# Patient Record
Sex: FEMALE | Race: WHITE | Employment: OTHER | URBAN - METROPOLITAN AREA
[De-identification: names, ages, dates, MRNs, and addresses within clinical notes are randomized per-mention and may not be internally consistent; named-entity substitution may affect disease eponyms.]

---

## 2017-01-07 ENCOUNTER — CARE COORDINATION (OUTPATIENT)
Dept: CASE MANAGEMENT | Age: 67
End: 2017-01-07

## 2017-01-08 ENCOUNTER — CARE COORDINATION (OUTPATIENT)
Dept: CASE MANAGEMENT | Age: 67
End: 2017-01-08

## 2017-01-09 ENCOUNTER — CARE COORDINATION (OUTPATIENT)
Dept: CASE MANAGEMENT | Age: 67
End: 2017-01-09

## 2017-01-10 ENCOUNTER — OFFICE VISIT (OUTPATIENT)
Dept: FAMILY MEDICINE CLINIC | Age: 67
End: 2017-01-10

## 2017-01-10 VITALS
WEIGHT: 200 LBS | SYSTOLIC BLOOD PRESSURE: 133 MMHG | BODY MASS INDEX: 33.28 KG/M2 | HEART RATE: 58 BPM | DIASTOLIC BLOOD PRESSURE: 82 MMHG | RESPIRATION RATE: 16 BRPM | TEMPERATURE: 97.9 F

## 2017-01-10 DIAGNOSIS — K44.9 HH (HIATUS HERNIA): ICD-10-CM

## 2017-01-10 DIAGNOSIS — I10 ESSENTIAL HYPERTENSION: Primary | ICD-10-CM

## 2017-01-10 PROCEDURE — G0444 DEPRESSION SCREEN ANNUAL: HCPCS | Performed by: FAMILY MEDICINE

## 2017-01-10 PROCEDURE — 99213 OFFICE O/P EST LOW 20 MIN: CPT | Performed by: FAMILY MEDICINE

## 2017-01-10 ASSESSMENT — PATIENT HEALTH QUESTIONNAIRE - PHQ9
9. THOUGHTS THAT YOU WOULD BE BETTER OFF DEAD, OR OF HURTING YOURSELF: 0
7. TROUBLE CONCENTRATING ON THINGS, SUCH AS READING THE NEWSPAPER OR WATCHING TELEVISION: 0
5. POOR APPETITE OR OVEREATING: 2
4. FEELING TIRED OR HAVING LITTLE ENERGY: 3
2. FEELING DOWN, DEPRESSED OR HOPELESS: 3
SUM OF ALL RESPONSES TO PHQ QUESTIONS 1-9: 14
SUM OF ALL RESPONSES TO PHQ9 QUESTIONS 1 & 2: 6
8. MOVING OR SPEAKING SO SLOWLY THAT OTHER PEOPLE COULD HAVE NOTICED. OR THE OPPOSITE, BEING SO FIGETY OR RESTLESS THAT YOU HAVE BEEN MOVING AROUND A LOT MORE THAN USUAL: 0
1. LITTLE INTEREST OR PLEASURE IN DOING THINGS: 3
10. IF YOU CHECKED OFF ANY PROBLEMS, HOW DIFFICULT HAVE THESE PROBLEMS MADE IT FOR YOU TO DO YOUR WORK, TAKE CARE OF THINGS AT HOME, OR GET ALONG WITH OTHER PEOPLE: 0
6. FEELING BAD ABOUT YOURSELF - OR THAT YOU ARE A FAILURE OR HAVE LET YOURSELF OR YOUR FAMILY DOWN: 0
3. TROUBLE FALLING OR STAYING ASLEEP: 3

## 2017-01-10 ASSESSMENT — ENCOUNTER SYMPTOMS
WHEEZING: 0
SHORTNESS OF BREATH: 0
COUGH: 0

## 2017-01-16 ENCOUNTER — CARE COORDINATION (OUTPATIENT)
Dept: CASE MANAGEMENT | Age: 67
End: 2017-01-16

## 2017-01-18 ENCOUNTER — OFFICE VISIT (OUTPATIENT)
Dept: CARDIOLOGY CLINIC | Age: 67
End: 2017-01-18

## 2017-01-18 VITALS
DIASTOLIC BLOOD PRESSURE: 80 MMHG | SYSTOLIC BLOOD PRESSURE: 134 MMHG | HEART RATE: 50 BPM | WEIGHT: 204 LBS | HEIGHT: 65 IN | BODY MASS INDEX: 33.99 KG/M2 | OXYGEN SATURATION: 97 %

## 2017-01-18 DIAGNOSIS — I25.118 CORONARY ARTERY DISEASE OF NATIVE ARTERY OF NATIVE HEART WITH STABLE ANGINA PECTORIS (HCC): ICD-10-CM

## 2017-01-18 DIAGNOSIS — I20.8 CARDIAC SYNDROME X (HCC): Primary | ICD-10-CM

## 2017-01-18 DIAGNOSIS — I10 HTN (HYPERTENSION), BENIGN: ICD-10-CM

## 2017-01-18 PROCEDURE — 99214 OFFICE O/P EST MOD 30 MIN: CPT | Performed by: INTERNAL MEDICINE

## 2017-01-19 ENCOUNTER — TELEPHONE (OUTPATIENT)
Dept: FAMILY MEDICINE CLINIC | Age: 67
End: 2017-01-19

## 2017-01-19 ENCOUNTER — CARE COORDINATION (OUTPATIENT)
Dept: CASE MANAGEMENT | Age: 67
End: 2017-01-19

## 2017-01-20 ENCOUNTER — CARE COORDINATION (OUTPATIENT)
Dept: CASE MANAGEMENT | Age: 67
End: 2017-01-20

## 2017-01-24 ENCOUNTER — CARE COORDINATION (OUTPATIENT)
Dept: FAMILY MEDICINE CLINIC | Age: 67
End: 2017-01-24

## 2017-02-01 ENCOUNTER — OFFICE VISIT (OUTPATIENT)
Dept: NEUROLOGY | Age: 67
End: 2017-02-01

## 2017-02-01 VITALS
OXYGEN SATURATION: 97 % | SYSTOLIC BLOOD PRESSURE: 139 MMHG | HEIGHT: 65 IN | BODY MASS INDEX: 33.82 KG/M2 | HEART RATE: 60 BPM | DIASTOLIC BLOOD PRESSURE: 78 MMHG | WEIGHT: 203 LBS

## 2017-02-01 DIAGNOSIS — G47.33 OSA (OBSTRUCTIVE SLEEP APNEA): ICD-10-CM

## 2017-02-01 DIAGNOSIS — D33.0 BENIGN NEOPLASM OF SUPRATENTORIAL REGION OF BRAIN (HCC): ICD-10-CM

## 2017-02-01 DIAGNOSIS — G43.109 MIGRAINE WITH AURA AND WITHOUT STATUS MIGRAINOSUS, NOT INTRACTABLE: ICD-10-CM

## 2017-02-01 DIAGNOSIS — G44.52 NEW PERSISTENT DAILY HEADACHE: Primary | ICD-10-CM

## 2017-02-01 DIAGNOSIS — I10 HTN (HYPERTENSION), BENIGN: ICD-10-CM

## 2017-02-01 PROCEDURE — 99214 OFFICE O/P EST MOD 30 MIN: CPT | Performed by: PSYCHIATRY & NEUROLOGY

## 2017-02-06 ENCOUNTER — OFFICE VISIT (OUTPATIENT)
Dept: FAMILY MEDICINE CLINIC | Age: 67
End: 2017-02-06

## 2017-02-06 VITALS
DIASTOLIC BLOOD PRESSURE: 87 MMHG | HEIGHT: 65 IN | RESPIRATION RATE: 16 BRPM | SYSTOLIC BLOOD PRESSURE: 140 MMHG | HEART RATE: 61 BPM | OXYGEN SATURATION: 99 % | BODY MASS INDEX: 33.45 KG/M2 | TEMPERATURE: 98 F | WEIGHT: 200.8 LBS

## 2017-02-06 DIAGNOSIS — J01.90 ACUTE SINUSITIS, RECURRENCE NOT SPECIFIED, UNSPECIFIED LOCATION: ICD-10-CM

## 2017-02-06 DIAGNOSIS — J06.9 UPPER RESPIRATORY TRACT INFECTION, UNSPECIFIED TYPE: Primary | ICD-10-CM

## 2017-02-06 PROCEDURE — G0444 DEPRESSION SCREEN ANNUAL: HCPCS | Performed by: NURSE PRACTITIONER

## 2017-02-06 PROCEDURE — 99213 OFFICE O/P EST LOW 20 MIN: CPT | Performed by: NURSE PRACTITIONER

## 2017-02-06 RX ORDER — AMOXICILLIN AND CLAVULANATE POTASSIUM 875; 125 MG/1; MG/1
1 TABLET, FILM COATED ORAL 2 TIMES DAILY
Qty: 20 TABLET | Refills: 0 | Status: SHIPPED | OUTPATIENT
Start: 2017-02-06 | End: 2017-03-29 | Stop reason: ALTCHOICE

## 2017-02-06 RX ORDER — DEXTROMETHORPHAN HYDROBROMIDE AND PROMETHAZINE HYDROCHLORIDE 15; 6.25 MG/5ML; MG/5ML
5 SYRUP ORAL 4 TIMES DAILY PRN
Qty: 240 ML | Refills: 0 | Status: SHIPPED | OUTPATIENT
Start: 2017-02-06 | End: 2017-03-03 | Stop reason: ALTCHOICE

## 2017-02-06 ASSESSMENT — PATIENT HEALTH QUESTIONNAIRE - PHQ9
6. FEELING BAD ABOUT YOURSELF - OR THAT YOU ARE A FAILURE OR HAVE LET YOURSELF OR YOUR FAMILY DOWN: 0
2. FEELING DOWN, DEPRESSED OR HOPELESS: 3
SUM OF ALL RESPONSES TO PHQ9 QUESTIONS 1 & 2: 3
SUM OF ALL RESPONSES TO PHQ QUESTIONS 1-9: 12
9. THOUGHTS THAT YOU WOULD BE BETTER OFF DEAD, OR OF HURTING YOURSELF: 0
4. FEELING TIRED OR HAVING LITTLE ENERGY: 3
10. IF YOU CHECKED OFF ANY PROBLEMS, HOW DIFFICULT HAVE THESE PROBLEMS MADE IT FOR YOU TO DO YOUR WORK, TAKE CARE OF THINGS AT HOME, OR GET ALONG WITH OTHER PEOPLE: 1
7. TROUBLE CONCENTRATING ON THINGS, SUCH AS READING THE NEWSPAPER OR WATCHING TELEVISION: 0
8. MOVING OR SPEAKING SO SLOWLY THAT OTHER PEOPLE COULD HAVE NOTICED. OR THE OPPOSITE, BEING SO FIGETY OR RESTLESS THAT YOU HAVE BEEN MOVING AROUND A LOT MORE THAN USUAL: 0
5. POOR APPETITE OR OVEREATING: 3
3. TROUBLE FALLING OR STAYING ASLEEP: 3

## 2017-02-06 ASSESSMENT — ENCOUNTER SYMPTOMS
SORE THROAT: 1
DIARRHEA: 0
WHEEZING: 0
SINUS PAIN: 1
PHOTOPHOBIA: 0
TROUBLE SWALLOWING: 0
BACK PAIN: 0
RHINORRHEA: 1
VOMITING: 0
ABDOMINAL PAIN: 0
SINUS PRESSURE: 1
SHORTNESS OF BREATH: 1
COUGH: 1
EYE REDNESS: 0
COLOR CHANGE: 0
EYE ITCHING: 0
EYE PAIN: 0
CHEST TIGHTNESS: 0
NAUSEA: 0

## 2017-02-08 ENCOUNTER — CARE COORDINATION (OUTPATIENT)
Dept: FAMILY MEDICINE CLINIC | Age: 67
End: 2017-02-08

## 2017-02-17 ENCOUNTER — OFFICE VISIT (OUTPATIENT)
Dept: FAMILY MEDICINE CLINIC | Age: 67
End: 2017-02-17

## 2017-02-17 VITALS
WEIGHT: 200 LBS | HEART RATE: 63 BPM | RESPIRATION RATE: 16 BRPM | TEMPERATURE: 98 F | HEIGHT: 65 IN | OXYGEN SATURATION: 98 % | DIASTOLIC BLOOD PRESSURE: 82 MMHG | BODY MASS INDEX: 33.32 KG/M2 | SYSTOLIC BLOOD PRESSURE: 140 MMHG

## 2017-02-17 DIAGNOSIS — M62.830 BACK MUSCLE SPASM: ICD-10-CM

## 2017-02-17 DIAGNOSIS — E03.9 HYPOTHYROIDISM, UNSPECIFIED TYPE: ICD-10-CM

## 2017-02-17 DIAGNOSIS — L29.9 ITCHING: ICD-10-CM

## 2017-02-17 DIAGNOSIS — M54.41 ACUTE RIGHT-SIDED LOW BACK PAIN WITH RIGHT-SIDED SCIATICA: Primary | ICD-10-CM

## 2017-02-17 LAB
A/G RATIO: 1.4 (ref 1.1–2.2)
ALBUMIN SERPL-MCNC: 4.1 G/DL (ref 3.4–5)
ALP BLD-CCNC: 132 U/L (ref 40–129)
ALT SERPL-CCNC: 9 U/L (ref 10–40)
ANION GAP SERPL CALCULATED.3IONS-SCNC: 15 MMOL/L (ref 3–16)
AST SERPL-CCNC: 12 U/L (ref 15–37)
BILIRUB SERPL-MCNC: 0.4 MG/DL (ref 0–1)
BUN BLDV-MCNC: 14 MG/DL (ref 7–20)
CALCIUM SERPL-MCNC: 9.3 MG/DL (ref 8.3–10.6)
CHLORIDE BLD-SCNC: 112 MMOL/L (ref 99–110)
CO2: 20 MMOL/L (ref 21–32)
CREAT SERPL-MCNC: 0.8 MG/DL (ref 0.6–1.2)
GFR AFRICAN AMERICAN: >60
GFR NON-AFRICAN AMERICAN: >60
GLOBULIN: 2.9 G/DL
GLUCOSE BLD-MCNC: 100 MG/DL (ref 70–99)
POTASSIUM SERPL-SCNC: 4.3 MMOL/L (ref 3.5–5.1)
SODIUM BLD-SCNC: 147 MMOL/L (ref 136–145)
T4 FREE: 2.1 NG/DL (ref 0.9–1.8)
TOTAL PROTEIN: 7 G/DL (ref 6.4–8.2)
TSH REFLEX FT4: <0.01 UIU/ML (ref 0.27–4.2)

## 2017-02-17 PROCEDURE — 99214 OFFICE O/P EST MOD 30 MIN: CPT | Performed by: NURSE PRACTITIONER

## 2017-02-17 RX ORDER — BACLOFEN 10 MG/1
10 TABLET ORAL 2 TIMES DAILY
Qty: 30 TABLET | Refills: 0 | Status: SHIPPED | OUTPATIENT
Start: 2017-02-17 | End: 2017-02-27

## 2017-02-17 RX ORDER — ETODOLAC 400 MG/1
400 TABLET, FILM COATED ORAL 2 TIMES DAILY
Qty: 30 TABLET | Refills: 0 | Status: SHIPPED | OUTPATIENT
Start: 2017-02-17 | End: 2017-03-03

## 2017-02-17 ASSESSMENT — ENCOUNTER SYMPTOMS
NAUSEA: 0
ABDOMINAL PAIN: 0
BACK PAIN: 1
CHEST TIGHTNESS: 0
COLOR CHANGE: 0
COUGH: 0
SHORTNESS OF BREATH: 0
CONSTIPATION: 0
WHEEZING: 0
BOWEL INCONTINENCE: 0
DIARRHEA: 0
EYES NEGATIVE: 1

## 2017-02-20 ENCOUNTER — TELEPHONE (OUTPATIENT)
Dept: FAMILY MEDICINE CLINIC | Age: 67
End: 2017-02-20

## 2017-02-20 DIAGNOSIS — E03.9 HYPOTHYROIDISM, UNSPECIFIED TYPE: Primary | ICD-10-CM

## 2017-02-20 RX ORDER — LEVOTHYROXINE SODIUM 0.12 MG/1
125 TABLET ORAL DAILY
Qty: 30 TABLET | Refills: 3 | Status: SHIPPED | OUTPATIENT
Start: 2017-02-20 | End: 2017-06-19 | Stop reason: DRUGHIGH

## 2017-03-03 ENCOUNTER — HOSPITAL ENCOUNTER (OUTPATIENT)
Dept: OTHER | Age: 67
Discharge: HOME | End: 2017-03-03
Attending: INTERNAL MEDICINE | Admitting: INTERNAL MEDICINE

## 2017-03-03 VITALS
TEMPERATURE: 97 F | HEART RATE: 50 BPM | DIASTOLIC BLOOD PRESSURE: 82 MMHG | OXYGEN SATURATION: 96 % | SYSTOLIC BLOOD PRESSURE: 114 MMHG | BODY MASS INDEX: 33.32 KG/M2 | WEIGHT: 200 LBS | RESPIRATION RATE: 18 BRPM | HEIGHT: 65 IN

## 2017-03-03 RX ORDER — SODIUM CHLORIDE, SODIUM LACTATE, POTASSIUM CHLORIDE, CALCIUM CHLORIDE 600; 310; 30; 20 MG/100ML; MG/100ML; MG/100ML; MG/100ML
INJECTION, SOLUTION INTRAVENOUS CONTINUOUS
Status: DISCONTINUED | OUTPATIENT
Start: 2017-03-03 | End: 2017-03-04 | Stop reason: HOSPADM

## 2017-03-03 ASSESSMENT — PAIN SCALES - GENERAL
PAINLEVEL_OUTOF10: 0

## 2017-03-03 ASSESSMENT — PAIN - FUNCTIONAL ASSESSMENT: PAIN_FUNCTIONAL_ASSESSMENT: 0-10

## 2017-03-20 RX ORDER — PANTOPRAZOLE SODIUM 40 MG/1
40 TABLET, DELAYED RELEASE ORAL 2 TIMES DAILY
Qty: 60 TABLET | Refills: 3 | Status: SHIPPED | OUTPATIENT
Start: 2017-03-20 | End: 2017-08-30 | Stop reason: SDUPTHER

## 2017-03-22 ENCOUNTER — CARE COORDINATION (OUTPATIENT)
Dept: CASE MANAGEMENT | Age: 67
End: 2017-03-22

## 2017-03-22 RX ORDER — ROSUVASTATIN CALCIUM 5 MG/1
5 TABLET, COATED ORAL DAILY
Qty: 90 TABLET | Refills: 3 | Status: SHIPPED | OUTPATIENT
Start: 2017-03-22 | End: 2018-04-11 | Stop reason: SDUPTHER

## 2017-03-27 ENCOUNTER — CARE COORDINATION (OUTPATIENT)
Dept: CASE MANAGEMENT | Age: 67
End: 2017-03-27

## 2017-03-29 ENCOUNTER — HOSPITAL ENCOUNTER (OUTPATIENT)
Dept: GENERAL RADIOLOGY | Age: 67
Discharge: HOME | End: 2017-03-29

## 2017-03-29 ENCOUNTER — OFFICE VISIT (OUTPATIENT)
Dept: FAMILY MEDICINE CLINIC | Age: 67
End: 2017-03-29

## 2017-03-29 VITALS
SYSTOLIC BLOOD PRESSURE: 152 MMHG | BODY MASS INDEX: 33.28 KG/M2 | HEART RATE: 52 BPM | RESPIRATION RATE: 16 BRPM | DIASTOLIC BLOOD PRESSURE: 92 MMHG | TEMPERATURE: 98 F | WEIGHT: 200 LBS

## 2017-03-29 DIAGNOSIS — M54.41 CHRONIC RIGHT-SIDED LOW BACK PAIN WITH RIGHT-SIDED SCIATICA: ICD-10-CM

## 2017-03-29 DIAGNOSIS — G89.29 CHRONIC RIGHT-SIDED LOW BACK PAIN WITH RIGHT-SIDED SCIATICA: ICD-10-CM

## 2017-03-29 DIAGNOSIS — M54.41 CHRONIC RIGHT-SIDED LOW BACK PAIN WITH RIGHT-SIDED SCIATICA: Primary | ICD-10-CM

## 2017-03-29 DIAGNOSIS — G89.29 CHRONIC RIGHT-SIDED LOW BACK PAIN WITH RIGHT-SIDED SCIATICA: Primary | ICD-10-CM

## 2017-03-29 PROCEDURE — G0444 DEPRESSION SCREEN ANNUAL: HCPCS | Performed by: FAMILY MEDICINE

## 2017-03-29 PROCEDURE — 99213 OFFICE O/P EST LOW 20 MIN: CPT | Performed by: FAMILY MEDICINE

## 2017-03-29 RX ORDER — CYCLOBENZAPRINE HCL 10 MG
10 TABLET ORAL 3 TIMES DAILY PRN
COMMUNITY
End: 2017-07-25

## 2017-03-29 ASSESSMENT — ENCOUNTER SYMPTOMS: BACK PAIN: 1

## 2017-03-29 ASSESSMENT — PATIENT HEALTH QUESTIONNAIRE - PHQ9
10. IF YOU CHECKED OFF ANY PROBLEMS, HOW DIFFICULT HAVE THESE PROBLEMS MADE IT FOR YOU TO DO YOUR WORK, TAKE CARE OF THINGS AT HOME, OR GET ALONG WITH OTHER PEOPLE: 0
2. FEELING DOWN, DEPRESSED OR HOPELESS: 3
SUM OF ALL RESPONSES TO PHQ QUESTIONS 1-9: 15
5. POOR APPETITE OR OVEREATING: 2
3. TROUBLE FALLING OR STAYING ASLEEP: 3
8. MOVING OR SPEAKING SO SLOWLY THAT OTHER PEOPLE COULD HAVE NOTICED. OR THE OPPOSITE, BEING SO FIGETY OR RESTLESS THAT YOU HAVE BEEN MOVING AROUND A LOT MORE THAN USUAL: 0
6. FEELING BAD ABOUT YOURSELF - OR THAT YOU ARE A FAILURE OR HAVE LET YOURSELF OR YOUR FAMILY DOWN: 0
1. LITTLE INTEREST OR PLEASURE IN DOING THINGS: 3
9. THOUGHTS THAT YOU WOULD BE BETTER OFF DEAD, OR OF HURTING YOURSELF: 0
7. TROUBLE CONCENTRATING ON THINGS, SUCH AS READING THE NEWSPAPER OR WATCHING TELEVISION: 1
SUM OF ALL RESPONSES TO PHQ9 QUESTIONS 1 & 2: 6
4. FEELING TIRED OR HAVING LITTLE ENERGY: 3

## 2017-03-30 ENCOUNTER — TELEPHONE (OUTPATIENT)
Dept: FAMILY MEDICINE CLINIC | Age: 67
End: 2017-03-30

## 2017-03-30 DIAGNOSIS — M54.16 RIGHT LUMBAR RADICULITIS: Primary | ICD-10-CM

## 2017-03-31 ENCOUNTER — CARE COORDINATION (OUTPATIENT)
Dept: CASE MANAGEMENT | Age: 67
End: 2017-03-31

## 2017-04-01 ENCOUNTER — CARE COORDINATION (OUTPATIENT)
Dept: CASE MANAGEMENT | Age: 67
End: 2017-04-01

## 2017-04-01 DIAGNOSIS — G43.109 MIGRAINE WITH AURA AND WITHOUT STATUS MIGRAINOSUS, NOT INTRACTABLE: ICD-10-CM

## 2017-04-01 DIAGNOSIS — G44.52 NEW PERSISTENT DAILY HEADACHE: ICD-10-CM

## 2017-04-03 RX ORDER — TOPIRAMATE 25 MG/1
TABLET ORAL
Qty: 120 TABLET | Refills: 3 | Status: SHIPPED | OUTPATIENT
Start: 2017-04-03 | End: 2017-07-26 | Stop reason: SDUPTHER

## 2017-04-06 ENCOUNTER — HOSPITAL ENCOUNTER (OUTPATIENT)
Dept: MRI IMAGING | Age: 67
Discharge: HOME | End: 2017-04-06
Attending: FAMILY MEDICINE | Admitting: FAMILY MEDICINE

## 2017-04-06 ENCOUNTER — HOSPITAL ENCOUNTER (OUTPATIENT)
Dept: MRI IMAGING | Age: 67
Discharge: HOME | End: 2017-04-06
Attending: NEUROLOGICAL SURGERY | Admitting: NEUROLOGICAL SURGERY

## 2017-04-06 DIAGNOSIS — D32.9 MENINGIOMA (HCC): ICD-10-CM

## 2017-04-06 DIAGNOSIS — M54.16 RIGHT LUMBAR RADICULITIS: ICD-10-CM

## 2017-04-06 DIAGNOSIS — M54.16 RADICULOPATHY OF LUMBAR REGION: ICD-10-CM

## 2017-04-06 DIAGNOSIS — D32.9 BENIGN NEOPLASM OF MENINGES (HCC): ICD-10-CM

## 2017-04-06 LAB
ALBUMIN SERPL-MCNC: 4 G/DL (ref 3.4–5)
ANION GAP SERPL CALCULATED.3IONS-SCNC: 10 MMOL/L (ref 3–16)
BUN BLDV-MCNC: 12 MG/DL (ref 7–20)
CALCIUM SERPL-MCNC: 8.9 MG/DL (ref 8.3–10.6)
CHLORIDE BLD-SCNC: 102 MMOL/L (ref 99–110)
CO2: 24 MMOL/L (ref 21–32)
CREAT SERPL-MCNC: 0.7 MG/DL (ref 0.6–1.2)
GFR AFRICAN AMERICAN: >60
GFR NON-AFRICAN AMERICAN: >60
GLUCOSE BLD-MCNC: 97 MG/DL (ref 70–99)
PHOSPHORUS: 3.5 MG/DL (ref 2.5–4.9)
POTASSIUM SERPL-SCNC: 3.5 MMOL/L (ref 3.5–5.1)
SODIUM BLD-SCNC: 136 MMOL/L (ref 136–145)

## 2017-04-10 ENCOUNTER — CARE COORDINATION (OUTPATIENT)
Dept: FAMILY MEDICINE CLINIC | Age: 67
End: 2017-04-10

## 2017-04-10 ENCOUNTER — TELEPHONE (OUTPATIENT)
Dept: FAMILY MEDICINE CLINIC | Age: 67
End: 2017-04-10

## 2017-04-10 DIAGNOSIS — M51.16 LUMBAR DISC DISEASE WITH RADICULOPATHY: Primary | ICD-10-CM

## 2017-04-11 ENCOUNTER — TELEPHONE (OUTPATIENT)
Dept: FAMILY MEDICINE CLINIC | Age: 67
End: 2017-04-11

## 2017-04-11 RX ORDER — TRAMADOL HYDROCHLORIDE 50 MG/1
50 TABLET ORAL EVERY 8 HOURS PRN
Qty: 30 TABLET | Refills: 1 | Status: SHIPPED | OUTPATIENT
Start: 2017-04-11 | End: 2018-04-11 | Stop reason: ALTCHOICE

## 2017-04-18 RX ORDER — SUCRALFATE 1 G/1
1 TABLET ORAL 4 TIMES DAILY
Qty: 120 TABLET | Refills: 3 | Status: SHIPPED | OUTPATIENT
Start: 2017-04-18 | End: 2018-01-02 | Stop reason: SDUPTHER

## 2017-04-20 ENCOUNTER — TELEPHONE (OUTPATIENT)
Dept: FAMILY MEDICINE CLINIC | Age: 67
End: 2017-04-20

## 2017-04-25 ENCOUNTER — TELEPHONE (OUTPATIENT)
Dept: FAMILY MEDICINE CLINIC | Age: 67
End: 2017-04-25

## 2017-04-25 RX ORDER — METHYLPHENIDATE HYDROCHLORIDE 10 MG/1
10 TABLET ORAL 2 TIMES DAILY
Qty: 60 TABLET | Refills: 0 | Status: SHIPPED | OUTPATIENT
Start: 2017-04-25 | End: 2017-06-05 | Stop reason: SDUPTHER

## 2017-05-01 ENCOUNTER — OFFICE VISIT (OUTPATIENT)
Dept: ORTHOPEDIC SURGERY | Age: 67
End: 2017-05-01

## 2017-05-01 VITALS
SYSTOLIC BLOOD PRESSURE: 173 MMHG | HEART RATE: 54 BPM | BODY MASS INDEX: 33.32 KG/M2 | WEIGHT: 200 LBS | HEIGHT: 65 IN | DIASTOLIC BLOOD PRESSURE: 94 MMHG

## 2017-05-01 DIAGNOSIS — M51.36 LUMBAR DEGENERATIVE DISC DISEASE: Primary | ICD-10-CM

## 2017-05-01 PROCEDURE — 99213 OFFICE O/P EST LOW 20 MIN: CPT | Performed by: ORTHOPAEDIC SURGERY

## 2017-05-03 ENCOUNTER — OFFICE VISIT (OUTPATIENT)
Dept: ORTHOPEDIC SURGERY | Age: 67
End: 2017-05-03

## 2017-05-03 ENCOUNTER — TELEPHONE (OUTPATIENT)
Dept: ORTHOPEDIC SURGERY | Age: 67
End: 2017-05-03

## 2017-05-03 VITALS
HEIGHT: 65 IN | BODY MASS INDEX: 33.31 KG/M2 | SYSTOLIC BLOOD PRESSURE: 132 MMHG | HEART RATE: 78 BPM | DIASTOLIC BLOOD PRESSURE: 88 MMHG | WEIGHT: 199.96 LBS

## 2017-05-03 DIAGNOSIS — M51.36 DDD (DEGENERATIVE DISC DISEASE), LUMBAR: Primary | ICD-10-CM

## 2017-05-03 DIAGNOSIS — M54.16 LUMBAR RADICULITIS: ICD-10-CM

## 2017-05-03 PROCEDURE — 99214 OFFICE O/P EST MOD 30 MIN: CPT | Performed by: PHYSICIAN ASSISTANT

## 2017-05-09 ENCOUNTER — HOSPITAL ENCOUNTER (OUTPATIENT)
Dept: PAIN MANAGEMENT | Age: 67
Discharge: HOME | End: 2017-05-09
Attending: PHYSICAL MEDICINE & REHABILITATION | Admitting: PHYSICAL MEDICINE & REHABILITATION

## 2017-05-09 VITALS
WEIGHT: 199 LBS | HEIGHT: 64 IN | OXYGEN SATURATION: 100 % | BODY MASS INDEX: 33.97 KG/M2 | HEART RATE: 93 BPM | SYSTOLIC BLOOD PRESSURE: 160 MMHG | RESPIRATION RATE: 16 BRPM | TEMPERATURE: 97.3 F | DIASTOLIC BLOOD PRESSURE: 89 MMHG

## 2017-05-09 ASSESSMENT — ACTIVITIES OF DAILY LIVING (ADL): EFFECT OF PAIN ON DAILY ACTIVITIES: WALKING AND STANDING

## 2017-05-09 ASSESSMENT — PAIN DESCRIPTION - DESCRIPTORS: DESCRIPTORS: ACHING

## 2017-05-09 ASSESSMENT — PAIN - FUNCTIONAL ASSESSMENT: PAIN_FUNCTIONAL_ASSESSMENT: 0-10

## 2017-05-15 RX ORDER — ISOSORBIDE MONONITRATE 30 MG/1
TABLET, EXTENDED RELEASE ORAL
Qty: 30 TABLET | Refills: 11 | Status: SHIPPED | OUTPATIENT
Start: 2017-05-15 | End: 2018-09-21 | Stop reason: SDUPTHER

## 2017-05-17 ENCOUNTER — CARE COORDINATION (OUTPATIENT)
Dept: FAMILY MEDICINE CLINIC | Age: 67
End: 2017-05-17

## 2017-05-23 ENCOUNTER — CARE COORDINATOR VISIT (OUTPATIENT)
Dept: FAMILY MEDICINE CLINIC | Age: 67
End: 2017-05-23

## 2017-05-23 ENCOUNTER — OFFICE VISIT (OUTPATIENT)
Dept: FAMILY MEDICINE CLINIC | Age: 67
End: 2017-05-23

## 2017-05-23 VITALS
HEART RATE: 55 BPM | RESPIRATION RATE: 16 BRPM | WEIGHT: 193 LBS | DIASTOLIC BLOOD PRESSURE: 94 MMHG | BODY MASS INDEX: 33.13 KG/M2 | TEMPERATURE: 98.2 F | SYSTOLIC BLOOD PRESSURE: 168 MMHG

## 2017-05-23 DIAGNOSIS — M54.16 LUMBAR RADICULITIS: ICD-10-CM

## 2017-05-23 DIAGNOSIS — I10 ESSENTIAL HYPERTENSION: Primary | ICD-10-CM

## 2017-05-23 PROCEDURE — 99213 OFFICE O/P EST LOW 20 MIN: CPT | Performed by: FAMILY MEDICINE

## 2017-05-23 RX ORDER — LISINOPRIL 10 MG/1
10 TABLET ORAL DAILY
Qty: 30 TABLET | Refills: 3 | Status: SHIPPED | OUTPATIENT
Start: 2017-05-23 | End: 2018-04-19 | Stop reason: ALTCHOICE

## 2017-05-23 ASSESSMENT — ENCOUNTER SYMPTOMS
COUGH: 0
SHORTNESS OF BREATH: 1
WHEEZING: 0

## 2017-05-28 ASSESSMENT — ENCOUNTER SYMPTOMS
BLOOD IN STOOL: 0
BACK PAIN: 1
ABDOMINAL PAIN: 0
PHOTOPHOBIA: 0

## 2017-05-30 ENCOUNTER — OFFICE VISIT (OUTPATIENT)
Dept: ORTHOPEDIC SURGERY | Age: 67
End: 2017-05-30

## 2017-05-30 VITALS
HEART RATE: 59 BPM | SYSTOLIC BLOOD PRESSURE: 133 MMHG | BODY MASS INDEX: 32.93 KG/M2 | HEIGHT: 64 IN | DIASTOLIC BLOOD PRESSURE: 72 MMHG | WEIGHT: 192.9 LBS

## 2017-05-30 DIAGNOSIS — M48.061 LUMBAR STENOSIS: Primary | ICD-10-CM

## 2017-05-30 DIAGNOSIS — M54.16 LUMBAR RADICULITIS: ICD-10-CM

## 2017-05-30 DIAGNOSIS — M51.36 DDD (DEGENERATIVE DISC DISEASE), LUMBAR: ICD-10-CM

## 2017-05-30 PROCEDURE — 99214 OFFICE O/P EST MOD 30 MIN: CPT | Performed by: PHYSICIAN ASSISTANT

## 2017-06-02 ENCOUNTER — CARE COORDINATION (OUTPATIENT)
Dept: FAMILY MEDICINE CLINIC | Age: 67
End: 2017-06-02

## 2017-06-05 ENCOUNTER — TELEPHONE (OUTPATIENT)
Dept: FAMILY MEDICINE CLINIC | Age: 67
End: 2017-06-05

## 2017-06-05 RX ORDER — METHYLPHENIDATE HYDROCHLORIDE 10 MG/1
10 TABLET ORAL 2 TIMES DAILY
Qty: 60 TABLET | Refills: 0 | Status: SHIPPED | OUTPATIENT
Start: 2017-06-05 | End: 2017-07-05 | Stop reason: SDUPTHER

## 2017-06-06 ENCOUNTER — OFFICE VISIT (OUTPATIENT)
Dept: ORTHOPEDIC SURGERY | Age: 67
End: 2017-06-06

## 2017-06-06 DIAGNOSIS — M25.551 PAIN OF RIGHT HIP JOINT: ICD-10-CM

## 2017-06-06 DIAGNOSIS — M70.61 GREATER TROCHANTERIC BURSITIS OF RIGHT HIP: Primary | ICD-10-CM

## 2017-06-06 PROCEDURE — 20610 DRAIN/INJ JOINT/BURSA W/O US: CPT | Performed by: ORTHOPAEDIC SURGERY

## 2017-06-06 PROCEDURE — 73502 X-RAY EXAM HIP UNI 2-3 VIEWS: CPT | Performed by: ORTHOPAEDIC SURGERY

## 2017-06-06 PROCEDURE — 99214 OFFICE O/P EST MOD 30 MIN: CPT | Performed by: ORTHOPAEDIC SURGERY

## 2017-06-09 ENCOUNTER — TELEPHONE (OUTPATIENT)
Dept: FAMILY MEDICINE CLINIC | Age: 67
End: 2017-06-09

## 2017-06-09 DIAGNOSIS — K92.1 BLACK TARRY STOOLS: ICD-10-CM

## 2017-06-09 DIAGNOSIS — K92.1 BLACK TARRY STOOLS: Primary | ICD-10-CM

## 2017-06-09 DIAGNOSIS — E03.9 HYPOTHYROIDISM, UNSPECIFIED TYPE: ICD-10-CM

## 2017-06-09 LAB
BASOPHILS ABSOLUTE: 0 K/UL (ref 0–0.2)
BASOPHILS RELATIVE PERCENT: 0.3 %
EOSINOPHILS ABSOLUTE: 0 K/UL (ref 0–0.6)
EOSINOPHILS RELATIVE PERCENT: 0.9 %
HCT VFR BLD CALC: 33 % (ref 36–48)
HEMOGLOBIN: 10.3 G/DL (ref 12–16)
LYMPHOCYTES ABSOLUTE: 2.1 K/UL (ref 1–5.1)
LYMPHOCYTES RELATIVE PERCENT: 39.7 %
MCH RBC QN AUTO: 26.7 PG (ref 26–34)
MCHC RBC AUTO-ENTMCNC: 31.4 G/DL (ref 31–36)
MCV RBC AUTO: 85.1 FL (ref 80–100)
MONOCYTES ABSOLUTE: 0.3 K/UL (ref 0–1.3)
MONOCYTES RELATIVE PERCENT: 5.8 %
NEUTROPHILS ABSOLUTE: 2.8 K/UL (ref 1.7–7.7)
NEUTROPHILS RELATIVE PERCENT: 53.3 %
PDW BLD-RTO: 17.8 % (ref 12.4–15.4)
PLATELET # BLD: 147 K/UL (ref 135–450)
PMV BLD AUTO: 8.3 FL (ref 5–10.5)
RBC # BLD: 3.87 M/UL (ref 4–5.2)
T4 FREE: 1.8 NG/DL (ref 0.9–1.8)
TSH REFLEX FT4: <0.01 UIU/ML (ref 0.27–4.2)
WBC # BLD: 5.2 K/UL (ref 4–11)

## 2017-06-12 ENCOUNTER — TELEPHONE (OUTPATIENT)
Dept: FAMILY MEDICINE CLINIC | Age: 67
End: 2017-06-12

## 2017-06-19 ENCOUNTER — TELEPHONE (OUTPATIENT)
Dept: FAMILY MEDICINE CLINIC | Age: 67
End: 2017-06-19

## 2017-06-19 RX ORDER — LEVOTHYROXINE SODIUM 112 UG/1
112 TABLET ORAL DAILY
Qty: 30 TABLET | Refills: 5 | Status: SHIPPED | OUTPATIENT
Start: 2017-06-19 | End: 2017-12-18 | Stop reason: SDUPTHER

## 2017-06-28 ENCOUNTER — OFFICE VISIT (OUTPATIENT)
Dept: FAMILY MEDICINE CLINIC | Age: 67
End: 2017-06-28

## 2017-06-28 VITALS
BODY MASS INDEX: 34.6 KG/M2 | RESPIRATION RATE: 16 BRPM | DIASTOLIC BLOOD PRESSURE: 88 MMHG | HEIGHT: 62 IN | WEIGHT: 188 LBS | HEART RATE: 55 BPM | SYSTOLIC BLOOD PRESSURE: 122 MMHG | TEMPERATURE: 98 F

## 2017-06-28 DIAGNOSIS — M51.36 DDD (DEGENERATIVE DISC DISEASE), LUMBAR: Primary | ICD-10-CM

## 2017-06-28 PROCEDURE — 99213 OFFICE O/P EST LOW 20 MIN: CPT | Performed by: FAMILY MEDICINE

## 2017-07-05 RX ORDER — METHYLPHENIDATE HYDROCHLORIDE 10 MG/1
10 TABLET ORAL 2 TIMES DAILY
Qty: 60 TABLET | Refills: 0 | Status: SHIPPED | OUTPATIENT
Start: 2017-07-05 | End: 2017-08-04 | Stop reason: SDUPTHER

## 2017-07-24 ENCOUNTER — OFFICE VISIT (OUTPATIENT)
Dept: CARDIOLOGY CLINIC | Age: 67
End: 2017-07-24

## 2017-07-24 VITALS
OXYGEN SATURATION: 98 % | WEIGHT: 189 LBS | BODY MASS INDEX: 34.78 KG/M2 | HEIGHT: 62 IN | SYSTOLIC BLOOD PRESSURE: 136 MMHG | DIASTOLIC BLOOD PRESSURE: 82 MMHG | HEART RATE: 52 BPM

## 2017-07-24 DIAGNOSIS — I10 ESSENTIAL HYPERTENSION: ICD-10-CM

## 2017-07-24 DIAGNOSIS — E78.2 MIXED HYPERLIPIDEMIA: ICD-10-CM

## 2017-07-24 DIAGNOSIS — I20.8 CARDIAC SYNDROME X (HCC): Primary | ICD-10-CM

## 2017-07-24 PROCEDURE — 99214 OFFICE O/P EST MOD 30 MIN: CPT | Performed by: NURSE PRACTITIONER

## 2017-07-25 ENCOUNTER — TELEPHONE (OUTPATIENT)
Dept: CARDIOLOGY CLINIC | Age: 67
End: 2017-07-25

## 2017-07-26 ENCOUNTER — CARE COORDINATION (OUTPATIENT)
Dept: FAMILY MEDICINE CLINIC | Age: 67
End: 2017-07-26

## 2017-07-26 ENCOUNTER — OFFICE VISIT (OUTPATIENT)
Dept: NEUROLOGY | Age: 67
End: 2017-07-26

## 2017-07-26 ENCOUNTER — CARE COORDINATION (OUTPATIENT)
Dept: CARE COORDINATION | Age: 67
End: 2017-07-26

## 2017-07-26 VITALS
DIASTOLIC BLOOD PRESSURE: 76 MMHG | OXYGEN SATURATION: 97 % | HEIGHT: 65 IN | BODY MASS INDEX: 31.82 KG/M2 | HEART RATE: 66 BPM | WEIGHT: 191 LBS | SYSTOLIC BLOOD PRESSURE: 136 MMHG

## 2017-07-26 DIAGNOSIS — G44.221 CHRONIC TENSION-TYPE HEADACHE, INTRACTABLE: ICD-10-CM

## 2017-07-26 DIAGNOSIS — I10 HTN (HYPERTENSION), BENIGN: ICD-10-CM

## 2017-07-26 DIAGNOSIS — D33.0 BENIGN NEOPLASM OF SUPRATENTORIAL REGION OF BRAIN (HCC): ICD-10-CM

## 2017-07-26 DIAGNOSIS — G47.33 OSA (OBSTRUCTIVE SLEEP APNEA): ICD-10-CM

## 2017-07-26 DIAGNOSIS — G43.119 INTRACTABLE MIGRAINE WITH AURA WITHOUT STATUS MIGRAINOSUS: Primary | ICD-10-CM

## 2017-07-26 DIAGNOSIS — E78.5 DYSLIPIDEMIA: ICD-10-CM

## 2017-07-26 PROCEDURE — 99214 OFFICE O/P EST MOD 30 MIN: CPT | Performed by: PSYCHIATRY & NEUROLOGY

## 2017-07-26 RX ORDER — TOPIRAMATE 25 MG/1
75 TABLET ORAL 2 TIMES DAILY
Qty: 180 TABLET | Refills: 5 | Status: SHIPPED | OUTPATIENT
Start: 2017-07-26 | End: 2017-11-01 | Stop reason: SDUPTHER

## 2017-08-03 ENCOUNTER — TELEPHONE (OUTPATIENT)
Dept: FAMILY MEDICINE CLINIC | Age: 67
End: 2017-08-03

## 2017-08-04 RX ORDER — METHYLPHENIDATE HYDROCHLORIDE 10 MG/1
10 TABLET ORAL 2 TIMES DAILY
Qty: 60 TABLET | Refills: 0 | Status: SHIPPED | OUTPATIENT
Start: 2017-08-04 | End: 2017-09-03

## 2017-08-07 ENCOUNTER — TELEPHONE (OUTPATIENT)
Dept: CARDIOLOGY CLINIC | Age: 67
End: 2017-08-07

## 2017-08-09 ENCOUNTER — TELEPHONE (OUTPATIENT)
Dept: CARDIOLOGY CLINIC | Age: 67
End: 2017-08-09

## 2017-08-15 ENCOUNTER — TELEPHONE (OUTPATIENT)
Dept: FAMILY MEDICINE CLINIC | Age: 67
End: 2017-08-15

## 2017-08-30 ENCOUNTER — CARE COORDINATION (OUTPATIENT)
Dept: FAMILY MEDICINE CLINIC | Age: 67
End: 2017-08-30

## 2017-08-30 ENCOUNTER — OFFICE VISIT (OUTPATIENT)
Dept: FAMILY MEDICINE CLINIC | Age: 67
End: 2017-08-30

## 2017-08-30 VITALS
HEART RATE: 47 BPM | RESPIRATION RATE: 16 BRPM | BODY MASS INDEX: 31.45 KG/M2 | WEIGHT: 189 LBS | SYSTOLIC BLOOD PRESSURE: 160 MMHG | DIASTOLIC BLOOD PRESSURE: 90 MMHG | TEMPERATURE: 97.7 F

## 2017-08-30 DIAGNOSIS — Z13.820 OSTEOPOROSIS SCREENING: ICD-10-CM

## 2017-08-30 DIAGNOSIS — I10 ESSENTIAL HYPERTENSION: ICD-10-CM

## 2017-08-30 DIAGNOSIS — Z12.31 ENCOUNTER FOR SCREENING MAMMOGRAM FOR BREAST CANCER: ICD-10-CM

## 2017-08-30 DIAGNOSIS — Z12.11 SCREENING FOR COLON CANCER: ICD-10-CM

## 2017-08-30 DIAGNOSIS — G47.419 CONTROLLED NARCOLEPSY: ICD-10-CM

## 2017-08-30 DIAGNOSIS — G47.33 OBSTRUCTIVE SLEEP APNEA SYNDROME: Primary | ICD-10-CM

## 2017-08-30 PROCEDURE — 99214 OFFICE O/P EST MOD 30 MIN: CPT | Performed by: FAMILY MEDICINE

## 2017-08-30 RX ORDER — LOSARTAN POTASSIUM 100 MG/1
100 TABLET ORAL DAILY
Qty: 30 TABLET | Refills: 5 | Status: SHIPPED | OUTPATIENT
Start: 2017-08-30 | End: 2018-03-06 | Stop reason: SDUPTHER

## 2017-08-30 RX ORDER — RANOLAZINE 500 MG/1
500 TABLET, EXTENDED RELEASE ORAL 2 TIMES DAILY
COMMUNITY
End: 2018-12-17 | Stop reason: SDUPTHER

## 2017-08-30 RX ORDER — PANTOPRAZOLE SODIUM 40 MG/1
40 TABLET, DELAYED RELEASE ORAL 2 TIMES DAILY
Qty: 60 TABLET | Refills: 5 | Status: SHIPPED | OUTPATIENT
Start: 2017-08-30 | End: 2018-03-12 | Stop reason: SDUPTHER

## 2017-08-30 RX ORDER — LISINOPRIL 10 MG/1
10 TABLET ORAL DAILY
Qty: 30 TABLET | Refills: 3 | Status: CANCELLED | OUTPATIENT
Start: 2017-08-30

## 2017-08-30 ASSESSMENT — ENCOUNTER SYMPTOMS
ABDOMINAL PAIN: 0
SORE THROAT: 0
RHINORRHEA: 0
TROUBLE SWALLOWING: 0
DIARRHEA: 0
SHORTNESS OF BREATH: 0
WHEEZING: 0
COUGH: 1
CONSTIPATION: 0

## 2017-09-04 ASSESSMENT — ENCOUNTER SYMPTOMS: PHOTOPHOBIA: 0

## 2017-09-06 ENCOUNTER — OFFICE VISIT (OUTPATIENT)
Dept: CARDIOLOGY CLINIC | Age: 67
End: 2017-09-06

## 2017-09-06 VITALS
WEIGHT: 190 LBS | SYSTOLIC BLOOD PRESSURE: 132 MMHG | DIASTOLIC BLOOD PRESSURE: 78 MMHG | HEART RATE: 56 BPM | BODY MASS INDEX: 31.65 KG/M2 | HEIGHT: 65 IN

## 2017-09-06 DIAGNOSIS — E78.2 MIXED HYPERLIPIDEMIA: ICD-10-CM

## 2017-09-06 DIAGNOSIS — R06.00 PAROXYSMAL DYSPNEA: ICD-10-CM

## 2017-09-06 DIAGNOSIS — I25.118 CORONARY ARTERY DISEASE OF NATIVE ARTERY OF NATIVE HEART WITH STABLE ANGINA PECTORIS (HCC): ICD-10-CM

## 2017-09-06 DIAGNOSIS — I20.8 CARDIAC SYNDROME X (HCC): ICD-10-CM

## 2017-09-06 DIAGNOSIS — I10 HTN (HYPERTENSION), BENIGN: ICD-10-CM

## 2017-09-06 DIAGNOSIS — R00.2 PALPITATIONS: ICD-10-CM

## 2017-09-06 DIAGNOSIS — I45.9 SKIPPED BEATS: Primary | ICD-10-CM

## 2017-09-06 PROCEDURE — 99214 OFFICE O/P EST MOD 30 MIN: CPT | Performed by: INTERNAL MEDICINE

## 2017-09-11 ENCOUNTER — HOSPITAL ENCOUNTER (OUTPATIENT)
Dept: PULMONOLOGY | Age: 67
Discharge: HOME | End: 2017-09-11
Attending: INTERNAL MEDICINE | Admitting: INTERNAL MEDICINE

## 2017-09-11 ENCOUNTER — HOSPITAL ENCOUNTER (OUTPATIENT)
Dept: GENERAL RADIOLOGY | Age: 67
Discharge: HOME | End: 2017-09-11
Attending: FAMILY MEDICINE | Admitting: FAMILY MEDICINE

## 2017-09-11 VITALS — OXYGEN SATURATION: 99 %

## 2017-09-11 DIAGNOSIS — Z13.820 ENCOUNTER FOR SCREENING FOR OSTEOPOROSIS: ICD-10-CM

## 2017-09-11 DIAGNOSIS — Z12.31 ENCOUNTER FOR SCREENING MAMMOGRAM FOR BREAST CANCER: ICD-10-CM

## 2017-09-11 DIAGNOSIS — Z13.820 OSTEOPOROSIS SCREENING: ICD-10-CM

## 2017-09-11 LAB — PRO-BNP: 496 PG/ML (ref 0–124)

## 2017-09-11 RX ORDER — ALBUTEROL SULFATE 2.5 MG/3ML
2.5 SOLUTION RESPIRATORY (INHALATION) ONCE
Status: COMPLETED | OUTPATIENT
Start: 2017-09-11 | End: 2017-09-11

## 2017-09-11 RX ORDER — METHYLPHENIDATE HYDROCHLORIDE 10 MG/1
10 TABLET ORAL 2 TIMES DAILY
Qty: 60 TABLET | Refills: 0 | Status: SHIPPED | OUTPATIENT
Start: 2017-09-11 | End: 2017-10-11 | Stop reason: SDUPTHER

## 2017-09-11 RX ADMIN — ALBUTEROL SULFATE 2.5 MG: 2.5 SOLUTION RESPIRATORY (INHALATION) at 10:44

## 2017-09-12 ENCOUNTER — TELEPHONE (OUTPATIENT)
Dept: CARDIOLOGY CLINIC | Age: 67
End: 2017-09-12

## 2017-09-12 DIAGNOSIS — R94.2 ABNORMAL PFT: Primary | ICD-10-CM

## 2017-09-20 ENCOUNTER — TELEPHONE (OUTPATIENT)
Dept: FAMILY MEDICINE CLINIC | Age: 67
End: 2017-09-20

## 2017-09-22 ENCOUNTER — CARE COORDINATION (OUTPATIENT)
Dept: FAMILY MEDICINE CLINIC | Age: 67
End: 2017-09-22

## 2017-10-11 ENCOUNTER — TELEPHONE (OUTPATIENT)
Dept: FAMILY MEDICINE CLINIC | Age: 67
End: 2017-10-11

## 2017-10-11 RX ORDER — METHYLPHENIDATE HYDROCHLORIDE 10 MG/1
10 TABLET ORAL 2 TIMES DAILY
Qty: 60 TABLET | Refills: 0 | Status: SHIPPED | OUTPATIENT
Start: 2017-10-11 | End: 2017-11-10

## 2017-10-11 NOTE — TELEPHONE ENCOUNTER
10/23/2017 9:20 AM    Argyle Castleman, MD          AND PULM       MMA  11/1/2017  11:00 AM   Jay Jay Cabrera MD    AND NEURO      MMA  11/6/2017  10:20 AM   DO AMRIK Weiss    [facility]  12/11/2017 9:00 AM    RAFITA Ayala   [facility]

## 2017-10-11 NOTE — TELEPHONE ENCOUNTER
Med refill:    methylphenidate (RITALIN) 10 MG tablet [[id number]]      Pharmacy:  [facility] [address] [facility] 8/30/17    Future Appointments  Date Time Provider Kamran Wangi   10/23/2017 9:20 AM Ramos Mercado MD AND PULM [facility]   11/1/2017 11:00 AM Salomon Gao MD AND NEURO [facility]   11/6/2017 10:20 AM DO RUKHSANA Melo  [address] [facility]   12/11/2017 9:00 AM Jimena Wong, RAFITA Giron [facility]

## 2017-10-24 ENCOUNTER — TELEPHONE (OUTPATIENT)
Dept: CARDIOLOGY CLINIC | Age: 67
End: 2017-10-24

## 2017-10-25 ENCOUNTER — OFFICE VISIT (OUTPATIENT)
Dept: PULMONOLOGY | Age: 67
End: 2017-10-25

## 2017-10-25 VITALS
BODY MASS INDEX: 32.09 KG/M2 | SYSTOLIC BLOOD PRESSURE: 160 MMHG | HEIGHT: 65 IN | WEIGHT: 192.6 LBS | TEMPERATURE: 98.3 F | DIASTOLIC BLOOD PRESSURE: 88 MMHG | OXYGEN SATURATION: 98 % | HEART RATE: 58 BPM | RESPIRATION RATE: 16 BRPM

## 2017-10-25 DIAGNOSIS — G47.33 OSA (OBSTRUCTIVE SLEEP APNEA): Primary | ICD-10-CM

## 2017-10-25 DIAGNOSIS — R06.02 SOB (SHORTNESS OF BREATH): ICD-10-CM

## 2017-10-25 PROCEDURE — 99203 OFFICE O/P NEW LOW 30 MIN: CPT | Performed by: INTERNAL MEDICINE

## 2017-10-25 RX ORDER — ALBUTEROL SULFATE 90 UG/1
2 AEROSOL, METERED RESPIRATORY (INHALATION) EVERY 6 HOURS PRN
Qty: 1 INHALER | Refills: 0 | COMMUNITY
Start: 2017-10-25 | End: 2018-06-18 | Stop reason: ALTCHOICE

## 2017-10-25 ASSESSMENT — SLEEP AND FATIGUE QUESTIONNAIRES
HOW LIKELY ARE YOU TO NOD OFF OR FALL ASLEEP IN A CAR, WHILE STOPPED FOR A FEW MINUTES IN TRAFFIC: 0
HOW LIKELY ARE YOU TO NOD OFF OR FALL ASLEEP WHILE SITTING AND TALKING TO SOMEONE: 0
HOW LIKELY ARE YOU TO NOD OFF OR FALL ASLEEP WHILE LYING DOWN TO REST IN THE AFTERNOON WHEN CIRCUMSTANCES PERMIT: 3
HOW LIKELY ARE YOU TO NOD OFF OR FALL ASLEEP WHILE SITTING INACTIVE IN A PUBLIC PLACE: 0
ESS TOTAL SCORE: 12
HOW LIKELY ARE YOU TO NOD OFF OR FALL ASLEEP WHILE SITTING AND READING: 3
HOW LIKELY ARE YOU TO NOD OFF OR FALL ASLEEP WHILE WATCHING TV: 3
NECK CIRCUMFERENCE (INCHES): 14
HOW LIKELY ARE YOU TO NOD OFF OR FALL ASLEEP WHEN YOU ARE A PASSENGER IN A CAR FOR AN HOUR WITHOUT A BREAK: 0
HOW LIKELY ARE YOU TO NOD OFF OR FALL ASLEEP WHILE SITTING QUIETLY AFTER LUNCH WITHOUT ALCOHOL: 3

## 2017-10-25 NOTE — PROGRESS NOTES
Chief Complaint/Referring Provider:  Patient is being seen at the request of Dr. Chiara Bates for a consultation for sleep apnea     Presenting HPI: Patient is a 29-year-old female who was referred to the office for a pulmonary consult  Patient questioning says that she has cough but is unable to expectorate but she can feel her primary in her throat, along with the coughing patient has shortness of breath but no significant wheezing as such  Patient does not give any history of significant rhinorrhea, nasal congestion or sinus congestion but does have to clear her throat quite often  Patient does not give a history of any epistaxis, gum bleeding or hemoptysis  Patient does have a history of oropharyngeal dysphagia and patient was found to have several strictures for which she has had several stretching of the esophagus and the last one was less than 1 year back  Patient does have shortness of breath with walking and patient says that along with that she also has some chest discomfort but patient has had evaluations by Dr. Chiara Bates and patient does not give any discrete history of coronary ischemia but patient says that she has had some Holter monitoring but she does not know the results  Patient does not have any significant pleuritic chest pain or palpitations  Patient does not have any fever or chills  Patient feels tired and having no energy  Patient says that she works as a  at [employer] and by the end of the day she has is some swelling of the legs  Patient had a history of snoring and also has history of sleep apnea but patient says that she has not used the CPAP machine for no more than 2 years back as the parts were not functioning  Patient does give a history of sleep fragmentation  Patient has history of hypothyroidism but patient says that it was overcorrected and for the last 2 times her PCP has decreased the dose of the Synthroid  Patient has lost 45 pounds in last 1-2 years time which is not lettrs     Social History Main Topics    Smoking status: Never Smoker    Smokeless tobacco: Never Used    Alcohol use No    Drug use: No    Sexual activity: No     Other Topics Concern    Not on file     Social History Narrative    No narrative on file       Family History   Problem Relation Age of Onset    Diabetes Mother     Heart Disease Mother     Cancer Mother      ovarian    Heart Disease Father     Stroke Father     Early Death Father      46 - heart attack    Heart Disease Sister     Stroke Sister     Cancer Sister      ovarian        Review of Systems: Complete Review of system reviewed with patient and noted on attached review of system sheet. ROS same as above     Physical Exam:  Blood pressure (!) 160/88, pulse 58, temperature 98.3 °F (36.8 °C), temperature source Oral, resp. rate 16, height 5' 5\" (1.651 m), weight 192 lb 9.6 oz (87.4 kg), SpO2 98 %, not currently breastfeeding.'  Constitutional:  No acute distress. HENT:  Oropharynx is clear and moist. No thyromegaly. Eyes:  Conjunctivae are normal. Pupils equal, round, and reactive to light. No scleral icterus. Neck: . No tracheal deviation present. No obvious thyroid mass. Neck diameter is increased   Cardiovascular: Normal rate, regular rhythm, normal heart sounds. No right ventricular heave. No lower extremity edema. Pulmonary/Chest: No wheezes. No rales. Chest wall is not dull to percussion. No accessory muscle usage or stridor. Abdominal: Soft. Bowel sounds present. No distension or hernia. No tenderness. Musculoskeletal: No cyanosis. No clubbing. No obvious joint deformity. Lymphadenopathy: No cervical or supraclavicular adenopathy. Skin: Skin is warm and dry. No rash or nodules on the exposed extremities. Psychiatric: Normal mood and affect. Behavior is normal.  No anxiety. Neurologic: Alert, awake and oriented. PERRL.   Speech fluent      Sleep Medicine Data:  Sitting and reading: High chance of inhalers but would not do so at this time  · Patient was told about salt and fluid restrictions  · Patient's primary care physician and cardiology decided with the patient will benefit from a low-dose diuretics  · Patient's Synthroid dose as per patient's primary care provider  · If patient continues to have oropharyngeal dysphagia and patient is to have GI follow-up assess if she needs to have stressing of the esophagus and not  · Patient does not believe in taking flu shots and pneumonia vaccines  · Patient told to use some saline nasal spray  · Further management as per test results

## 2017-10-30 ENCOUNTER — HOSPITAL ENCOUNTER (OUTPATIENT)
Dept: SLEEP MEDICINE | Age: 67
Discharge: HOME | End: 2017-11-01
Attending: INTERNAL MEDICINE | Admitting: INTERNAL MEDICINE

## 2017-10-30 DIAGNOSIS — G47.33 OSA (OBSTRUCTIVE SLEEP APNEA): ICD-10-CM

## 2017-11-01 ENCOUNTER — OFFICE VISIT (OUTPATIENT)
Dept: NEUROLOGY | Age: 67
End: 2017-11-01

## 2017-11-01 ENCOUNTER — CARE COORDINATION (OUTPATIENT)
Dept: CARE COORDINATION | Age: 67
End: 2017-11-01

## 2017-11-01 VITALS
OXYGEN SATURATION: 90 % | SYSTOLIC BLOOD PRESSURE: 132 MMHG | HEIGHT: 65 IN | BODY MASS INDEX: 30.99 KG/M2 | HEART RATE: 65 BPM | WEIGHT: 186 LBS | DIASTOLIC BLOOD PRESSURE: 80 MMHG

## 2017-11-01 DIAGNOSIS — E78.5 DYSLIPIDEMIA: ICD-10-CM

## 2017-11-01 DIAGNOSIS — G44.221 CHRONIC TENSION-TYPE HEADACHE, INTRACTABLE: ICD-10-CM

## 2017-11-01 DIAGNOSIS — G47.33 OSA (OBSTRUCTIVE SLEEP APNEA): ICD-10-CM

## 2017-11-01 DIAGNOSIS — I10 HTN (HYPERTENSION), BENIGN: ICD-10-CM

## 2017-11-01 DIAGNOSIS — G43.119 INTRACTABLE MIGRAINE WITH AURA WITHOUT STATUS MIGRAINOSUS: Primary | ICD-10-CM

## 2017-11-01 DIAGNOSIS — D33.0 BENIGN NEOPLASM OF SUPRATENTORIAL REGION OF BRAIN (HCC): ICD-10-CM

## 2017-11-01 PROCEDURE — 99214 OFFICE O/P EST MOD 30 MIN: CPT | Performed by: PSYCHIATRY & NEUROLOGY

## 2017-11-01 RX ORDER — TOPIRAMATE 25 MG/1
75 TABLET ORAL 2 TIMES DAILY
Qty: 180 TABLET | Refills: 5 | Status: SHIPPED | OUTPATIENT
Start: 2017-11-01 | End: 2018-09-04 | Stop reason: SDUPTHER

## 2017-11-01 ASSESSMENT — ENCOUNTER SYMPTOMS
BLURRED VISION: 0
BACK PAIN: 1
DOUBLE VISION: 0
COUGH: 0

## 2017-11-01 NOTE — CARE COORDINATION
Ambulatory Care Coordination ED Follow up Call       Reason for ED Visit:  Leg pain & Rash  Care Management Risk Score: CMRS 6    Unable to leave a message for a return call regarding ED visit 10/30, recorder states \"memory full, enter remote access code. Will attempt to reach patient again.        [address] [facility] Coordination   [phone]

## 2017-11-01 NOTE — PROGRESS NOTES
The patient came today for follow up regarding: chronic daily headaches and chronic migraines. Since the patient's last visit, she increased her Topamax to 75 mg bid. No SE from Topamax. She continues to have daily headaches. Headaches can be moderate but persistent. Pain is holocranial dull ache pain. No visual changes, weakness or nausea. No triggers or other associated symptoms. She is not taking any rescue medications for these headaches. Her migraines are once a month and can last for few hours. No changes with her migraines. She stopped taking Ultram. No recent fever or head trauma. History of HTN controlled on medications. She was seen by her sleep physician and had recent HST. She has the same EDS with snoring. History of left frontal meningoma. Last MRI brain in 4-17 showed stable lesion. She sees [facility] once a year. No recent LOC or WOOD. She is on Statin. She has a same chronic back pain. Pain is chronic and moderate. No recent falling or trauma or injury or passing out. She walks with her cane. No trouble with bladder or bladder. Other review of system was unremarkable. Past Medical History:   Diagnosis Date    Acid reflux     small stomach ulcer    Angina pectoris, variant (HCC)     Anxiety     Arthritis     Asthma     Benign esophageal stricture 12/2016    Benign tumor 03/20/2017     Benign brain tumor size of a quater    Cancer ([facility] [address]     ovarian    Depression     Frequency     Hyperlipidemia     Hypertension     Kidney stones     MI, old     Narcolepsy     Sleep apnea     Thyroid disease     Urgency of urination      Prior to Visit Medications    Medication Sig Taking?  Authorizing Provider   sulfamethoxazole-trimethoprim (BACTRIM DS) 800-160 MG per tablet Take 1 tablet by mouth 2 times daily for 10 days Yes Zelphia Copper, NP   cephALEXin (KEFLEX) 250 MG capsule Take 2 capsules by mouth 4 times daily Yes Zelphia Copper, NP   albuterol sulfate HFA (PROAIR HFA) 108 (90 Base) MCG/ACT inhaler Inhale 2 puffs into the lungs every 6 hours as needed for Wheezing Yes Adithya Reynoso MD   methylphenidate (RITALIN) 10 MG tablet Take 1 tablet by mouth 2 times daily  For narcolepsy.  Yes Shanna Reynoso DO   ranolazine (RANEXA) 500 MG extended release tablet Take 500 mg by mouth 2 times daily Yes Historical Provider, MD   pantoprazole (PROTONIX) 40 MG tablet Take 1 tablet by mouth 2 times daily Yes Shanna Reynoso DO   losartan (COZAAR) 100 MG tablet Take 1 tablet by mouth daily Yes Shanna Reynoso DO   topiramate (TOPAMAX) 25 MG tablet Take 3 tablets by mouth 2 times daily Yes Jay Jay Wall MD   levothyroxine (SYNTHROID) 112 MCG tablet Take 1 tablet by mouth Daily Yes Shanna Reynoso DO   lisinopril (PRINIVIL;ZESTRIL) 10 MG tablet Take 1 tablet by mouth daily For BP Yes Shanna Reynoso DO   isosorbide mononitrate (IMDUR) 30 MG extended release tablet TAKE ONE TABLET BY MOUTH ONCE DAILY Yes Santiago Sue MD   sucralfate (CARAFATE) 1 GM tablet Take 1 tablet by mouth 4 times daily Yes Shanna Reynoso DO   rosuvastatin (CRESTOR) 5 MG tablet Take 1 tablet by mouth daily Yes Boy Vides MD   metoprolol tartrate (LOPRESSOR) 25 MG tablet Take 25 mg by mouth 2 times daily Yes Historical Provider, MD   ondansetron (ZOFRAN) 4 MG tablet Take 1 tablet by mouth every 8 hours as needed for Nausea or Vomiting  Raymond Khan NP   traMADol (ULTRAM) 50 MG tablet Take 1 tablet by mouth every 8 hours as needed for Pain  Shanna Reynoso DO     Allergies   Allergen Reactions    Latex Rash    Vistaril [Hydroxyzine Hcl]      Social History   Substance Use Topics    Smoking status: Never Smoker    Smokeless tobacco: Never Used    Alcohol use No     Family History   Problem Relation Age of Onset    Diabetes Mother     Heart Disease Mother     Cancer Mother      ovarian    Heart Disease Father     Stroke Father     Early Death Father      46 - heart attack    Heart Disease Sister     Stroke Sister     Cancer Sister      ovarian     Past Surgical History:   Procedure Laterality Date    ARM SURGERY Right 8/19/14    exc.trapezium and flexor carpi radialis interpositional arthroplasty    BREAST SURGERY      reduction    CARPAL TUNNEL RELEASE Right     CHOLECYSTECTOMY      COLONOSCOPY      DENTAL SURGERY      ENDOSCOPY, COLON, DIAGNOSTIC  01/06/2017    Anastomotic ulcer, gastritis    ESOPHAGEAL DILATATION  12/2016    GASTRIC BYPASS SURGERY      GASTRIC BYPASS SURGERY      HYSTERECTOMY      LITHOTRIPSY Left 11/12/2013    LITHOTRIPSY Left 12/26/13    LEFT ESWL    UPPER GASTROINTESTINAL ENDOSCOPY  03/03/2017    Gastritis         Exam:   Constitutional:   Vitals:    11/01/17 1111   BP: 132/80   Pulse: 65   SpO2: 90%   Weight: 186 lb (84.4 kg)   Height: 5' 5\" (1.651 m)       General appearance: well-nourished. Eye: No icterus. No blurring of optic disc. Neck: supple  Cardiovascular: No carotid bruit. Heart: S1, S2         No lower leg edema  Mental Status: Oriented to person, place, problem, and time. Fluent speech. Good fund of knowledge. Normal attention span and concentration. Cranial Nerves:   II: Visual fields: Full to confrontation  III: Pupils: equal, round, reactive to light  III,IV,VI: Extra Ocular Movements are intact. No nystagmus  V: Facial sensation is intact to pin prick and light touch  VII: Facial strength and movements: intact and symmetric smile,cheek puffing and eyebrow elevation  VIII: Hearing: Intact to finger rub bilaterally  IX: Palate elevation is symmetric  XI: Shoulder shrug is intact  XII: Tongue movements are normal  Musculoskeletal: 5/5 in all 4 extremities. Normal tone. Reflexes: Bilateral biceps 2/4, triceps 2/4, brachial radialis 2/4, knee 1/4 and ankle 1/4. Planters: flexor bilaterally. Coordination: no pronator drift, no dysmetria. Finger nose finger testing within normal limits. Sensation: normal to all modalities.   Gait/Posture:

## 2017-11-03 NOTE — CARE COORDINATION
Ambulatory Care Coordination ED Follow up Call       Reason for ED Visit:  Leg pain & Rash  Care Management Risk Score: CMRS 6    Unable to leave a message for a return call regarding ED visit 10/30, recorder states \"memory full, enter remote access code. No further outreach will be made.     [address] [facility] Coordination   [phone]

## 2017-11-06 ENCOUNTER — OFFICE VISIT (OUTPATIENT)
Dept: FAMILY MEDICINE CLINIC | Age: 67
End: 2017-11-06

## 2017-11-06 VITALS
WEIGHT: 188 LBS | BODY MASS INDEX: 31.32 KG/M2 | HEIGHT: 65 IN | HEART RATE: 50 BPM | SYSTOLIC BLOOD PRESSURE: 146 MMHG | OXYGEN SATURATION: 97 % | DIASTOLIC BLOOD PRESSURE: 98 MMHG

## 2017-11-06 DIAGNOSIS — G47.33 OBSTRUCTIVE SLEEP APNEA SYNDROME: ICD-10-CM

## 2017-11-06 DIAGNOSIS — I10 ESSENTIAL HYPERTENSION: Primary | ICD-10-CM

## 2017-11-06 DIAGNOSIS — E03.9 HYPOTHYROIDISM, UNSPECIFIED TYPE: ICD-10-CM

## 2017-11-06 DIAGNOSIS — Z12.11 SCREENING FOR COLON CANCER: ICD-10-CM

## 2017-11-06 LAB
CONTROL: NORMAL
HEMOCCULT STL QL: NORMAL

## 2017-11-06 PROCEDURE — 99213 OFFICE O/P EST LOW 20 MIN: CPT | Performed by: FAMILY MEDICINE

## 2017-11-06 PROCEDURE — 82274 ASSAY TEST FOR BLOOD FECAL: CPT | Performed by: FAMILY MEDICINE

## 2017-11-06 NOTE — PATIENT INSTRUCTIONS
food, junk food, and fast food that you eat. Decrease animal sources of saturated fats, and completely avoid and eliminate  hydrogenated oils and trans fats. Check the Food Label on the food you eat and avoid foods that have hydrogenated vegetable oils in them. That includes bakery products. Meat and cheese generally contains saturated fat. Drink skim milk which contains no fat. Increase the amount of fresh food that you cook yourself. Eat at least five servings of fruits and vegetables a day. Increase the portion of your plate that contains the fruit and vegetables to at least 50%, and decrease the amount of starches like potatoes, rice, pasta to no more than 25% of your plate. Increase your fiber intake. Fiber is found in fruits and vegetables as well as whole grains, oatmeal,  and beans. A diet with at least 5 servings of fruits and vegetables should give you about 10-20grams of fiber daily. Switch to monounsaturated fats like olive oil. . Use these types of fats where you would have used butter. Fat from olives, avocados, coconut, or other nuts is okay. Add baked or grilled fish to you diet at least 1-2 times a week. Consider an Omega 3 fatty acid supplement which can raise the HDL good cholesterol. 2-4 grams a day is recommended. Learn more on the Internet. Check out these resources:    American Heart Association   Heart. [address]:   [employer]    Triglycerides can be lowered without medication by exercising, and loosing weight and eating a diet lower in carbohydrates especially from flour sources,  and avoiding simple sugars. Omega 3 fatty acids can help lower triglyceride levels, 2-4 grams a day. The good cholesterol is HDL. In order to increase the good cholesterol, increasing cardiovascular exercise helps. Avoid hydrogenated oils (trans fats) in processed, bakery,  and junk food.  Use monounsaturated fats such as olive oil can help raise the good cholesterol. Keep the appointment with the pulmonary doctor.     I will refer you for counseling  Same meds for now  See me 3 months

## 2017-11-06 NOTE — PROGRESS NOTES
Subjective:      Patient ID: Eliane Freeman is a 78 y.o. female. HPI   here to follow up BP and some tests that she had        Damien Franck 1762 -  1 year follow        DEXA scan - osteoporosis           PFT, mild changes         Sleep study- mild PRINCE    Review of Systems   Respiratory:        Has appointment with Pulmonary next week,   should discuss PRINCE and the PFT       Skin:        Cellulitis right lower leg. In the ER 10/30  Still on antibiotics     Neurological: Positive for headaches. Negative for seizures. Seeing Dr Ryan Sykes for headaches and a meningeoma    Psychiatric/Behavioral: Positive for dysphoric mood. Negative for self-injury and suicidal ideas. Has been feeling stressed and kind of down,  No dark thoughts / suicidal thoughts. Not being close with her daughter  And not seeing her grandson. Objective:   Physical Exam   Constitutional: She is oriented to person, place, and time. She appears well-developed and well-nourished. No distress. Cardiovascular: Normal rate and regular rhythm. Pulmonary/Chest: Effort normal. No respiratory distress. She has no wheezes. She has no rales. Abdominal: She exhibits no distension. There is no tenderness. There is no rebound. Musculoskeletal: She exhibits no edema. Right shin is near natural color,  No heat. Neurological: She is alert and oriented to person, place, and time. No cranial nerve deficit. Vitals reviewed. Assessment:      HTN, treated   PRINCE,     Narcolepsy    CAD    Cranial Meningioma             Plan:        PFT and the sleep study reviewed. Keep the appointment with pulmonary scheduled for next week. Counseling / psychology discussed. Continue the current meds    Prior to Visit Medications    Medication Sig Taking?  Authorizing Provider   topiramate (TOPAMAX) 25 MG tablet Take 3 tablets by mouth 2 times daily Yes Yannick Ha MD   cephALEXin (KEFLEX) 250 MG capsule Take 2 capsules by mouth 4 times

## 2017-11-14 ENCOUNTER — TELEPHONE (OUTPATIENT)
Dept: FAMILY MEDICINE CLINIC | Age: 67
End: 2017-11-14

## 2017-11-14 RX ORDER — METHYLPHENIDATE HYDROCHLORIDE 10 MG/1
10 TABLET ORAL 2 TIMES DAILY
Qty: 60 TABLET | Refills: 0 | Status: SHIPPED | OUTPATIENT
Start: 2017-11-14 | End: 2017-12-18 | Stop reason: SDUPTHER

## 2017-11-14 NOTE — TELEPHONE ENCOUNTER
Patient is requesting refill for her ritalin     Which has      Future Appointments  Date Time Provider Kamran Molina   11/15/2017 9:20 AM Jacob Fowler MD AND PULM [facility]   2017 9:00 AM RAFITA Gloria [facility]         Medication   methylphenidate (RITALIN) 10 MG tablet [[id number]]   Order Class:     Order Class   Normal [1]   methylphenidate (RITALIN) 10 MG tablet [[id number]] ENDED   Order Details   Dose: 10 mg Route: Oral Frequency: 2 TIMES DAILY   Dispense Quantity:  60 tablet Refills:  0 Fills remaining:  --           Sig: Take 1 tablet by mouth 2 times daily  For narcolepsy.          Written Date:  10/11/17 Expiration Date:  12/10/17     Start Date:  10/11/17 End Date:  11/10/17 after 60 doses     Ordering Provider:  -- Authorizing Provider:  Lorri Aponte DO Ordering User:  Lorri Aponte DO             Original Order:  methylphenidate (RITALIN) 10 MG tablet [[id number]]

## 2017-11-15 ENCOUNTER — OFFICE VISIT (OUTPATIENT)
Dept: PULMONOLOGY | Age: 67
End: 2017-11-15

## 2017-11-15 VITALS
WEIGHT: 188 LBS | SYSTOLIC BLOOD PRESSURE: 140 MMHG | BODY MASS INDEX: 31.32 KG/M2 | HEIGHT: 65 IN | DIASTOLIC BLOOD PRESSURE: 78 MMHG | HEART RATE: 45 BPM | RESPIRATION RATE: 16 BRPM | OXYGEN SATURATION: 98 %

## 2017-11-15 DIAGNOSIS — G47.33 OSA (OBSTRUCTIVE SLEEP APNEA): Primary | ICD-10-CM

## 2017-11-15 DIAGNOSIS — R06.02 SOB (SHORTNESS OF BREATH): ICD-10-CM

## 2017-11-15 PROCEDURE — 99214 OFFICE O/P EST MOD 30 MIN: CPT | Performed by: INTERNAL MEDICINE

## 2017-11-15 RX ORDER — FLUTICASONE FUROATE AND VILANTEROL 100; 25 UG/1; UG/1
1 POWDER RESPIRATORY (INHALATION) DAILY
COMMUNITY
End: 2018-12-12

## 2017-11-15 NOTE — PROGRESS NOTES
Presenting HPI: Patient is a 80-year-old female has come to the office for pulmonary follow up and to discuss the test results  Patient still has some cough and shortness of breath and has to use the rescue inhaler 1-2 times a day  No increased expectoration/chest pain;has occ wheezing  Continues to have sleep fragmentation  Patient is bradycardic -when pointed-states that it is chronic and does not have any orthostatic changes/dizziness  No other pertinent ROS of concern         Past Medical History:   Diagnosis Date    Acid reflux     small stomach ulcer    Angina pectoris, variant (HCC)     Anxiety     Arthritis     Asthma     Benign esophageal stricture 12/2016    Benign tumor 03/20/2017     Benign brain tumor size of a quater    Cancer ([address]     ovarian    Depression     Frequency     Hyperlipidemia     Hypertension     Kidney stones     MI, old     Narcolepsy     Sleep apnea     Thyroid disease     Urgency of urination        Past Surgical History:   Procedure Laterality Date    ARM SURGERY Right 8/19/14    exc.trapezium and flexor carpi radialis interpositional arthroplasty    BREAST SURGERY      reduction    CARPAL TUNNEL RELEASE Right     CHOLECYSTECTOMY      COLONOSCOPY      DENTAL SURGERY      ENDOSCOPY, COLON, DIAGNOSTIC  01/06/2017    Anastomotic ulcer, gastritis    ESOPHAGEAL DILATATION  12/2016    GASTRIC BYPASS SURGERY      GASTRIC BYPASS SURGERY      HYSTERECTOMY      LITHOTRIPSY Left 11/12/2013    LITHOTRIPSY Left 12/26/13    LEFT ESWL    UPPER GASTROINTESTINAL ENDOSCOPY  03/03/2017    Gastritis       Allergies   Allergen Reactions    Latex Rash    Vistaril [Hydroxyzine Hcl]        Medication list was reviewed and updated as needed in Epic    Social History     Social History    Marital status:      Spouse name: N/A    Number of children: N/A    Years of education: N/A     Occupational History     [employer]     Social History Main Topics    Smoking status: Never Smoker    Smokeless tobacco: Never Used    Alcohol use No    Drug use: No    Sexual activity: No     Other Topics Concern    Not on file     Social History Narrative    No narrative on file       Family History   Problem Relation Age of Onset    Diabetes Mother     Heart Disease Mother     Cancer Mother      ovarian    Heart Disease Father     Stroke Father     Early Death Father      46 - heart attack    Heart Disease Sister     Stroke Sister     Cancer Sister      ovarian        Review of Systems: Complete Review of system reviewed with patient and noted on attached review of system sheet. ROS same as above     Physical Exam:  Blood pressure (!) 140/78, pulse (!) 45, resp. rate 16, height 5' 5\" (1.651 m), weight 188 lb (85.3 kg), SpO2 98 %, not currently breastfeeding.'  Constitutional:  No acute distress. HENT:  Oropharynx is clear and moist. No thyromegaly. Eyes:  Conjunctivae are normal. Pupils equal, round, and reactive to light. No scleral icterus. Neck: . No tracheal deviation present. No obvious thyroid mass. Neck diameter is increased   Cardiovascular: SB, normal heart sounds. No right ventricular heave. No lower extremity edema. Pulmonary/Chest: No wheezes. No rales. Chest wall is not dull to percussion. No accessory muscle usage or stridor. Abdominal: Soft. Bowel sounds present. No distension or hernia. No tenderness. Musculoskeletal: No cyanosis. No clubbing. No obvious joint deformity. Lymphadenopathy: No cervical or supraclavicular adenopathy. Skin: Skin is warm and dry. No rash or nodules on the exposed extremities. Psychiatric: Normal mood and affect. Behavior is normal.  No anxiety. Neurologic: Alert, awake and oriented. PERRL. Speech fluent          Imaging:  I have reviewed radiology images personally.   No orders to display     PFT-No OAD/restrictive lung ds but has RAD     PSG-Mild PRINCE with AHI-13    Assessment:    1. PRINCE (obstructive sleep apnea)        2.  SOB (shortness of breath)          Plan:   · Patient was told about auscultatory findings and implications  · Patient was told about the pathophysiology of the disease process and its modifying factors  · Patient clinically has PRINCE with diastolic dysfunction which may be contributing to her symptomatology along with reactive airway disease  · Patient's sleep study results discussed along with findings and implications   · Patient also started on albuterol inhaler 2 puffs every 6 on PRN basis   · Patient was given a sample of Breo to try and told to take once a day and was shown how to take it properly and to rinse mouth with water after use to avoid hoarseness of voice/oralk thrush and if this is helping -to call for prescriptions   · Patient was told about salt and fluid restrictions  · PSG results discussed with options and after discussion-order for autoCPAP given   · Patient needs to follow in 30-90 days for compliance configuration   · Patient's primary care physician and cardiology decided with the patient will benefit from a low-dose diuretics  · Patient's Synthroid dose as per patient's primary care provider  · If patient continues to have oropharyngeal dysphagia and patient is to have GI follow-up assess if she needs to have stretchinging of the esophagus and not  · Patient has bradycardia-management as per cardiology   · Patient does not believe in taking flu shots and pneumonia vaccines  · Patient told to use some saline nasal spray  · Further management as per test results

## 2017-12-13 PROCEDURE — 93272 ECG/REVIEW INTERPRET ONLY: CPT | Performed by: INTERNAL MEDICINE

## 2017-12-14 ENCOUNTER — TELEPHONE (OUTPATIENT)
Dept: CARDIOLOGY CLINIC | Age: 67
End: 2017-12-14

## 2017-12-14 DIAGNOSIS — R00.2 PALPITATIONS: ICD-10-CM

## 2017-12-18 ENCOUNTER — TELEPHONE (OUTPATIENT)
Dept: FAMILY MEDICINE CLINIC | Age: 67
End: 2017-12-18

## 2017-12-18 RX ORDER — LEVOTHYROXINE SODIUM 112 UG/1
112 TABLET ORAL DAILY
Qty: 30 TABLET | Refills: 5 | Status: SHIPPED | OUTPATIENT
Start: 2017-12-18 | End: 2018-07-02 | Stop reason: SDUPTHER

## 2017-12-18 RX ORDER — METHYLPHENIDATE HYDROCHLORIDE 10 MG/1
10 TABLET ORAL 2 TIMES DAILY
Qty: 60 TABLET | Refills: 0 | Status: SHIPPED | OUTPATIENT
Start: 2017-12-18 | End: 2018-01-29 | Stop reason: SDUPTHER

## 2017-12-18 NOTE — TELEPHONE ENCOUNTER
Patient needs refill on     methylphenidate (RITALIN) 10 MG tablet       levothyroxine (SYNTHROID) 112 MCG tablet

## 2018-01-02 RX ORDER — SUCRALFATE 1 G/1
TABLET ORAL
Qty: 120 TABLET | Refills: 3 | Status: SHIPPED | OUTPATIENT
Start: 2018-01-02 | End: 2018-10-15 | Stop reason: SDUPTHER

## 2018-01-02 NOTE — TELEPHONE ENCOUNTER
Last OV 11/6/17  Future Appointments  Date Time Provider Kamran Molina   1/15/2018 10:20 AM Stefany Lackey MD AND PULM [facility]   1/15/2018 11:15 AM RAFITA Chiang

## 2018-01-10 ENCOUNTER — TELEPHONE (OUTPATIENT)
Dept: CARDIOLOGY CLINIC | Age: 68
End: 2018-01-10

## 2018-01-19 ENCOUNTER — CARE COORDINATION (OUTPATIENT)
Dept: CARE COORDINATION | Age: 68
End: 2018-01-19

## 2018-01-24 ENCOUNTER — TELEPHONE (OUTPATIENT)
Dept: PULMONOLOGY | Age: 68
End: 2018-01-24

## 2018-01-24 NOTE — TELEPHONE ENCOUNTER
Patient did not show for 2 mo sleep follow up  with Dr. Sigifredo Kapoor on 1/24/18    Patient was also no show on: N/A    LOV 11/15/17

## 2018-01-29 ENCOUNTER — TELEPHONE (OUTPATIENT)
Dept: FAMILY MEDICINE CLINIC | Age: 68
End: 2018-01-29

## 2018-01-29 RX ORDER — METHYLPHENIDATE HYDROCHLORIDE 10 MG/1
10 TABLET ORAL 2 TIMES DAILY
Qty: 60 TABLET | Refills: 0 | Status: SHIPPED | OUTPATIENT
Start: 2018-01-29 | End: 2018-02-26 | Stop reason: SDUPTHER

## 2018-02-02 ENCOUNTER — CARE COORDINATION (OUTPATIENT)
Dept: CARE COORDINATION | Age: 68
End: 2018-02-02

## 2018-02-19 ENCOUNTER — TELEPHONE (OUTPATIENT)
Dept: CARDIOLOGY CLINIC | Age: 68
End: 2018-02-19

## 2018-02-19 ENCOUNTER — TELEPHONE (OUTPATIENT)
Dept: FAMILY MEDICINE CLINIC | Age: 68
End: 2018-02-19

## 2018-02-19 NOTE — TELEPHONE ENCOUNTER
Med refill request    Patient needs a refill on RITALIN.      Mail order or local pharmacy: local    Pharmacy[address] [facility] [phone]        LOV 11/6/2017    Future Appointments  Date Time Provider Kamran Molina   5/9/2018 10:00 AM Alexx Moore MD AND KATE ANDINO

## 2018-02-19 NOTE — TELEPHONE ENCOUNTER
Tell the patient that this prescription refill is too early. It was refilled on January 29, for a 30 day prescription.

## 2018-02-20 ENCOUNTER — CARE COORDINATION (OUTPATIENT)
Dept: CARE COORDINATION | Age: 68
End: 2018-02-20

## 2018-02-20 NOTE — CARE COORDINATION
Attempted outreach call for CC enrollment; left a VM with RN [facility] call-back information. Will send final letter in 1 week if patient does not return call.

## 2018-02-26 ENCOUNTER — TELEPHONE (OUTPATIENT)
Dept: FAMILY MEDICINE CLINIC | Age: 68
End: 2018-02-26

## 2018-02-26 RX ORDER — METHYLPHENIDATE HYDROCHLORIDE 10 MG/1
10 TABLET ORAL 2 TIMES DAILY
Qty: 60 TABLET | Refills: 0 | Status: SHIPPED | OUTPATIENT
Start: 2018-02-26 | End: 2018-04-04 | Stop reason: SDUPTHER

## 2018-02-26 NOTE — TELEPHONE ENCOUNTER
Patient needs a refill on ritalin. They need a 30 day supply.      Mail order or local pharmacy: local    Pharmacy: Doyle [facility]itz on file    Patient  or mail to patient(If mail order):    Last OV 11/6/17    Future Appointments  Date Time Provider Kamran Molina   5/9/2018 10:00 AM Elias Alonso MD AND KATE ANDINO

## 2018-02-27 ENCOUNTER — CARE COORDINATION (OUTPATIENT)
Dept: CARE COORDINATION | Age: 68
End: 2018-02-27

## 2018-02-27 NOTE — CARE COORDINATION
Unable to reach patient via phone after 3 attempts. Mailed patient final letter to attempt enrollment into Care Coordination. She does not currently have another appointment scheduled with PCP. Will remove from list if not contacted w/in 1 week.

## 2018-03-06 RX ORDER — LOSARTAN POTASSIUM 100 MG/1
TABLET ORAL
Qty: 90 TABLET | Refills: 3 | Status: SHIPPED | OUTPATIENT
Start: 2018-03-06 | End: 2019-03-04 | Stop reason: SDUPTHER

## 2018-03-07 ENCOUNTER — CARE COORDINATION (OUTPATIENT)
Dept: CARE COORDINATION | Age: 68
End: 2018-03-07

## 2018-03-12 RX ORDER — PANTOPRAZOLE SODIUM 40 MG/1
TABLET, DELAYED RELEASE ORAL
Qty: 60 TABLET | Refills: 5 | Status: SHIPPED | OUTPATIENT
Start: 2018-03-12 | End: 2018-09-27 | Stop reason: SDUPTHER

## 2018-03-12 NOTE — TELEPHONE ENCOUNTER
Refill request   Future Appointments  Date Time Provider Kamran Molina   5/9/2018 10:00 AM Crissy Burr MD AND KATE ANDINO

## 2018-04-04 ENCOUNTER — TELEPHONE (OUTPATIENT)
Dept: FAMILY MEDICINE CLINIC | Age: 68
End: 2018-04-04

## 2018-04-04 RX ORDER — METHYLPHENIDATE HYDROCHLORIDE 10 MG/1
10 TABLET ORAL 2 TIMES DAILY
Qty: 60 TABLET | Refills: 0 | Status: SHIPPED | OUTPATIENT
Start: 2018-04-04 | End: 2018-04-30 | Stop reason: SDUPTHER

## 2018-04-11 ENCOUNTER — OFFICE VISIT (OUTPATIENT)
Dept: FAMILY MEDICINE CLINIC | Age: 68
End: 2018-04-11

## 2018-04-11 VITALS
HEART RATE: 51 BPM | WEIGHT: 199 LBS | SYSTOLIC BLOOD PRESSURE: 166 MMHG | BODY MASS INDEX: 33.12 KG/M2 | TEMPERATURE: 97.9 F | DIASTOLIC BLOOD PRESSURE: 90 MMHG | RESPIRATION RATE: 16 BRPM

## 2018-04-11 DIAGNOSIS — E03.9 HYPOTHYROIDISM, UNSPECIFIED TYPE: ICD-10-CM

## 2018-04-11 DIAGNOSIS — M51.16 LUMBAR DISC DISEASE WITH RADICULOPATHY: ICD-10-CM

## 2018-04-11 DIAGNOSIS — I10 ESSENTIAL HYPERTENSION: ICD-10-CM

## 2018-04-11 DIAGNOSIS — G47.419 CONTROLLED NARCOLEPSY: ICD-10-CM

## 2018-04-11 DIAGNOSIS — I10 ESSENTIAL HYPERTENSION: Primary | ICD-10-CM

## 2018-04-11 LAB
A/G RATIO: 1.4 (ref 1.1–2.2)
ALBUMIN SERPL-MCNC: 4.2 G/DL (ref 3.4–5)
ALP BLD-CCNC: 103 U/L (ref 40–129)
ALT SERPL-CCNC: 14 U/L (ref 10–40)
ANION GAP SERPL CALCULATED.3IONS-SCNC: 13 MMOL/L (ref 3–16)
AST SERPL-CCNC: 13 U/L (ref 15–37)
BILIRUB SERPL-MCNC: 0.4 MG/DL (ref 0–1)
BUN BLDV-MCNC: 15 MG/DL (ref 7–20)
CALCIUM SERPL-MCNC: 9.1 MG/DL (ref 8.3–10.6)
CHLORIDE BLD-SCNC: 108 MMOL/L (ref 99–110)
CO2: 22 MMOL/L (ref 21–32)
CREAT SERPL-MCNC: 0.7 MG/DL (ref 0.6–1.2)
GFR AFRICAN AMERICAN: >60
GFR NON-AFRICAN AMERICAN: >60
GLOBULIN: 2.9 G/DL
GLUCOSE BLD-MCNC: 112 MG/DL (ref 70–99)
POTASSIUM SERPL-SCNC: 5.3 MMOL/L (ref 3.5–5.1)
SODIUM BLD-SCNC: 143 MMOL/L (ref 136–145)
T4 FREE: 1.5 NG/DL (ref 0.9–1.8)
TOTAL PROTEIN: 7.1 G/DL (ref 6.4–8.2)
TSH SERPL DL<=0.05 MIU/L-ACNC: 0.13 UIU/ML (ref 0.27–4.2)

## 2018-04-11 PROCEDURE — 99213 OFFICE O/P EST LOW 20 MIN: CPT | Performed by: FAMILY MEDICINE

## 2018-04-11 RX ORDER — ROSUVASTATIN CALCIUM 5 MG/1
5 TABLET, COATED ORAL DAILY
Qty: 90 TABLET | Refills: 2 | Status: SHIPPED | OUTPATIENT
Start: 2018-04-11 | End: 2019-01-10 | Stop reason: SDUPTHER

## 2018-04-11 ASSESSMENT — ENCOUNTER SYMPTOMS
CONSTIPATION: 0
DIARRHEA: 0
BACK PAIN: 1
BLOOD IN STOOL: 0

## 2018-04-11 ASSESSMENT — PATIENT HEALTH QUESTIONNAIRE - PHQ9
SUM OF ALL RESPONSES TO PHQ QUESTIONS 1-9: 1
SUM OF ALL RESPONSES TO PHQ9 QUESTIONS 1 & 2: 1
2. FEELING DOWN, DEPRESSED OR HOPELESS: 1
1. LITTLE INTEREST OR PLEASURE IN DOING THINGS: 0

## 2018-04-16 ENCOUNTER — TELEPHONE (OUTPATIENT)
Dept: FAMILY MEDICINE CLINIC | Age: 68
End: 2018-04-16

## 2018-04-18 ENCOUNTER — TELEPHONE (OUTPATIENT)
Dept: CARDIOLOGY CLINIC | Age: 68
End: 2018-04-18

## 2018-05-01 RX ORDER — METHYLPHENIDATE HYDROCHLORIDE 10 MG/1
10 TABLET ORAL 2 TIMES DAILY
Qty: 60 TABLET | Refills: 0 | Status: SHIPPED | OUTPATIENT
Start: 2018-05-01 | End: 2018-05-31 | Stop reason: SDUPTHER

## 2018-05-14 ENCOUNTER — TELEPHONE (OUTPATIENT)
Dept: CARDIOLOGY CLINIC | Age: 68
End: 2018-05-14

## 2018-05-16 ENCOUNTER — OFFICE VISIT (OUTPATIENT)
Dept: PULMONOLOGY | Age: 68
End: 2018-05-16

## 2018-05-16 VITALS
RESPIRATION RATE: 16 BRPM | BODY MASS INDEX: 32.99 KG/M2 | WEIGHT: 198 LBS | HEIGHT: 65 IN | HEART RATE: 55 BPM | SYSTOLIC BLOOD PRESSURE: 181 MMHG | DIASTOLIC BLOOD PRESSURE: 100 MMHG | OXYGEN SATURATION: 100 % | TEMPERATURE: 98 F

## 2018-05-16 DIAGNOSIS — R06.02 SOB (SHORTNESS OF BREATH): ICD-10-CM

## 2018-05-16 DIAGNOSIS — G47.33 OSA (OBSTRUCTIVE SLEEP APNEA): Primary | ICD-10-CM

## 2018-05-16 DIAGNOSIS — J45.909 UNCOMPLICATED ASTHMA, UNSPECIFIED ASTHMA SEVERITY, UNSPECIFIED WHETHER PERSISTENT: ICD-10-CM

## 2018-05-16 DIAGNOSIS — I10 ESSENTIAL HYPERTENSION: ICD-10-CM

## 2018-05-16 PROCEDURE — 99213 OFFICE O/P EST LOW 20 MIN: CPT | Performed by: INTERNAL MEDICINE

## 2018-05-16 ASSESSMENT — SLEEP AND FATIGUE QUESTIONNAIRES
HOW LIKELY ARE YOU TO NOD OFF OR FALL ASLEEP WHILE LYING DOWN TO REST IN THE AFTERNOON WHEN CIRCUMSTANCES PERMIT: 3
HOW LIKELY ARE YOU TO NOD OFF OR FALL ASLEEP WHILE SITTING INACTIVE IN A PUBLIC PLACE: 0
HOW LIKELY ARE YOU TO NOD OFF OR FALL ASLEEP WHILE SITTING QUIETLY AFTER LUNCH WITHOUT ALCOHOL: 1
NECK CIRCUMFERENCE (INCHES): 14
HOW LIKELY ARE YOU TO NOD OFF OR FALL ASLEEP WHEN YOU ARE A PASSENGER IN A CAR FOR AN HOUR WITHOUT A BREAK: 0
HOW LIKELY ARE YOU TO NOD OFF OR FALL ASLEEP WHILE WATCHING TV: 3
HOW LIKELY ARE YOU TO NOD OFF OR FALL ASLEEP WHILE SITTING AND TALKING TO SOMEONE: 0
ESS TOTAL SCORE: 10
HOW LIKELY ARE YOU TO NOD OFF OR FALL ASLEEP WHILE SITTING AND READING: 3
HOW LIKELY ARE YOU TO NOD OFF OR FALL ASLEEP IN A CAR, WHILE STOPPED FOR A FEW MINUTES IN TRAFFIC: 0

## 2018-05-30 ENCOUNTER — TELEPHONE (OUTPATIENT)
Dept: CARDIOLOGY CLINIC | Age: 68
End: 2018-05-30

## 2018-05-31 DIAGNOSIS — G47.419 PRIMARY NARCOLEPSY WITHOUT CATAPLEXY: Primary | ICD-10-CM

## 2018-05-31 RX ORDER — METHYLPHENIDATE HYDROCHLORIDE 10 MG/1
10 TABLET ORAL 2 TIMES DAILY
Qty: 60 TABLET | Refills: 0 | Status: SHIPPED | OUTPATIENT
Start: 2018-05-31 | End: 2018-07-03 | Stop reason: SDUPTHER

## 2018-06-18 ENCOUNTER — OFFICE VISIT (OUTPATIENT)
Dept: FAMILY MEDICINE CLINIC | Age: 68
End: 2018-06-18

## 2018-06-18 ENCOUNTER — HOSPITAL ENCOUNTER (OUTPATIENT)
Dept: GENERAL RADIOLOGY | Age: 68
Discharge: HOME | End: 2018-06-18

## 2018-06-18 VITALS
OXYGEN SATURATION: 89 % | HEART RATE: 51 BPM | DIASTOLIC BLOOD PRESSURE: 86 MMHG | SYSTOLIC BLOOD PRESSURE: 150 MMHG | WEIGHT: 197 LBS | BODY MASS INDEX: 32.82 KG/M2 | HEIGHT: 65 IN

## 2018-06-18 DIAGNOSIS — M54.50 ACUTE RIGHT-SIDED LOW BACK PAIN WITHOUT SCIATICA: ICD-10-CM

## 2018-06-18 DIAGNOSIS — M54.50 ACUTE RIGHT-SIDED LOW BACK PAIN WITHOUT SCIATICA: Primary | ICD-10-CM

## 2018-06-18 PROCEDURE — 99213 OFFICE O/P EST LOW 20 MIN: CPT | Performed by: FAMILY MEDICINE

## 2018-06-18 RX ORDER — ACETAMINOPHEN AND CODEINE PHOSPHATE 300; 30 MG/1; MG/1
1 TABLET ORAL 3 TIMES DAILY PRN
Qty: 20 TABLET | Refills: 0 | Status: SHIPPED | OUTPATIENT
Start: 2018-06-18 | End: 2018-06-25

## 2018-06-18 ASSESSMENT — ENCOUNTER SYMPTOMS
ABDOMINAL PAIN: 0
BACK PAIN: 1

## 2018-06-19 ENCOUNTER — TELEPHONE (OUTPATIENT)
Dept: FAMILY MEDICINE CLINIC | Age: 68
End: 2018-06-19

## 2018-06-19 DIAGNOSIS — M54.16 LUMBAR RADICULITIS: Primary | ICD-10-CM

## 2018-06-20 ENCOUNTER — TELEPHONE (OUTPATIENT)
Dept: CARDIOLOGY CLINIC | Age: 68
End: 2018-06-20

## 2018-06-25 ENCOUNTER — TELEPHONE (OUTPATIENT)
Dept: ORTHOPEDIC SURGERY | Age: 68
End: 2018-06-25

## 2018-06-25 ENCOUNTER — OFFICE VISIT (OUTPATIENT)
Dept: ORTHOPEDIC SURGERY | Age: 68
End: 2018-06-25

## 2018-06-25 VITALS
DIASTOLIC BLOOD PRESSURE: 98 MMHG | HEIGHT: 65 IN | WEIGHT: 197.09 LBS | SYSTOLIC BLOOD PRESSURE: 170 MMHG | BODY MASS INDEX: 32.84 KG/M2 | HEART RATE: 53 BPM

## 2018-06-25 DIAGNOSIS — M54.16 LUMBAR RADICULITIS: ICD-10-CM

## 2018-06-25 DIAGNOSIS — M51.36 DDD (DEGENERATIVE DISC DISEASE), LUMBAR: Primary | ICD-10-CM

## 2018-06-25 PROCEDURE — 99214 OFFICE O/P EST MOD 30 MIN: CPT | Performed by: PHYSICIAN ASSISTANT

## 2018-06-25 PROCEDURE — L0642 LO SAG RI AN/POS PNL PRE OTS: HCPCS | Performed by: PHYSICIAN ASSISTANT

## 2018-07-02 ENCOUNTER — HOSPITAL ENCOUNTER (OUTPATIENT)
Dept: MRI IMAGING | Age: 68
Discharge: HOME | End: 2018-07-02
Attending: PHYSICIAN ASSISTANT | Admitting: PHYSICIAN ASSISTANT

## 2018-07-02 DIAGNOSIS — M51.36 OTHER INTERVERTEBRAL DISC DEGENERATION, LUMBAR REGION: ICD-10-CM

## 2018-07-02 DIAGNOSIS — M51.36 DDD (DEGENERATIVE DISC DISEASE), LUMBAR: ICD-10-CM

## 2018-07-02 DIAGNOSIS — M54.16 LUMBAR RADICULITIS: ICD-10-CM

## 2018-07-03 DIAGNOSIS — G47.419 PRIMARY NARCOLEPSY WITHOUT CATAPLEXY: ICD-10-CM

## 2018-07-03 RX ORDER — METHYLPHENIDATE HYDROCHLORIDE 10 MG/1
10 TABLET ORAL 2 TIMES DAILY
Qty: 60 TABLET | Refills: 0 | Status: SHIPPED | OUTPATIENT
Start: 2018-07-03 | End: 2018-07-31 | Stop reason: SDUPTHER

## 2018-07-03 NOTE — TELEPHONE ENCOUNTER
Med refill request: see Medication History      Patient needs a refill on RITALIN. Mail order or local pharmacy: local    Pharmacy: [address] [phone]         LOV 6/18/2018    Future Appointments  Date Time Provider Kamran Wangi   9/17/2018 12:45 PM MD Matt Galaviz   11/28/2018 1:20 PM Evaristo Velasco MD AND KATE ANDINO             MRI test results on back she had done 7.2.18 at [facility]. Pt would like to be called with those results.  Thank you

## 2018-07-11 ENCOUNTER — TELEPHONE (OUTPATIENT)
Dept: CARDIOLOGY CLINIC | Age: 68
End: 2018-07-11

## 2018-07-18 ENCOUNTER — OFFICE VISIT (OUTPATIENT)
Dept: ORTHOPEDIC SURGERY | Age: 68
End: 2018-07-18

## 2018-07-18 VITALS
HEART RATE: 59 BPM | HEIGHT: 55 IN | WEIGHT: 197 LBS | SYSTOLIC BLOOD PRESSURE: 143 MMHG | DIASTOLIC BLOOD PRESSURE: 77 MMHG | BODY MASS INDEX: 45.59 KG/M2

## 2018-07-18 DIAGNOSIS — M54.16 LUMBAR RADICULITIS: ICD-10-CM

## 2018-07-18 DIAGNOSIS — M51.36 DDD (DEGENERATIVE DISC DISEASE), LUMBAR: Primary | ICD-10-CM

## 2018-07-18 DIAGNOSIS — M48.061 LUMBAR FORAMINAL STENOSIS: ICD-10-CM

## 2018-07-18 PROCEDURE — 99214 OFFICE O/P EST MOD 30 MIN: CPT | Performed by: PHYSICIAN ASSISTANT

## 2018-07-18 NOTE — LETTER
[facility]    Please Schedule the following with: Dr. Niurka Banda    Date:  07/18/18     Patient: Dominik Brennan     YOB: 1950    Patient Home Phone: [phone] (home)    Diagnosis: L5-S1 DDD M51.36 with right foraminal stenosis M48.062, right lumbar radiculitis M54.16, mild-moderate central stenosis L4 5    []LT     [x]RT     []JERE     []Midline    Levels: L5-S1 #1 new series 02015    []Cervical SHAR    []L-MBB  []SI Joint    []C-FACET  []L-FACET    [x]Interlaminar SHAR     []HIP     []C-MBB  []Transforaminal SHAR   []Neurotomy    Attending Physician: Mynor Dodson    Injection Schedule for: At: [facility]    First Insurance: Mickeal Snow #:  Second Insurance:                 Pre-cert #:    Comments:  5/9/17: Rt L5/S1 LESI= 70% improved    [] Blood Thinner:                 []Diabetic           []Antibiotic:               []Glaucoma:    [] Current Open Wounds, Lacerations or Sores     Allergies: Allergies   Allergen Reactions    Latex Rash    Vistaril [Hydroxyzine Hcl]        Past Medical History:   Diagnosis Date    Acid reflux     small stomach ulcer    Angina pectoris, variant (HCC)     Anxiety     Arthritis     Asthma     Benign esophageal stricture 12/2016    Benign tumor 03/20/2017     Benign brain tumor size of a quater    Cancer ([facility] [address]     ovarian    Depression     Frequency     Hyperlipidemia     Hypertension     Kidney stones     MI, old     Narcolepsy     Sleep apnea     Thyroid disease     Urgency of urination           Current Outpatient Prescriptions:     levothyroxine (SYNTHROID) 112 MCG tablet, TAKE ONE TABLET BY MOUTH ONCE DAILY, Disp: 30 tablet, Rfl: 11    methylphenidate (RITALIN) 10 MG tablet, Take 1 tablet by mouth 2 times daily for 30 days. For narcolepsy. , Disp: 60 tablet, Rfl: 0    ondansetron (ZOFRAN) 4 MG tablet, Take 1 tablet by mouth every 8 hours as needed for Nausea or Vomiting, Disp: 15 tablet, Rfl: 0    rosuvastatin (CRESTOR) 5 MG tablet, Take 1 tablet by mouth daily, Disp: 90 tablet, Rfl: 2    pantoprazole (PROTONIX) 40 MG tablet, TAKE ONE TABLET BY MOUTH TWICE DAILY, Disp: 60 tablet, Rfl: 5    losartan (COZAAR) 100 MG tablet, TAKE ONE TABLET BY MOUTH ONCE DAILY, Disp: 90 tablet, Rfl: 3    sucralfate (CARAFATE) 1 GM tablet, TAKE ONE TABLET BY MOUTH 4 TIMES DAILY, Disp: 120 tablet, Rfl: 3    fluticasone-vilanterol (BREO ELLIPTA) 100-25 MCG/INH AEPB inhaler, Inhale 1 puff into the lungs daily Pt was given 1 sample in office, Disp: , Rfl:     topiramate (TOPAMAX) 25 MG tablet, Take 3 tablets by mouth 2 times daily, Disp: 180 tablet, Rfl: 5    ranolazine (RANEXA) 500 MG extended release tablet, Take 500 mg by mouth 2 times daily, Disp: , Rfl:     isosorbide mononitrate (IMDUR) 30 MG extended release tablet, TAKE ONE TABLET BY MOUTH ONCE DAILY, Disp: 30 tablet, Rfl: 11    metoprolol tartrate (LOPRESSOR) 25 MG tablet, Take 25 mg by mouth 2 times daily, Disp: , Rfl:

## 2018-07-19 ENCOUNTER — TELEPHONE (OUTPATIENT)
Dept: ORTHOPEDIC SURGERY | Age: 68
End: 2018-07-19

## 2018-07-20 ENCOUNTER — TELEPHONE (OUTPATIENT)
Dept: ORTHOPEDIC SURGERY | Age: 68
End: 2018-07-20

## 2018-07-20 NOTE — TELEPHONE ENCOUNTER
DOS   7/30/18  CPT   15885  DX   M51.36   M48.062   M54.16  OP SX AUTH NPR FOR THIS PLAN  RIGHT   LEVELS   L5  - S1   PROCEDURE   EPIDURAL INJECTION  DR. Tina Ceja [facility]  INSURANCE:   Gen Liang

## 2018-07-26 ENCOUNTER — TELEPHONE (OUTPATIENT)
Dept: CARDIOLOGY CLINIC | Age: 68
End: 2018-07-26

## 2018-07-31 ENCOUNTER — TELEPHONE (OUTPATIENT)
Dept: FAMILY MEDICINE CLINIC | Age: 68
End: 2018-07-31

## 2018-07-31 DIAGNOSIS — G47.419 PRIMARY NARCOLEPSY WITHOUT CATAPLEXY: ICD-10-CM

## 2018-07-31 RX ORDER — METHYLPHENIDATE HYDROCHLORIDE 10 MG/1
10 TABLET ORAL 2 TIMES DAILY
Qty: 60 TABLET | Refills: 0 | Status: SHIPPED | OUTPATIENT
Start: 2018-07-31 | End: 2018-08-28 | Stop reason: SDUPTHER

## 2018-08-09 ENCOUNTER — TELEPHONE (OUTPATIENT)
Dept: ORTHOPEDIC SURGERY | Age: 68
End: 2018-08-09

## 2018-08-20 ENCOUNTER — TELEPHONE (OUTPATIENT)
Dept: CARDIOLOGY CLINIC | Age: 68
End: 2018-08-20

## 2018-08-27 ENCOUNTER — HOSPITAL ENCOUNTER (OUTPATIENT)
Age: 68
Setting detail: OUTPATIENT SURGERY
Discharge: HOME | End: 2018-08-27
Attending: PHYSICAL MEDICINE & REHABILITATION | Admitting: PHYSICAL MEDICINE & REHABILITATION
Payer: COMMERCIAL

## 2018-08-27 VITALS
TEMPERATURE: 97.5 F | HEIGHT: 65 IN | OXYGEN SATURATION: 100 % | SYSTOLIC BLOOD PRESSURE: 160 MMHG | HEART RATE: 52 BPM | BODY MASS INDEX: 31.65 KG/M2 | RESPIRATION RATE: 16 BRPM | WEIGHT: 190 LBS | DIASTOLIC BLOOD PRESSURE: 93 MMHG

## 2018-08-27 PROCEDURE — 3600000002 HC SURGERY LEVEL 2 BASE: Performed by: PHYSICAL MEDICINE & REHABILITATION

## 2018-08-27 PROCEDURE — 2709999900 HC NON-CHARGEABLE SUPPLY: Performed by: PHYSICAL MEDICINE & REHABILITATION

## 2018-08-27 PROCEDURE — 7100000010 HC PHASE II RECOVERY - FIRST 15 MIN: Performed by: PHYSICAL MEDICINE & REHABILITATION

## 2018-08-27 ASSESSMENT — ACTIVITIES OF DAILY LIVING (ADL): EFFECT OF PAIN ON DAILY ACTIVITIES: WALKING, STANDING

## 2018-08-27 ASSESSMENT — PAIN DESCRIPTION - DESCRIPTORS: DESCRIPTORS: ACHING

## 2018-08-27 ASSESSMENT — PAIN - FUNCTIONAL ASSESSMENT: PAIN_FUNCTIONAL_ASSESSMENT: 0-10

## 2018-08-27 NOTE — OP NOTE
Patient:  Gabino Mendoza Record #:  [id number]   Date:  8/27/2018  Physician:  Brooklyn Lopez M.D. Facility: [facility]     Pre-op diagnosis: Lumbar spondylosis, lumbar radiculitis  Post-op diagnosis:  same  Procedure: Right L5/S1 interlaminar epidural injection with flouroscopic guidance  Anesthesia: Local  Procedure Note:    The patient was admitted through pre-op and written consent was obtained. The patient was advised of the risks and benefits of the procedure, including but not limited to the following: bleeding, pain, infection, temporary paralysis, nerve damage and spinal headache. The patient was given the opportunity to ask questions. There were no contraindications for this procedure. The appropriate area was prepped and draped in a sterile fashion. Landmarks were identified and marked. The skin and soft tissues were anesthetized with 1% lidocaine. A 20G 4.5in. Touhy needle was advanced to the right L5/S1 interlaminar space using fluoroscopic guidance with ideal needle tip placement confirmed by multiple views. Injection of contrast showed epidural flow. There were no signs of intravascular or intrathecal injection. 80 mg depomedrol and 1cc 1% lidocaine were then injected. There were no complications and the patient tolerated the procedure well. The patient was transferred to the recovery area and monitored. Discharge instructions were given. The patient is to contact me for any post-procedure concerns. The patient is to follow up as scheduled.     SHWETA: 3-4cm     F Luis Miguel Weaver MD

## 2018-08-28 ENCOUNTER — TELEPHONE (OUTPATIENT)
Dept: FAMILY MEDICINE CLINIC | Age: 68
End: 2018-08-28

## 2018-08-28 DIAGNOSIS — G47.419 PRIMARY NARCOLEPSY WITHOUT CATAPLEXY: ICD-10-CM

## 2018-08-28 RX ORDER — METHYLPHENIDATE HYDROCHLORIDE 10 MG/1
10 TABLET ORAL 2 TIMES DAILY
Qty: 60 TABLET | Refills: 0 | Status: SHIPPED | OUTPATIENT
Start: 2018-08-28 | End: 2018-09-04 | Stop reason: SDUPTHER

## 2018-08-28 NOTE — TELEPHONE ENCOUNTER
Patient needs a refill on Ritalin. They need a 30 day supply.      Mail order or local pharmacy: local    Pharmacy: [facility]

## 2018-09-04 DIAGNOSIS — G43.119 INTRACTABLE MIGRAINE WITH AURA WITHOUT STATUS MIGRAINOSUS: ICD-10-CM

## 2018-09-04 DIAGNOSIS — G47.419 PRIMARY NARCOLEPSY WITHOUT CATAPLEXY: ICD-10-CM

## 2018-09-04 RX ORDER — TOPIRAMATE 25 MG/1
TABLET ORAL
Qty: 180 TABLET | Refills: 0 | Status: SHIPPED | OUTPATIENT
Start: 2018-09-04 | End: 2018-10-08 | Stop reason: SDUPTHER

## 2018-09-04 RX ORDER — METHYLPHENIDATE HYDROCHLORIDE 10 MG/1
10 TABLET ORAL 2 TIMES DAILY
Qty: 60 TABLET | Refills: 0 | Status: SHIPPED | OUTPATIENT
Start: 2018-09-04 | End: 2018-10-04 | Stop reason: SDUPTHER

## 2018-09-04 NOTE — TELEPHONE ENCOUNTER
Last seen 11.01.17    Last office note states to RTO 6 months/05.2018    Next appointment scheduled for 09.26.18

## 2018-09-05 DIAGNOSIS — G43.119 INTRACTABLE MIGRAINE WITH AURA WITHOUT STATUS MIGRAINOSUS: ICD-10-CM

## 2018-09-05 RX ORDER — TOPIRAMATE 25 MG/1
TABLET ORAL
Qty: 180 TABLET | Refills: 5 | OUTPATIENT
Start: 2018-09-05

## 2018-09-10 ENCOUNTER — OFFICE VISIT (OUTPATIENT)
Dept: ORTHOPEDIC SURGERY | Age: 68
End: 2018-09-10

## 2018-09-10 VITALS
HEIGHT: 55 IN | SYSTOLIC BLOOD PRESSURE: 155 MMHG | HEART RATE: 77 BPM | WEIGHT: 197 LBS | DIASTOLIC BLOOD PRESSURE: 74 MMHG | BODY MASS INDEX: 45.59 KG/M2

## 2018-09-10 DIAGNOSIS — M48.061 LUMBAR FORAMINAL STENOSIS: ICD-10-CM

## 2018-09-10 DIAGNOSIS — M51.36 DDD (DEGENERATIVE DISC DISEASE), LUMBAR: Primary | ICD-10-CM

## 2018-09-10 DIAGNOSIS — M47.819 FACET ARTHROPATHY: ICD-10-CM

## 2018-09-10 PROCEDURE — 99214 OFFICE O/P EST MOD 30 MIN: CPT | Performed by: PHYSICIAN ASSISTANT

## 2018-09-17 ENCOUNTER — OFFICE VISIT (OUTPATIENT)
Dept: CARDIOLOGY CLINIC | Age: 68
End: 2018-09-17

## 2018-09-17 VITALS
WEIGHT: 202 LBS | HEIGHT: 65 IN | DIASTOLIC BLOOD PRESSURE: 78 MMHG | BODY MASS INDEX: 33.66 KG/M2 | OXYGEN SATURATION: 98 % | HEART RATE: 56 BPM | SYSTOLIC BLOOD PRESSURE: 148 MMHG

## 2018-09-17 DIAGNOSIS — E78.2 MIXED HYPERLIPIDEMIA: ICD-10-CM

## 2018-09-17 DIAGNOSIS — I20.8 CARDIAC SYNDROME X (HCC): ICD-10-CM

## 2018-09-17 DIAGNOSIS — I25.10 CORONARY ARTERY DISEASE INVOLVING NATIVE CORONARY ARTERY OF NATIVE HEART WITHOUT ANGINA PECTORIS: ICD-10-CM

## 2018-09-17 DIAGNOSIS — I10 ESSENTIAL HYPERTENSION: ICD-10-CM

## 2018-09-17 DIAGNOSIS — I20.8 MICROVASCULAR ANGINA (HCC): ICD-10-CM

## 2018-09-17 DIAGNOSIS — R06.02 SOB (SHORTNESS OF BREATH): Primary | ICD-10-CM

## 2018-09-17 PROCEDURE — 99214 OFFICE O/P EST MOD 30 MIN: CPT | Performed by: INTERNAL MEDICINE

## 2018-09-17 RX ORDER — FUROSEMIDE 20 MG/1
20 TABLET ORAL DAILY
Qty: 30 TABLET | Refills: 1 | Status: SHIPPED | OUTPATIENT
Start: 2018-09-17 | End: 2018-11-16 | Stop reason: SDUPTHER

## 2018-09-17 NOTE — LETTER
[address] Cardiology - [address] [address]  Phone: [phone]  Fax: [phone]    Maira Fam MD        September 18, 2018     Devyn Ny DO  [address] [facility] [address]  [address] [facility]    Patient: Micah Parra  MR Number: [id number]  YOB: 1950  Date of Visit: 9/17/2018    Dear Dr. Devyn Ny:    Below are the relevant portions of my assessment and plan of care. [address]   Daily Cardiovascular Progress Note          Assessment:   - Paroxysmal dyspnea of unclear etiology; pulmonary, deconditioning vs heart failure  - Microvascular angina - stable  - Mild CAD - cath June 2014   - Hypertension    - Hyperlipidemia  - s/p dilatation of esophogeal stricture Jan 2017  - Gastrojejunal anastomosis ulcer  - PRINCE    Recommendation:  - I will give her a trial of Lasix to see if that helps the dyspnea. She has been taking Breo-Ellipta without much relief. - Her chest pain is otherwise stable and she will continue her current antianginals (Ranexa, Lopressor, Imdur). - Her BP is stable on Losartan/Lopressor.  - She is off the Asa due to history of peptic ulcer. LDL from Mar 17' was at goal (<70) on low dose Crestor. She has not been able to tolerate higher doses. Will repeat her lipid panel. - I will have her see Bradley White NP in six months and see Dr. Franchesca Garcia in 12 months. If you have questions, please do not hesitate to call me. I look forward to following Micah Parra along with you. Isra Amaya MD, [address] [facility],  [facility]  [phone] [place] office  [phone] [facility]  9/17/2018 12:27 PM         If you have questions, please do not hesitate to call me. I look forward to following Graciela along with you.     Sincerely,        Maira Fam MD

## 2018-09-17 NOTE — PROGRESS NOTES
HCT 36.7 06/02/2018    MCV 91.6 06/02/2018     06/02/2018     BMP:   Lab Results   Component Value Date     06/02/2018    K 3.4 06/02/2018     06/02/2018    CO2 23 06/02/2018    PHOS 3.5 04/06/2017    BUN 21 06/02/2018    CREATININE 0.7 06/02/2018    CALCIUM 9.2 06/02/2018    GFRAA >60 06/02/2018    GFRAA >60 07/19/2012     LFTS:   Lab Results   Component Value Date    ALT 9 06/02/2018    AST 13 06/02/2018    ALKPHOS 106 06/02/2018    PROT 7.2 06/02/2018    PROT 7.2 07/19/2012    AGRATIO 1.4 06/02/2018    BILITOT 0.3 06/02/2018     LIPID PANEL:   No components found for: CHLPL  Lab Results   Component Value Date    TRIG 89 03/21/2017    TRIG 112 01/05/2017    TRIG 133 07/23/2016     Lab Results   Component Value Date    HDL 52 03/21/2017    HDL 48 01/05/2017    HDL 43 07/23/2016     Lab Results   Component Value Date    LDLCALC 46 03/21/2017    LDLCALC 35 01/05/2017    LDLCALC 91 07/23/2016     TSH:   Lab Results   Component Value Date    TSH 0.13 04/11/2018     BNP:   No results found for: BNP    Data Review:  I have reviewed the below testing personally and my interpretation is below. STRESS TEST: 6/11/14   Impression: 1. Findings are concerning for anterior wall / anteroseptal ischemia. 2. Left ventricular ejection fraction is 54%     CATH: 6/12/14   1. Mild coronary artery disease. 2. Normal left ventricular systolic function, ejection fraction 60%. 3. Mildly elevated left ventricular end-diastolic pressure 14 mmHg. CXR: 7/13/14   FINDINGS: Borderline cardiomegaly. No pleural effusions. No acute infiltrate. PFT 9/11/17  1. No evidence of obstructive defect or restrictive defect. 2.  Isolated, mildly decreased diffuse capacity that could be seen emphysema, interstitial lung disease, anemia, pulmonary vascular  disease. Clinical correlation is warranted.       Assessment:   - Paroxysmal dyspnea of unclear etiology; pulmonary, deconditioning vs heart failure  - Microvascular angina

## 2018-09-18 ENCOUNTER — TELEPHONE (OUTPATIENT)
Dept: CARDIOLOGY CLINIC | Age: 68
End: 2018-09-18

## 2018-09-18 ENCOUNTER — TELEPHONE (OUTPATIENT)
Dept: FAMILY MEDICINE CLINIC | Age: 68
End: 2018-09-18

## 2018-09-21 NOTE — TELEPHONE ENCOUNTER
Pt wants IMDUR sent to [address] [place] [address], [facility] [phone]

## 2018-09-24 RX ORDER — ISOSORBIDE MONONITRATE 30 MG/1
TABLET, EXTENDED RELEASE ORAL
Qty: 30 TABLET | Refills: 11 | Status: SHIPPED | OUTPATIENT
Start: 2018-09-24 | End: 2018-09-26

## 2018-09-24 RX ORDER — ISOSORBIDE MONONITRATE 30 MG/1
TABLET, EXTENDED RELEASE ORAL
Qty: 90 TABLET | Refills: 3 | Status: SHIPPED | OUTPATIENT
Start: 2018-09-24 | End: 2019-10-07 | Stop reason: SDUPTHER

## 2018-09-26 ENCOUNTER — OFFICE VISIT (OUTPATIENT)
Dept: NEUROLOGY | Age: 68
End: 2018-09-26
Payer: COMMERCIAL

## 2018-09-26 ENCOUNTER — OFFICE VISIT (OUTPATIENT)
Dept: FAMILY MEDICINE CLINIC | Age: 68
End: 2018-09-26
Payer: COMMERCIAL

## 2018-09-26 VITALS
BODY MASS INDEX: 33.62 KG/M2 | DIASTOLIC BLOOD PRESSURE: 84 MMHG | HEIGHT: 65 IN | OXYGEN SATURATION: 99 % | SYSTOLIC BLOOD PRESSURE: 126 MMHG | WEIGHT: 201.8 LBS | HEART RATE: 92 BPM

## 2018-09-26 VITALS
HEART RATE: 54 BPM | BODY MASS INDEX: 33.66 KG/M2 | DIASTOLIC BLOOD PRESSURE: 72 MMHG | OXYGEN SATURATION: 98 % | SYSTOLIC BLOOD PRESSURE: 114 MMHG | WEIGHT: 202 LBS | HEIGHT: 65 IN

## 2018-09-26 DIAGNOSIS — G43.119 INTRACTABLE MIGRAINE WITH AURA WITHOUT STATUS MIGRAINOSUS: Primary | ICD-10-CM

## 2018-09-26 DIAGNOSIS — G47.33 OSA (OBSTRUCTIVE SLEEP APNEA): ICD-10-CM

## 2018-09-26 DIAGNOSIS — I10 HTN (HYPERTENSION), BENIGN: ICD-10-CM

## 2018-09-26 DIAGNOSIS — E78.5 DYSLIPIDEMIA: ICD-10-CM

## 2018-09-26 DIAGNOSIS — G47.419 PRIMARY NARCOLEPSY WITHOUT CATAPLEXY: ICD-10-CM

## 2018-09-26 DIAGNOSIS — I25.10 CAD IN NATIVE ARTERY: ICD-10-CM

## 2018-09-26 DIAGNOSIS — F33.0 MILD EPISODE OF RECURRENT MAJOR DEPRESSIVE DISORDER (HCC): Primary | ICD-10-CM

## 2018-09-26 DIAGNOSIS — M54.16 LUMBAR RADICULITIS: ICD-10-CM

## 2018-09-26 PROCEDURE — 99213 OFFICE O/P EST LOW 20 MIN: CPT | Performed by: FAMILY MEDICINE

## 2018-09-26 PROCEDURE — 99214 OFFICE O/P EST MOD 30 MIN: CPT | Performed by: PSYCHIATRY & NEUROLOGY

## 2018-09-26 RX ORDER — ESCITALOPRAM OXALATE 10 MG/1
10 TABLET ORAL DAILY
Qty: 30 TABLET | Refills: 2 | Status: SHIPPED | OUTPATIENT
Start: 2018-09-26 | End: 2018-12-26 | Stop reason: SDUPTHER

## 2018-09-26 ASSESSMENT — ENCOUNTER SYMPTOMS: BACK PAIN: 1

## 2018-09-26 NOTE — PROGRESS NOTES
The patient came today for follow up regarding: chronic migraines. Since the patient's last visit, she continues to take Topamax 75 mg bid. She denies any SE from Topamax. Her migraines are less frequent. Can be once every few weeks. Duration is hours and degree is moderate. She has throbbing unilateral pain with photophobia and nausea. She does not take any rescue medications. She stopped taking OTC. No more daily headaches. No sleep issues. The same chronic depression. Hx of HTN controlled on medications. No recent CP or numbness. She does not take ASA daily. Hx of PRINCE and she using her CPAP every night. No recurrent falling, visual changes and other ROS was negative. Past Medical History:   Diagnosis Date    Acid reflux     small stomach ulcer    Angina pectoris, variant (HCC)     Anxiety     Arthritis     Asthma     Benign esophageal stricture 12/2016    Benign tumor 03/20/2017     Benign brain tumor size of a quater    Cancer ([facility] [address]     ovarian    Depression     Frequency     Hyperlipidemia     Hypertension     Kidney stones     MI, old     Narcolepsy     Sleep apnea     Thyroid disease     Urgency of urination      Prior to Visit Medications    Medication Sig Taking? Authorizing Provider   isosorbide mononitrate (IMDUR) 30 MG extended release tablet TAKE ONE TABLET BY MOUTH ONCE DAILY Yes Eliane Dockery MD   isosorbide mononitrate (IMDUR) 30 MG extended release tablet TAKE ONE TABLET BY MOUTH ONCE DAILY Yes Eliane Dockery MD   furosemide (LASIX) 20 MG tablet Take 1 tablet by mouth daily Yes Eliane Dockery MD   topiramate (TOPAMAX) 25 MG tablet TAKE THREE TABLETS BY MOUTH TWICE DAILY Yes Kody Alvarez MD   methylphenidate (RITALIN) 10 MG tablet Take 1 tablet by mouth 2 times daily for 30 days. For narcolepsy.  Yes Frank Doran, DO   levothyroxine (SYNTHROID) 112 MCG tablet TAKE ONE TABLET BY MOUTH ONCE DAILY Yes Gamaliel Katz, DO   rosuvastatin (CRESTOR) 5 MG tablet Take 1 tablet by mouth daily Yes Tod Nova, DO   pantoprazole (PROTONIX) 40 MG tablet TAKE ONE TABLET BY MOUTH TWICE DAILY Yes Tod Nova, DO   losartan (COZAAR) 100 MG tablet TAKE ONE TABLET BY MOUTH ONCE DAILY Yes Tod Nova, DO   sucralfate (CARAFATE) 1 GM tablet TAKE ONE TABLET BY MOUTH 4 TIMES DAILY Yes Tod Nova, DO   fluticasone-vilanterol (BREO ELLIPTA) 100-25 MCG/INH AEPB inhaler Inhale 1 puff into the lungs daily Pt was given 1 sample in office Yes Historical Provider, MD   ranolazine (RANEXA) 500 MG extended release tablet Take 500 mg by mouth 2 times daily Yes Historical Provider, MD   metoprolol tartrate (LOPRESSOR) 25 MG tablet Take 25 mg by mouth 2 times daily Yes Historical Provider, MD     Allergies   Allergen Reactions    Latex Rash    Vistaril [Hydroxyzine Hcl]      Social History   Substance Use Topics    Smoking status: Never Smoker    Smokeless tobacco: Never Used    Alcohol use No     Family History   Problem Relation Age of Onset    Diabetes Mother     Heart Disease Mother     Cancer Mother         ovarian    Heart Disease Father     Stroke Father     Early Death Father         46 - heart attack    Heart Disease Sister     Stroke Sister     Cancer Sister         ovarian     Past Surgical History:   Procedure Laterality Date    ARM SURGERY Right 8/19/14    exc.trapezium and flexor carpi radialis interpositional arthroplasty    BREAST SURGERY      reduction    CARPAL TUNNEL RELEASE Right     CHOLECYSTECTOMY      [facility]ONOS[facility]     [facility] [place] [facility]      ENDOSCOPY, COLON, DIAGNOSTIC  01/06/2017    Anastomotic ulcer, gastritis    ESOPHAGEAL DILATATION  12/2016    GASTRIC BYPASS SURGERY      GASTRIC BYPASS SURGERY      HYSTERECTOMY      LITHOTRIPSY Left 11/12/2013    LITHOTRIPSY Left 12/26/13    LEFT ESWL    NE NJX DX/THER SBST INTRLMNR CRV/THRC W/IMG GDN Right 8/27/2018    RIGHT LUMBAR FIVE SACRAL ONE EPIDURAL STEROID INJECTION SITE

## 2018-09-27 RX ORDER — PANTOPRAZOLE SODIUM 40 MG/1
TABLET, DELAYED RELEASE ORAL
Qty: 60 TABLET | Refills: 5 | Status: SHIPPED | OUTPATIENT
Start: 2018-09-27 | End: 2019-03-18 | Stop reason: SDUPTHER

## 2018-09-27 NOTE — TELEPHONE ENCOUNTER
LOV 09/26/2018      Future Appointments  Date Time Provider Kamran Molina   11/28/2018 1:20 PM Carlos Batres MD AND PULM [facility]   12/17/2018 2:30 PM YOCASTA Mccall - DUYEN Adela Ment[facility]   3/27/2019 9:15 AM Jay Jay Francois MD AND NEURO [facility]

## 2018-09-30 PROBLEM — F33.0 MILD EPISODE OF RECURRENT MAJOR DEPRESSIVE DISORDER (HCC): Status: ACTIVE | Noted: 2018-09-30

## 2018-10-04 DIAGNOSIS — G47.419 PRIMARY NARCOLEPSY WITHOUT CATAPLEXY: ICD-10-CM

## 2018-10-04 RX ORDER — METHYLPHENIDATE HYDROCHLORIDE 10 MG/1
10 TABLET ORAL 2 TIMES DAILY
Qty: 60 TABLET | Refills: 0 | Status: SHIPPED | OUTPATIENT
Start: 2018-10-04 | End: 2018-10-31 | Stop reason: SDUPTHER

## 2018-10-08 DIAGNOSIS — G43.119 INTRACTABLE MIGRAINE WITH AURA WITHOUT STATUS MIGRAINOSUS: ICD-10-CM

## 2018-10-08 RX ORDER — TOPIRAMATE 25 MG/1
75 TABLET ORAL 2 TIMES DAILY
Qty: 540 TABLET | Refills: 1 | Status: SHIPPED | OUTPATIENT
Start: 2018-10-08 | End: 2019-04-03 | Stop reason: SDUPTHER

## 2018-10-15 RX ORDER — SUCRALFATE 1 G/1
TABLET ORAL
Qty: 120 TABLET | Refills: 5 | Status: SHIPPED | OUTPATIENT
Start: 2018-10-15 | End: 2019-08-07 | Stop reason: SDUPTHER

## 2018-10-24 ENCOUNTER — TELEPHONE (OUTPATIENT)
Dept: CARDIOLOGY CLINIC | Age: 68
End: 2018-10-24

## 2018-10-31 DIAGNOSIS — G47.419 PRIMARY NARCOLEPSY WITHOUT CATAPLEXY: ICD-10-CM

## 2018-10-31 NOTE — TELEPHONE ENCOUNTER
Med refill request:    Patient needs a refill on RITALIN. They need a 30 day supply.      Mail order or local pharmacy: local    Pharmacy: :  [address] [place] [address] [address] [place] [phone]        LOV 9/26/2018    Future Appointments  Date Time Provider Kamran Tracy   11/5/2018 11:20 AM DO RUKHSANA Redman  [facility]   11/28/2018 1:20 PM Amol Vides MD AND PULM [facility]   12/17/2018 2:30 PM YOCASTA Vargas - CNP [facility]   3/27/2019 9:15 AM Jay Jay Tate MD AND NEURO [facility]

## 2018-11-01 RX ORDER — METHYLPHENIDATE HYDROCHLORIDE 10 MG/1
10 TABLET ORAL 2 TIMES DAILY
Qty: 60 TABLET | Refills: 0 | Status: SHIPPED | OUTPATIENT
Start: 2018-11-01 | End: 2018-12-06 | Stop reason: SDUPTHER

## 2018-11-05 ENCOUNTER — OFFICE VISIT (OUTPATIENT)
Dept: FAMILY MEDICINE CLINIC | Age: 68
End: 2018-11-05
Payer: COMMERCIAL

## 2018-11-05 VITALS
BODY MASS INDEX: 34.16 KG/M2 | OXYGEN SATURATION: 99 % | HEART RATE: 58 BPM | SYSTOLIC BLOOD PRESSURE: 154 MMHG | HEIGHT: 65 IN | DIASTOLIC BLOOD PRESSURE: 92 MMHG | WEIGHT: 205 LBS

## 2018-11-05 DIAGNOSIS — E03.9 HYPOTHYROIDISM, UNSPECIFIED TYPE: Primary | ICD-10-CM

## 2018-11-05 DIAGNOSIS — M51.36 DDD (DEGENERATIVE DISC DISEASE), LUMBAR: ICD-10-CM

## 2018-11-05 DIAGNOSIS — D33.0 BENIGN NEOPLASM OF SUPRATENTORIAL REGION OF BRAIN (HCC): ICD-10-CM

## 2018-11-05 DIAGNOSIS — G47.419 PRIMARY NARCOLEPSY WITHOUT CATAPLEXY: ICD-10-CM

## 2018-11-05 PROCEDURE — 99213 OFFICE O/P EST LOW 20 MIN: CPT | Performed by: FAMILY MEDICINE

## 2018-11-05 ASSESSMENT — ENCOUNTER SYMPTOMS: GASTROINTESTINAL NEGATIVE: 1

## 2018-11-10 ASSESSMENT — ENCOUNTER SYMPTOMS
PHOTOPHOBIA: 0
COUGH: 0
SHORTNESS OF BREATH: 0

## 2018-11-12 ENCOUNTER — HOSPITAL ENCOUNTER (OUTPATIENT)
Dept: ULTRASOUND IMAGING | Age: 68
Discharge: HOME | End: 2018-11-12
Payer: COMMERCIAL

## 2018-11-12 DIAGNOSIS — E03.9 HYPOTHYROIDISM, UNSPECIFIED TYPE: ICD-10-CM

## 2018-11-12 PROCEDURE — 76536 US EXAM OF HEAD AND NECK: CPT

## 2018-11-17 RX ORDER — FUROSEMIDE 20 MG/1
20 TABLET ORAL DAILY
Qty: 30 TABLET | Refills: 3 | Status: SHIPPED | OUTPATIENT
Start: 2018-11-17 | End: 2018-11-18 | Stop reason: SDUPTHER

## 2018-11-21 RX ORDER — FUROSEMIDE 20 MG/1
20 TABLET ORAL DAILY
Qty: 90 TABLET | Refills: 3 | Status: SHIPPED | OUTPATIENT
Start: 2018-11-21 | End: 2020-01-13

## 2018-11-26 ENCOUNTER — TELEPHONE (OUTPATIENT)
Dept: CARDIOLOGY CLINIC | Age: 68
End: 2018-11-26

## 2018-11-27 NOTE — TELEPHONE ENCOUNTER
[facility] requesting a call to the office, per HIPAA form can not leave a detailed message.

## 2018-12-05 ENCOUNTER — TELEPHONE (OUTPATIENT)
Dept: CARDIOLOGY CLINIC | Age: 68
End: 2018-12-05

## 2018-12-06 ENCOUNTER — TELEPHONE (OUTPATIENT)
Dept: FAMILY MEDICINE CLINIC | Age: 68
End: 2018-12-06

## 2018-12-06 DIAGNOSIS — G47.419 PRIMARY NARCOLEPSY WITHOUT CATAPLEXY: ICD-10-CM

## 2018-12-06 RX ORDER — METHYLPHENIDATE HYDROCHLORIDE 10 MG/1
10 TABLET ORAL 2 TIMES DAILY
Qty: 60 TABLET | Refills: 0 | Status: SHIPPED | OUTPATIENT
Start: 2018-12-06 | End: 2019-01-03 | Stop reason: SDUPTHER

## 2018-12-11 NOTE — TELEPHONE ENCOUNTER
Pt is requesting refill on her Metoprolol.  Please send to [facility] [address], [address] [phone]

## 2018-12-12 ENCOUNTER — OFFICE VISIT (OUTPATIENT)
Dept: PULMONOLOGY | Age: 68
End: 2018-12-12
Payer: COMMERCIAL

## 2018-12-12 VITALS
DIASTOLIC BLOOD PRESSURE: 80 MMHG | SYSTOLIC BLOOD PRESSURE: 132 MMHG | WEIGHT: 207 LBS | HEART RATE: 72 BPM | BODY MASS INDEX: 34.49 KG/M2 | TEMPERATURE: 97.5 F | HEIGHT: 65 IN | RESPIRATION RATE: 16 BRPM | OXYGEN SATURATION: 100 %

## 2018-12-12 DIAGNOSIS — E66.9 OBESITY (BMI 30.0-34.9): ICD-10-CM

## 2018-12-12 DIAGNOSIS — G47.33 OSA (OBSTRUCTIVE SLEEP APNEA): Primary | ICD-10-CM

## 2018-12-12 DIAGNOSIS — J45.909 UNCOMPLICATED ASTHMA, UNSPECIFIED ASTHMA SEVERITY, UNSPECIFIED WHETHER PERSISTENT: ICD-10-CM

## 2018-12-12 PROBLEM — E66.811 OBESITY (BMI 30.0-34.9): Status: ACTIVE | Noted: 2018-12-12

## 2018-12-12 PROCEDURE — 99213 OFFICE O/P EST LOW 20 MIN: CPT | Performed by: INTERNAL MEDICINE

## 2018-12-12 ASSESSMENT — SLEEP AND FATIGUE QUESTIONNAIRES
HOW LIKELY ARE YOU TO NOD OFF OR FALL ASLEEP WHILE SITTING AND READING: 0
HOW LIKELY ARE YOU TO NOD OFF OR FALL ASLEEP WHEN YOU ARE A PASSENGER IN A CAR FOR AN HOUR WITHOUT A BREAK: 0
HOW LIKELY ARE YOU TO NOD OFF OR FALL ASLEEP WHILE WATCHING TV: 1
HOW LIKELY ARE YOU TO NOD OFF OR FALL ASLEEP WHILE SITTING QUIETLY AFTER LUNCH WITHOUT ALCOHOL: 0
ESS TOTAL SCORE: 2
NECK CIRCUMFERENCE (INCHES): 14
HOW LIKELY ARE YOU TO NOD OFF OR FALL ASLEEP WHILE SITTING INACTIVE IN A PUBLIC PLACE: 0
HOW LIKELY ARE YOU TO NOD OFF OR FALL ASLEEP WHILE SITTING AND TALKING TO SOMEONE: 0
HOW LIKELY ARE YOU TO NOD OFF OR FALL ASLEEP WHILE LYING DOWN TO REST IN THE AFTERNOON WHEN CIRCUMSTANCES PERMIT: 1
HOW LIKELY ARE YOU TO NOD OFF OR FALL ASLEEP IN A CAR, WHILE STOPPED FOR A FEW MINUTES IN TRAFFIC: 0

## 2018-12-12 NOTE — PROGRESS NOTES
CC-Pulmonary follow up      Presenting HPI: Patient when seen this morning states that she has been feeling reasonably well, patient says that she does have some intermittent sensation of gasping for air which goes away but patient says that she also also notices that since that time or she has gastric bypass she has not been eating well and patient says that she has intermittent episodes of hypoglycemia and once it improves she also feels better, patient does not using any Breo ellipta at this time, patient does not have any increasing rhinorrhea or nasal congestion, patient does not have any sore throat, no difficulty in swallowing, no coughing or choking while eating, patient does not have any odynophagia or dysphagia, patient does not have any chest pain or palpitations, patient does not have any wheezing, patient says that when she goes to work depending on how much she has to walk she has to use her rescue inhaler and on average she uses about 3 times a week, patient has been using her CPAP machine on regular basis, patient sleep fragmentation has gone down, patient does have some leg swelling because she works as a  and she has to stand for long time, patient does not have any pleuritic chest pain, no fever no chills no rigors, patient does not have any abdominal discomfort and nausea vomiting, patient does not have any confusion or lethargy, overall patient says that she is making progress, no other new symptoms of concern        Past Medical History:   Diagnosis Date    Acid reflux     small stomach ulcer    Angina pectoris, variant ([facility] [address]     Anxiety     Arthritis     Asthma     Benign esophageal stricture 12/2016    Benign tumor 03/20/2017     Benign brain tumor size of a quater    Cancer ([facility] [address]     ovarian    Depression     Frequency     Hyperlipidemia     Hypertension     Kidney stones     MI, old     Narcolepsy     Sleep apnea     Thyroid disease     Urgency of urination

## 2018-12-17 ENCOUNTER — OFFICE VISIT (OUTPATIENT)
Dept: CARDIOLOGY CLINIC | Age: 68
End: 2018-12-17
Payer: COMMERCIAL

## 2018-12-17 VITALS
SYSTOLIC BLOOD PRESSURE: 90 MMHG | DIASTOLIC BLOOD PRESSURE: 60 MMHG | HEIGHT: 65 IN | HEART RATE: 54 BPM | OXYGEN SATURATION: 98 % | WEIGHT: 202.4 LBS | BODY MASS INDEX: 33.72 KG/M2

## 2018-12-17 DIAGNOSIS — E78.2 MIXED HYPERLIPIDEMIA: ICD-10-CM

## 2018-12-17 DIAGNOSIS — I20.8 MICROVASCULAR ANGINA (HCC): Primary | ICD-10-CM

## 2018-12-17 DIAGNOSIS — I10 ESSENTIAL HYPERTENSION: ICD-10-CM

## 2018-12-17 DIAGNOSIS — R06.02 SOB (SHORTNESS OF BREATH): ICD-10-CM

## 2018-12-17 PROCEDURE — 99214 OFFICE O/P EST MOD 30 MIN: CPT | Performed by: NURSE PRACTITIONER

## 2018-12-17 RX ORDER — RANOLAZINE 1000 MG/1
1000 TABLET, EXTENDED RELEASE ORAL 2 TIMES DAILY
Qty: 60 TABLET | Refills: 5 | Status: SHIPPED | OUTPATIENT
Start: 2018-12-17 | End: 2019-03-18 | Stop reason: ALTCHOICE

## 2018-12-17 NOTE — PROGRESS NOTES
ischemia '17  ~Ranexa / isosorbide / ASA / statin     2. SOB (shortness of breath)   ~states to have had improvement with lasix  ~continues to follow with pul; stated sleep apne improved with CPAP    3. Essential hypertension   ~controlled    4. Mixed hyperlipidemia   ~tolerating low dose crestor          I had the opportunity to review the clinical symptoms and presentation of [employer]. Plan:     1. Increase Ranexa to 1000 mg bid   2. F/U in four months with Dr. Jasmyne Hall    Overall the patient is stable from CV standpoint    I have addresed the patient's cardiac risk factors and adjusted pharmacologic treatment as needed. In addition, I have reinforced the need for patient directed risk factor modification. Further evaluation will be based upon the patient's clinical course and testing results. All questions and concerns were addressed to the patient/friend. Alternatives to my treatment were discussed. The patient is not currently smoking. The risks related to smoking were reviewed with the patient. Recommend maintaining a smoke-free lifestyle. Patient is on a beta-blocker  Patient is on an ARB  Patient is on a statin    Antiplatelet therapy has been recommended / prescribed for this patient. Education conducted on adverse reactions including bleeding was discussed. The patient verbalizes understanding not to stop medications without discussing with us. Discussed exercise: 30-60 minutes 7 days/week  Discussed Low saturated fat diet. Thank you for allowing to us to participate in the care of [employer].     YOCASTA Calle    Documentation of today's visit sent to PCP

## 2019-01-03 DIAGNOSIS — G47.419 PRIMARY NARCOLEPSY WITHOUT CATAPLEXY: ICD-10-CM

## 2019-01-03 RX ORDER — METHYLPHENIDATE HYDROCHLORIDE 10 MG/1
10 TABLET ORAL 2 TIMES DAILY
Qty: 60 TABLET | Refills: 0 | Status: SHIPPED | OUTPATIENT
Start: 2019-01-03 | End: 2019-02-01 | Stop reason: SDUPTHER

## 2019-01-11 RX ORDER — ROSUVASTATIN CALCIUM 5 MG/1
TABLET, COATED ORAL
Qty: 90 TABLET | Refills: 1 | Status: SHIPPED | OUTPATIENT
Start: 2019-01-11 | End: 2019-07-08 | Stop reason: SDUPTHER

## 2019-01-28 ENCOUNTER — TELEPHONE (OUTPATIENT)
Dept: CARDIOLOGY CLINIC | Age: 69
End: 2019-01-28

## 2019-02-01 DIAGNOSIS — G47.419 PRIMARY NARCOLEPSY WITHOUT CATAPLEXY: ICD-10-CM

## 2019-02-01 RX ORDER — METHYLPHENIDATE HYDROCHLORIDE 10 MG/1
10 TABLET ORAL 2 TIMES DAILY
Qty: 60 TABLET | Refills: 0 | Status: SHIPPED | OUTPATIENT
Start: 2019-02-01 | End: 2019-02-26 | Stop reason: SDUPTHER

## 2019-02-26 ENCOUNTER — TELEPHONE (OUTPATIENT)
Dept: FAMILY MEDICINE CLINIC | Age: 69
End: 2019-02-26

## 2019-02-26 DIAGNOSIS — G47.419 PRIMARY NARCOLEPSY WITHOUT CATAPLEXY: ICD-10-CM

## 2019-02-26 RX ORDER — METHYLPHENIDATE HYDROCHLORIDE 10 MG/1
10 TABLET ORAL 2 TIMES DAILY
Qty: 60 TABLET | Refills: 0 | Status: SHIPPED | OUTPATIENT
Start: 2019-02-26 | End: 2019-04-04 | Stop reason: SDUPTHER

## 2019-02-27 ENCOUNTER — TELEPHONE (OUTPATIENT)
Dept: CARDIOLOGY CLINIC | Age: 69
End: 2019-02-27

## 2019-03-04 RX ORDER — LOSARTAN POTASSIUM 100 MG/1
TABLET ORAL
Qty: 90 TABLET | Refills: 3 | Status: SHIPPED | OUTPATIENT
Start: 2019-03-04 | End: 2019-03-18 | Stop reason: CLARIF

## 2019-03-05 ENCOUNTER — TELEPHONE (OUTPATIENT)
Dept: FAMILY MEDICINE CLINIC | Age: 69
End: 2019-03-05

## 2019-03-05 RX ORDER — VALSARTAN 160 MG/1
160 TABLET ORAL DAILY
Qty: 90 TABLET | Refills: 1 | Status: SHIPPED | OUTPATIENT
Start: 2019-03-05 | End: 2019-07-18 | Stop reason: SDUPTHER

## 2019-03-18 ENCOUNTER — OFFICE VISIT (OUTPATIENT)
Dept: FAMILY MEDICINE CLINIC | Age: 69
End: 2019-03-18
Payer: COMMERCIAL

## 2019-03-18 VITALS
HEIGHT: 65 IN | OXYGEN SATURATION: 97 % | SYSTOLIC BLOOD PRESSURE: 132 MMHG | DIASTOLIC BLOOD PRESSURE: 86 MMHG | BODY MASS INDEX: 33.49 KG/M2 | HEART RATE: 68 BPM | WEIGHT: 201 LBS

## 2019-03-18 DIAGNOSIS — I10 ESSENTIAL HYPERTENSION: ICD-10-CM

## 2019-03-18 DIAGNOSIS — F33.0 MILD EPISODE OF RECURRENT MAJOR DEPRESSIVE DISORDER (HCC): ICD-10-CM

## 2019-03-18 DIAGNOSIS — D33.0 BENIGN NEOPLASM OF SUPRATENTORIAL REGION OF BRAIN (HCC): ICD-10-CM

## 2019-03-18 DIAGNOSIS — G47.419 PRIMARY NARCOLEPSY WITHOUT CATAPLEXY: Primary | ICD-10-CM

## 2019-03-18 DIAGNOSIS — I20.8 MICROVASCULAR ANGINA (HCC): ICD-10-CM

## 2019-03-18 DIAGNOSIS — I20.8 SYNDROME X, CARDIAC (HCC): ICD-10-CM

## 2019-03-18 PROCEDURE — 99214 OFFICE O/P EST MOD 30 MIN: CPT | Performed by: FAMILY MEDICINE

## 2019-03-18 RX ORDER — PANTOPRAZOLE SODIUM 40 MG/1
TABLET, DELAYED RELEASE ORAL
Qty: 60 TABLET | Refills: 5 | Status: SHIPPED | OUTPATIENT
Start: 2019-03-18 | End: 2019-07-18 | Stop reason: SDUPTHER

## 2019-03-18 ASSESSMENT — ENCOUNTER SYMPTOMS
SHORTNESS OF BREATH: 1
WHEEZING: 0
APNEA: 0
ALLERGIC/IMMUNOLOGIC NEGATIVE: 1
CHOKING: 0
GASTROINTESTINAL NEGATIVE: 1
COUGH: 1
EYE REDNESS: 0
EYE PAIN: 1
EYE ITCHING: 0

## 2019-04-03 ENCOUNTER — OFFICE VISIT (OUTPATIENT)
Dept: NEUROLOGY | Age: 69
End: 2019-04-03
Payer: COMMERCIAL

## 2019-04-03 VITALS
HEIGHT: 65 IN | OXYGEN SATURATION: 96 % | DIASTOLIC BLOOD PRESSURE: 82 MMHG | WEIGHT: 202 LBS | SYSTOLIC BLOOD PRESSURE: 128 MMHG | HEART RATE: 45 BPM | BODY MASS INDEX: 33.66 KG/M2

## 2019-04-03 DIAGNOSIS — E78.5 DYSLIPIDEMIA: ICD-10-CM

## 2019-04-03 DIAGNOSIS — I10 HTN (HYPERTENSION), BENIGN: ICD-10-CM

## 2019-04-03 DIAGNOSIS — G47.33 OSA (OBSTRUCTIVE SLEEP APNEA): ICD-10-CM

## 2019-04-03 DIAGNOSIS — G43.119 INTRACTABLE MIGRAINE WITH AURA WITHOUT STATUS MIGRAINOSUS: Primary | ICD-10-CM

## 2019-04-03 PROCEDURE — 99213 OFFICE O/P EST LOW 20 MIN: CPT | Performed by: PSYCHIATRY & NEUROLOGY

## 2019-04-03 RX ORDER — TOPIRAMATE 25 MG/1
75 TABLET ORAL 2 TIMES DAILY
Qty: 540 TABLET | Refills: 1 | Status: SHIPPED | OUTPATIENT
Start: 2019-04-03 | End: 2019-10-14 | Stop reason: SDUPTHER

## 2019-04-03 NOTE — PROGRESS NOTES
Zoe Spencer MD   sucralfate (CARAFATE) 1 GM tablet TAKE ONE TABLET BY MOUTH 4 TIMES DAILY Yes José Miguel Bland DO   isosorbide mononitrate (IMDUR) 30 MG extended release tablet TAKE ONE TABLET BY MOUTH ONCE DAILY Yes Chriss Meigs, MD   levothyroxine (SYNTHROID) 112 MCG tablet TAKE ONE TABLET BY MOUTH ONCE DAILY Yes José Miguel Bland DO     Allergies   Allergen Reactions    Latex Rash    Vistaril [Hydroxyzine Hcl]      Social History     Tobacco Use    Smoking status: Never Smoker    Smokeless tobacco: Never Used   Substance Use Topics    Alcohol use: No     Alcohol/week: 0.0 oz     Family History   Problem Relation Age of Onset    Diabetes Mother     Heart Disease Mother     Cancer Mother         ovarian    Heart Disease Father     Stroke Father     Early Death Father         46 - heart attack    Heart Disease Sister     Stroke Sister     Cancer Sister         ovarian     Past Surgical History:   Procedure Laterality Date    ARM SURGERY Right 8/19/14    exc.trapezium and flexor carpi radialis interpositional arthroplasty    BREAST SURGERY      reduction    CARPAL TUNNEL RELEASE Right     CHOLECYSTECTOMY      COLONOSCOPY      DENTAL SURGERY      ENDOSCOPY, COLON, DIAGNOSTIC  01/06/2017    Anastomotic ulcer, gastritis    ESOPHAGEAL DILATATION  12/2016    GASTRIC BYPASS SURGERY      GASTRIC BYPASS SURGERY      HYSTERECTOMY      LITHOTRIPSY Left 11/12/2013    LITHOTRIPSY Left 12/26/13    LEFT ESWL    MA NJX DX/THER SBST INTRLMNR CRV/THRC W/IMG GDN Right 8/27/2018    RIGHT LUMBAR FIVE SACRAL ONE EPIDURAL STEROID INJECTION SITE CONFIRMED BY FLUOROSCOPY performed by Ivy Uribe MD at [address]  03/03/2017    Gastritis         Exam:   Constitutional:   Vitals:    04/03/19 0956   BP: 128/82   Pulse: (!) 45   SpO2: 96%   Weight: 202 lb (91.6 kg)   Height: 5' 5\" (1.651 m)       General appearance: well-nourished. Eye: No icterus.  No blurring of optic disc. Neck: supple  Cardiovascular:          No lower leg edema with good pulsation. Mental Status: Oriented to person, place, problem, and time. Fluent speech. Good fund of knowledge. Normal attention span and concentration. Intact recent and remote memory  Cranial Nerves:   II: Visual fields: Full to confrontation  III: Pupils: equal, round, reactive to light  III,IV,VI: Extra Ocular Movements are intact. No nystagmus  V: Facial sensation is intact to pin prick and light touch  VII: Facial strength and movements: intact and symmetric  VIII: Hearing: Intact to finger rub bilaterally  IX: Palate elevation is symmetric  XI: Shoulder shrug is intact  XII: Tongue movements are normal  Musculoskeletal: 5/5 in all 4 extremities. Normal tone. Reflexes: Bilateral biceps 2/4, triceps 2/4, brachial radialis 2/4, knee 2/4 and ankle 2/4. Planters: flexor bilaterally. Coordination: no pronator drift, no dysmetria with FNF testing. No tremors. Sensation: normal to all modalities. Gait/Posture: steady    ROS : A 10-12 system review of constitutional, cardiovascular, respiratory, musculoskeletal, endocrine, hematological, skin, SHEENT, genitourinary, psychiatric and neurologic systems was obtained and updated today which is unremarkable except as mentioned in my HPI      Medical decision making:  I personally reviewed and updated social history, past medical history, medications, allergy, surgical history, and family history as documented in the patient's electronic health records. Labs and/or neuroimaging and other test results reviewed and discussed with the patient. Reviewed notes from other physicians. Provided patient education regarding risk, benefits and treatment options as well as adherence to medication regimen and side effect from these medications. Assessment:   Diagnosis Orders   1. Intractable migraine with aura without status migrainosus  topiramate (TOPAMAX) 25 MG tablet   2.  HTN (hypertension), benign     3. Dyslipidemia     4.  PRINCE (obstructive sleep apnea)             Plan:  Continue Topamax 75 mg twice daily  Refill for medication  Side effect was discussed  Hydration  Headache diary  Avoid OTC more than 2 days a week  Continue CPAP  Weight reduction  Falling precautions  Aspirin  Statin for stroke prevention  Continue current blood pressure medications and monitor blood pressure at home  SSRI  RTC 6 months

## 2019-04-04 DIAGNOSIS — G47.419 PRIMARY NARCOLEPSY WITHOUT CATAPLEXY: ICD-10-CM

## 2019-04-04 RX ORDER — METHYLPHENIDATE HYDROCHLORIDE 10 MG/1
10 TABLET ORAL 2 TIMES DAILY
Qty: 60 TABLET | Refills: 0 | Status: SHIPPED | OUTPATIENT
Start: 2019-04-04 | End: 2019-05-06 | Stop reason: SDUPTHER

## 2019-04-04 NOTE — TELEPHONE ENCOUNTER
Last OV 3/18/19  Future Appointments   Date Time Provider Kamran Wangi   4/22/2019 11:30 AM Louie Adame MD Christy Olp [facility]   7/15/2019  1:00 PM [facility]   10/2/2019  9:45 AM Jay Jay Bradford MD AND NEURO [facility]

## 2019-04-19 NOTE — PROGRESS NOTES
[address] [facility]   Cardiovascular Evaluation    PATIENT: Anselmo Treviño  DATE: 2019  MRN: [id number]  CSN: [id number]  : 1950      Primary Care Doctor: Da Qureshi DO  Reason for evaluation:   6 Month Follow-Up (Last OV NP TS 2018); New Patient (Former [facility], last visit 2018.); and Coronary Artery Disease      Subjective:   History of present illness on initial date of evaluation:   Anselmo Treviño is a 80 y.o. patient who presents who presents for follow up for microvascular angina, CAD, HTN, HLD. She follows Dr. Adithya Miller for COPD. Today she reports feeling ok. She is unable to afford Renexa. She states she has chest pain almost every day. She states it is sharp. She states the Imdur helps some, but feels the Renexa helped more. She denies SOB, palpitations, dizziness or syncope.        Patient Active Problem List   Diagnosis    Excessive sleepiness    HTN (hypertension)    Restless legs syndrome (RLS)    PRINCE (obstructive sleep apnea)    Depressive disorder, not elsewhere classified    Narcolepsy    Microvascular angina (HCC)    Chronic ischemic heart disease    CMC arthritis, thumb, degenerative    GERD (gastroesophageal reflux disease)    Anxiety    Cardiac syndrome X ([address]    CAD (coronary artery disease)    HLD (hyperlipidemia)    Essential hypertension    New persistent daily headache    Migraine with aura and without status migrainosus, not intractable    Benign neoplasm of supratentorial region of brain ([address]    HTN (hypertension), benign    Asthma    Diaphragmatic hernia    Female genuine stress incontinence    Blood in the urine    Morbid obesity ([address]    Pain of right hip joint    Greater trochanteric bursitis of right hip    Intractable migraine with aura without status migrainosus    Chronic tension-type headache, intractable    Dyslipidemia    Paroxysmal dyspnea    Skipped beats    PRINCE (obstructive sleep apnea)    SOB (shortness of breath)    Hypothyroidism    Mild episode of recurrent major depressive disorder (HCC)    Obesity (BMI 30.0-34. 9)         Past Medical History:   has a past medical history of Acid reflux, Angina pectoris, variant (HCC), Anxiety, Arthritis, Asthma, Benign esophageal stricture, Benign tumor, Cancer ([facility] [address] Depression, Frequency, Hyperlipidemia, Hypertension, Kidney stones, MI, old, Narcolepsy, Sleep apnea, Thyroid disease, and Urgency of urination. Surgical History:   has a past surgical history that includes Cholecystectomy; Hysterectomy; Gastric bypass surgery; Breast surgery; Dental surgery; Lithotripsy (Left, 11/12/2013); Lithotripsy (Left, 12/26/13); Colonoscopy; Carpal tunnel release (Right); Arm Surgery (Right, 8/19/14); Gastric bypass surgery; Endoscopy, colon, diagnostic (01/06/2017); Esophagus dilation (12/2016); Upper gastrointestinal endoscopy (03/03/2017); and pr njx dx/ther sbst intrlmnr crv/thrc w/img gdn (Right, 8/27/2018). Social History:   reports that she has never smoked. She has never used smokeless tobacco. She reports that she does not drink alcohol or use drugs. Family History:  No evidence for sudden cardiac death or premature CAD    Home Medications:  Reviewed and are listed in nursing record. and/or listed below  Current Outpatient Medications   Medication Sig Dispense Refill    methylphenidate (RITALIN) 10 MG tablet Take 1 tablet by mouth 2 times daily for 30 days.  For narcolepsy 60 tablet 0    topiramate (TOPAMAX) 25 MG tablet Take 3 tablets by mouth 2 times daily 540 tablet 1    pantoprazole (PROTONIX) 40 MG tablet TAKE 1 TABLET BY MOUTH TWICE DAILY 60 tablet 5    metoprolol tartrate (LOPRESSOR) 25 MG tablet TAKE 1 TABLET BY MOUTH TWICE DAILY 180 tablet 0    valsartan (DIOVAN) 160 MG tablet Take 1 tablet by mouth daily 90 tablet 1    rosuvastatin (CRESTOR) 5 MG tablet TAKE 1 TABLET BY MOUTH ONCE DAILY 90 tablet 1    escitalopram (LEXAPRO) 10 MG tablet TAKE 1 TABLET BY MOUTH ONCE DAILY 30 tablet 5    aspirin 81 MG tablet Take 81 mg by mouth daily      furosemide (LASIX) 20 MG tablet Take 1 tablet by mouth daily 90 tablet 3    sucralfate (CARAFATE) 1 GM tablet TAKE ONE TABLET BY MOUTH 4 TIMES DAILY 120 tablet 5    isosorbide mononitrate (IMDUR) 30 MG extended release tablet TAKE ONE TABLET BY MOUTH ONCE DAILY 90 tablet 3    levothyroxine (SYNTHROID) 112 MCG tablet TAKE ONE TABLET BY MOUTH ONCE DAILY 30 tablet 11     No current facility-administered medications for this visit. Allergies:  Latex and Vistaril [hydroxyzine hcl]     Review of Systems:   A 14 point review of symptoms completed. Pertinent positives identified in the HPI, all other review of symptoms negative as below.     Objective:   PHYSICAL EXAM:    Vitals:    04/22/19 1132   BP: 110/64   Pulse: (!) 42   SpO2: 100%    Weight: 199 lb (90.3 kg)     Wt Readings from Last 3 Encounters:   04/22/19 199 lb (90.3 kg)   04/03/19 202 lb (91.6 kg)   03/18/19 201 lb (91.2 kg)       General Appearance:  Alert, cooperative, no distress, appears stated age   Head:  Normocephalic, atraumatic   Eyes:  PERRL, conjunctiva/corneas clear   Nose: Nares normal, no drainage or sinus tenderness   Throat: Lips, mucosa, and tongue normal   Neck: Supple, symmetrical, trachea midline, NL thyroid no carotid bruit or JVD   Lungs:   CTAB, respirations unlabored   Chest Wall:  No tenderness or deformity   Heart:  Regular rhythm and rocio rate; S1, S2 are normal;   no murmur noted; no rub or gallop   Abdomen:   Soft, non-tender, +BS x 4, no masses, no organomegaly   Extremities: Extremities normal, atraumatic, no cyanosis or edema   Pulses: 2+ and symmetric   Skin: Skin color, texture, turgor normal, no rashes or lesions   Pysch: Normal mood and affect   Neurologic: Normal gross motor and sensory exam.         LABS   CBC:      Lab Results   Component Value Date    WBC 5.0 06/02/2018    RBC 4.01 06/02/2018    HGB 12.0 2018    HCT 36.7 2018    MCV 91.6 2018    RDW 15.0 2018     2018     CMP:  Lab Results   Component Value Date     2018    K 3.4 2018     2018    CO2 23 2018    BUN 21 2018    CREATININE 0.7 2018    GFRAA >60 2018    GFRAA >60 2012    AGRATIO 1.4 2018    LABGLOM >60 2018    GLUCOSE 83 2018    PROT 7.2 2018    PROT 7.2 2012    CALCIUM 9.2 2018    BILITOT 0.3 2018    ALKPHOS 106 2018    AST 13 2018    ALT 9 2018     PT/INR:   No results found for: PTINR  Liver:  No components found for: CHLPL  Lab Results   Component Value Date    ALT 9 (L) 2018    AST 13 (L) 2018    ALKPHOS 106 2018    BILITOT 0.3 2018     No results found for: LABA1C  Lipids:         Lab Results   Component Value Date    TRIG 89 2017    TRIG 112 2017    TRIG 133 2016            Lab Results   Component Value Date    HDL 52 2017    HDL 48 2017    HDL 43 2016            Lab Results   Component Value Date    LDLCALC 46 2017    LDLCALC 35 2017    LDLCALC 91 2016            Lab Results   Component Value Date    LABVLDL 18 2017    LABVLDL 22 2017    LABVLDL 27 2016         CARDIAC DATA   EK2017 Sinus rocio, low voltage, NSST changes      STRESS TEST: 3/20/17  Summary  There is no evidence of stress induced ischemia. Small, fixed defect in the  anteroseptal wall c/w breast attenuation artifact. Normal LV function with  ejection fraction of 64%. There are no regional wall motion abnormalities. CARDIAC CATH:  CATH: 14   1. Mild coronary artery disease. 2. Normal left ventricular systolic function, ejection fraction 60%. 3. Mildly elevated left ventricular end-diastolic pressure 14 mmHg. VASCULAR/OTHER IMAGING:  PFT 9/11/17  1.   No evidence of obstructive defect or restrictive defect. 2.  Isolated, mildly decreased diffuse capacity that could be seen emphysema, interstitial lung disease, anemia, pulmonary vascular  disease. Clinical correlation is warranted        Assessment and Plan   Coretta Kuhn is a 80 y.o. female who presents today for the following problems:      1. Cardiac microvascular disease: still CP daily  2. CAD: mild on 6/ 2014 cath  3. HTN: controlled  4. HLD: controlled  5. Dizziness: sounds more like possible VBO, Cerebral atrophy noted last MRI  6. Sinus bradycardia: asymptomatic      Hx of esophogeal stricture and dilation  Hx of Gastrojejunal anastomosis ulcer    PLAN  1. PT continues with daily CP, nt much help with imdur   - trial of L-arginine 500mg po BID for now  2. Wean off imdur as possible,   3. Agree with Brain MRA- eval vertebral arteries given cerebral atrophy  4. cautious ASA due to PUD  5.  Cont lopressor, crestor            MD Plan:     Patient Active Problem List   Diagnosis    Excessive sleepiness    HTN (hypertension)    Restless legs syndrome (RLS)    PRINCE (obstructive sleep apnea)    Depressive disorder, not elsewhere classified    Narcolepsy    Microvascular angina (HCC)    Chronic ischemic heart disease    CMC arthritis, thumb, degenerative    GERD (gastroesophageal reflux disease)    Anxiety    Cardiac syndrome X ([address]    CAD (coronary artery disease)    HLD (hyperlipidemia)    Essential hypertension    New persistent daily headache    Migraine with aura and without status migrainosus, not intractable    Benign neoplasm of supratentorial region of brain ([address]    HTN (hypertension), benign    Asthma    Diaphragmatic hernia    Female genuine stress incontinence    Blood in the urine    Morbid obesity ([address]    Pain of right hip joint    Greater trochanteric bursitis of right hip    Intractable migraine with aura without status migrainosus    Chronic tension-type headache, intractable    Dyslipidemia    Paroxysmal dyspnea    Skipped beats    PRINCE (obstructive sleep apnea)    SOB (shortness of breath)    Hypothyroidism    Mild episode of recurrent major depressive disorder (HCC)    Obesity (BMI 30.0-34. 9)       Patient Plan:  1. May start L-Arginine 500 mg twice a day for chest pain  2. May try weaning off the Imdur once you start the L-Arginine. 3. Continue all other medications  4. Follow up in 3-4 months       It is a pleasure to assist in the care of [employer]. Please call with any questions. This note was scribed in the presence of Fercho Anne MD by Poncho Bourgeois RN. The scribes documentation has been prepared under my direction and personally reviewed by me in its entirety. I confirm that the note above accurately reflects all work, treatment, procedures, and medical decision making performed by me. I, Dr. Dion Byrnes MD, personally performed the services described in this documentation as scribed by Dixon Ferrer in my presence, and it is both accurate and complete to the best of our ability.          Dion Byrnes MD, [address] [facility] Cardiologist  [address]  [phone] [facility]  [phone] [address] [place] [address]

## 2019-04-22 ENCOUNTER — OFFICE VISIT (OUTPATIENT)
Dept: CARDIOLOGY CLINIC | Age: 69
End: 2019-04-22
Payer: COMMERCIAL

## 2019-04-22 VITALS
DIASTOLIC BLOOD PRESSURE: 64 MMHG | HEIGHT: 65 IN | SYSTOLIC BLOOD PRESSURE: 110 MMHG | BODY MASS INDEX: 33.15 KG/M2 | OXYGEN SATURATION: 100 % | HEART RATE: 42 BPM | WEIGHT: 199 LBS

## 2019-04-22 DIAGNOSIS — I10 ESSENTIAL HYPERTENSION: ICD-10-CM

## 2019-04-22 DIAGNOSIS — R00.1 SINUS BRADYCARDIA: ICD-10-CM

## 2019-04-22 DIAGNOSIS — E78.2 MIXED HYPERLIPIDEMIA: ICD-10-CM

## 2019-04-22 DIAGNOSIS — I20.8 MICROVASCULAR ANGINA (HCC): Primary | ICD-10-CM

## 2019-04-22 DIAGNOSIS — I25.10 CORONARY ARTERY DISEASE INVOLVING NATIVE CORONARY ARTERY OF NATIVE HEART WITHOUT ANGINA PECTORIS: ICD-10-CM

## 2019-04-22 PROCEDURE — 99214 OFFICE O/P EST MOD 30 MIN: CPT | Performed by: INTERNAL MEDICINE

## 2019-04-22 NOTE — PATIENT INSTRUCTIONS
Patient Plan:  1. May start L-Arginine 500 mg twice a day for chest pain  2. May try weaning off the Imdur once you start the L-Arginine. 3. Continue all other medications  4.  Follow up in 3-4 months

## 2019-04-29 ENCOUNTER — HOSPITAL ENCOUNTER (OUTPATIENT)
Dept: MRI IMAGING | Age: 69
Discharge: HOME | End: 2019-04-29
Payer: COMMERCIAL

## 2019-04-29 ENCOUNTER — HOSPITAL ENCOUNTER (OUTPATIENT)
Age: 69
Discharge: HOME | End: 2019-04-29
Payer: COMMERCIAL

## 2019-04-29 DIAGNOSIS — D32.9 MENINGIOMA (HCC): ICD-10-CM

## 2019-04-29 LAB
BUN BLDV-MCNC: 18 MG/DL (ref 7–20)
CREAT SERPL-MCNC: 0.9 MG/DL (ref 0.6–1.2)
GFR AFRICAN AMERICAN: >60
GFR NON-AFRICAN AMERICAN: >60

## 2019-04-29 PROCEDURE — A9579 GAD-BASE MR CONTRAST NOS,1ML: HCPCS | Performed by: NEUROLOGICAL SURGERY

## 2019-04-29 PROCEDURE — 36415 COLL VENOUS BLD VENIPUNCTURE: CPT

## 2019-04-29 PROCEDURE — 82565 ASSAY OF CREATININE: CPT

## 2019-04-29 PROCEDURE — 6360000004 HC RX CONTRAST MEDICATION: Performed by: NEUROLOGICAL SURGERY

## 2019-04-29 PROCEDURE — 84520 ASSAY OF UREA NITROGEN: CPT

## 2019-04-29 PROCEDURE — 70553 MRI BRAIN STEM W/O & W/DYE: CPT

## 2019-04-29 RX ADMIN — GADOTERIDOL 17 ML: 279.3 INJECTION, SOLUTION INTRAVENOUS at 12:08

## 2019-05-06 ENCOUNTER — TELEPHONE (OUTPATIENT)
Dept: FAMILY MEDICINE CLINIC | Age: 69
End: 2019-05-06

## 2019-05-06 DIAGNOSIS — G47.419 PRIMARY NARCOLEPSY WITHOUT CATAPLEXY: ICD-10-CM

## 2019-05-06 RX ORDER — METHYLPHENIDATE HYDROCHLORIDE 10 MG/1
10 TABLET ORAL 2 TIMES DAILY
Qty: 60 TABLET | Refills: 0 | Status: SHIPPED | OUTPATIENT
Start: 2019-05-06 | End: 2019-06-05 | Stop reason: SDUPTHER

## 2019-05-06 NOTE — TELEPHONE ENCOUNTER
Patient needs a refill on methylphenidate (RITALIN) 10 MG tablet     . They need a 30 day supply.      Mail order or local pharmacy: local    [facility]arm[facility]

## 2019-05-06 NOTE — TELEPHONE ENCOUNTER
Future Appointments   Date Time Provider Kamran Tracy   7/15/2019  1:00 PM [facility]   7/31/2019 12:30 [facility]   10/2/2019  9:45 AM Chirag Fleming MD AND NEURO [facility] 3/18/2019

## 2019-05-09 ENCOUNTER — TELEPHONE (OUTPATIENT)
Dept: FAMILY MEDICINE CLINIC | Age: 69
End: 2019-05-09

## 2019-05-09 ENCOUNTER — TELEPHONE (OUTPATIENT)
Dept: RADIATION ONCOLOGY | Age: 69
End: 2019-05-09

## 2019-05-09 ENCOUNTER — PRE-PROCEDURE TELEPHONE (OUTPATIENT)
Dept: RADIATION ONCOLOGY | Age: 69
End: 2019-05-09

## 2019-05-09 NOTE — TELEPHONE ENCOUNTER
Patient returned our call regarding upcoming fractionated Gamma Knife. Discussed logistics of simulation and planning as well as the five days of treatment. Patient reports understanding that Dr. Efren Schneider office will start her on steroid and AED prior to treatment. All questions answered. Emailed patient the schedule of her treatments. Encouraged to call with any additional questions or concerns.     Trenton Montero RN

## 2019-05-09 NOTE — PROGRESS NOTES
Attempted to contact to inform patient of the dates of her scheduled SIM and 5 Fx Framless Gamma Knife procedure, however, no answer on cell phone or home phone, message left to return call.      Martinez Florez RN

## 2019-05-13 ENCOUNTER — APPOINTMENT (OUTPATIENT)
Dept: RADIATION ONCOLOGY | Age: 69
End: 2019-05-13
Payer: COMMERCIAL

## 2019-05-14 ENCOUNTER — HOSPITAL ENCOUNTER (OUTPATIENT)
Dept: MRI IMAGING | Age: 69
Discharge: HOME | End: 2019-05-14
Payer: COMMERCIAL

## 2019-05-14 ENCOUNTER — APPOINTMENT (OUTPATIENT)
Dept: CT IMAGING | Age: 69
End: 2019-05-14
Payer: COMMERCIAL

## 2019-05-14 ENCOUNTER — HOSPITAL ENCOUNTER (OUTPATIENT)
Dept: RADIATION ONCOLOGY | Age: 69
Discharge: HOME | End: 2019-05-14
Payer: COMMERCIAL

## 2019-05-14 ENCOUNTER — CLINICAL DOCUMENTATION (OUTPATIENT)
Dept: RADIATION ONCOLOGY | Age: 69
End: 2019-05-14

## 2019-05-14 PROCEDURE — 70553 MRI BRAIN STEM W/O & W/DYE: CPT

## 2019-05-14 PROCEDURE — 77290 THER RAD SIMULAJ FIELD CPLX: CPT

## 2019-05-14 PROCEDURE — 6360000004 HC RX CONTRAST MEDICATION: Performed by: NEUROLOGICAL SURGERY

## 2019-05-14 PROCEDURE — 77334 RADIATION TREATMENT AID(S): CPT

## 2019-05-14 PROCEDURE — A9579 GAD-BASE MR CONTRAST NOS,1ML: HCPCS | Performed by: NEUROLOGICAL SURGERY

## 2019-05-14 RX ADMIN — GADOTERIDOL 20 ML: 279.3 INJECTION, SOLUTION INTRAVENOUS at 12:18

## 2019-05-14 NOTE — ONCOLOGY
Fractionated SRS Simulation on the Gamma Knife ICON unit at [address] [place] [address]  05/14/19         Patient confirmation and identification was confirmed using 2 means and a timeout procedure was also done prior to the procedure. She was taken to the gamma knife machine, and her mask was fabricated. Cone beam CT scan was performed. She then was transferred to the MRI unit where an MRI w wo was completed. Both CT and MRI images were then accepted by myself, Dr. Luan Castanon and our physicist.     Plan is for 25 Gy/5 fractions.  Initial treatment is scheduled for 5/28/19.         Junie Ballesteros MD

## 2019-05-14 NOTE — ONCOLOGY
Gamma Knife Radiosurgery Procedure Note      Patient: Martha Guevara  Date: 5/14/2019    Diagnosis: R frontal falcine meningioma    Procedure: Gamma Knife Based Stereotactic Radiosurgery [facility])    Description of procedure:  Cooper Jim was met at the [facility] at 3:26 PM on 5/14/2019 by Dr. Real Marie and myself. [Weston Sandoval]    Conscious sedation and frame placement were completed by Dr. Real Marie. A stereotactic MRI brain with double-dose contrast was then completed. Dosimetry of SRS is summarized as follows:   R frontal falcine meningioma: 14 Gy to isodose line enveloping lesion. See Media Tab for complete dosimetry. Dosimetry was reviewed by \"Dr. Jose Simpson MS (special physics consult requested) and myself. Treatment was then given successfully. The frame was removed without complication. The patient was discharged home in stable condition. She was placed on a steroid taper She will follow with Dr. Real Marie. She will followup with me on prn basis.       María Tavarez MD

## 2019-05-28 ENCOUNTER — HOSPITAL ENCOUNTER (OUTPATIENT)
Dept: RADIATION ONCOLOGY | Age: 69
Discharge: HOME | End: 2019-05-28
Payer: COMMERCIAL

## 2019-05-28 PROCEDURE — 77295 3-D RADIOTHERAPY PLAN: CPT

## 2019-05-28 PROCEDURE — 77300 RADIATION THERAPY DOSE PLAN: CPT

## 2019-05-28 PROCEDURE — 77373 STRTCTC BDY RAD THER TX DLVR: CPT

## 2019-05-28 PROCEDURE — 77334 RADIATION TREATMENT AID(S): CPT

## 2019-05-29 ENCOUNTER — PROCEDURE VISIT (OUTPATIENT)
Dept: RADIATION ONCOLOGY | Age: 69
End: 2019-05-29

## 2019-05-29 ENCOUNTER — HOSPITAL ENCOUNTER (OUTPATIENT)
Dept: RADIATION ONCOLOGY | Age: 69
Discharge: HOME | End: 2019-05-29
Payer: COMMERCIAL

## 2019-05-29 DIAGNOSIS — D32.0 MENINGIOMA, CEREBRAL (HCC): Primary | ICD-10-CM

## 2019-05-29 PROCEDURE — 77373 STRTCTC BDY RAD THER TX DLVR: CPT

## 2019-05-29 NOTE — OP NOTE
1 [facility]  PATIENT NAME:   Jose Bauer   MR #:  [id number]  YOB: 1950   ACCOUNT #:  [de-identified]  SURGEON:  Chico Diggs M.D. ADMIT DATE:  5/29/2019  SERVICE:  Neurosurgery  DICTATED BY: Chico Diggs M.D. SURGERY DATE:  5/29/2019           OPERATIVE REPORT       PREOPERATIVE DIAGNOSIS:     1. Right frontal falcine meningioma     POSTOPERATIVE DIAGNOSIS:     1. Same    PROCEDURE(S) PERFORMED:     1. Frameless Gamma Knife radiosurgery for right frontal falcine meningioma    SURGEON:  Chico Diggs MD   RADIATION ONCOLOGIST: Oriana Calderon MD and Overton Peabody, MD    ANESTHESIA:  None    COMPLICATIONS:  None    INDICATIONS:   This is a 80year-old woman with migratory headaches and an enlarging right frontal falcine meningioma. We discussed various treatment options but ultimately recommended five-fraction frameless Gamma Knife radiosurgery. The patient was aware of the potential benefits in terms of tumor control and the possible risks including (but not limited to): brain swelling, seizures, new neurological symptoms, radiation injury (necrosis) of the brain, and/or secondary tumor formation. PERFORMANCE STATUS:  90%    SYSTEMIC DISEASE:  Not applicable    DETAILS OF PROCEDURE:  The patient underwent a radiosurgery fusion MRI scan prior to the procedure. A thermoplastic mask was formed and the patient underwent a reference cone-beam CT scan in the Gamma Knife ICON. These images were sent over the network to the [address] [place] [address] and fused with the MRI scan. An optimal treatment plan was generated and approved by the neurosurgeon, radiation oncologist, and medical physicist.      On the days of treatment, the patient was positioned in the mask and a set-up cone-beam CT scan was performed. The bony landmarks were compared with the reference cone-beam CT to confirm set-up accuracy.   The patient then underwent stereotactic radiosurgery treatment delivery. Intra-fraction movement was monitored by infrared throughout the procedure. Target Size (cm) Shape Shots Parameters Dose (Gy) Isodose (%)   Rt frontal falcine 4.02 Oval 18 5 Gy x 5 25 50     The Rt frontal falcine target is complex based on maximal diameter > 3.5 cm. The patient tolerated the procedure without difficulty. The patient was instructed on medications and the need for follow-up in two weeks. TOTAL TIME SPENT WITH PATIENT:  In conformance with CMS regulations, I affirm that I was present for the entire neurosurgical portion of the procedure including target identification and contouring, development of the treatment plan, patient positioning and verification, and treatment delivery.      Lillian Desir MD

## 2019-06-03 ENCOUNTER — PROCEDURE VISIT (OUTPATIENT)
Dept: RADIATION ONCOLOGY | Age: 69
End: 2019-06-03

## 2019-06-03 DIAGNOSIS — D32.9 MENINGIOMA (HCC): Primary | ICD-10-CM

## 2019-06-03 NOTE — PROGRESS NOTES
Fractionated SRS treatment note on the gamma knife machine. Ricky Monica  06/03/19       Patient presents for her 5 of 5 treatments on the gamma knife machine for her memingiomaq    She was taken to the gamma knife machine, and her mask was fitted to the machine. Patient confirmation and identification was confirmed using 2 means and a timeout procedure was also done prior to the procedure.     Cone beam CT scan was performed. These images were reviewed in detailed and fused upon her stereotactic reference images. Isodose lines were also reviewed. These were then accepted by myself and our physicist.     Fraction Number: 5  Dose delivered: 5 Gy  Total dose delivered: 25Gy  Planned total dose:  25Gy     The patient acutely tolerated the procedure well. Mask was removed. She will follow-up when the next appointment has been scheduled. I was present throughout the procedure.     Alexia Boyle MD

## 2019-06-05 ENCOUNTER — TELEPHONE (OUTPATIENT)
Dept: FAMILY MEDICINE CLINIC | Age: 69
End: 2019-06-05

## 2019-06-05 DIAGNOSIS — G47.419 PRIMARY NARCOLEPSY WITHOUT CATAPLEXY: Primary | ICD-10-CM

## 2019-06-05 RX ORDER — METHYLPHENIDATE HYDROCHLORIDE 10 MG/1
10 TABLET ORAL 2 TIMES DAILY
Qty: 60 TABLET | Refills: 0 | Status: SHIPPED | OUTPATIENT
Start: 2019-06-05 | End: 2019-07-08 | Stop reason: SDUPTHER

## 2019-06-17 ENCOUNTER — TELEPHONE (OUTPATIENT)
Dept: RADIATION ONCOLOGY | Age: 69
End: 2019-06-17

## 2019-06-19 ENCOUNTER — POST-OP TELEPHONE (OUTPATIENT)
Dept: RADIATION ONCOLOGY | Age: 69
End: 2019-06-19

## 2019-06-19 NOTE — FLOWSHEET NOTE
Post GK call pt feeling fine but is tired. Did complain of a headache shortly after stopping the steroids but it was mild and went away.

## 2019-06-26 RX ORDER — LEVOTHYROXINE SODIUM 112 UG/1
TABLET ORAL
Qty: 30 TABLET | Refills: 11 | Status: SHIPPED | OUTPATIENT
Start: 2019-06-26 | End: 2019-07-17 | Stop reason: DRUGHIGH

## 2019-06-26 RX ORDER — ESCITALOPRAM OXALATE 10 MG/1
TABLET ORAL
Qty: 30 TABLET | Refills: 5 | Status: SHIPPED | OUTPATIENT
Start: 2019-06-26 | End: 2019-12-12 | Stop reason: SDUPTHER

## 2019-06-26 NOTE — TELEPHONE ENCOUNTER
Future Appointments   Date Time Provider Kamran Tracy   7/15/2019  1:00 PM DO RUKHSANA Castellanos  [facility]   7/31/2019 12:30 PM MD Edie Obregon [facility]   10/2/2019  9:45 AM Pavithra Lee MD AND NEURO [facility] 3/18/2019

## 2019-07-08 DIAGNOSIS — G47.419 PRIMARY NARCOLEPSY WITHOUT CATAPLEXY: ICD-10-CM

## 2019-07-08 RX ORDER — METHYLPHENIDATE HYDROCHLORIDE 10 MG/1
10 TABLET ORAL 2 TIMES DAILY
Qty: 60 TABLET | Refills: 0 | Status: SHIPPED | OUTPATIENT
Start: 2019-07-08 | End: 2019-08-07 | Stop reason: SDUPTHER

## 2019-07-09 RX ORDER — ROSUVASTATIN CALCIUM 5 MG/1
TABLET, COATED ORAL
Qty: 90 TABLET | Refills: 1 | Status: SHIPPED | OUTPATIENT
Start: 2019-07-09 | End: 2019-07-18 | Stop reason: SDUPTHER

## 2019-07-15 ENCOUNTER — OFFICE VISIT (OUTPATIENT)
Dept: FAMILY MEDICINE CLINIC | Age: 69
End: 2019-07-15
Payer: COMMERCIAL

## 2019-07-15 VITALS
WEIGHT: 202 LBS | HEART RATE: 77 BPM | OXYGEN SATURATION: 97 % | BODY MASS INDEX: 33.66 KG/M2 | HEIGHT: 65 IN | DIASTOLIC BLOOD PRESSURE: 84 MMHG | SYSTOLIC BLOOD PRESSURE: 136 MMHG

## 2019-07-15 DIAGNOSIS — E78.2 MIXED HYPERLIPIDEMIA: ICD-10-CM

## 2019-07-15 DIAGNOSIS — I10 ESSENTIAL HYPERTENSION: ICD-10-CM

## 2019-07-15 DIAGNOSIS — E03.9 HYPOTHYROIDISM, UNSPECIFIED TYPE: ICD-10-CM

## 2019-07-15 DIAGNOSIS — G47.419 PRIMARY NARCOLEPSY WITHOUT CATAPLEXY: Primary | ICD-10-CM

## 2019-07-15 DIAGNOSIS — R07.89 CHEST WALL PAIN: ICD-10-CM

## 2019-07-15 LAB
BASOPHILS ABSOLUTE: 0 K/UL (ref 0–0.2)
BASOPHILS RELATIVE PERCENT: 0.3 %
EOSINOPHILS ABSOLUTE: 0 K/UL (ref 0–0.6)
EOSINOPHILS RELATIVE PERCENT: 0.7 %
HCT VFR BLD CALC: 38.3 % (ref 36–48)
HEMOGLOBIN: 12.6 G/DL (ref 12–16)
LYMPHOCYTES ABSOLUTE: 1.4 K/UL (ref 1–5.1)
LYMPHOCYTES RELATIVE PERCENT: 29.2 %
MCH RBC QN AUTO: 31.6 PG (ref 26–34)
MCHC RBC AUTO-ENTMCNC: 32.9 G/DL (ref 31–36)
MCV RBC AUTO: 95.9 FL (ref 80–100)
MONOCYTES ABSOLUTE: 0.3 K/UL (ref 0–1.3)
MONOCYTES RELATIVE PERCENT: 7.4 %
NEUTROPHILS ABSOLUTE: 2.9 K/UL (ref 1.7–7.7)
NEUTROPHILS RELATIVE PERCENT: 62.4 %
PDW BLD-RTO: 15 % (ref 12.4–15.4)
PLATELET # BLD: 143 K/UL (ref 135–450)
PMV BLD AUTO: 8.5 FL (ref 5–10.5)
RBC # BLD: 3.99 M/UL (ref 4–5.2)
WBC # BLD: 4.7 K/UL (ref 4–11)

## 2019-07-15 PROCEDURE — 99214 OFFICE O/P EST MOD 30 MIN: CPT | Performed by: FAMILY MEDICINE

## 2019-07-15 NOTE — PROGRESS NOTES
Social Needs    Financial resource strain: Not on file    Food insecurity:     Worry: Not on file     Inability: Not on file    Transportation needs:     Medical: Not on file     Non-medical: Not on file   Tobacco Use    Smoking status: Never Smoker    Smokeless tobacco: Never Used   Substance and Sexual Activity    Alcohol use: No     Alcohol/week: 0.0 standard drinks    Drug use: No    Sexual activity: Never   Lifestyle    Physical activity:     Days per week: Not on file     Minutes per session: Not on file    Stress: Not on file   Relationships    Social connections:     Talks on phone: Not on file     Gets together: Not on file     Attends [employer] service: Not on file     Active member of club or organization: Not on file     Attends meetings of clubs or organizations: Not on file     Relationship status: Not on file    Intimate partner violence:     Fear of current or ex partner: Not on file     Emotionally abused: Not on file     Physically abused: Not on file     Forced sexual activity: Not on file   Other Topics Concern    Not on file   Social History Narrative    Not on file       Family History   Problem Relation Age of Onset    Diabetes Mother     Heart Disease Mother     Cancer Mother         ovarian    Heart Disease Father     Stroke Father     Early Death Father         46 - heart attack    Heart Disease Sister     Stroke Sister     Cancer Sister         ovarian       Vitals:    07/15/19 1257   BP: 136/84   Pulse: 77   SpO2: 97%       Wt Readings from Last 3 Encounters:   07/15/19 202 lb (91.6 kg)   04/22/19 199 lb (90.3 kg)   04/03/19 202 lb (91.6 kg)       Review of Systems   Constitutional: Negative for unexpected weight change. Cardiovascular: Positive for chest pain. Has chest pains. Renexa not affordable.   Dr Victorina Cobb prescribed l-ARGINE    No claudication       Gastrointestinal:        Says abd pain, but not digestion related and pain to press   Neurological: levothyroxine (SYNTHROID) 112 MCG tablet TAKE 1 TABLET BY MOUTH ONCE DAILY 30 tablet 11    metoprolol tartrate (LOPRESSOR) 25 MG tablet TAKE 1 TABLET BY MOUTH TWICE DAILY 180 tablet 0    metoprolol tartrate (LOPRESSOR) 25 MG tablet TAKE 1 TABLET BY MOUTH TWICE DAILY 180 tablet 0    L-Arginine 500 MG TABS Take 500 mg by mouth 2 times daily 60 tablet 11    topiramate (TOPAMAX) 25 MG tablet Take 3 tablets by mouth 2 times daily 540 tablet 1    pantoprazole (PROTONIX) 40 MG tablet TAKE 1 TABLET BY MOUTH TWICE DAILY 60 tablet 5    valsartan (DIOVAN) 160 MG tablet Take 1 tablet by mouth daily 90 tablet 1    aspirin 81 MG tablet Take 81 mg by mouth daily      furosemide (LASIX) 20 MG tablet Take 1 tablet by mouth daily 90 tablet 3    sucralfate (CARAFATE) 1 GM tablet TAKE ONE TABLET BY MOUTH 4 TIMES DAILY 120 tablet 5    isosorbide mononitrate (IMDUR) 30 MG extended release tablet TAKE ONE TABLET BY MOUTH ONCE DAILY 90 tablet 3     No current facility-administered medications for this visit.

## 2019-07-16 LAB
A/G RATIO: 1.7 (ref 1.1–2.2)
ALBUMIN SERPL-MCNC: 4.2 G/DL (ref 3.4–5)
ALP BLD-CCNC: 111 U/L (ref 40–129)
ALT SERPL-CCNC: 11 U/L (ref 10–40)
ANION GAP SERPL CALCULATED.3IONS-SCNC: 13 MMOL/L (ref 3–16)
AST SERPL-CCNC: 13 U/L (ref 15–37)
BILIRUB SERPL-MCNC: 0.4 MG/DL (ref 0–1)
BUN BLDV-MCNC: 14 MG/DL (ref 7–20)
CALCIUM SERPL-MCNC: 9.5 MG/DL (ref 8.3–10.6)
CHLORIDE BLD-SCNC: 112 MMOL/L (ref 99–110)
CHOLESTEROL, TOTAL: 126 MG/DL (ref 0–199)
CO2: 21 MMOL/L (ref 21–32)
CREAT SERPL-MCNC: 0.8 MG/DL (ref 0.6–1.2)
GFR AFRICAN AMERICAN: >60
GFR NON-AFRICAN AMERICAN: >60
GLOBULIN: 2.5 G/DL
GLUCOSE BLD-MCNC: 100 MG/DL (ref 70–99)
HDLC SERPL-MCNC: 52 MG/DL (ref 40–60)
LDL CHOLESTEROL CALCULATED: 47 MG/DL
POTASSIUM SERPL-SCNC: 4.7 MMOL/L (ref 3.5–5.1)
SODIUM BLD-SCNC: 146 MMOL/L (ref 136–145)
T4 FREE: 1.7 NG/DL (ref 0.9–1.8)
TOTAL PROTEIN: 6.7 G/DL (ref 6.4–8.2)
TRIGL SERPL-MCNC: 136 MG/DL (ref 0–150)
TSH SERPL DL<=0.05 MIU/L-ACNC: <0.01 UIU/ML (ref 0.27–4.2)
VLDLC SERPL CALC-MCNC: 27 MG/DL

## 2019-07-17 ENCOUNTER — TELEPHONE (OUTPATIENT)
Dept: FAMILY MEDICINE CLINIC | Age: 69
End: 2019-07-17

## 2019-07-17 RX ORDER — LEVOTHYROXINE SODIUM 0.1 MG/1
100 TABLET ORAL DAILY
Qty: 30 TABLET | Refills: 11 | Status: SHIPPED | OUTPATIENT
Start: 2019-07-17 | End: 2020-08-24 | Stop reason: SDUPTHER

## 2019-07-18 RX ORDER — ROSUVASTATIN CALCIUM 5 MG/1
TABLET, COATED ORAL
Qty: 90 TABLET | Refills: 2 | Status: SHIPPED | OUTPATIENT
Start: 2019-07-18 | End: 2020-08-17

## 2019-07-18 RX ORDER — PANTOPRAZOLE SODIUM 40 MG/1
TABLET, DELAYED RELEASE ORAL
Qty: 180 TABLET | Refills: 2 | Status: SHIPPED | OUTPATIENT
Start: 2019-07-18 | End: 2020-05-25

## 2019-07-18 RX ORDER — VALSARTAN 160 MG/1
160 TABLET ORAL DAILY
Qty: 90 TABLET | Refills: 1 | Status: SHIPPED | OUTPATIENT
Start: 2019-07-18 | End: 2019-12-16 | Stop reason: SDUPTHER

## 2019-07-30 NOTE — PROGRESS NOTES
region of brain ([facility] [address] [facility]    HTN (hypertension), benign    Asthma    Diaphragmatic hernia    Female genuine stress incontinence    Blood in the urine    Morbid obesity (HCC)    Pain of right hip joint    Greater trochanteric bursitis of right hip    Intractable migraine with aura without status migrainosus    Chronic tension-type headache, intractable    Dyslipidemia    Paroxysmal dyspnea    Skipped beats    PRINCE (obstructive sleep apnea)    SOB (shortness of breath)    Hypothyroidism    Mild episode of recurrent major depressive disorder (HCC)    Obesity (BMI 30.0-34. 9)       Patient Plan:  1. No change in medications  2. No cardiac testing warranted at this time  3. Follow up in 1 year with NP    It is a pleasure to assist in the care of [employer]. Please call with any questions. This note was scribed in the presence of Dr. Maday Sheridan MD by Hien Celaya RN. The scribes documentation has been prepared under my direction and personally reviewed by me in its entirety. The scribes documentation has been prepared under my direction and personally reviewed by me in its entirety. I confirm that the note above accurately reflects all work, treatment, procedures, and medical decision making performed by me. I, Dr. Lamar Oviedo MD, personally performed the services described in this documentation as scribed by Rakesh Lerner in my presence, and it is both accurate and complete to the best of our ability.        Lamar Oviedo MD, [facility] Cardiologist  [address]  [phone] [facility]  [phone] [address] [place] [address]

## 2019-07-31 ENCOUNTER — OFFICE VISIT (OUTPATIENT)
Dept: CARDIOLOGY CLINIC | Age: 69
End: 2019-07-31
Payer: COMMERCIAL

## 2019-07-31 VITALS
HEART RATE: 50 BPM | SYSTOLIC BLOOD PRESSURE: 124 MMHG | WEIGHT: 203.6 LBS | OXYGEN SATURATION: 98 % | DIASTOLIC BLOOD PRESSURE: 70 MMHG | BODY MASS INDEX: 33.92 KG/M2 | HEIGHT: 65 IN

## 2019-07-31 DIAGNOSIS — I25.10 CORONARY ARTERY DISEASE INVOLVING NATIVE CORONARY ARTERY OF NATIVE HEART WITHOUT ANGINA PECTORIS: ICD-10-CM

## 2019-07-31 DIAGNOSIS — R00.1 BRADYCARDIA: ICD-10-CM

## 2019-07-31 DIAGNOSIS — I10 ESSENTIAL HYPERTENSION: ICD-10-CM

## 2019-07-31 DIAGNOSIS — E78.5 BORDERLINE HYPERLIPIDEMIA: ICD-10-CM

## 2019-07-31 DIAGNOSIS — I25.89 CARDIAC MICROVASCULAR DISEASE: Primary | ICD-10-CM

## 2019-07-31 DIAGNOSIS — E78.5 DYSLIPIDEMIA: ICD-10-CM

## 2019-07-31 PROCEDURE — 99214 OFFICE O/P EST MOD 30 MIN: CPT | Performed by: INTERNAL MEDICINE

## 2019-07-31 NOTE — PATIENT INSTRUCTIONS
Patient Plan:  1. No change in medications  2. No cardiac testing warranted at this time  3.  Follow up in 1 year with NP

## 2019-07-31 NOTE — LETTER
Extremities: Extremities normal, atraumatic, no cyanosis or edema   Pulses: 2+ and symmetric   Skin: Skin color, texture, turgor normal, no rashes or lesions   Pysch: Normal mood and affect   Neurologic: Normal gross motor and sensory exam.         LABS   CBC:      Lab Results   Component Value Date    WBC 4.7 07/15/2019    RBC 3.99 07/15/2019    HGB 12.6 07/15/2019    HCT 38.3 07/15/2019    MCV 95.9 07/15/2019    RDW 15.0 07/15/2019     07/15/2019     CMP:  Lab Results   Component Value Date     07/15/2019    K 4.7 07/15/2019     07/15/2019    CO2 21 07/15/2019    BUN 14 07/15/2019    CREATININE 0.8 07/15/2019    GFRAA >60 07/15/2019    GFRAA >60 2012    AGRATIO 1.7 07/15/2019    LABGLOM >60 07/15/2019    GLUCOSE 100 07/15/2019    PROT 6.7 07/15/2019    PROT 7.2 2012    CALCIUM 9.5 07/15/2019    BILITOT 0.4 07/15/2019    ALKPHOS 111 07/15/2019    AST 13 07/15/2019    ALT 11 07/15/2019     PT/INR:   No results found for: PTINR  Liver:  No components found for: CHLPL  Lab Results   Component Value Date    ALT 11 07/15/2019    AST 13 (L) 07/15/2019    ALKPHOS 111 07/15/2019    BILITOT 0.4 07/15/2019     No results found for: LABA1C  Lipids:         Lab Results   Component Value Date    TRIG 136 07/15/2019    TRIG 89 2017    TRIG 112 2017            Lab Results   Component Value Date    HDL 52 07/15/2019    HDL 52 2017    HDL 48 2017            Lab Results   Component Value Date    LDLCALC 47 07/15/2019    LDLCALC 46 2017    LDLCALC 35 2017            Lab Results   Component Value Date    LABVLDL 27 07/15/2019    LABVLDL 18 2017    LABVLDL 22 2017         CARDIAC DATA   EK2017 Sinus rocio, low voltage, NSST changes      STRESS TEST: 3/20/17  Summary  There is no evidence of stress induced ischemia. Small, fixed defect in the  anteroseptal wall c/w breast attenuation artifact.  Normal

## 2019-08-07 DIAGNOSIS — G47.419 PRIMARY NARCOLEPSY WITHOUT CATAPLEXY: ICD-10-CM

## 2019-08-07 RX ORDER — METHYLPHENIDATE HYDROCHLORIDE 10 MG/1
10 TABLET ORAL 2 TIMES DAILY
Qty: 60 TABLET | Refills: 0 | Status: SHIPPED | OUTPATIENT
Start: 2019-08-07 | End: 2019-09-09 | Stop reason: SDUPTHER

## 2019-08-07 RX ORDER — SUCRALFATE 1 G/1
TABLET ORAL
Qty: 120 TABLET | Refills: 5 | Status: SHIPPED | OUTPATIENT
Start: 2019-08-07 | End: 2020-05-25

## 2019-08-07 NOTE — TELEPHONE ENCOUNTER
Future Appointments   Date Time Provider Kamran Molina   10/2/2019  9:45 AM Jay Jay Young MD AND [facility]     LOV 7/15/2019

## 2019-09-09 ENCOUNTER — TELEPHONE (OUTPATIENT)
Dept: FAMILY MEDICINE CLINIC | Age: 69
End: 2019-09-09

## 2019-09-09 DIAGNOSIS — G47.419 PRIMARY NARCOLEPSY WITHOUT CATAPLEXY: ICD-10-CM

## 2019-09-09 RX ORDER — METHYLPHENIDATE HYDROCHLORIDE 10 MG/1
10 TABLET ORAL 2 TIMES DAILY
Qty: 60 TABLET | Refills: 0 | Status: SHIPPED | OUTPATIENT
Start: 2019-09-09 | End: 2019-10-11 | Stop reason: SDUPTHER

## 2019-09-09 NOTE — TELEPHONE ENCOUNTER
Patient needs a refill on ritalin. They need a 30 day supply.      Mail order or local pharmacy: local    Pharmacy: [facility] on file    Patient  or mail to patient(If mail order):     Last OV 7/31/19    Future Appointments   Date Time Provider Kamran Molina   10/2/2019  9:45 AM Jay Jay Campuzano MD AND [facility]

## 2019-09-16 ENCOUNTER — OFFICE VISIT (OUTPATIENT)
Dept: FAMILY MEDICINE CLINIC | Age: 69
End: 2019-09-16
Payer: COMMERCIAL

## 2019-09-16 VITALS
BODY MASS INDEX: 33.99 KG/M2 | SYSTOLIC BLOOD PRESSURE: 126 MMHG | DIASTOLIC BLOOD PRESSURE: 70 MMHG | HEART RATE: 50 BPM | WEIGHT: 204 LBS | HEIGHT: 65 IN | OXYGEN SATURATION: 98 %

## 2019-09-16 DIAGNOSIS — M51.36 DDD (DEGENERATIVE DISC DISEASE), LUMBAR: ICD-10-CM

## 2019-09-16 DIAGNOSIS — G47.419 PRIMARY NARCOLEPSY WITHOUT CATAPLEXY: Primary | ICD-10-CM

## 2019-09-16 DIAGNOSIS — I10 ESSENTIAL HYPERTENSION: ICD-10-CM

## 2019-09-16 DIAGNOSIS — Z12.31 BREAST CANCER SCREENING BY MAMMOGRAM: ICD-10-CM

## 2019-09-16 DIAGNOSIS — Z12.11 SCREEN FOR COLON CANCER: ICD-10-CM

## 2019-09-16 DIAGNOSIS — I20.8 MICROVASCULAR ANGINA (HCC): ICD-10-CM

## 2019-09-16 DIAGNOSIS — I20.8 SYNDROME X, CARDIAC (HCC): ICD-10-CM

## 2019-09-16 LAB
CONTROL: NORMAL
HEMOCCULT STL QL: NORMAL

## 2019-09-16 PROCEDURE — 82274 ASSAY TEST FOR BLOOD FECAL: CPT | Performed by: FAMILY MEDICINE

## 2019-09-16 PROCEDURE — 99213 OFFICE O/P EST LOW 20 MIN: CPT | Performed by: FAMILY MEDICINE

## 2019-09-16 ASSESSMENT — ENCOUNTER SYMPTOMS
BACK PAIN: 1
RESPIRATORY NEGATIVE: 1

## 2019-09-16 NOTE — PROGRESS NOTES
with meds until schedule change   Psychiatric/Behavioral: Positive for dysphoric mood. Negative for self-injury and suicidal ideas. The patient is nervous/anxious. Objective:   Physical Exam   Constitutional: She is oriented to person, place, and time. She appears well-developed and well-nourished. No distress. Cardiovascular: Normal rate, regular rhythm and normal heart sounds. Pulmonary/Chest: Effort normal and breath sounds normal. No stridor. Musculoskeletal: She exhibits no edema. Neurological: She is alert and oriented to person, place, and time. No cranial nerve deficit. Psychiatric: She has a normal mood and affect. Her behavior is normal.       Assessment:       Diagnosis Orders   1. Screen for colon cancer  POCT Fecal Immunochemical Test (FIT)    POCT Fecal Immunochemical Test (FIT)   Narcolepsy  Micro vascular coronary disease  Hypertension, treated, controlled  Acid reflux  Depressive disorder, stable, partial remission      Plan:     FIT test resulted-  Neg  Resuming the previous schedule hopefully    rec medically later hours. Letter written for the patient. The patient's pharmacy monitoring profile is reviewed and it is appropriate for her medications. Recommend continue the current program and medicines. General non-medicine strategies discussed. Current Outpatient Medications   Medication Sig Dispense Refill    methylphenidate (RITALIN) 10 MG tablet Take 1 tablet by mouth 2 times daily for 30 days.  60 tablet 0    sucralfate (CARAFATE) 1 GM tablet TAKE 1 TABLET BY MOUTH 4 TIMES DAILY 120 tablet 5    pantoprazole (PROTONIX) 40 MG tablet TAKE 1 TABLET BY MOUTH TWICE DAILY 180 tablet 2    rosuvastatin (CRESTOR) 5 MG tablet TAKE 1 TABLET BY MOUTH ONCE DAILY 90 tablet 2    valsartan (DIOVAN) 160 MG tablet Take 1 tablet by mouth daily 90 tablet 1    levothyroxine (SYNTHROID) 100 MCG tablet Take 1 tablet by mouth Daily 30 tablet 11    escitalopram (LEXAPRO) 10 MG tablet TAKE 1

## 2019-09-16 NOTE — LETTER
[address]  [address] [facility]  Phone: [phone]  Fax: [address] [place] [address], DO        September 16, 2019      Lidya Forrester ( 1950)         This patient is treated for a neurologic condition. Her previous work schedule is beneficial and necessary due to the medications she takes . Your consideration is appreciated.        Sincerely,        Terrye Cheadle, DO

## 2019-10-08 RX ORDER — ISOSORBIDE MONONITRATE 30 MG/1
TABLET, EXTENDED RELEASE ORAL
Qty: 90 TABLET | Refills: 3 | Status: SHIPPED | OUTPATIENT
Start: 2019-10-08 | End: 2020-11-04 | Stop reason: SDUPTHER

## 2019-10-11 DIAGNOSIS — G47.419 PRIMARY NARCOLEPSY WITHOUT CATAPLEXY: ICD-10-CM

## 2019-10-11 RX ORDER — METHYLPHENIDATE HYDROCHLORIDE 10 MG/1
10 TABLET ORAL 2 TIMES DAILY
Qty: 60 TABLET | Refills: 0 | Status: SHIPPED | OUTPATIENT
Start: 2019-10-11 | End: 2019-11-11 | Stop reason: SDUPTHER

## 2019-10-14 ENCOUNTER — OFFICE VISIT (OUTPATIENT)
Dept: NEUROLOGY | Age: 69
End: 2019-10-14
Payer: COMMERCIAL

## 2019-10-14 VITALS
WEIGHT: 202 LBS | DIASTOLIC BLOOD PRESSURE: 69 MMHG | BODY MASS INDEX: 33.66 KG/M2 | HEIGHT: 65 IN | OXYGEN SATURATION: 99 % | HEART RATE: 43 BPM | SYSTOLIC BLOOD PRESSURE: 118 MMHG

## 2019-10-14 DIAGNOSIS — E78.5 DYSLIPIDEMIA: ICD-10-CM

## 2019-10-14 DIAGNOSIS — G43.119 INTRACTABLE MIGRAINE WITH AURA WITHOUT STATUS MIGRAINOSUS: Primary | ICD-10-CM

## 2019-10-14 DIAGNOSIS — I10 HTN (HYPERTENSION), BENIGN: ICD-10-CM

## 2019-10-14 DIAGNOSIS — D33.0 BENIGN NEOPLASM OF SUPRATENTORIAL REGION OF BRAIN (HCC): ICD-10-CM

## 2019-10-14 DIAGNOSIS — G47.33 OSA (OBSTRUCTIVE SLEEP APNEA): ICD-10-CM

## 2019-10-14 PROCEDURE — 99214 OFFICE O/P EST MOD 30 MIN: CPT | Performed by: PSYCHIATRY & NEUROLOGY

## 2019-10-14 RX ORDER — TOPIRAMATE 25 MG/1
50 TABLET ORAL 2 TIMES DAILY
Qty: 120 TABLET | Refills: 5 | Status: SHIPPED | OUTPATIENT
Start: 2019-10-14 | End: 2020-03-11 | Stop reason: SDUPTHER

## 2019-11-11 ENCOUNTER — TELEPHONE (OUTPATIENT)
Dept: FAMILY MEDICINE CLINIC | Age: 69
End: 2019-11-11

## 2019-11-11 DIAGNOSIS — G47.419 PRIMARY NARCOLEPSY WITHOUT CATAPLEXY: ICD-10-CM

## 2019-11-11 RX ORDER — METHYLPHENIDATE HYDROCHLORIDE 10 MG/1
10 TABLET ORAL 2 TIMES DAILY
Qty: 60 TABLET | Refills: 0 | Status: SHIPPED | OUTPATIENT
Start: 2019-11-11 | End: 2019-12-07 | Stop reason: SDUPTHER

## 2019-12-07 DIAGNOSIS — G47.419 PRIMARY NARCOLEPSY WITHOUT CATAPLEXY: ICD-10-CM

## 2019-12-07 RX ORDER — METHYLPHENIDATE HYDROCHLORIDE 10 MG/1
10 TABLET ORAL 2 TIMES DAILY
Qty: 60 TABLET | Refills: 0 | Status: SHIPPED | OUTPATIENT
Start: 2019-12-07 | End: 2020-01-14 | Stop reason: SDUPTHER

## 2019-12-12 RX ORDER — ESCITALOPRAM OXALATE 10 MG/1
TABLET ORAL
Qty: 30 TABLET | Refills: 5 | Status: SHIPPED | OUTPATIENT
Start: 2019-12-12 | End: 2020-07-22

## 2019-12-16 ENCOUNTER — OFFICE VISIT (OUTPATIENT)
Dept: FAMILY MEDICINE CLINIC | Age: 69
End: 2019-12-16
Payer: COMMERCIAL

## 2019-12-16 VITALS
WEIGHT: 205 LBS | RESPIRATION RATE: 16 BRPM | OXYGEN SATURATION: 97 % | TEMPERATURE: 98.4 F | BODY MASS INDEX: 34.11 KG/M2 | DIASTOLIC BLOOD PRESSURE: 80 MMHG | HEART RATE: 49 BPM | SYSTOLIC BLOOD PRESSURE: 136 MMHG

## 2019-12-16 DIAGNOSIS — Z87.898 HISTORY OF BRAIN TUMOR: Primary | ICD-10-CM

## 2019-12-16 DIAGNOSIS — G43.019 INTRACTABLE MIGRAINE WITHOUT AURA AND WITHOUT STATUS MIGRAINOSUS: ICD-10-CM

## 2019-12-16 DIAGNOSIS — G44.009 CLUSTER HEADACHE, NOT INTRACTABLE, UNSPECIFIED CHRONICITY PATTERN: ICD-10-CM

## 2019-12-16 PROCEDURE — G0444 DEPRESSION SCREEN ANNUAL: HCPCS | Performed by: NURSE PRACTITIONER

## 2019-12-16 PROCEDURE — 99214 OFFICE O/P EST MOD 30 MIN: CPT | Performed by: NURSE PRACTITIONER

## 2019-12-16 RX ORDER — VALSARTAN 160 MG/1
160 TABLET ORAL DAILY
Qty: 90 TABLET | Refills: 1 | Status: SHIPPED | OUTPATIENT
Start: 2019-12-16 | End: 2020-09-28

## 2019-12-16 ASSESSMENT — ENCOUNTER SYMPTOMS
SINUS PRESSURE: 0
WHEEZING: 0
APNEA: 0
SHORTNESS OF BREATH: 0
STRIDOR: 0
CHOKING: 0
COUGH: 0
CHEST TIGHTNESS: 0

## 2019-12-16 ASSESSMENT — PATIENT HEALTH QUESTIONNAIRE - PHQ9
3. TROUBLE FALLING OR STAYING ASLEEP: 0
5. POOR APPETITE OR OVEREATING: 0
10. IF YOU CHECKED OFF ANY PROBLEMS, HOW DIFFICULT HAVE THESE PROBLEMS MADE IT FOR YOU TO DO YOUR WORK, TAKE CARE OF THINGS AT HOME, OR GET ALONG WITH OTHER PEOPLE: 3
8. MOVING OR SPEAKING SO SLOWLY THAT OTHER PEOPLE COULD HAVE NOTICED. OR THE OPPOSITE, BEING SO FIGETY OR RESTLESS THAT YOU HAVE BEEN MOVING AROUND A LOT MORE THAN USUAL: 3
6. FEELING BAD ABOUT YOURSELF - OR THAT YOU ARE A FAILURE OR HAVE LET YOURSELF OR YOUR FAMILY DOWN: 0
4. FEELING TIRED OR HAVING LITTLE ENERGY: 3
SUM OF ALL RESPONSES TO PHQ QUESTIONS 1-9: 13
7. TROUBLE CONCENTRATING ON THINGS, SUCH AS READING THE NEWSPAPER OR WATCHING TELEVISION: 3
SUM OF ALL RESPONSES TO PHQ QUESTIONS 1-9: 13
1. LITTLE INTEREST OR PLEASURE IN DOING THINGS: 3
SUM OF ALL RESPONSES TO PHQ9 QUESTIONS 1 & 2: 4
2. FEELING DOWN, DEPRESSED OR HOPELESS: 1
9. THOUGHTS THAT YOU WOULD BE BETTER OFF DEAD, OR OF HURTING YOURSELF: 0

## 2019-12-23 ENCOUNTER — HOSPITAL ENCOUNTER (OUTPATIENT)
Dept: MRI IMAGING | Age: 69
Discharge: HOME | End: 2019-12-23
Payer: COMMERCIAL

## 2019-12-23 DIAGNOSIS — D32.9 MENINGIOMA (HCC): ICD-10-CM

## 2019-12-23 LAB
GFR AFRICAN AMERICAN: >60
GFR NON-AFRICAN AMERICAN: >60
PERFORMED ON: NORMAL
POC CREATININE: 0.7 MG/DL (ref 0.6–1.2)
POC SAMPLE TYPE: NORMAL

## 2019-12-23 PROCEDURE — 82565 ASSAY OF CREATININE: CPT

## 2019-12-23 PROCEDURE — 70553 MRI BRAIN STEM W/O & W/DYE: CPT

## 2019-12-23 PROCEDURE — 6360000004 HC RX CONTRAST MEDICATION: Performed by: NEUROLOGICAL SURGERY

## 2019-12-23 PROCEDURE — A9576 INJ PROHANCE MULTIPACK: HCPCS | Performed by: NEUROLOGICAL SURGERY

## 2019-12-23 RX ADMIN — GADOTERIDOL 20 ML: 279.3 INJECTION, SOLUTION INTRAVENOUS at 18:33

## 2019-12-28 ENCOUNTER — TELEPHONE (OUTPATIENT)
Dept: FAMILY MEDICINE CLINIC | Age: 69
End: 2019-12-28

## 2020-01-06 NOTE — TELEPHONE ENCOUNTER
lov   7/31/19         PLAN  1. Pt doing well on L-arginine with less CP that she doesn't notice              - d/w pt ok to wean as needed, call if CP increases  2. Ok to  wean off imdur as possible,   3. cautious ASA due to PUD  4.  Cont lopressor, crestor

## 2020-01-13 RX ORDER — FUROSEMIDE 20 MG/1
TABLET ORAL
Qty: 90 TABLET | Refills: 0 | Status: SHIPPED | OUTPATIENT
Start: 2020-01-13 | End: 2020-04-20

## 2020-01-14 ENCOUNTER — TELEPHONE (OUTPATIENT)
Dept: FAMILY MEDICINE CLINIC | Age: 70
End: 2020-01-14

## 2020-01-14 RX ORDER — METHYLPHENIDATE HYDROCHLORIDE 10 MG/1
10 TABLET ORAL 2 TIMES DAILY
Qty: 60 TABLET | Refills: 0 | Status: SHIPPED | OUTPATIENT
Start: 2020-01-14 | End: 2020-02-13 | Stop reason: SDUPTHER

## 2020-01-14 RX ORDER — METHYLPHENIDATE HYDROCHLORIDE 10 MG/1
10 TABLET ORAL 2 TIMES DAILY
Qty: 60 TABLET | Refills: 0 | Status: CANCELLED | OUTPATIENT
Start: 2020-01-14 | End: 2020-02-13

## 2020-03-09 ENCOUNTER — TELEPHONE (OUTPATIENT)
Dept: NEUROLOGY | Age: 70
End: 2020-03-09

## 2020-03-09 NOTE — TELEPHONE ENCOUNTER
Called patient to verify if a refill was needed before her f/u appt on 04.08.20, phone rang but no answer, no machine.

## 2020-03-09 NOTE — TELEPHONE ENCOUNTER
Received fax from [address] requesting a refill on pt's Topiramate 25 mg tablet, Take 2 tablets po bid, #120 or consider 90 day supplies. Last appt was 10/14/19. Next appt is 4/8/20. Please advise. Thank you.

## 2020-03-11 RX ORDER — TOPIRAMATE 25 MG/1
50 TABLET ORAL 2 TIMES DAILY
Qty: 120 TABLET | Refills: 0 | Status: SHIPPED | OUTPATIENT
Start: 2020-03-11 | End: 2020-04-15 | Stop reason: SDUPTHER

## 2020-03-11 NOTE — TELEPHONE ENCOUNTER
Last seen 10.14.19    Last office note states to RTO 6 months/04.2020    Next appointment scheduled for 04.08.20

## 2020-03-24 RX ORDER — METHYLPHENIDATE HYDROCHLORIDE 10 MG/1
10 TABLET ORAL 2 TIMES DAILY
Qty: 60 TABLET | Refills: 0 | Status: SHIPPED | OUTPATIENT
Start: 2020-03-24 | End: 2020-04-27 | Stop reason: SDUPTHER

## 2020-03-24 NOTE — TELEPHONE ENCOUNTER
12/16/2019          Future Appointments   Date Time Provider Kamran Molina   4/8/2020 12:30 PM Jay Jay Lynn MD AND [facility]

## 2020-04-15 ENCOUNTER — VIRTUAL VISIT (OUTPATIENT)
Dept: NEUROLOGY | Age: 70
End: 2020-04-15
Payer: COMMERCIAL

## 2020-04-15 PROCEDURE — 99442 PR PHYS/QHP TELEPHONE EVALUATION 11-20 MIN: CPT | Performed by: PSYCHIATRY & NEUROLOGY

## 2020-04-15 RX ORDER — TOPIRAMATE 25 MG/1
50 TABLET ORAL 2 TIMES DAILY
Qty: 120 TABLET | Refills: 5 | Status: SHIPPED | OUTPATIENT
Start: 2020-04-15 | End: 2020-10-07 | Stop reason: SDUPTHER

## 2020-04-20 RX ORDER — FUROSEMIDE 20 MG/1
TABLET ORAL
Qty: 90 TABLET | Refills: 1 | Status: SHIPPED | OUTPATIENT
Start: 2020-04-20 | End: 2020-11-03

## 2020-04-24 ENCOUNTER — TELEPHONE (OUTPATIENT)
Dept: FAMILY MEDICINE CLINIC | Age: 70
End: 2020-04-24

## 2020-04-27 ENCOUNTER — TELEPHONE (OUTPATIENT)
Dept: FAMILY MEDICINE CLINIC | Age: 70
End: 2020-04-27

## 2020-04-27 RX ORDER — METHYLPHENIDATE HYDROCHLORIDE 10 MG/1
10 TABLET ORAL 2 TIMES DAILY
Qty: 60 TABLET | Refills: 0 | Status: SHIPPED | OUTPATIENT
Start: 2020-04-27 | End: 2020-05-26 | Stop reason: SDUPTHER

## 2020-04-27 RX ORDER — TOPIRAMATE 25 MG/1
TABLET ORAL
Qty: 360 TABLET | Refills: 0 | OUTPATIENT
Start: 2020-04-27

## 2020-04-28 ENCOUNTER — VIRTUAL VISIT (OUTPATIENT)
Dept: FAMILY MEDICINE CLINIC | Age: 70
End: 2020-04-28
Payer: COMMERCIAL

## 2020-04-28 VITALS — WEIGHT: 207 LBS | BODY MASS INDEX: 34.49 KG/M2 | HEIGHT: 65 IN

## 2020-04-28 DIAGNOSIS — E03.9 HYPOTHYROIDISM, UNSPECIFIED TYPE: ICD-10-CM

## 2020-04-28 DIAGNOSIS — R53.82 CHRONIC FATIGUE: ICD-10-CM

## 2020-04-28 DIAGNOSIS — I10 ESSENTIAL HYPERTENSION: ICD-10-CM

## 2020-04-28 LAB
ANION GAP SERPL CALCULATED.3IONS-SCNC: 12 MMOL/L (ref 3–16)
BASOPHILS ABSOLUTE: 0 K/UL (ref 0–0.2)
BASOPHILS RELATIVE PERCENT: 0.4 %
BUN BLDV-MCNC: 16 MG/DL (ref 7–20)
CALCIUM SERPL-MCNC: 9.3 MG/DL (ref 8.3–10.6)
CHLORIDE BLD-SCNC: 106 MMOL/L (ref 99–110)
CO2: 25 MMOL/L (ref 21–32)
CREAT SERPL-MCNC: 0.7 MG/DL (ref 0.6–1.2)
EOSINOPHILS ABSOLUTE: 0.1 K/UL (ref 0–0.6)
EOSINOPHILS RELATIVE PERCENT: 1 %
GFR AFRICAN AMERICAN: >60
GFR NON-AFRICAN AMERICAN: >60
GLUCOSE BLD-MCNC: 96 MG/DL (ref 70–99)
HCT VFR BLD CALC: 37 % (ref 36–48)
HEMOGLOBIN: 12.3 G/DL (ref 12–16)
LYMPHOCYTES ABSOLUTE: 2.1 K/UL (ref 1–5.1)
LYMPHOCYTES RELATIVE PERCENT: 39 %
MCH RBC QN AUTO: 32.4 PG (ref 26–34)
MCHC RBC AUTO-ENTMCNC: 33.3 G/DL (ref 31–36)
MCV RBC AUTO: 97.1 FL (ref 80–100)
MONOCYTES ABSOLUTE: 0.5 K/UL (ref 0–1.3)
MONOCYTES RELATIVE PERCENT: 9.4 %
NEUTROPHILS ABSOLUTE: 2.8 K/UL (ref 1.7–7.7)
NEUTROPHILS RELATIVE PERCENT: 50.2 %
PDW BLD-RTO: 15.4 % (ref 12.4–15.4)
PLATELET # BLD: 141 K/UL (ref 135–450)
PMV BLD AUTO: 8 FL (ref 5–10.5)
POTASSIUM SERPL-SCNC: 4 MMOL/L (ref 3.5–5.1)
RBC # BLD: 3.81 M/UL (ref 4–5.2)
SODIUM BLD-SCNC: 143 MMOL/L (ref 136–145)
T4 FREE: 1.3 NG/DL (ref 0.9–1.8)
TSH SERPL DL<=0.05 MIU/L-ACNC: 0.09 UIU/ML (ref 0.27–4.2)
WBC # BLD: 5.5 K/UL (ref 4–11)

## 2020-04-28 PROCEDURE — 99442 PR PHYS/QHP TELEPHONE EVALUATION 11-20 MIN: CPT | Performed by: FAMILY MEDICINE

## 2020-05-01 ASSESSMENT — ENCOUNTER SYMPTOMS
SHORTNESS OF BREATH: 0
COUGH: 0

## 2020-05-01 NOTE — PATIENT INSTRUCTIONS
Get the blood work. Call the neurosurgeon and report your change in symptoms. Continue the current medications for now.

## 2020-05-04 ENCOUNTER — TELEPHONE (OUTPATIENT)
Dept: FAMILY MEDICINE CLINIC | Age: 70
End: 2020-05-04

## 2020-05-04 RX ORDER — METHYLPHENIDATE HYDROCHLORIDE 10 MG/1
10 TABLET ORAL 2 TIMES DAILY
Qty: 60 TABLET | Refills: 0 | OUTPATIENT
Start: 2020-05-04 | End: 2020-06-03

## 2020-05-04 NOTE — TELEPHONE ENCOUNTER
Tell the patient this was refilled 4/28 per [facility]RS. Please check with the pharmacy that she picked this up.   Thanks

## 2020-05-06 ENCOUNTER — HOSPITAL ENCOUNTER (OUTPATIENT)
Dept: MRI IMAGING | Age: 70
Discharge: HOME | End: 2020-05-06
Payer: COMMERCIAL

## 2020-05-06 PROCEDURE — 70553 MRI BRAIN STEM W/O & W/DYE: CPT

## 2020-05-06 PROCEDURE — 6360000004 HC RX CONTRAST MEDICATION: Performed by: NEUROLOGICAL SURGERY

## 2020-05-06 PROCEDURE — A9579 GAD-BASE MR CONTRAST NOS,1ML: HCPCS | Performed by: NEUROLOGICAL SURGERY

## 2020-05-06 RX ADMIN — GADOTERIDOL 20 ML: 279.3 INJECTION, SOLUTION INTRAVENOUS at 08:30

## 2020-05-26 ENCOUNTER — TELEPHONE (OUTPATIENT)
Dept: FAMILY MEDICINE CLINIC | Age: 70
End: 2020-05-26

## 2020-05-26 RX ORDER — METHYLPHENIDATE HYDROCHLORIDE 10 MG/1
10 TABLET ORAL 2 TIMES DAILY
Qty: 60 TABLET | Refills: 0 | Status: SHIPPED | OUTPATIENT
Start: 2020-05-26 | End: 2020-06-26 | Stop reason: SDUPTHER

## 2020-05-26 NOTE — TELEPHONE ENCOUNTER
12/16/2019        Future Appointments   Date Time Provider Kamran Molina   10/7/2020 11:00 AM Jay Jay Lozada MD AND [facility]

## 2020-06-17 ENCOUNTER — TELEPHONE (OUTPATIENT)
Dept: FAMILY MEDICINE CLINIC | Age: 70
End: 2020-06-17

## 2020-07-31 ENCOUNTER — TELEPHONE (OUTPATIENT)
Dept: FAMILY MEDICINE CLINIC | Age: 70
End: 2020-07-31

## 2020-07-31 RX ORDER — METHYLPHENIDATE HYDROCHLORIDE 10 MG/1
10 TABLET ORAL 2 TIMES DAILY
Qty: 60 TABLET | Refills: 0 | Status: SHIPPED | OUTPATIENT
Start: 2020-07-31 | End: 2020-08-31 | Stop reason: SDUPTHER

## 2020-07-31 RX ORDER — METHYLPHENIDATE HYDROCHLORIDE 10 MG/1
10 TABLET ORAL 2 TIMES DAILY
Qty: 60 TABLET | Refills: 0 | Status: SHIPPED | OUTPATIENT
Start: 2020-07-31 | End: 2020-07-31 | Stop reason: SDUPTHER

## 2020-07-31 NOTE — TELEPHONE ENCOUNTER
[facility] called regarding the Ritalin rx that was sent over this afternoon. They can NOT fill it because in the note to pharm it says OK to fill 12/9. Please take this out of the note. Patient needs a refill on Ritalin. They need a 30 day supply.      Mail order or local pharmacy: local    Pharmacy: [facility] on file    Patient  or mail to patient(If mail order):     Last OV VV 4/28/20    Future Appointments   Date Time Provider Kamran Molina   10/7/2020 11:00 AM Jay Jay Mc MD AND [facility]

## 2020-07-31 NOTE — TELEPHONE ENCOUNTER
----- Message from Marilee Patel sent at 7/31/2020  1:16 PM EDT -----  Subject: Message to Provider    QUESTIONS  Information for Provider? Pt is requesting a call back from clinical staff   to discuss medication methylphenidate (RITALIN) 10 MG tablet.  ---------------------------------------------------------------------------  --------------  CALL BACK INFO  What is the best way for the office to contact you? OK to leave message on   voicemail  Preferred Call Back Phone Number? [phone]  ---------------------------------------------------------------------------  --------------  SCRIPT ANSWERS  Relationship to Patient?  Self

## 2020-07-31 NOTE — TELEPHONE ENCOUNTER
New RX needs sent to [facility] for methylphenidate (RITALIN) 10 MG tablet     The one sent has order in notes not to fill until 12/9 and the pt is out

## 2020-08-17 RX ORDER — ROSUVASTATIN CALCIUM 5 MG/1
TABLET, COATED ORAL
Qty: 90 TABLET | Refills: 0 | Status: SHIPPED | OUTPATIENT
Start: 2020-08-17 | End: 2020-11-04 | Stop reason: SDUPTHER

## 2020-08-17 NOTE — TELEPHONE ENCOUNTER
Future Appointments   Date Time Provider Kamran Molina   10/7/2020 11:00 AM Christian Liriano MD AND [facility] 4/28/2020

## 2020-08-24 RX ORDER — LEVOTHYROXINE SODIUM 0.1 MG/1
100 TABLET ORAL DAILY
Qty: 30 TABLET | Refills: 1 | Status: SHIPPED | OUTPATIENT
Start: 2020-08-24 | End: 2020-11-05

## 2020-08-24 RX ORDER — METHYLPHENIDATE HYDROCHLORIDE 10 MG/1
10 TABLET ORAL 2 TIMES DAILY
Qty: 60 TABLET | Refills: 0 | OUTPATIENT
Start: 2020-08-24 | End: 2020-09-23

## 2020-08-24 NOTE — TELEPHONE ENCOUNTER
----- Message from Renea Nguyen sent at 8/24/2020 10:08 AM EDT -----  Subject: Refill Request    QUESTIONS  Name of Medication? methylphenidate (RITALIN) 10 MG tablet  Patient-reported dosage and instructions? 10mg   How many days do you have left? 2  Preferred Pharmacy? [address] [phone]  Pharmacy phone number (if available)? [phone]  Additional Information for Provider?   ---------------------------------------------------------------------------  --------------  CALL BACK INFO  What is the best way for the office to contact you? OK to leave message on   voicemail  Preferred Call Back Phone Number?  [phone]

## 2020-08-24 NOTE — TELEPHONE ENCOUNTER
----- Message from Yanira Quesada sent at 8/24/2020 10:17 AM EDT -----  Subject: Message to Provider    QUESTIONS  Information for Provider? pt needs levothyroxine (SYNTHROID) 100 MCG   tablet Josefa . 15. [phone] Annemarie Berkowitz [phone]   ---------------------------------------------------------------------------  --------------  German GEIGER  What is the best way for the office to contact you? OK to leave message on   voicemail  Preferred Call Back Phone Number? [phone]  ---------------------------------------------------------------------------  --------------  SCRIPT ANSWERS  Relationship to Patient?  Self

## 2020-08-31 ENCOUNTER — TELEPHONE (OUTPATIENT)
Dept: FAMILY MEDICINE CLINIC | Age: 70
End: 2020-08-31

## 2020-08-31 RX ORDER — METHYLPHENIDATE HYDROCHLORIDE 10 MG/1
10 TABLET ORAL 2 TIMES DAILY
Qty: 60 TABLET | Refills: 0 | Status: SHIPPED | OUTPATIENT
Start: 2020-08-31 | End: 2020-09-28 | Stop reason: SDUPTHER

## 2020-08-31 NOTE — TELEPHONE ENCOUNTER
Patient needs a refill on Ritalin. They need a 30 day supply.      Mail order or local pharmacy: local    Pharmacy: [facility] on file    Patient  or mail to patient(If mail order):     Last OV  4/28/20    Future Appointments   Date Time Provider Kamran Molina   9/2/2020  1:20 PM [facility]   10/7/2020 11:00 AM Jay Jay Monahan MD AND NEURO [facility]

## 2020-09-02 ENCOUNTER — OFFICE VISIT (OUTPATIENT)
Dept: FAMILY MEDICINE CLINIC | Age: 70
End: 2020-09-02
Payer: COMMERCIAL

## 2020-09-02 VITALS
OXYGEN SATURATION: 95 % | HEART RATE: 52 BPM | RESPIRATION RATE: 14 BRPM | SYSTOLIC BLOOD PRESSURE: 132 MMHG | BODY MASS INDEX: 37.32 KG/M2 | DIASTOLIC BLOOD PRESSURE: 76 MMHG | TEMPERATURE: 97.7 F | HEIGHT: 65 IN | WEIGHT: 224 LBS

## 2020-09-02 PROBLEM — M46.90 INFLAMMATORY SPONDYLOPATHY (HCC): Status: ACTIVE | Noted: 2020-09-02

## 2020-09-02 LAB
T4 FREE: 1.2 NG/DL (ref 0.9–1.8)
TSH SERPL DL<=0.05 MIU/L-ACNC: 0.05 UIU/ML (ref 0.27–4.2)

## 2020-09-02 PROCEDURE — G0444 DEPRESSION SCREEN ANNUAL: HCPCS | Performed by: FAMILY MEDICINE

## 2020-09-02 PROCEDURE — 99213 OFFICE O/P EST LOW 20 MIN: CPT | Performed by: FAMILY MEDICINE

## 2020-09-02 ASSESSMENT — PATIENT HEALTH QUESTIONNAIRE - PHQ9
1. LITTLE INTEREST OR PLEASURE IN DOING THINGS: 3
8. MOVING OR SPEAKING SO SLOWLY THAT OTHER PEOPLE COULD HAVE NOTICED. OR THE OPPOSITE, BEING SO FIGETY OR RESTLESS THAT YOU HAVE BEEN MOVING AROUND A LOT MORE THAN USUAL: 0
7. TROUBLE CONCENTRATING ON THINGS, SUCH AS READING THE NEWSPAPER OR WATCHING TELEVISION: 1
SUM OF ALL RESPONSES TO PHQ9 QUESTIONS 1 & 2: 6
2. FEELING DOWN, DEPRESSED OR HOPELESS: 3
9. THOUGHTS THAT YOU WOULD BE BETTER OFF DEAD, OR OF HURTING YOURSELF: 0
6. FEELING BAD ABOUT YOURSELF - OR THAT YOU ARE A FAILURE OR HAVE LET YOURSELF OR YOUR FAMILY DOWN: 2
3. TROUBLE FALLING OR STAYING ASLEEP: 0
10. IF YOU CHECKED OFF ANY PROBLEMS, HOW DIFFICULT HAVE THESE PROBLEMS MADE IT FOR YOU TO DO YOUR WORK, TAKE CARE OF THINGS AT HOME, OR GET ALONG WITH OTHER PEOPLE: 1
SUM OF ALL RESPONSES TO PHQ QUESTIONS 1-9: 13
SUM OF ALL RESPONSES TO PHQ QUESTIONS 1-9: 13
5. POOR APPETITE OR OVEREATING: 2
4. FEELING TIRED OR HAVING LITTLE ENERGY: 2

## 2020-09-04 ENCOUNTER — TELEPHONE (OUTPATIENT)
Dept: FAMILY MEDICINE CLINIC | Age: 70
End: 2020-09-04

## 2020-10-06 ASSESSMENT — ENCOUNTER SYMPTOMS
GASTROINTESTINAL NEGATIVE: 1
WHEEZING: 0
SHORTNESS OF BREATH: 0

## 2020-10-06 NOTE — PROGRESS NOTES
Subjective:      Patient ID: Marcelle Hamilton is a 72 y.o. y.o. female. HPI medication follow-up and mood. Patient reports being moodier than usual.  Attributed somewhat isolation due to COVID. She does very little except the minimum going out. Interaction with other people has been diminished.       Chief Complaint   Patient presents with    Medication Check    Depression       Allergies   Allergen Reactions    Latex Rash    Vistaril [Hydroxyzine Hcl]        Past Medical History:   Diagnosis Date    Acid reflux     small stomach ulcer    Angina pectoris, variant (HCC)     Anxiety     Arthritis     Asthma     Benign esophageal stricture 12/2016    Benign tumor 03/20/2017     Benign brain tumor size of a quater    Cancer ([facility] [address]     ovarian    Depression     Frequency     Hyperlipidemia     Hypertension     Kidney stones     MI, old     Narcolepsy     Sleep apnea     Thyroid disease     Urgency of urination        Past Surgical History:   Procedure Laterality Date    ARM SURGERY Right 8/19/14    exc.trapezium and flexor carpi radialis interpositional arthroplasty    BREAST SURGERY      reduction    CARPAL TUNNEL RELEASE Right     CHOLECYSTECTOMY      COLONOSCOPY     [facility] DENTAL SURGERY      ENDOSCOPY, COLON, DIAGNOSTIC  01/06/2017    Anastomotic ulcer, gastritis    ESOPHAGEAL DILATATION  12/2016    GASTRIC BYPASS SURGERY      GASTRIC BYPASS SURGERY      HYSTERECTOMY      LITHOTRIPSY Left 11/12/2013    LITHOTRIPSY Left 12/26/13    LEFT ESWL    IN NJX DX/THER SBST INTRLMNR CRV/THRC W/IMG GDN Right 8/27/2018    RIGHT LUMBAR FIVE SACRAL ONE EPIDURAL STEROID INJECTION SITE CONFIRMED BY FLUOROSCOPY performed by Jenny Miller MD at [address]  03/03/2017    Gastritis       Social History     Socioeconomic History    Marital status:      Spouse name: Not on file    Number of children: Not on file    Years of education: Not on file    Highest education level: Not on file   Occupational History     Employer: [employer] resource strain: Not on file    Food insecurity     Worry: Not on file     Inability: Not on file    Transportation needs     Medical: Not on file     Non-medical: Not on file   Tobacco Use    Smoking status: Never Smoker    Smokeless tobacco: Never Used   Substance and Sexual Activity    Alcohol use: No     Alcohol/week: 0.0 standard drinks    Drug use: No    Sexual activity: Never   Lifestyle    Physical activity     Days per week: Not on file     Minutes per session: Not on file    Stress: Not on file   Relationships    Social connections     Talks on phone: Not on file     Gets together: Not on file     Attends [employer] service: Not on file     Active member of club or organization: Not on file     Attends meetings of clubs or organizations: Not on file     Relationship status: Not on file    Intimate partner violence     Fear of current or ex partner: Not on file     Emotionally abused: Not on file     Physically abused: Not on file     Forced sexual activity: Not on file   Other Topics Concern    Not on file   Social History Narrative    Not on file       Family History   Problem Relation Age of Onset    Diabetes Mother     Heart Disease Mother     Cancer Mother         ovarian    Heart Disease Father     Stroke Father     Early Death Father         46 - heart attack    Heart Disease Sister     Stroke Sister     Cancer Sister         ovarian       Vitals:    09/02/20 1313   BP: 132/76   Pulse: 52   Resp: 14   Temp: 97.7 °F (36.5 °C)   SpO2: 95%       Wt Readings from Last 3 Encounters:   09/02/20 224 lb (101.6 kg)   04/28/20 207 lb (93.9 kg)   12/16/19 205 lb (93 kg)       Review of Systems   Constitutional: Positive for activity change and unexpected weight change. Respiratory: Negative for shortness of breath and wheezing. Cardiovascular: Negative for chest pain. Has some lower extremity edema in the afternoon, particularly on the right. There is also some right ankle pain. Edema diminishes overnight. She does not have orthopnea. History of sleep apnea and she uses CPAP. Gastrointestinal: Negative. Genitourinary: Negative for dysuria and hematuria. Musculoskeletal: Positive for arthralgias. Particularly right ankle. Neurological:        She has a history of narcolepsy. She takes stimulant medicine. She generally had been doing fairly well. No focal neurologic signs at this point. Psychiatric/Behavioral: Positive for dysphoric mood. Negative for self-injury and suicidal ideas. Narcolepsy addressed with stimulant medicine. She has been on antidepressant. She feels she is worse recently due to COVID considerations of isolation. She also has a special needs grandson that she is not engaged with. She is worried about him and depressed about lack of interaction with his mother or the child. Objective:   Physical Exam  Constitutional:       Appearance: She is obese. She is not ill-appearing. Eyes:      General: No scleral icterus. Extraocular Movements: Extraocular movements intact. Cardiovascular:      Rate and Rhythm: Normal rate and regular rhythm. Pulmonary:      Effort: Pulmonary effort is normal.      Breath sounds: Normal breath sounds. Abdominal:      General: There is no distension. Palpations: Abdomen is soft. Tenderness: There is no abdominal tenderness. Musculoskeletal:      Right lower leg: No edema. Left lower leg: No edema. Comments: No significant edema at this time. Lymphadenopathy:      Cervical: No cervical adenopathy. Skin:     Comments: There are 2 small ecchymotic spots on the abdomen that looks consistent with mild bruising. No generalized ecchymosis or purpura skin changes. Neurological:      General: No focal deficit present.       Mental Status: She is alert and oriented to person, place, and time. Psychiatric:      Comments: Pleasant, cooperative. Speech is clear and coherent. Affect seems a bit down. No overt crying or suggestion of thought disorder. Assessment:       Diagnosis Orders   1. Hypothyroidism, unspecified type  T4, Free    TSH without Reflex    T4, Free    TSH without Reflex   2. Weight gain  T4, Free    TSH without Reflex    T4, Free    TSH without Reflex   3. Inflammatory spondylopathy, unspecified spinal region ([facility] [address]     4. Mild episode of recurrent major depressive disorder ([facility] [address]     5. Morbid obesity ([facility] [address]     6. Syndrome X, cardiac ([facility] [address]     7. Uncomplicated asthma, unspecified asthma severity, unspecified whether persistent     Narcolepsy        Plan: We will get some labs recheck her thyroid etc.  We will consider increasing her Lexapro dose. Discussed strategies for management of mood and stress while in some isolation and strategies for dealing with the depression from lack of engagement with her grandson. Consider counseling. Follow-up after the blood work.         Current Outpatient Medications   Medication Sig Dispense Refill    levothyroxine (SYNTHROID) 100 MCG tablet Take 1 tablet by mouth Daily 30 tablet 1    rosuvastatin (CRESTOR) 5 MG tablet Take 1 tablet by mouth once daily 90 tablet 0    metoprolol tartrate (LOPRESSOR) 25 MG tablet Take 1 tablet by mouth twice daily 180 tablet 0    escitalopram (LEXAPRO) 10 MG tablet Take 1 tablet by mouth once daily 30 tablet 3    pantoprazole (PROTONIX) 40 MG tablet Take 1 tablet by mouth twice daily 180 tablet 1    sucralfate (CARAFATE) 1 GM tablet Take 1 tablet by mouth 4 times daily 120 tablet 3    furosemide (LASIX) 20 MG tablet Take 1 tablet by mouth once daily 90 tablet 1    topiramate (TOPAMAX) 25 MG tablet Take 2 tablets by mouth 2 times daily 120 tablet 5    NONFORMULARY 5 - LOXIN (given by Mabel WOODS)      isosorbide mononitrate (IMDUR) 30 MG extended release tablet TAKE ONE TABLET BY MOUTH ONCE DAILY 90 tablet 3    L-Arginine 500 MG TABS Take 500 mg by mouth 2 times daily 60 tablet 11    aspirin 81 MG tablet Take 81 mg by mouth daily      valsartan (DIOVAN) 160 MG tablet Take 1 tablet by mouth once daily 90 tablet 2    methylphenidate (RITALIN) 10 MG tablet Take 1 tablet by mouth 2 times daily for 30 days. 60 tablet 0     No current facility-administered medications for this visit.

## 2020-10-07 ENCOUNTER — OFFICE VISIT (OUTPATIENT)
Dept: NEUROLOGY | Age: 70
End: 2020-10-07
Payer: COMMERCIAL

## 2020-10-07 VITALS
HEART RATE: 59 BPM | DIASTOLIC BLOOD PRESSURE: 76 MMHG | WEIGHT: 202 LBS | HEIGHT: 65 IN | BODY MASS INDEX: 33.66 KG/M2 | TEMPERATURE: 97.5 F | SYSTOLIC BLOOD PRESSURE: 126 MMHG | OXYGEN SATURATION: 98 %

## 2020-10-07 PROBLEM — R20.0 NUMBNESS AND TINGLING: Status: ACTIVE | Noted: 2020-10-07

## 2020-10-07 PROBLEM — R20.2 NUMBNESS AND TINGLING: Status: ACTIVE | Noted: 2020-10-07

## 2020-10-07 PROBLEM — R73.02 IMPAIRED GLUCOSE TOLERANCE (ORAL): Status: ACTIVE | Noted: 2020-10-07

## 2020-10-07 PROCEDURE — 99214 OFFICE O/P EST MOD 30 MIN: CPT | Performed by: PSYCHIATRY & NEUROLOGY

## 2020-10-07 RX ORDER — TOPIRAMATE 25 MG/1
50 TABLET ORAL 2 TIMES DAILY
Qty: 120 TABLET | Refills: 5 | Status: SHIPPED | OUTPATIENT
Start: 2020-10-07 | End: 2021-04-26

## 2020-10-07 NOTE — PROGRESS NOTES
The patient came today for follow up regarding: chronic migraines. Since her last visit, she continues to have episodic migraine with aura. Frequency is once every few weeks. Degree is moderate to severe. Duration is minutes to hours. The same description of unilateral pulsating and throbbing pain with nausea, photophobia and phonophobia. No other associated symptoms. Occasional visual aura. She takes Topamax now 50 mg twice daily. No side effect of such medication. She takes Excedrin as needed but not weekly for migraine rescue. No more daily headaches. She is complaining of new onset numbness and tingling in her hands and feet. Symptoms started few months ago. Degree is waxing and waning but mild to moderate. Frequency is daily. Duration is minutes. No triggers. No severe neck pain or worsening of her back pain. Not worse with repetitive movement or out of sleep. Recent blood test showed normal TSH. She has not had any recent A1c or B12. She denies any weakness or balance difficulties. She has history of RLS. She described frequent episode of nocturnal discomfort in her legs with improvement with walking. She takes over-the-counter medication supplement which does help. No other triggers or other associated symptoms    History of PRINCE and she is compliant with her CPAP machine. No recent change with her weight. BMI of 33. She is on blood pressure medication and statin. She denies any daily headaches. Other review of system was unremarkable.     Past Medical History:   Diagnosis Date    Acid reflux     small stomach ulcer    Angina pectoris, variant (HCC)     Anxiety     Arthritis     Asthma     Benign esophageal stricture 12/2016    Benign tumor 03/20/2017     Benign brain tumor size of a quater    Cancer ([facility] [address]     ovarian    Depression     Frequency     Hyperlipidemia     Hypertension     Kidney stones     MI, old     Narcolepsy     Sleep apnea     Thyroid disease     Urgency of urination      Prior to Visit Medications    Medication Sig Taking? Authorizing Provider   topiramate (TOPAMAX) 25 MG tablet Take 2 tablets by mouth 2 times daily Yes Poncho Hart MD   valsartan (DIOVAN) 160 MG tablet Take 1 tablet by mouth once daily Yes Radha Abide, DO   methylphenidate (RITALIN) 10 MG tablet Take 1 tablet by mouth 2 times daily for 30 days.  Yes Radha Abide, DO   levothyroxine (SYNTHROID) 100 MCG tablet Take 1 tablet by mouth Daily Yes Radha Abide, DO   rosuvastatin (CRESTOR) 5 MG tablet Take 1 tablet by mouth once daily Yes Radha Abide, DO   metoprolol tartrate (LOPRESSOR) 25 MG tablet Take 1 tablet by mouth twice daily Yes Juan Cantu MD   escitalopram (LEXAPRO) 10 MG tablet Take 1 tablet by mouth once daily Yes Radha Abide, DO   pantoprazole (PROTONIX) 40 MG tablet Take 1 tablet by mouth twice daily Yes Radha Abide, DO   sucralfate (CARAFATE) 1 GM tablet Take 1 tablet by mouth 4 times daily Yes Radha Abide, DO   furosemide (LASIX) 20 MG tablet Take 1 tablet by mouth once daily Yes Juan Cantu MD   NONFORMULARY 5 - LOXIN (given by [address] MD) Yes Historical Provider, MD   isosorbide mononitrate (IMDUR) 30 MG extended release tablet TAKE ONE TABLET BY MOUTH ONCE DAILY Yes Juan Cantu MD   L-Arginine 500 MG TABS Take 500 mg by mouth 2 times daily Yes Juan Cantu MD   aspirin 81 MG tablet Take 81 mg by mouth daily Yes Historical Provider, MD     Allergies   Allergen Reactions    Latex Rash    Vistaril [Hydroxyzine Hcl]      Social History     Tobacco Use    Smoking status: Never Smoker    Smokeless tobacco: Never Used   Substance Use Topics    Alcohol use: No     Alcohol/week: 0.0 standard drinks     Family History   Problem Relation Age of Onset    Diabetes Mother     Heart Disease Mother     Cancer Mother         ovarian    Heart Disease Father     Stroke Father     Early Death Father         46 - heart attack    Heart Disease Sister     Stroke Sister     Cancer Sister         ovarian     Past Surgical History:   Procedure Laterality Date    ARM SURGERY Right 8/19/14    exc.trapezium and flexor carpi radialis interpositional arthroplasty    BREAST SURGERY      reduction    CARPAL TUNNEL RELEASE Right     CHOLECYSTECTOMY      COLONOSCOPY      DENTAL SURGERY      ENDOSCOPY, COLON, DIAGNOSTIC  01/06/2017    Anastomotic ulcer, gastritis    ESOPHAGEAL DILATATION  12/2016    GASTRIC BYPASS SURGERY      GASTRIC BYPASS SURGERY      HYSTERECTOMY      LITHOTRIPSY Left 11/12/2013    LITHOTRIPSY Left 12/26/13    LEFT ESWL    GA NJX DX/THER SBST INTRLMNR CRV/THRC W/IMG GDN Right 8/27/2018    RIGHT LUMBAR FIVE SACRAL ONE EPIDURAL STEROID INJECTION SITE CONFIRMED BY FLUOROSCOPY performed by Yovanny Sauer MD at [address]  03/03/2017    Gastritis         Exam:   Constitutional:   Vitals:    10/07/20 1115   BP: 126/76   Pulse: 59   Temp: 97.5 °F (36.4 °C)   TempSrc: Infrared   SpO2: 98%   Weight: 202 lb (91.6 kg)   Height: 5' 5\" (1.651 m)       General appearance: well-nourished. Eye: No icterus. Neck: supple  Cardiovascular:   No lower leg edema with good pulsation. Mental Status:   Oriented to person, place, problem, and time. Fluent speech. Good fund of knowledge. Normal attention span and concentration. Intact recent and remote memory  Cranial Nerves:   II:  Pupils: equal, round, reactive to light  III,IV,VI: Extra Ocular Movements are intact. No nystagmus  V: Facial sensation is intact  VII: Facial strength and movements: intact and symmetric  VIII: Hearing: Intact to finger rub bilaterally  IX: Palate elevation is symmetric  XI: Shoulder shrug is intact  XII: Tongue movements are normal  Musculoskeletal: 5/5 in all 4 extremities. Normal tone. Reflexes: symmetric 2 in arms and legs   Coordination: no pronator drift, no dysmetria with FNF testing.  No or sooner if new symptoms arise.

## 2020-10-30 ENCOUNTER — TELEPHONE (OUTPATIENT)
Dept: FAMILY MEDICINE CLINIC | Age: 70
End: 2020-10-30

## 2020-10-30 RX ORDER — METHYLPHENIDATE HYDROCHLORIDE 10 MG/1
10 TABLET ORAL 2 TIMES DAILY
Qty: 60 TABLET | Refills: 0 | Status: SHIPPED | OUTPATIENT
Start: 2020-10-30 | End: 2020-11-05 | Stop reason: SDUPTHER

## 2020-10-30 NOTE — TELEPHONE ENCOUNTER
9/2/2020          Future Appointments   Date Time Provider Kamran Molina   4/7/2021 11:15 AM Jay Jay Norris MD [facility]

## 2020-11-03 RX ORDER — FUROSEMIDE 20 MG/1
TABLET ORAL
Qty: 90 TABLET | Refills: 0 | Status: SHIPPED | OUTPATIENT
Start: 2020-11-03 | End: 2020-11-04 | Stop reason: SDUPTHER

## 2020-11-03 NOTE — PROGRESS NOTES
HTN (hypertension), benign    Asthma    Diaphragmatic hernia    Female genuine stress incontinence    Blood in the urine    Morbid obesity (HCC)    Pain of right hip joint    Greater trochanteric bursitis of right hip    Intractable migraine with aura without status migrainosus    Chronic tension-type headache, intractable    Dyslipidemia    Paroxysmal dyspnea    Skipped beats    PRINCE (obstructive sleep apnea)    SOB (shortness of breath)    Hypothyroidism    Mild episode of recurrent major depressive disorder (HCC)    Obesity (BMI 30.0-34. 9)    Lumbosacral spondylosis without myelopathy    Inflammatory spondylopathy (HCC)    Numbness and tingling    Impaired glucose tolerance (oral)          Past Medical History:   has a past medical history of Acid reflux, Angina pectoris, variant (HCC), Anxiety, Arthritis, Asthma, Benign esophageal stricture, Benign tumor, Cancer ([facility] [address] Depression, Frequency, Hyperlipidemia, Hypertension, Kidney stones, MI, old, Narcolepsy, Sleep apnea, Thyroid disease, and Urgency of urination. Surgical History:   has a past surgical history that includes Cholecystectomy; Hysterectomy; Gastric bypass surgery; Breast surgery; Dental surgery; Lithotripsy (Left, 11/12/2013); Lithotripsy (Left, 12/26/13); Colonoscopy; Carpal tunnel release (Right); Arm Surgery (Right, 8/19/14); Gastric bypass surgery; Endoscopy, colon, diagnostic (01/06/2017); Esophagus dilation (12/2016); Upper gastrointestinal endoscopy (03/03/2017); and pr njx dx/ther sbst intrlmnr crv/thrc w/img gdn (Right, 8/27/2018). Social History:   reports that she has never smoked. She has never used smokeless tobacco. She reports that she does not drink alcohol or use drugs. Family History:  No evidence for sudden cardiac death or premature CAD    Home Medications:  Reviewed and are listed in nursing record.  and/or listed below  Current Outpatient Medications   Medication Sig Dispense Refill    furosemide (LASIX) 20 MG tablet Take 1 tablet by mouth once daily 90 tablet 0    methylphenidate (RITALIN) 10 MG tablet Take 1 tablet by mouth 2 times daily for 30 days. 60 tablet 0    topiramate (TOPAMAX) 25 MG tablet Take 2 tablets by mouth 2 times daily 120 tablet 5    valsartan (DIOVAN) 160 MG tablet Take 1 tablet by mouth once daily 90 tablet 2    levothyroxine (SYNTHROID) 100 MCG tablet Take 1 tablet by mouth Daily 30 tablet 1    rosuvastatin (CRESTOR) 5 MG tablet Take 1 tablet by mouth once daily 90 tablet 0    metoprolol tartrate (LOPRESSOR) 25 MG tablet Take 1 tablet by mouth twice daily 180 tablet 0    escitalopram (LEXAPRO) 10 MG tablet Take 1 tablet by mouth once daily 30 tablet 3    pantoprazole (PROTONIX) 40 MG tablet Take 1 tablet by mouth twice daily 180 tablet 1    sucralfate (CARAFATE) 1 GM tablet Take 1 tablet by mouth 4 times daily 120 tablet 3    NONFORMULARY 5 - LOXIN (given by Mabel WOODS)      isosorbide mononitrate (IMDUR) 30 MG extended release tablet TAKE ONE TABLET BY MOUTH ONCE DAILY 90 tablet 3    L-Arginine 500 MG TABS Take 500 mg by mouth 2 times daily 60 tablet 11    aspirin 81 MG tablet Take 81 mg by mouth daily       No current facility-administered medications for this visit. Allergies:  Latex and Vistaril [hydroxyzine hcl]     Review of Systems:   A 14 point review of symptoms completed. Pertinent positives identified in the HPI, all other review of symptoms negative as below.     Objective:   PHYSICAL EXAM:    Vitals:    11/04/20 0918   BP: 122/70   Pulse: 70   SpO2: 99%    Weight: 229 lb (103.9 kg)     Wt Readings from Last 3 Encounters:   11/04/20 229 lb (103.9 kg)   10/07/20 202 lb (91.6 kg)   09/02/20 224 lb (101.6 kg)       General Appearance:  Alert, cooperative, no distress, appears stated age   Head:  Normocephalic, atraumatic   Eyes:  PERRL, conjunctiva/corneas clear   Nose: Nares normal, no drainage or sinus tenderness   Throat: Lips, mucosa, and tongue normal Neck: Supple, symmetrical, trachea midline, NL thyroid no carotid bruit or JVD   Lungs:   CTAB, respirations unlabored   Chest Wall:  No tenderness or deformity   Heart:  Regular rhythm and rocio rate; S1, S2 are normal;   no murmur noted; no rub or gallop   Abdomen:   Soft, non-tender, +BS x 4, no masses, no organomegaly   Extremities: Extremities normal, atraumatic, no cyanosis or edema   Pulses: 2+ and symmetric   Skin: Skin color, texture, turgor normal, no rashes or lesions   Pysch: Normal mood and affect   Neurologic: Normal gross motor and sensory exam.         LABS   CBC:      Lab Results   Component Value Date    WBC 5.5 04/28/2020    RBC 3.81 04/28/2020    HGB 12.3 04/28/2020    HCT 37.0 04/28/2020    MCV 97.1 04/28/2020    RDW 15.4 04/28/2020     04/28/2020     CMP:  Lab Results   Component Value Date     04/28/2020    K 4.0 04/28/2020     04/28/2020    CO2 25 04/28/2020    BUN 16 04/28/2020    CREATININE 0.7 04/28/2020    GFRAA >60 04/28/2020    GFRAA >60 07/19/2012    AGRATIO 1.7 07/15/2019    LABGLOM >60 04/28/2020    GLUCOSE 96 04/28/2020    PROT 6.7 07/15/2019    PROT 7.2 07/19/2012    CALCIUM 9.3 04/28/2020    BILITOT 0.4 07/15/2019    ALKPHOS 111 07/15/2019    AST 13 07/15/2019    ALT 11 07/15/2019     PT/INR:   No results found for: PTINR  Liver:  No components found for: CHLPL  Lab Results   Component Value Date    ALT 11 07/15/2019    AST 13 (L) 07/15/2019    ALKPHOS 111 07/15/2019    BILITOT 0.4 07/15/2019     No results found for: LABA1C  Lipids:         Lab Results   Component Value Date    TRIG 136 07/15/2019    TRIG 89 03/21/2017    TRIG 112 01/05/2017            Lab Results   Component Value Date    HDL 52 07/15/2019    HDL 52 03/21/2017    HDL 48 01/05/2017            Lab Results   Component Value Date    LDLCALC 47 07/15/2019    LDLCALC 46 03/21/2017    LDLCALC 35 01/05/2017            Lab Results   Component Value Date    LABVLDL 27 07/15/2019    LABVLDL 18 2017    LABVLDL 22 2017         CARDIAC DATA   EK2017 Sinus rocio, low voltage, NSST changes      STRESS TEST: 3/20/17  Summary  There is no evidence of stress induced ischemia. Small, fixed defect in the  anteroseptal wall c/w breast attenuation artifact. Normal LV function with  ejection fraction of 64%. There are no regional wall motion abnormalities. CARDIAC CATH:  CATH: 14   1. Mild coronary artery disease. 2. Normal left ventricular systolic function, ejection fraction 60%. 3. Mildly elevated left ventricular end-diastolic pressure 14 mmHg. VASCULAR/OTHER IMAGING:  MRI Brain 19  Stable right frontal meningioma. Mild parenchymal volume loss and sequela of chronic microvascular ischemic changes. PFT 17  1. No evidence of obstructive defect or restrictive defect. 2.  Isolated, mildly decreased diffuse capacity that could be seen emphysema, interstitial lung disease, anemia, pulmonary vascular  disease. Clinical correlation is warranted        Assessment and Plan   Kenji Atwood is a 72 y.o. female who presents today for the following problems:        1. Cardiac microvascular disease: Continues with class -III angina  2. CAD: mild on 2014 cath  3. HTN: controlled  4. HLD: controlled  5. Dizziness:continues and favors wobbling to right   - meningoma and getting gamma knife tx  6. Sinus bradycardia: asymptomatic, improved  7. Meningioma of brain    Hx of esophogeal stricture and dilation  Hx of Gastrojejunal anastomosis ulcer    PLAN  1. Patient continues to have chest pain. Over this difficult historian but as per number of years since work-up  2.  Echocardiogram and BNP  3. We will get Fiona Myoview. Her brain meningioma and unsteady gait we will not put on treadmill  4. We will call her with blood work and results as above.           MD Plan:     Patient Active Problem List   Diagnosis    Excessive sleepiness    Restless legs syndrome (RLS) support staff, while the remaining scribed note accurately describes my personal service to the patient. The above RN is working as a scribe for and in the presence of myself . Working as a scribe, the RN may have prepopulated components of this note with my historical intellectual property under my direct supervision. Any additions to this intellectual property were performed at my direction. Furthermore, the content and accuracy of this note have been reviewed by me to the best of my ability.        Yanira Rizo MD, [address] [facility] Cardiologist  [facility]  [phone] [facility]  [phone] [address] [place] [address]

## 2020-11-04 ENCOUNTER — OFFICE VISIT (OUTPATIENT)
Dept: CARDIOLOGY CLINIC | Age: 70
End: 2020-11-04
Payer: COMMERCIAL

## 2020-11-04 VITALS
WEIGHT: 229 LBS | HEART RATE: 70 BPM | HEIGHT: 65 IN | SYSTOLIC BLOOD PRESSURE: 122 MMHG | OXYGEN SATURATION: 99 % | BODY MASS INDEX: 38.15 KG/M2 | DIASTOLIC BLOOD PRESSURE: 70 MMHG

## 2020-11-04 PROCEDURE — 99214 OFFICE O/P EST MOD 30 MIN: CPT | Performed by: INTERNAL MEDICINE

## 2020-11-04 RX ORDER — FUROSEMIDE 20 MG/1
TABLET ORAL
Qty: 90 TABLET | Refills: 3 | Status: SHIPPED | OUTPATIENT
Start: 2020-11-04 | End: 2021-12-02

## 2020-11-04 RX ORDER — VALSARTAN 160 MG/1
TABLET ORAL
Qty: 90 TABLET | Refills: 3 | Status: SHIPPED | OUTPATIENT
Start: 2020-11-04 | End: 2022-01-17

## 2020-11-04 RX ORDER — ROSUVASTATIN CALCIUM 5 MG/1
5 TABLET, COATED ORAL DAILY
Qty: 90 TABLET | Refills: 3 | Status: SHIPPED | OUTPATIENT
Start: 2020-11-04 | End: 2021-12-02

## 2020-11-04 RX ORDER — ISOSORBIDE MONONITRATE 30 MG/1
TABLET, EXTENDED RELEASE ORAL
Qty: 90 TABLET | Refills: 3 | Status: SHIPPED | OUTPATIENT
Start: 2020-11-04 | End: 2021-12-02

## 2020-11-04 NOTE — LETTER
[address]  [address] [facility]   Cardiovascular Evaluation    PATIENT: Tejas Karimi  DATE: 2020  MRN: <[id number]>  CSN: [id number]  : 1950      Primary Care Doctor: Ashly Leung DO  Reason for evaluation:   1 Year Follow Up; Chest Pain (mid sternal); Shortness of Breath (sitting still and on exertion); Edema (swelling bilateral feet and legs); and Fatigue      Subjective:   History of present illness on initial date of evaluation:   Tejas Karimi is a 72 y.o. patient who presents who presents for follow up for microvascular angina, CAD, HTN, HLD. She follows Dr. Manoj Davila for COPD. Today she reports feeling ok. She is unable to afford Renexa. She states she has chest pain almost every day. She states it is sharp. She states the Imdur helps some, but feels the Renexa helped more. Today she reports she has SOB and chest pain, but states this is not new. She states she still sleeps with 2 pillows to prop her up. She states the chest pain comes and goes depending upon what she is doing and states when she is stressed it is worse. She states she has been having BLE edema. Somedays are worse than others and normally worse by the end of the day. She states since last visit she was diagnosed with a Meningioma 19 and has had 5 radiation treatments. She states she has some balance issues due to this. She is following with Dr. Alpha Cheadle for this.      Patient Active Problem List   Diagnosis    Excessive sleepiness    Restless legs syndrome (RLS)    Depressive disorder, not elsewhere classified    Narcolepsy    Other chest pain    Chronic ischemic heart disease    CMC arthritis, thumb, degenerative    GERD (gastroesophageal reflux disease)    Anxiety    Cardiac syndrome X ([facility] [address]    CAD (coronary artery disease)    New persistent daily headache    Migraine with aura and without status migrainosus, not intractable  Benign neoplasm of supratentorial region of brain ([facility] [address]    HTN (hypertension), benign    Asthma    Diaphragmatic hernia    Female genuine stress incontinence    Blood in the urine    Morbid obesity (HCC)    Pain of right hip joint    Greater trochanteric bursitis of right hip    Intractable migraine with aura without status migrainosus    Chronic tension-type headache, intractable    Dyslipidemia    Paroxysmal dyspnea    Skipped beats    PRINCE (obstructive sleep apnea)    SOB (shortness of breath)    Hypothyroidism    Mild episode of recurrent major depressive disorder (HCC)    Obesity (BMI 30.0-34. 9)    Lumbosacral spondylosis without myelopathy    Inflammatory spondylopathy (HCC)    Numbness and tingling    Impaired glucose tolerance (oral)          Past Medical History:   has a past medical history of Acid reflux, Angina pectoris, variant (HCC), Anxiety, Arthritis, Asthma, Benign esophageal stricture, Benign tumor, Cancer ([facility] [address] Depression, Frequency, Hyperlipidemia, Hypertension, Kidney stones, MI, old, Narcolepsy, Sleep apnea, Thyroid disease, and Urgency of urination. Surgical History:   has a past surgical history that includes Cholecystectomy; Hysterectomy; Gastric bypass surgery; Breast surgery; Dental surgery; Lithotripsy (Left, 11/12/2013); Lithotripsy (Left, 12/26/13); Colonoscopy; Carpal tunnel release (Right); Arm Surgery (Right, 8/19/14); Gastric bypass surgery; Endoscopy, colon, diagnostic (01/06/2017); Esophagus dilation (12/2016); Upper gastrointestinal endoscopy (03/03/2017); and pr njx dx/ther sbst intrlmnr crv/thrc w/img gdn (Right, 8/27/2018). Social History:   reports that she has never smoked. She has never used smokeless tobacco. She reports that she does not drink alcohol or use drugs. Family History:  No evidence for sudden cardiac death or premature CAD    Home Medications:  Reviewed and are listed in nursing record.  and/or listed below Current Outpatient Medications   Medication Sig Dispense Refill    furosemide (LASIX) 20 MG tablet Take 1 tablet by mouth once daily 90 tablet 0    methylphenidate (RITALIN) 10 MG tablet Take 1 tablet by mouth 2 times daily for 30 days. 60 tablet 0    topiramate (TOPAMAX) 25 MG tablet Take 2 tablets by mouth 2 times daily 120 tablet 5    valsartan (DIOVAN) 160 MG tablet Take 1 tablet by mouth once daily 90 tablet 2    levothyroxine (SYNTHROID) 100 MCG tablet Take 1 tablet by mouth Daily 30 tablet 1    rosuvastatin (CRESTOR) 5 MG tablet Take 1 tablet by mouth once daily 90 tablet 0    metoprolol tartrate (LOPRESSOR) 25 MG tablet Take 1 tablet by mouth twice daily 180 tablet 0    escitalopram (LEXAPRO) 10 MG tablet Take 1 tablet by mouth once daily 30 tablet 3    pantoprazole (PROTONIX) 40 MG tablet Take 1 tablet by mouth twice daily 180 tablet 1    sucralfate (CARAFATE) 1 GM tablet Take 1 tablet by mouth 4 times daily 120 tablet 3    NONFORMULARY 5 - LOXIN (given by Mabel WOODS)      isosorbide mononitrate (IMDUR) 30 MG extended release tablet TAKE ONE TABLET BY MOUTH ONCE DAILY 90 tablet 3    L-Arginine 500 MG TABS Take 500 mg by mouth 2 times daily 60 tablet 11    aspirin 81 MG tablet Take 81 mg by mouth daily       No current facility-administered medications for this visit. Allergies:  Latex and Vistaril [hydroxyzine hcl]     Review of Systems:   A 14 point review of symptoms completed. Pertinent positives identified in the HPI, all other review of symptoms negative as below.     Objective:   PHYSICAL EXAM:    Vitals:    11/04/20 0918   BP: 122/70   Pulse: 70   SpO2: 99%    Weight: 229 lb (103.9 kg)     Wt Readings from Last 3 Encounters:   11/04/20 229 lb (103.9 kg)   10/07/20 202 lb (91.6 kg)   09/02/20 224 lb (101.6 kg)       General Appearance:  Alert, cooperative, no distress, appears stated age   Head:  Normocephalic, atraumatic   Eyes:  PERRL, conjunctiva/corneas clear Nose: Nares normal, no drainage or sinus tenderness   Throat: Lips, mucosa, and tongue normal   Neck: Supple, symmetrical, trachea midline, NL thyroid no carotid bruit or JVD   Lungs:   CTAB, respirations unlabored   Chest Wall:  No tenderness or deformity   Heart:  Regular rhythm and rocio rate; S1, S2 are normal;   no murmur noted; no rub or gallop   Abdomen:   Soft, non-tender, +BS x 4, no masses, no organomegaly   Extremities: Extremities normal, atraumatic, no cyanosis or edema   Pulses: 2+ and symmetric   Skin: Skin color, texture, turgor normal, no rashes or lesions   Pysch: Normal mood and affect   Neurologic: Normal gross motor and sensory exam.         LABS   CBC:      Lab Results   Component Value Date    WBC 5.5 04/28/2020    RBC 3.81 04/28/2020    HGB 12.3 04/28/2020    HCT 37.0 04/28/2020    MCV 97.1 04/28/2020    RDW 15.4 04/28/2020     04/28/2020     CMP:  Lab Results   Component Value Date     04/28/2020    K 4.0 04/28/2020     04/28/2020    CO2 25 04/28/2020    BUN 16 04/28/2020    CREATININE 0.7 04/28/2020    GFRAA >60 04/28/2020    GFRAA >60 07/19/2012    AGRATIO 1.7 07/15/2019    LABGLOM >60 04/28/2020    GLUCOSE 96 04/28/2020    PROT 6.7 07/15/2019    PROT 7.2 07/19/2012    CALCIUM 9.3 04/28/2020    BILITOT 0.4 07/15/2019    ALKPHOS 111 07/15/2019    AST 13 07/15/2019    ALT 11 07/15/2019     PT/INR:   No results found for: PTINR  Liver:  No components found for: CHLPL  Lab Results   Component Value Date    ALT 11 07/15/2019    AST 13 (L) 07/15/2019    ALKPHOS 111 07/15/2019    BILITOT 0.4 07/15/2019     No results found for: LABA1C  Lipids:         Lab Results   Component Value Date    TRIG 136 07/15/2019    TRIG 89 03/21/2017    TRIG 112 01/05/2017            Lab Results   Component Value Date    HDL 52 07/15/2019    HDL 52 03/21/2017    HDL 48 01/05/2017            Lab Results   Component Value Date    LDLCALC 47 07/15/2019    LDLCALC 46 03/21/2017 LDLCALC 35 2017            Lab Results   Component Value Date    LABVLDL 27 07/15/2019    LABVLDL 18 2017    LABVLDL 22 2017         CARDIAC DATA   EK2017 Sinus rocio, low voltage, NSST changes      STRESS TEST: 3/20/17  Summary  There is no evidence of stress induced ischemia. Small, fixed defect in the  anteroseptal wall c/w breast attenuation artifact. Normal LV function with  ejection fraction of 64%. There are no regional wall motion abnormalities. CARDIAC CATH:  CATH: 14   1. Mild coronary artery disease. 2. Normal left ventricular systolic function, ejection fraction 60%. 3. Mildly elevated left ventricular end-diastolic pressure 14 mmHg. VASCULAR/OTHER IMAGING:  MRI Brain 19  Stable right frontal meningioma. Mild parenchymal volume loss and sequela of chronic microvascular ischemic changes. PFT 17  1. No evidence of obstructive defect or restrictive defect. 2.  Isolated, mildly decreased diffuse capacity that could be seen emphysema, interstitial lung disease, anemia, pulmonary vascular  disease. Clinical correlation is warranted        Assessment and Plan   Aure Guillermo is a 72 y.o. female who presents today for the following problems:        1. Cardiac microvascular disease: Continues with class -III angina  2. CAD: mild on 2014 cath  3. HTN: controlled  4. HLD: controlled  5. Dizziness:continues and favors wobbling to right   - meningoma and getting gamma knife tx  6. Sinus bradycardia: asymptomatic, improved  7. Meningioma of brain    Hx of esophogeal stricture and dilation  Hx of Gastrojejunal anastomosis ulcer    PLAN  1. Patient continues to have chest pain. Over this difficult historian but as per number of years since work-up  2.  Echocardiogram and BNP  3. We will get Fiona Myoview. Her brain meningioma and unsteady gait we will not put on treadmill  4. We will call her with blood work and results as above. MD Plan:     Patient Active Problem List   Diagnosis    Excessive sleepiness    Restless legs syndrome (RLS)    Depressive disorder, not elsewhere classified    Narcolepsy    Other chest pain    Chronic ischemic heart disease    CMC arthritis, thumb, degenerative    GERD (gastroesophageal reflux disease)    Anxiety    Cardiac syndrome X ([facility] [address]    CAD (coronary artery disease)    New persistent daily headache    Migraine with aura and without status migrainosus, not intractable    Benign neoplasm of supratentorial region of brain ([address]    HTN (hypertension), benign    Asthma    Diaphragmatic hernia    Female genuine stress incontinence    Blood in the urine    Morbid obesity ([facility] [address]    Pain of right hip joint    Greater trochanteric bursitis of right hip    Intractable migraine with aura without status migrainosus    Chronic tension-type headache, intractable    Dyslipidemia    Paroxysmal dyspnea    Skipped beats    PRINCE (obstructive sleep apnea)    SOB (shortness of breath)    Hypothyroidism    Mild episode of recurrent major depressive disorder (HCC)    Obesity (BMI 30.0-34. 9)    Lumbosacral spondylosis without myelopathy    Inflammatory spondylopathy (HCC)    Numbness and tingling    Impaired glucose tolerance (oral)        Patient Plan:  1. Echocardiogram to view size and strength of the heart   2. Labs - bmp and bnp  3. Stress test to risk stratify   4. We will call you with the results  5. Recommend compression socks (knee high) to be worn during the day. [email or web address]  6. Follow up in 1 year or sooner if needed   7. Refills warranted       It is a pleasure to assist in the care of [employer]. Please call with any questions.     This note was scribed in the presence of Daya Mustafa MD by Bel Valle RN.      Boone Mitchell MD, personally performed the services described in this documentation as scribed by the above signed scribe in my presence, and it is both accurate and complete to the best of our ability and knowledge. I agree with the details independently gathered by my clinical support staff, while the remaining scribed note accurately describes my personal service to the patient. The above RN is working as a scribe for and in the presence of myself . Working as a scribe, the RN may have prepopulated components of this note with my historical intellectual property under my direct supervision. Any additions to this intellectual property were performed at my direction. Furthermore, the content and accuracy of this note have been reviewed by me to the best of my ability.        Leighann Rodriguezr, MD, [address] Cardiologist  [facility]  [phone] [facility]  [phone] [address] [place] [address]

## 2020-11-05 RX ORDER — METHYLPHENIDATE HYDROCHLORIDE 10 MG/1
10 TABLET ORAL 2 TIMES DAILY
Qty: 60 TABLET | Refills: 0 | Status: SHIPPED | OUTPATIENT
Start: 2020-11-05 | End: 2020-12-21 | Stop reason: SDUPTHER

## 2020-11-10 DIAGNOSIS — R06.02 SOB (SHORTNESS OF BREATH): ICD-10-CM

## 2020-11-10 DIAGNOSIS — R20.0 NUMBNESS AND TINGLING: ICD-10-CM

## 2020-11-10 DIAGNOSIS — R20.2 NUMBNESS AND TINGLING: ICD-10-CM

## 2020-11-10 DIAGNOSIS — R07.89 OTHER CHEST PAIN: ICD-10-CM

## 2020-11-10 DIAGNOSIS — R73.02 IMPAIRED GLUCOSE TOLERANCE (ORAL): ICD-10-CM

## 2020-11-10 DIAGNOSIS — I25.10 CORONARY ARTERY DISEASE INVOLVING NATIVE CORONARY ARTERY OF NATIVE HEART WITHOUT ANGINA PECTORIS: ICD-10-CM

## 2020-11-10 LAB
ANION GAP SERPL CALCULATED.3IONS-SCNC: 12 MMOL/L (ref 3–16)
BUN BLDV-MCNC: 17 MG/DL (ref 7–20)
CALCIUM SERPL-MCNC: 8.7 MG/DL (ref 8.3–10.6)
CHLORIDE BLD-SCNC: 107 MMOL/L (ref 99–110)
CO2: 22 MMOL/L (ref 21–32)
CREAT SERPL-MCNC: 0.8 MG/DL (ref 0.6–1.2)
GFR AFRICAN AMERICAN: >60
GFR NON-AFRICAN AMERICAN: >60
GLUCOSE BLD-MCNC: 93 MG/DL (ref 70–99)
POTASSIUM SERPL-SCNC: 4 MMOL/L (ref 3.5–5.1)
PRO-BNP: 288 PG/ML (ref 0–124)
SODIUM BLD-SCNC: 141 MMOL/L (ref 136–145)

## 2020-11-11 ENCOUNTER — TELEPHONE (OUTPATIENT)
Dept: CARDIOLOGY CLINIC | Age: 70
End: 2020-11-11

## 2020-11-11 LAB
ESTIMATED AVERAGE GLUCOSE: 114 MG/DL
FOLATE: 4.67 NG/ML (ref 4.78–24.2)
HBA1C MFR BLD: 5.6 %
VITAMIN B-12: 362 PG/ML (ref 211–911)

## 2020-11-11 NOTE — TELEPHONE ENCOUNTER
Created telephone encounter. Per pt HIPAA form can not leave test results on machine. [facility] requesting pt to call the office for test results.

## 2020-11-11 NOTE — TELEPHONE ENCOUNTER
----- Message from Hu Caputo MD sent at 11/11/2020 12:36 PM EST -----  Let patient know their lab test is stable, continue current meds, no new orders or changes at this time. Thanks.

## 2020-11-19 ENCOUNTER — HOSPITAL ENCOUNTER (OUTPATIENT)
Dept: NUCLEAR MEDICINE | Age: 70
Discharge: HOME | End: 2020-11-19
Payer: COMMERCIAL

## 2020-11-19 ENCOUNTER — HOSPITAL ENCOUNTER (OUTPATIENT)
Dept: NON INVASIVE DIAGNOSTICS | Age: 70
Discharge: HOME | End: 2020-11-19
Payer: COMMERCIAL

## 2020-11-19 LAB
LV EF: 55 %
LV EF: 65 %
LVEF MODALITY: NORMAL
LVEF MODALITY: NORMAL

## 2020-11-19 PROCEDURE — 3430000000 HC RX DIAGNOSTIC RADIOPHARMACEUTICAL: Performed by: INTERNAL MEDICINE

## 2020-11-19 PROCEDURE — A9502 TC99M TETROFOSMIN: HCPCS | Performed by: INTERNAL MEDICINE

## 2020-11-19 PROCEDURE — 78452 HT MUSCLE IMAGE SPECT MULT: CPT

## 2020-11-19 PROCEDURE — 93017 CV STRESS TEST TRACING ONLY: CPT

## 2020-11-19 PROCEDURE — 6360000002 HC RX W HCPCS: Performed by: INTERNAL MEDICINE

## 2020-11-19 PROCEDURE — 6360000004 HC RX CONTRAST MEDICATION: Performed by: INTERNAL MEDICINE

## 2020-11-19 PROCEDURE — C8929 TTE W OR WO FOL WCON,DOPPLER: HCPCS

## 2020-11-19 RX ORDER — AMINOPHYLLINE DIHYDRATE 25 MG/ML
100 INJECTION, SOLUTION INTRAVENOUS ONCE
Status: COMPLETED | OUTPATIENT
Start: 2020-11-19 | End: 2020-11-19

## 2020-11-19 RX ADMIN — PERFLUTREN 2 ML: 6.52 INJECTION, SUSPENSION INTRAVENOUS at 08:23

## 2020-11-19 RX ADMIN — TETROFOSMIN 32.2 MILLICURIE: 1.38 INJECTION, POWDER, LYOPHILIZED, FOR SOLUTION INTRAVENOUS at 09:50

## 2020-11-19 RX ADMIN — TETROFOSMIN 10.8 MILLICURIE: 1.38 INJECTION, POWDER, LYOPHILIZED, FOR SOLUTION INTRAVENOUS at 08:42

## 2020-11-19 RX ADMIN — REGADENOSON 0.4 MG: 0.08 INJECTION, SOLUTION INTRAVENOUS at 09:50

## 2020-11-19 RX ADMIN — AMINOPHYLLINE 100 MG: 25 INJECTION, SOLUTION INTRAVENOUS at 09:53

## 2020-11-19 NOTE — PROGRESS NOTES
Pt completed stress portion of cardiac stress test. Aminophylline 100mg ivp given for c/o chest pressure 7/10. Symptoms resolved. Pt is discharged to nuclear department for final scan. Nuclear tech will remove PIV. Discharge instructions given to pt. Pt verbalizes understanding to discharge instructions.

## 2020-11-23 ENCOUNTER — TELEPHONE (OUTPATIENT)
Dept: CARDIOLOGY CLINIC | Age: 70
End: 2020-11-23

## 2020-11-23 NOTE — TELEPHONE ENCOUNTER
Spoke to pt. Relayed message. MARIELY. Informed her she needs a cardiac cath since she has ongoing CP and SOB. She is agreeable. Informed her to hold lasix the am of procedure.  MARIELY

## 2020-11-23 NOTE — TELEPHONE ENCOUNTER
----- Message from Madelyn Peña MD sent at 11/19/2020  7:21 PM EST -----  Please let patient know her stress test and echo were both abnormal.  She continues to have symptoms let us go ahead and get a Covid test and schedule her for left heart cath

## 2020-11-23 NOTE — TELEPHONE ENCOUNTER
----- Message from Manuel Gamboa MD sent at 11/19/2020  7:21 PM EST -----  Please let patient know her stress test and echo were both abnormal.  She continues to have symptoms let us go ahead and get a Covid test and schedule her for left heart cath

## 2020-11-24 ENCOUNTER — TELEPHONE (OUTPATIENT)
Dept: CARDIOLOGY CLINIC | Age: 70
End: 2020-11-24

## 2020-11-24 NOTE — TELEPHONE ENCOUNTER
----- Message from Mirna Latif MD sent at 11/19/2020  7:21 PM EST -----  Please let patient know her stress test and echo were both abnormal.  She continues to have symptoms let us go ahead and get a Covid test and schedule her for left heart cath

## 2020-11-24 NOTE — TELEPHONE ENCOUNTER
Created telephone encounter. [facility] requesting a call back to the office.

## 2020-11-25 NOTE — TELEPHONE ENCOUNTER
Spoke with patient. Patient is scheduled with Dr. Janna Morelos for Left Heart Cath on 12/9/20 at [facility], arrival time of 7:30am to the Cath Lab. Please have patient arrive to the main entrance of [facility] and check in with the registration desk. Remind patient to be NPO after midnight (8 hours prior). Do not apply lotions/creams on skin the day of procedure. COVID testing 12/3 @ [facility].

## 2020-12-03 ENCOUNTER — OFFICE VISIT (OUTPATIENT)
Dept: PRIMARY CARE CLINIC | Age: 70
End: 2020-12-03
Payer: COMMERCIAL

## 2020-12-03 ENCOUNTER — HOSPITAL ENCOUNTER (OUTPATIENT)
Dept: MRI IMAGING | Age: 70
Discharge: HOME | End: 2020-12-03
Payer: COMMERCIAL

## 2020-12-03 PROCEDURE — 99211 OFF/OP EST MAY X REQ PHY/QHP: CPT | Performed by: NURSE PRACTITIONER

## 2020-12-03 PROCEDURE — 6360000004 HC RX CONTRAST MEDICATION: Performed by: NEUROLOGICAL SURGERY

## 2020-12-03 PROCEDURE — 70553 MRI BRAIN STEM W/O & W/DYE: CPT

## 2020-12-03 PROCEDURE — A9579 GAD-BASE MR CONTRAST NOS,1ML: HCPCS | Performed by: NEUROLOGICAL SURGERY

## 2020-12-03 RX ADMIN — GADOTERIDOL 20 ML: 279.3 INJECTION, SOLUTION INTRAVENOUS at 13:28

## 2020-12-05 LAB — SARS-COV-2, NAA: NOT DETECTED

## 2020-12-09 ENCOUNTER — HOSPITAL ENCOUNTER (OUTPATIENT)
Dept: CARDIAC CATH/INVASIVE PROCEDURES | Age: 70
Discharge: HOME | End: 2020-12-09
Attending: INTERNAL MEDICINE | Admitting: INTERNAL MEDICINE
Payer: COMMERCIAL

## 2020-12-09 VITALS — BODY MASS INDEX: 38.19 KG/M2 | HEIGHT: 65 IN | WEIGHT: 229.2 LBS

## 2020-12-09 LAB
ANION GAP SERPL CALCULATED.3IONS-SCNC: 6 MMOL/L (ref 3–16)
BUN BLDV-MCNC: 13 MG/DL (ref 7–20)
CALCIUM SERPL-MCNC: 9 MG/DL (ref 8.3–10.6)
CHLORIDE BLD-SCNC: 106 MMOL/L (ref 99–110)
CHOLESTEROL, TOTAL: 158 MG/DL (ref 0–199)
CO2: 25 MMOL/L (ref 21–32)
CREAT SERPL-MCNC: 0.8 MG/DL (ref 0.6–1.2)
EKG ATRIAL RATE: 48 BPM
EKG DIAGNOSIS: NORMAL
EKG P AXIS: 33 DEGREES
EKG P-R INTERVAL: 142 MS
EKG Q-T INTERVAL: 526 MS
EKG QRS DURATION: 80 MS
EKG QTC CALCULATION (BAZETT): 469 MS
EKG R AXIS: 3 DEGREES
EKG T AXIS: 77 DEGREES
EKG VENTRICULAR RATE: 48 BPM
GFR AFRICAN AMERICAN: >60
GFR NON-AFRICAN AMERICAN: >60
GLUCOSE BLD-MCNC: 105 MG/DL (ref 70–99)
HCT VFR BLD CALC: 37 % (ref 36–48)
HDLC SERPL-MCNC: 52 MG/DL (ref 40–60)
HEMOGLOBIN: 12.1 G/DL (ref 12–16)
INR BLD: 0.99 (ref 0.86–1.14)
LDL CHOLESTEROL CALCULATED: 71 MG/DL
LEFT VENTRICULAR EJECTION FRACTION HIGH VALUE: 55 %
MCH RBC QN AUTO: 31 PG (ref 26–34)
MCHC RBC AUTO-ENTMCNC: 32.8 G/DL (ref 31–36)
MCV RBC AUTO: 94.6 FL (ref 80–100)
PDW BLD-RTO: 15.2 % (ref 12.4–15.4)
PLATELET # BLD: 144 K/UL (ref 135–450)
PMV BLD AUTO: 7.5 FL (ref 5–10.5)
POTASSIUM REFLEX MAGNESIUM: 3.7 MMOL/L (ref 3.5–5.1)
PROTHROMBIN TIME: 11.5 SEC (ref 10–13.2)
RBC # BLD: 3.91 M/UL (ref 4–5.2)
SODIUM BLD-SCNC: 137 MMOL/L (ref 136–145)
TRIGL SERPL-MCNC: 176 MG/DL (ref 0–150)
VLDLC SERPL CALC-MCNC: 35 MG/DL
WBC # BLD: 5.1 K/UL (ref 4–11)

## 2020-12-09 PROCEDURE — 85027 COMPLETE CBC AUTOMATED: CPT

## 2020-12-09 PROCEDURE — C1894 INTRO/SHEATH, NON-LASER: HCPCS

## 2020-12-09 PROCEDURE — 85610 PROTHROMBIN TIME: CPT

## 2020-12-09 PROCEDURE — 99152 MOD SED SAME PHYS/QHP 5/>YRS: CPT | Performed by: INTERNAL MEDICINE

## 2020-12-09 PROCEDURE — C1769 GUIDE WIRE: HCPCS

## 2020-12-09 PROCEDURE — 2709999900 HC NON-CHARGEABLE SUPPLY

## 2020-12-09 PROCEDURE — 93010 ELECTROCARDIOGRAM REPORT: CPT | Performed by: INTERNAL MEDICINE

## 2020-12-09 PROCEDURE — 93458 L HRT ARTERY/VENTRICLE ANGIO: CPT

## 2020-12-09 PROCEDURE — 6360000004 HC RX CONTRAST MEDICATION

## 2020-12-09 PROCEDURE — 2500000003 HC RX 250 WO HCPCS

## 2020-12-09 PROCEDURE — 93458 L HRT ARTERY/VENTRICLE ANGIO: CPT | Performed by: INTERNAL MEDICINE

## 2020-12-09 PROCEDURE — 6360000002 HC RX W HCPCS

## 2020-12-09 PROCEDURE — 80048 BASIC METABOLIC PNL TOTAL CA: CPT

## 2020-12-09 PROCEDURE — 93005 ELECTROCARDIOGRAM TRACING: CPT | Performed by: INTERNAL MEDICINE

## 2020-12-09 PROCEDURE — 99153 MOD SED SAME PHYS/QHP EA: CPT

## 2020-12-09 PROCEDURE — 99152 MOD SED SAME PHYS/QHP 5/>YRS: CPT

## 2020-12-09 PROCEDURE — 80061 LIPID PANEL: CPT

## 2020-12-09 RX ORDER — MIDAZOLAM HYDROCHLORIDE 5 MG/ML
INJECTION INTRAMUSCULAR; INTRAVENOUS
Status: COMPLETED | OUTPATIENT
Start: 2020-12-09 | End: 2020-12-09

## 2020-12-09 RX ORDER — METHYLPHENIDATE HYDROCHLORIDE 10 MG/1
10 TABLET ORAL 2 TIMES DAILY
COMMUNITY
End: 2020-12-21 | Stop reason: SDUPTHER

## 2020-12-09 RX ORDER — HEPARIN SODIUM 1000 [USP'U]/ML
INJECTION, SOLUTION INTRAVENOUS; SUBCUTANEOUS
Status: COMPLETED | OUTPATIENT
Start: 2020-12-09 | End: 2020-12-09

## 2020-12-09 RX ORDER — SODIUM CHLORIDE 0.9 % (FLUSH) 0.9 %
10 SYRINGE (ML) INJECTION PRN
Status: DISCONTINUED | OUTPATIENT
Start: 2020-12-09 | End: 2020-12-09 | Stop reason: HOSPADM

## 2020-12-09 RX ORDER — SODIUM CHLORIDE 0.9 % (FLUSH) 0.9 %
10 SYRINGE (ML) INJECTION EVERY 12 HOURS SCHEDULED
Status: DISCONTINUED | OUTPATIENT
Start: 2020-12-09 | End: 2020-12-09 | Stop reason: HOSPADM

## 2020-12-09 RX ORDER — FENTANYL CITRATE 50 UG/ML
INJECTION, SOLUTION INTRAMUSCULAR; INTRAVENOUS
Status: COMPLETED | OUTPATIENT
Start: 2020-12-09 | End: 2020-12-09

## 2020-12-09 RX ADMIN — MIDAZOLAM HYDROCHLORIDE 2 MG: 5 INJECTION INTRAMUSCULAR; INTRAVENOUS at 09:34

## 2020-12-09 RX ADMIN — FENTANYL CITRATE 25 MCG: 50 INJECTION, SOLUTION INTRAMUSCULAR; INTRAVENOUS at 09:34

## 2020-12-09 RX ADMIN — HEPARIN SODIUM 5000 UNITS: 1000 INJECTION, SOLUTION INTRAVENOUS; SUBCUTANEOUS at 09:36

## 2020-12-09 NOTE — H&P
Brief Pre-Op Note/Sedation Assessment      Jo Aponte  1950  Cath Pool Rm/NONE      [id number]  8:50 AM    Planned Procedure: Cardiac Catheterization Procedure    Post Procedure Plan: Return to same level of care    Consent: I have discussed with the patient and/or the patient representative the indication, alternatives, and the possible risks and/or complications of the planned procedure and the anesthesia methods. The patient and/or patient representative appear to understand and agree to proceed. Chief Complaint: Chest Pain/Pressure      Indications for Cath Procedure:  ACS > 24 hrs, Worsening Angina and Suspected CAD  Anginal Classification within 2 weeks:  CCS III - Symptoms with everyday living activities, i.e., moderate limitation  NYHA Heart Failure Class within 2 weeks: No symptoms  Is Cath Lab Visit Valve-related?: No  Surgical Risk: N/A  Functional Type: Unknown    Anti- Anginal Meds within 2 weeks:   Yes: Beta Blockers, Ranolazine, Aspirin and Statin (Any)    Stress or Imaging Studies Performed (within 6 months):  Stress Test with SPECT Result: Positive:  anteroseptal and anteroapical Risk/Extent of Ischemia:  High     Vital Signs:  Ht 5' 5\" (1.651 m)   Wt 229 lb 3.2 oz (104 kg)   BMI 38.14 kg/m²     Allergies:   Allergies   Allergen Reactions    Latex Rash    Vistaril [Hydroxyzine Hcl]        Past Medical History:  Past Medical History:   Diagnosis Date    Acid reflux     small stomach ulcer    Angina pectoris, variant (HCC)     Anxiety     Arthritis     Asthma     Benign esophageal stricture 12/2016    Benign tumor 03/20/2017     Benign brain tumor size of a quater    Cancer ([facility] [address]     ovarian    Depression     Frequency     Hyperlipidemia     Hypertension     Kidney stones     MI, old     Narcolepsy     Sleep apnea     Thyroid disease     Urgency of urination          Surgical History:  Past Surgical History:   Procedure Laterality Date    ARM SURGERY Right 8/19/14    exc.trapezium and flexor carpi radialis interpositional arthroplasty    BREAST SURGERY      reduction    CARPAL TUNNEL RELEASE Right     CHOLECYSTECTOMY      COLONOSCOPY     [facility] DENTAL SURGERY      ENDOSCOPY, COLON, DIAGNOSTIC  01/06/2017    Anastomotic ulcer, gastritis    ESOPHAGEAL DILATATION  12/2016    GASTRIC BYPASS SURGERY      GASTRIC BYPASS SURGERY      HYSTERECTOMY      LITHOTRIPSY Left 11/12/2013    LITHOTRIPSY Left 12/26/13    LEFT ESWL    PA NJX DX/THER SBST INTRLMNR CRV/THRC W/IMG GDN Right 8/27/2018    RIGHT LUMBAR FIVE SACRAL ONE EPIDURAL STEROID INJECTION SITE CONFIRMED BY FLUOROSCOPY performed by Haritha Bach MD at [address] [place]  03/03/2017    Gastritis         Medications:  No current facility-administered medications for this encounter. Pre-Sedation:    Pre-Sedation Documentation and Exam:  I have assessed the patient and agree with the H&P present on the chart. Prior History of Anesthesia Complications:   none    Modified Mallampati:  II (soft palate, uvula, fauces visible)    ASA Classification:  Class 3 - A patient with severe systemic disease that limits activity but is not incapacitating      Soy Scale: Activity:  2 - Able to move 4 extremities voluntarily on command  Respiration:  2 - Able to breathe deeply and cough freely  Circulation:  2 - BP+/- 20mmHg of normal  Consciousness:  2 - Fully awake  Oxygen Saturation (color):  2 - Able to maintain oxygen saturation >92% on room air    Sedation/Anesthesia Plan:  Guard the patient's safety and welfare. Minimize physical discomfort and pain. Minimize negative psychological responses to treatment by providing sedation and analgesia and maximize the potential amnesia. Patient to meet pre-procedure discharge plan.     Medication Planned:  midazolam intravenously and fentanyl intravenously    Patient is an appropriate candidate for plan of sedation: yes      Electronically signed by Camilla Jacobson MD on 12/9/2020 at 8:50 AM

## 2020-12-09 NOTE — BRIEF OP NOTE
Brief Postoperative Note      Patient: Jonny Cooley  YOB: 1950  MRN: [id number]    Brief Postoperative Note  1. Pre-operative Diagnosis: abnormal echo and stress test        Post-operative Diagnosis: Same  2. Surgeons/Assistants: Josiah Acosta MD, [facility]  3. Complications: None  4. Estimated Blood Loss: less than 50   5. Specimens: Were Not Obtained  6. Anesthesia: Moderate Sedation  For sedation: Moderated sedation was achieved with Versed (2mg) and Fentanyl (25mcg). Monitoring of the patient's vital signs and respiratory status was provided by a trained independent observer nurse during the entire course of the procedure(s) and under my direct supervision and recorded in patient's medical record. The duration of sedation was 0920 to 0945. 7. Procedure(s) performed: Moderate sedation  US guidance of Rt radial  LHC  LVG  Cors        Procedure Details:  Consent Access  site Bleed Risk Sedat US   Used*? Contrast Flouro TIme Fluoro  mGy   Yes Rt radial  MCSFC Yes 45 2.3 406   *CPT 91865: If stated, Ultrasound guidance was used to access rt radial using Seldinger technique after local infiltration of 1% lidocaine. Ultrasound documented/visualized size, patency, pulsatility and exact location for puncture and determined ok to proceed. [id number] imaging used for needle entry into the vessel(s). Image was printed off [employer] and sent to medical records on a progress note. *Sedation Note: MCSFC = minimal conscious sedation for comfort      LEFT HEART CATH:  Artery Findings/Result   LM  no angiographic CAD   LAD  mild luminal calcification proximally. 10% stenosis   Cx  very small insignificant vessel. RI  large vessel with branches to the lateral wall. Luminal irregularities. RCA  dominant. Sequential 20% stenosis throughout the mid and distal   LVEDP  12. LVG  55%. No obvious wall motion abnormalities.   No significant MR, no significant aortic pullback gradient

## 2020-12-15 ENCOUNTER — TELEPHONE (OUTPATIENT)
Dept: FAMILY MEDICINE CLINIC | Age: 70
End: 2020-12-15

## 2020-12-16 RX ORDER — ESCITALOPRAM OXALATE 10 MG/1
TABLET ORAL
Qty: 30 TABLET | Refills: 5 | Status: SHIPPED | OUTPATIENT
Start: 2020-12-16 | End: 2021-06-28

## 2020-12-21 ENCOUNTER — TELEPHONE (OUTPATIENT)
Dept: FAMILY MEDICINE CLINIC | Age: 70
End: 2020-12-21

## 2020-12-21 RX ORDER — METHYLPHENIDATE HYDROCHLORIDE 10 MG/1
10 TABLET ORAL 2 TIMES DAILY
Qty: 60 TABLET | Refills: 0 | Status: SHIPPED | OUTPATIENT
Start: 2020-12-21 | End: 2021-01-21 | Stop reason: SDUPTHER

## 2020-12-21 NOTE — TELEPHONE ENCOUNTER
Future Appointments   Date Time Provider Kamran Molina   12/30/2020 10:00 AM DO RUKHSANA Kilpatrick  MMA   1/26/2021 10:00 AM YOCASTA Jean Baptiste - [facility]   4/7/2021 11:15 AM Montana Juarez MD AND NEURO [facility] 9/2/2020

## 2020-12-30 ENCOUNTER — VIRTUAL VISIT (OUTPATIENT)
Dept: FAMILY MEDICINE CLINIC | Age: 70
End: 2020-12-30
Payer: COMMERCIAL

## 2020-12-30 VITALS — WEIGHT: 229 LBS | BODY MASS INDEX: 38.15 KG/M2 | HEIGHT: 65 IN

## 2020-12-30 PROCEDURE — G0438 PPPS, INITIAL VISIT: HCPCS | Performed by: FAMILY MEDICINE

## 2020-12-30 ASSESSMENT — PATIENT HEALTH QUESTIONNAIRE - PHQ9
1. LITTLE INTEREST OR PLEASURE IN DOING THINGS: 0
SUM OF ALL RESPONSES TO PHQ QUESTIONS 1-9: 0
SUM OF ALL RESPONSES TO PHQ QUESTIONS 1-9: 0
2. FEELING DOWN, DEPRESSED OR HOPELESS: 1
SUM OF ALL RESPONSES TO PHQ9 QUESTIONS 1 & 2: 1
SUM OF ALL RESPONSES TO PHQ QUESTIONS 1-9: 1
SUM OF ALL RESPONSES TO PHQ QUESTIONS 1-9: 1
SUM OF ALL RESPONSES TO PHQ QUESTIONS 1-9: 0
SUM OF ALL RESPONSES TO PHQ QUESTIONS 1-9: 1
1. LITTLE INTEREST OR PLEASURE IN DOING THINGS: 0

## 2020-12-30 ASSESSMENT — LIFESTYLE VARIABLES: HOW OFTEN DO YOU HAVE A DRINK CONTAINING ALCOHOL: 0

## 2020-12-31 NOTE — PROGRESS NOTES
aspirin 81 MG tablet Take 81 mg by mouth daily Yes Historical Provider, MD       Past Medical History:   Diagnosis Date    Acid reflux     small stomach ulcer    Angina pectoris, variant (HCC)     Anxiety     Arthritis     Asthma     Benign esophageal stricture 12/2016    Benign tumor 03/20/2017     Benign brain tumor size of a quater    Cancer ([address]     ovarian    Depression     Frequency     Hyperlipidemia     Hypertension     Kidney stones     MI, old     Narcolepsy     Sleep apnea     Thyroid disease     Urgency of urination        Past Surgical History:   Procedure Laterality Date    ARM SURGERY Right 8/19/14    exc.trapezium and flexor carpi radialis interpositional arthroplasty    BREAST SURGERY      reduction    CARPAL TUNNEL RELEASE Right     CHOLECYSTECTOMY      COLONOSCOPY     [facility] DENTAL SURGERY      ENDOSCOPY, COLON, DIAGNOSTIC  01/06/2017    Anastomotic ulcer, gastritis    ESOPHAGEAL DILATATION  12/2016    GASTRIC BYPASS SURGERY      GASTRIC BYPASS SURGERY      HYSTERECTOMY      LITHOTRIPSY Left 11/12/2013    LITHOTRIPSY Left 12/26/13    LEFT ESWL    AL NJX DX/THER SBST INTRLMNR CRV/THRC W/IMG GDN Right 8/27/2018    RIGHT LUMBAR FIVE SACRAL ONE EPIDURAL STEROID INJECTION SITE CONFIRMED BY FLUOROSCOPY performed by Jyothi Dunlap MD at [address] [facility]      UPPER GASTROINTESTINAL ENDOSCOPY  03/03/2017    Gastritis       Family History   Problem Relation Age of Onset    Diabetes Mother     Heart Disease Mother     Cancer Mother         ovarian    Heart Disease Father     Stroke Father     Early Death Father         46 - heart attack    Heart Disease Sister     Stroke Sister     Cancer Sister         ovarian       CareTeam (Including outside providers/suppliers regularly involved in providing care):   Patient Care Team:  Roma Villagran DO as PCP - [id number] [facility] A Jensen Moran DO as PCP - [facility] Empaneled Provider  Alexys Lebron MD (Orthopedic Surgery) Annika Ndiaye MD as Consulting Physician ([phone])    Wt Readings from Last 3 Encounters:   12/30/20 229 lb (103.9 kg)   12/09/20 229 lb 3.2 oz (104 kg)   11/04/20 229 lb (103.9 kg)      No flowsheet data found. Body mass index is 38.11 kg/m². Based upon direct observation of the patient, evaluation of cognition reveals recent and remote memory intact. Patient's complete Health Risk Assessment and screening values have been reviewed and are found in Flowsheets. The following problems were reviewed today and where indicated follow up appointments were made and/or referrals ordered. Positive Risk Factor Screenings with Interventions:      Cognitive: Words recalled: 1 Word Recalled  Total Score Interpretation: Positive Mini-Cog  Cognitive Impairment Interventions:  Discussed cognition in the elderly. Patient has some minor name and occasional word forgetfulness. She sounds as if she is more average for age thin significant. Discussed strategies for maintaining cognitive function, and items that may suggest that this is becoming more severe. We will follow the patient clinically. General Health and ACP:  General  In general, how would you say your health is?: Good  In the past 7 days, have you experienced any of the following?  New or Increased Pain, New or Increased Fatigue, Loneliness, Social Isolation, Stress or Anger?: None of These  Do you get the social and emotional support that you need?: Yes  Do you have a Living Will?: Yes  Advance Directives     Power of  Living Will ACP-Advance Directive ACP-Power of     Not on File Not on File Not on File Not on File      General Health Risk Interventions: · General health issues surveillance, maintenance and preventive measures are discussed. Living will is discussed and recommended the patient execute the document if she does not have 1. She reports in general she does not think she wants to be kept going with heroic measures of her quality of life would be lacking. Health Habits/Nutrition:  Health Habits/Nutrition  Do you exercise for at least 20 minutes 2-3 times per week?: (!) No  Have you lost any weight without trying in the past 3 months?: No  Do you eat fewer than 2 meals per day?: (!) Yes  Have you seen a dentist within the past year?: (!) No  Body mass index: (!) 38.1  Health Habits/Nutrition Interventions:  · Recommendation for regular dental and eye exams are discussed. Hearing/Vision:  No exam data present  Hearing/Vision  Do you or your family notice any trouble with your hearing?: (!) Yes  Do you have difficulty driving, watching TV, or doing any of your daily activities because of your eyesight?: No  Have you had an eye exam within the past year?: (!) No  Hearing/Vision Interventions:  · Recommendations for regular eye exams discussed. Hearing evaluation if there is noticeable hearing loss by the patient or family. Personalized Preventive Plan   Current Health Maintenance Status    There is no immunization history on file for this patient.      Health Maintenance   Topic Date Due    Hepatitis C screen  1950    DTaP/Tdap/Td vaccine (1 - Tdap) 06/23/1970    Shingles Vaccine (1 of 2) 06/23/2001    Annual Wellness Visit (AWV)  05/29/2019    Breast cancer screen  09/11/2019    Flu vaccine (1) 09/01/2020    Colon Cancer Screen FIT/FOBT  09/16/2020    Pneumococcal 65+ years Vaccine (1 of 1 - PPSV23) 02/01/2021 (Originally 6/23/2016)    TSH testing  09/02/2021    Lipid screen  12/09/2021    Potassium monitoring  12/09/2021    Creatinine monitoring  12/09/2021    Diabetes screen  11/10/2023  DEXA (modify frequency per FRAX score)  Completed    Hepatitis A vaccine  Aged Out    Hepatitis B vaccine  Aged Out    Hib vaccine  Aged Out    Meningococcal (ACWY) vaccine  Aged Out     Recommendations for [employer] Due: see orders and patient instructions/AVS.  . Recommended screening schedule for the next 5-10 years is provided to the patient in written form: see Patient Instructions/AVS.    There are no diagnoses linked to this encounter. Shin Alas is a 72 y.o. female being evaluated by a Virtual Visit (phone) encounter to address concerns as mentioned above. A caregiver was present when appropriate. Due to this being a TeleHealth encounter ([facility] emergency), evaluation of the following organ systems was limited: Vitals/Constitutional/EENT/Resp/CV/GI//MS/Neuro/Skin/Heme-Lymph-Imm. Pursuant to the emergency declaration under the [address] [place] [address], [address] [place] [address] and the [employer] Act, this Virtual Visit was conducted with patient's (and/or legal guardian's) consent, to reduce the patient's risk of exposure to COVID-19 and provide necessary medical care. The patient (and/or legal guardian) has also been advised to contact this office for worsening conditions or problems, and seek emergency medical treatment and/or call 911 if deemed necessary. Patient identification was verified at the start of the visit: Yes    Services were provided through phone to substitute for in-person clinic visit. Patient and provider were located at their individual homes. --Nadia Valladares, DO on 12/31/2020 at 5:38 PM    An electronic signature was used to authenticate this note.

## 2020-12-31 NOTE — PATIENT INSTRUCTIONS
Personalized Preventive Plan for Mary Velez - 12/30/2020  Medicare offers a range of preventive health benefits. Some of the tests and screenings are paid in full while other may be subject to a deductible, co-insurance, and/or copay. Some of these benefits include a comprehensive review of your medical history including lifestyle, illnesses that may run in your family, and various assessments and screenings as appropriate. After reviewing your medical record and screening and assessments performed today your provider may have ordered immunizations, labs, imaging, and/or referrals for you. A list of these orders (if applicable) as well as your Preventive Care list are included within your After Visit Summary for your review. Other Preventive Recommendations:    · A preventive eye exam performed by an eye specialist is recommended every 1-2 years to screen for glaucoma; cataracts, macular degeneration, and other eye disorders. · A preventive dental visit is recommended every 6 months. · Try to get at least 150 minutes of exercise per week or 10,000 steps per day on a pedometer . · Order or download the FREE \"Exercise & Physical Activity: Your Everyday Guide\" from [employer]. Call 7-475.609.6807 or search [employer] online. · You need 0130-6728 mg of calcium and 0411-8502 IU of vitamin D per day. It is possible to meet your calcium requirement with diet alone, but a vitamin D supplement is usually necessary to meet this goal.  · When exposed to the sun, use a sunscreen that protects against both UVA and UVB radiation with an SPF of 30 or greater. Reapply every 2 to 3 hours or after sweating, drying off with a towel, or swimming. · Always wear a seat belt when traveling in a car. Always wear a helmet when riding a bicycle or motorcycle. Continue the current medications. Continue follow-up with specialists as necessary and recommended.

## 2021-01-21 DIAGNOSIS — G47.419 PRIMARY NARCOLEPSY WITHOUT CATAPLEXY: ICD-10-CM

## 2021-01-21 RX ORDER — METHYLPHENIDATE HYDROCHLORIDE 10 MG/1
10 TABLET ORAL 2 TIMES DAILY
Qty: 60 TABLET | Refills: 0 | Status: SHIPPED | OUTPATIENT
Start: 2021-01-21 | End: 2021-02-25 | Stop reason: SDUPTHER

## 2021-01-21 NOTE — TELEPHONE ENCOUNTER
Future Appointments   Date Time Provider Kamran Tracy   1/26/2021 10:00 AM YOCASTA Grimaldo - [facility]   4/7/2021 11:15 AM Leah Gamez MD AND NEURO [facility] 12/30/2020

## 2021-02-25 ENCOUNTER — TELEPHONE (OUTPATIENT)
Dept: FAMILY MEDICINE CLINIC | Age: 71
End: 2021-02-25

## 2021-02-25 DIAGNOSIS — G47.419 PRIMARY NARCOLEPSY WITHOUT CATAPLEXY: ICD-10-CM

## 2021-02-25 RX ORDER — METHYLPHENIDATE HYDROCHLORIDE 10 MG/1
10 TABLET ORAL 2 TIMES DAILY
Qty: 60 TABLET | Refills: 0 | Status: SHIPPED | OUTPATIENT
Start: 2021-02-25 | End: 2021-03-29 | Stop reason: SDUPTHER

## 2021-02-25 NOTE — TELEPHONE ENCOUNTER
Patient needs a refill on methylphenidate (RITALIN) 10 MG tablet    . They need a 30 day supply.      Mail order or local pharmacy: local     Pharmacy: [facility]

## 2021-03-10 ENCOUNTER — OFFICE VISIT (OUTPATIENT)
Dept: CARDIOLOGY CLINIC | Age: 71
End: 2021-03-10
Payer: MEDICARE

## 2021-03-10 VITALS
DIASTOLIC BLOOD PRESSURE: 78 MMHG | WEIGHT: 229.5 LBS | BODY MASS INDEX: 38.24 KG/M2 | OXYGEN SATURATION: 97 % | HEIGHT: 65 IN | HEART RATE: 53 BPM | SYSTOLIC BLOOD PRESSURE: 100 MMHG

## 2021-03-10 DIAGNOSIS — G47.33 OSA (OBSTRUCTIVE SLEEP APNEA): ICD-10-CM

## 2021-03-10 DIAGNOSIS — I25.10 CORONARY ARTERY DISEASE INVOLVING NATIVE CORONARY ARTERY OF NATIVE HEART WITHOUT ANGINA PECTORIS: Primary | ICD-10-CM

## 2021-03-10 DIAGNOSIS — I25.89 CARDIAC MICROVASCULAR DISEASE: ICD-10-CM

## 2021-03-10 DIAGNOSIS — I10 ESSENTIAL HYPERTENSION: ICD-10-CM

## 2021-03-10 DIAGNOSIS — E78.5 DYSLIPIDEMIA: ICD-10-CM

## 2021-03-10 DIAGNOSIS — R94.39 ABNORMAL NUCLEAR STRESS TEST: ICD-10-CM

## 2021-03-10 PROCEDURE — 99214 OFFICE O/P EST MOD 30 MIN: CPT | Performed by: NURSE PRACTITIONER

## 2021-03-10 NOTE — PROGRESS NOTES
[address]   Cardiac Evaluation    Primary Care Doctor:  Peng Bates DO    Chief Complaint   Patient presents with    Follow-Up from Hospital        History of Present Illness:   I had the pleasure of seeing Dwain Middleton in follow up for recent hospitalization. Dwain Middleton was seen in the office in November 2020 with persistent symptoms of chest pain and shortness of breath. She has a past medical history of CAD with microvascular angina, HTN, hyperlipidemia, as well as narcolepsy on Ritalin, PRINCE and thyroid disease. She also has hx of gastric bypass and ulcer and gastric and jejunal junction. The echocardiogram and stress test were abnormal leading to [facility]. The [facility] on 12/9/2020 demonstrated mild nonobstructive CAD. She was given a 1 month course of antianginals to see if her symptoms improved. She notes improvement in chest pain with increase in the L-arginine from 500 mg bid to 1,000 mg bid. She continues to have chest pain that occurs at rest, sharp pain with position changes, located under left breast (able to pinpoint) and can take her breath away. She has had problems with swelling in past but stable recently. Has not been very active recently due to [place] and weather. Want to return to gym to exercise. Appetite is good/ fair. Sleep is fair. Dwain Middleton describes symptoms including chest pain, fatigue, edema but denies dyspnea, palpitations, orthopnea, PND, early saiety, syncope. Past Medical History:   has a past medical history of Acid reflux, Angina pectoris, variant (HCC), Anxiety, Arthritis, Asthma, Benign esophageal stricture, Benign tumor, Cancer ([facility] [address] Depression, Frequency, Hyperlipidemia, Hypertension, Kidney stones, MI, old, Narcolepsy, Sleep apnea, Thyroid disease, and Urgency of urination. Surgical History:   has a past surgical history that includes Cholecystectomy; Hysterectomy; Gastric bypass surgery; Breast surgery;  Dental surgery; Lithotripsy (Left, 11/12/2013); Lithotripsy (Left, 12/26/13); Colonoscopy; Carpal tunnel release (Right); Arm Surgery (Right, 8/19/14); Gastric bypass surgery; Endoscopy, colon, diagnostic (01/06/2017); Esophagus dilation (12/2016); Upper gastrointestinal endoscopy (03/03/2017); pr njx dx/ther sbst intrlmnr crv/thrc w/img gdn (Right, 8/27/2018); and Percutaneous Transluminal Coronary Angio. Social History:   reports that she has never smoked. She has never used smokeless tobacco. She reports that she does not drink alcohol or use drugs. Family History:   Family History   Problem Relation Age of Onset    Diabetes Mother     Heart Disease Mother     Cancer Mother         ovarian    Heart Disease Father     Stroke Father     Early Death Father         46 - heart attack    Heart Disease Sister     Stroke Sister     Cancer Sister         ovarian       Home Medications:  Prior to Admission medications    Medication Sig Start Date End Date Taking? Authorizing Provider   methylphenidate (RITALIN) 10 MG tablet Take 1 tablet by mouth 2 times daily for 30 days.  2/25/21 3/27/21 Yes Ann Landeros, DO   sucralfate (CARAFATE) 1 GM tablet Take 1 tablet by mouth 4 times daily 2/11/21  Yes Ann Landeros, DO   pantoprazole (PROTONIX) 40 MG tablet Take 1 tablet by mouth twice daily 12/23/20  Yes Ann Landeros, DO   escitalopram (LEXAPRO) 10 MG tablet Take 1 tablet by mouth once daily 12/16/20  Yes Ann Landeros, DO   L-Arginine 500 MG TABS Take 1,000 mg by mouth 2 times daily 12/9/20  Yes Melvin Baez MD   EUTHYROX 100 MCG tablet Take 1 tablet by mouth once daily 11/5/20  Yes Ann Landeros, DO   furosemide (LASIX) 20 MG tablet Take 1 tablet by mouth once daily 11/4/20  Yes Melvin Baez MD   valsartan (DIOVAN) 160 MG tablet Take 1 tablet by mouth once daily 11/4/20  Yes Melvin Baez MD   rosuvastatin (CRESTOR) 5 MG tablet Take 1 tablet by mouth daily 11/4/20  Yes Melvin Baez MD   metoprolol tartrate (LOPRESSOR) 25 MG tablet Take 1 tablet by mouth twice daily 11/4/20  Yes Ronald Iglesias MD   isosorbide mononitrate (IMDUR) 30 MG extended release tablet TAKE ONE TABLET BY MOUTH ONCE DAILY 11/4/20  Yes Ronald Iglesias MD   topiramate (TOPAMAX) 25 MG tablet Take 2 tablets by mouth 2 times daily 10/7/20 4/5/21 Yes Aaron Turner MD   NONFORMULARY 5 - LOXIN (given by Malaika Myles MD)   Yes Historical Provider, MD   aspirin 81 MG tablet Take 81 mg by mouth daily   Yes Historical Provider, MD        Allergies:  Latex and Vistaril [hydroxyzine hcl]     Physical Examination:    Vitals:    03/10/21 1500   BP: 100/78   Pulse: 53   SpO2: 97%   Weight: 229 lb 8 oz (104.1 kg)   Height: 5' 5\" (1.651 m)        Constitutional and General Appearance: Warm and dry, no apparent distress, normal coloration  HEENT:  Normocephalic, atraumatic  Respiratory:  · Normal excursion and expansion without use of accessory muscles  · Resp Auscultation: Normal breath sounds without dullness, no R/R/W  Cardiovascular:  · The apical impulses not displaced  · Heart tones are crisp and normal  · JVP 8 cm H2O  · Regular rate and rhythm, normal S1S2, no mg//r  · Peripheral pulses are symmetrical and full  · There is no clubbing, cyanosis of the extremities.   · No BLE edema  · Pedal Pulses: 2+ and equal     · right radial site without ooze, bruise or hematoma, 2+ pulse  Abdomen:  · No masses or tenderness  · Liver/Spleen: No Abnormalities Noted  Neurological/Psychiatric:  · Alert and oriented in all spheres  · Moves all extremities well  · Exhibits normal gait balance and coordination  · No abnormalities of mood, affect, memory, mentation, or behavior are noted    Lab Data:  CBC:   Lab Results   Component Value Date    WBC 5.1 12/09/2020    WBC 5.5 04/28/2020    WBC 4.7 07/15/2019    RBC 3.91 12/09/2020    RBC 3.81 04/28/2020    RBC 3.99 07/15/2019    HGB 12.1 12/09/2020    HGB 12.3 04/28/2020    HGB 12.6 07/15/2019    HCT 37.0 12/09/2020    HCT 37.0 04/28/2020 HCT 38.3 07/15/2019    MCV 94.6 12/09/2020    MCV 97.1 04/28/2020    MCV 95.9 07/15/2019    RDW 15.2 12/09/2020    RDW 15.4 04/28/2020    RDW 15.0 07/15/2019     12/09/2020     04/28/2020     07/15/2019     BMP:  Lab Results   Component Value Date     12/09/2020     11/10/2020     04/28/2020    K 3.7 12/09/2020    K 4.0 11/10/2020    K 4.0 04/28/2020    K 4.7 07/15/2019     12/09/2020     11/10/2020     04/28/2020    CO2 25 12/09/2020    CO2 22 11/10/2020    CO2 25 04/28/2020    PHOS 3.5 04/06/2017    BUN 13 12/09/2020    BUN 17 11/10/2020    BUN 16 04/28/2020    CREATININE 0.8 12/09/2020    CREATININE 0.8 11/10/2020    CREATININE 0.7 04/28/2020     BNP:   Lab Results   Component Value Date    PROBNP 288 11/10/2020    PROBNP 496 09/11/2017    PROBNP 844 07/25/2017     Troponin: No components found for: TROPONIN  Lipids:   Lab Results   Component Value Date    TRIG 176 12/09/2020    TRIG 136 07/15/2019    HDL 52 12/09/2020    HDL 52 07/15/2019    LDLCALC 71 12/09/2020    LDLCALC 47 07/15/2019     Cardiac Imaging:  CARDIAC CATH 12/9/2020:  Procedure Details:  Consent Access  site Bleed Risk Sedat US   Used*? Contrast Flouro TIme Fluoro  mGy   Yes Rt radial   MCSFC Yes 45 2.3 406   LEFT HEART CATH:  Artery Findings/Result   LM  no angiographic CAD   LAD  mild luminal calcification proximally. 10% stenosis   Cx  very small insignificant vessel. RI  large vessel with branches to the lateral wall. Luminal irregularities. RCA  dominant. Sequential 20% stenosis throughout the mid and distal   LVEDP  12. LVG  55%. No obvious wall motion abnormalities. No significant MR, no significant aortic pullback gradient   Assessment/Plan  1. Mild nonobstructive CAD as above.   2.  Given abnormal stress test and echo with normal LV gram today suspect a possible transient event                -We will give 1 month trial of antianginals and see if this improves patient symptomatology                -Continue aspirin, beta-blocker, statin for CV risk reduction     LEXISCAN NUCLEAR STRESS MPI 11/19/2020:   Summary  Small-moderate sized anteroseptal and apical septal partial reversibility  defect consistent with ischemia and infarction in the territory of the mid  and distal LAD. Hyperdynamic LV systolic function with FR>05% with uniform  wall motion. Intermediate-higher risk abnormal study. ECHO 11/19/2020:    Summary   LV systolic function is normal with an EF of 55%. Possible mild HK of basal inferior wall on certain views. Endocardium not entirely well visualized. There is mild concentric left ventricular hypertrophy. Grade II diastolic dysfunction with elevated left ventricular filling pressure. Mild biatrial enlargement. Mild mitral regurgitation. Inadequate tricuspid regurgitation jet to estimate systolic artery pressure (SPAP). STRESS TEST: 3/20/17  Summary  There is no evidence of stress induced ischemia. Small, fixed defect in the  anteroseptal wall c/w breast attenuation artifact. Normal LV function with  ejection fraction of 64%. There are no regional wall motion abnormalities. CATH: 6/12/14   1. Mild coronary artery disease. 2. Normal left ventricular systolic function, ejection fraction 60%. 3. Mildly elevated left ventricular end-diastolic pressure 14 mmHg. Assessment:    1. Coronary artery disease involving native coronary artery of native heart without angina pectoris    2. Cardiac microvascular disease    3. Abnormal nuclear stress test    4. Essential hypertension    5. Dyslipidemia    6. PRINCE (obstructive sleep apnea)          Plan:   1. Continue current heart medicines (aspirin, rosuvastatin/ Crestor, metoprolol, isosorbide, valsartan and L-arginine)  2. No need for further blood work or testing  3.  If you have recurrent chest pain despite this medical treatment, follow up with Dr. Alonso Koch to rule out other potenital causes of chest pain  4. Okay to start exercising  5.  Follow up with Dr. Vicente Salmon in 6 months       I appreciate the opportunity of cooperating in the care of this individual.    Norma Quiroz CNP, 3/10/2021, 3:10 PM

## 2021-03-10 NOTE — LETTER
[facility]   Cardiac Evaluation    Primary Care Doctor:  Ananya Cristina DO    Chief Complaint   Patient presents with    Follow-Up from Hospital        History of Present Illness:   I had the pleasure of seeing Claudell Morales in follow up for recent hospitalization. Claudell Morales was seen in the office in November 2020 with persistent symptoms of chest pain and shortness of breath. She has a past medical history of CAD with microvascular angina, HTN, hyperlipidemia, as well as narcolepsy on Ritalin, PRINCE and thyroid disease. She also has hx of gastric bypass and ulcer and gastric and jejunal junction. The echocardiogram and stress test were abnormal leading to [facility]. The [facility] on 12/9/2020 demonstrated mild nonobstructive CAD. She was given a 1 month course of antianginals to see if her symptoms improved. She notes improvement in chest pain with increase in the L-arginine from 500 mg bid to 1,000 mg bid. She continues to have chest pain that occurs at rest, sharp pain with position changes, located under left breast (able to pinpoint) and can take her breath away. She has had problems with swelling in past but stable recently. Has not been very active recently due to [place] and weather. Want to return to gym to exercise. Appetite is good/ fair. Sleep is fair. Claudell Morales describes symptoms including chest pain, fatigue, edema but denies dyspnea, palpitations, orthopnea, PND, early saiety, syncope. Past Medical History:   has a past medical history of Acid reflux, Angina pectoris, variant (HCC), Anxiety, Arthritis, Asthma, Benign esophageal stricture, Benign tumor, Cancer ([address] Depression, Frequency, Hyperlipidemia, Hypertension, Kidney stones, MI, old, Narcolepsy, Sleep apnea, Thyroid disease, and Urgency of urination. Surgical History:   has a past surgical history that includes Cholecystectomy; Hysterectomy; Gastric bypass surgery; Breast surgery;  Dental surgery; Lithotripsy (Left, 11/12/2013); Lithotripsy (Left, 12/26/13); Colonoscopy; Carpal tunnel release (Right); Arm Surgery (Right, 8/19/14); Gastric bypass surgery; Endoscopy, colon, diagnostic (01/06/2017); Esophagus dilation (12/2016); Upper gastrointestinal endoscopy (03/03/2017); pr njx dx/ther sbst intrlmnr crv/thrc w/img gdn (Right, 8/27/2018); and Percutaneous Transluminal Coronary Angio. Social History:   reports that she has never smoked. She has never used smokeless tobacco. She reports that she does not drink alcohol or use drugs. Family History:   Family History   Problem Relation Age of Onset    Diabetes Mother     Heart Disease Mother     Cancer Mother         ovarian    Heart Disease Father     Stroke Father     Early Death Father         46 - heart attack    Heart Disease Sister     Stroke Sister     Cancer Sister         ovarian       Home Medications:  Prior to Admission medications    Medication Sig Start Date End Date Taking? Authorizing Provider   methylphenidate (RITALIN) 10 MG tablet Take 1 tablet by mouth 2 times daily for 30 days.  2/25/21 3/27/21 Yes Azalia Douglas DO   sucralfate (CARAFATE) 1 GM tablet Take 1 tablet by mouth 4 times daily 2/11/21  Yes Azalia Douglas DO   pantoprazole (PROTONIX) 40 MG tablet Take 1 tablet by mouth twice daily 12/23/20  Yes Azalia Douglas DO   escitalopram (LEXAPRO) 10 MG tablet Take 1 tablet by mouth once daily 12/16/20  Yes Azalia Douglas DO   L-Arginine 500 MG TABS Take 1,000 mg by mouth 2 times daily 12/9/20  Yes Jackeline Gregory MD   EUTHYROX 100 MCG tablet Take 1 tablet by mouth once daily 11/5/20  Yes Azalia Douglas DO   furosemide (LASIX) 20 MG tablet Take 1 tablet by mouth once daily 11/4/20  Yes Jackeline Gregory MD   valsartan (DIOVAN) 160 MG tablet Take 1 tablet by mouth once daily 11/4/20  Yes Jackeline Gregory MD   rosuvastatin (CRESTOR) 5 MG tablet Take 1 tablet by mouth daily 11/4/20  Yes Jackeline Gregory MD   metoprolol tartrate (LOPRESSOR) 25 MG tablet Take 1 tablet by mouth twice daily 11/4/20  Yes Otoniel Vásquez MD   isosorbide mononitrate (IMDUR) 30 MG extended release tablet TAKE ONE TABLET BY MOUTH ONCE DAILY 11/4/20  Yes Otoniel Vásquez MD   topiramate (TOPAMAX) 25 MG tablet Take 2 tablets by mouth 2 times daily 10/7/20 4/5/21 Yes Christian Liriano MD   NONFORMULARY 5 - LOXIN (given by Agustin Lee MD)   Yes Historical Provider, MD   aspirin 81 MG tablet Take 81 mg by mouth daily   Yes Historical Provider, MD        Allergies:  Latex and Vistaril [hydroxyzine hcl]     Physical Examination:    Vitals:    03/10/21 1500   BP: 100/78   Pulse: 53   SpO2: 97%   Weight: 229 lb 8 oz (104.1 kg)   Height: 5' 5\" (1.651 m)        Constitutional and General Appearance: Warm and dry, no apparent distress, normal coloration  HEENT:  Normocephalic, atraumatic  Respiratory:  · Normal excursion and expansion without use of accessory muscles  · Resp Auscultation: Normal breath sounds without dullness, no R/R/W  Cardiovascular:  · The apical impulses not displaced  · Heart tones are crisp and normal  · JVP 8 cm H2O  · Regular rate and rhythm, normal S1S2, no mg//r  · Peripheral pulses are symmetrical and full  · There is no clubbing, cyanosis of the extremities.   · No BLE edema  · Pedal Pulses: 2+ and equal     · right radial site without ooze, bruise or hematoma, 2+ pulse  Abdomen:  · No masses or tenderness  · Liver/Spleen: No Abnormalities Noted  Neurological/Psychiatric:  · Alert and oriented in all spheres  · Moves all extremities well  · Exhibits normal gait balance and coordination  · No abnormalities of mood, affect, memory, mentation, or behavior are noted    Lab Data:  CBC:   Lab Results   Component Value Date    WBC 5.1 12/09/2020    WBC 5.5 04/28/2020    WBC 4.7 07/15/2019    RBC 3.91 12/09/2020    RBC 3.81 04/28/2020    RBC 3.99 07/15/2019    HGB 12.1 12/09/2020    HGB 12.3 04/28/2020    HGB 12.6 07/15/2019    HCT 37.0 12/09/2020    HCT 37.0 04/28/2020    HCT 38.3 07/15/2019    MCV 94.6 12/09/2020    MCV 97.1 04/28/2020    MCV 95.9 07/15/2019    RDW 15.2 12/09/2020    RDW 15.4 04/28/2020    RDW 15.0 07/15/2019     12/09/2020     04/28/2020     07/15/2019     BMP:  Lab Results   Component Value Date     12/09/2020     11/10/2020     04/28/2020    K 3.7 12/09/2020    K 4.0 11/10/2020    K 4.0 04/28/2020    K 4.7 07/15/2019     12/09/2020     11/10/2020     04/28/2020    CO2 25 12/09/2020    CO2 22 11/10/2020    CO2 25 04/28/2020    PHOS 3.5 04/06/2017    BUN 13 12/09/2020    BUN 17 11/10/2020    BUN 16 04/28/2020    CREATININE 0.8 12/09/2020    CREATININE 0.8 11/10/2020    CREATININE 0.7 04/28/2020     BNP:   Lab Results   Component Value Date    PROBNP 288 11/10/2020    PROBNP 496 09/11/2017    PROBNP 844 07/25/2017     Troponin: No components found for: TROPONIN  Lipids:   Lab Results   Component Value Date    TRIG 176 12/09/2020    TRIG 136 07/15/2019    HDL 52 12/09/2020    HDL 52 07/15/2019    LDLCALC 71 12/09/2020    LDLCALC 47 07/15/2019     Cardiac Imaging:  CARDIAC CATH 12/9/2020:  Procedure Details:  Consent Access  site Bleed Risk Sedat US   Used*? Contrast Flouro TIme Fluoro  mGy   Yes Rt radial   MCSFC Yes 45 2.3 406   LEFT HEART CATH:  Artery Findings/Result   LM  no angiographic CAD   LAD  mild luminal calcification proximally. 10% stenosis   Cx  very small insignificant vessel. RI  large vessel with branches to the lateral wall. Luminal irregularities. RCA  dominant. Sequential 20% stenosis throughout the mid and distal   LVEDP  12. LVG  55%. No obvious wall motion abnormalities. No significant MR, no significant aortic pullback gradient   Assessment/Plan  1. Mild nonobstructive CAD as above.   2.  Given abnormal stress test and echo with normal LV gram today suspect a possible transient event                -We will give 1 month trial of antianginals and see if this improves patient symptomatology                -Continue aspirin, beta-blocker, statin for CV risk reduction     LEXISCAN NUCLEAR STRESS MPI 11/19/2020:   Summary  Small-moderate sized anteroseptal and apical septal partial reversibility  defect consistent with ischemia and infarction in the territory of the mid  and distal LAD. Hyperdynamic LV systolic function with WM>58% with uniform  wall motion. Intermediate-higher risk abnormal study. ECHO 11/19/2020:    Summary   LV systolic function is normal with an EF of 55%. Possible mild HK of basal inferior wall on certain views. Endocardium not entirely well visualized. There is mild concentric left ventricular hypertrophy. Grade II diastolic dysfunction with elevated left ventricular filling pressure. Mild biatrial enlargement. Mild mitral regurgitation. Inadequate tricuspid regurgitation jet to estimate systolic artery pressure (SPAP). STRESS TEST: 3/20/17  Summary  There is no evidence of stress induced ischemia. Small, fixed defect in the  anteroseptal wall c/w breast attenuation artifact. Normal LV function with  ejection fraction of 64%. There are no regional wall motion abnormalities. CATH: 6/12/14   1. Mild coronary artery disease. 2. Normal left ventricular systolic function, ejection fraction 60%. 3. Mildly elevated left ventricular end-diastolic pressure 14 mmHg. Assessment:    1. Coronary artery disease involving native coronary artery of native heart without angina pectoris    2. Cardiac microvascular disease    3. Abnormal nuclear stress test    4. Essential hypertension    5. Dyslipidemia    6. PRINCE (obstructive sleep apnea)          Plan:   1. Continue current heart medicines (aspirin, rosuvastatin/ Crestor, metoprolol, isosorbide, valsartan and L-arginine)  2. No need for further blood work or testing  3.  If you have recurrent chest pain despite this medical treatment, follow up with Dr. Godfrey Webster to rule out other potenital causes of chest pain  4. Okay to start exercising  5.  Follow up with Dr. Ross Villegas in 6 months       I appreciate the opportunity of cooperating in the care of this individual.    Simran Geller CNP, 3/10/2021, 3:10 PM

## 2021-03-10 NOTE — PATIENT INSTRUCTIONS
1. Continue current heart medicines (aspirin, rosuvastatin/ Crestor, metoprolol, isosorbide, valsartan and L-arginine)  2. No need for further blood work or testing  3. If you have recurrent chest pain despite this medical treatment, follow up with Dr. Sherman Velez to rule out other potenital causes of chest pain  4. Okay to start exercising  5.  Follow up with Dr. Paras Fuller in 6 months

## 2021-03-29 ENCOUNTER — TELEPHONE (OUTPATIENT)
Dept: FAMILY MEDICINE CLINIC | Age: 71
End: 2021-03-29

## 2021-03-29 DIAGNOSIS — G47.419 PRIMARY NARCOLEPSY WITHOUT CATAPLEXY: ICD-10-CM

## 2021-03-29 RX ORDER — METHYLPHENIDATE HYDROCHLORIDE 10 MG/1
10 TABLET ORAL 2 TIMES DAILY
Qty: 60 TABLET | Refills: 0 | Status: SHIPPED | OUTPATIENT
Start: 2021-03-29 | End: 2021-04-27 | Stop reason: SDUPTHER

## 2021-03-29 NOTE — TELEPHONE ENCOUNTER
Patient needs a refill on Ritalin. They need a 30 day supply.      Mail order or local pharmacy: local    Pharmacy: [facility] on file    Patient  or mail to patient(If mail order):     Last OV -VV 12/30/20  Future Appointments   Date Time Provider Kamran Molina   [facility]2021 10:40 [facility]   4/22/2021 12:45 PM Tika Cha MD AND NEUR[facility]

## 2021-04-19 ENCOUNTER — OFFICE VISIT (OUTPATIENT)
Dept: FAMILY MEDICINE CLINIC | Age: 71
End: 2021-04-19
Payer: MEDICARE

## 2021-04-19 VITALS
DIASTOLIC BLOOD PRESSURE: 78 MMHG | OXYGEN SATURATION: 95 % | WEIGHT: 229 LBS | BODY MASS INDEX: 38.15 KG/M2 | SYSTOLIC BLOOD PRESSURE: 128 MMHG | HEIGHT: 65 IN | RESPIRATION RATE: 14 BRPM | HEART RATE: 58 BPM

## 2021-04-19 DIAGNOSIS — M46.90 INFLAMMATORY SPONDYLOPATHY, UNSPECIFIED SPINAL REGION (HCC): ICD-10-CM

## 2021-04-19 DIAGNOSIS — R13.10 DYSPHAGIA, UNSPECIFIED TYPE: ICD-10-CM

## 2021-04-19 DIAGNOSIS — G47.419 PRIMARY NARCOLEPSY WITHOUT CATAPLEXY: Primary | ICD-10-CM

## 2021-04-19 DIAGNOSIS — R10.9 ABDOMINAL CRAMPS: ICD-10-CM

## 2021-04-19 DIAGNOSIS — D33.0 BENIGN NEOPLASM OF SUPRATENTORIAL REGION OF BRAIN (HCC): ICD-10-CM

## 2021-04-19 DIAGNOSIS — D49.2 NEOPLASM OF SKIN OF NOSE: ICD-10-CM

## 2021-04-19 DIAGNOSIS — F33.0 MILD EPISODE OF RECURRENT MAJOR DEPRESSIVE DISORDER (HCC): ICD-10-CM

## 2021-04-19 DIAGNOSIS — I20.8 SYNDROME X, CARDIAC (HCC): ICD-10-CM

## 2021-04-19 DIAGNOSIS — E66.01 MORBID OBESITY (HCC): ICD-10-CM

## 2021-04-19 PROCEDURE — 99214 OFFICE O/P EST MOD 30 MIN: CPT | Performed by: FAMILY MEDICINE

## 2021-04-19 ASSESSMENT — ENCOUNTER SYMPTOMS
DIARRHEA: 0
COUGH: 0
BACK PAIN: 1
TROUBLE SWALLOWING: 1
CONSTIPATION: 0
SHORTNESS OF BREATH: 0
BLOOD IN STOOL: 0

## 2021-04-19 ASSESSMENT — PATIENT HEALTH QUESTIONNAIRE - PHQ9
1. LITTLE INTEREST OR PLEASURE IN DOING THINGS: 0
SUM OF ALL RESPONSES TO PHQ QUESTIONS 1-9: 1
SUM OF ALL RESPONSES TO PHQ QUESTIONS 1-9: 1

## 2021-04-19 NOTE — PROGRESS NOTES
Subjective:      Patient ID: Hitesh Cerna is a 72 y.o. y.o. female. Here to follow up meds. Hx and meds reviewed.   Retired  Doing OK    Back pain and limited function without pain  Not great activity tolerance/ resp wise      HPI      Chief Complaint   Patient presents with    Medication Check       Allergies   Allergen Reactions    Latex Rash    Vistaril [Hydroxyzine Hcl]        Past Medical History:   Diagnosis Date    Acid reflux     small stomach ulcer    Angina pectoris, variant (HCC)     Anxiety     Arthritis     Asthma     Benign esophageal stricture 12/2016    Benign tumor 03/20/2017     Benign brain tumor size of a quater    Cancer ([address]     ovarian    Depression     Frequency     Hyperlipidemia     Hypertension     Kidney stones     MI, old     Narcolepsy     Sleep apnea     Thyroid disease     Urgency of urination        Past Surgical History:   Procedure Laterality Date    ARM SURGERY Right 8/19/14    exc.trapezium and flexor carpi radialis interpositional arthroplasty    BREAST SURGERY      reduction    CARPAL TUNNEL RELEASE Right     CHOLECYSTECTOMY      COLONOSCOPY     [facility] DENTAL SURGERY      ENDOSCOPY, COLON, DIAGNOSTIC  01/06/2017    Anastomotic ulcer, gastritis    ESOPHAGEAL DILATATION  12/2016    GASTRIC BYPASS SURGERY      GASTRIC BYPASS SURGERY      HYSTERECTOMY      LITHOTRIPSY Left 11/12/2013    LITHOTRIPSY Left 12/26/13    LEFT ESWL    HI NJX DX/THER SBST INTRLMNR CRV/THRC W/IMG GDN Right 8/27/2018    RIGHT LUMBAR FIVE SACRAL ONE EPIDURAL STEROID INJECTION SITE CONFIRMED BY FLUOROSCOPY performed by Anjelica Torres MD at [facility] ENDOSCOPY  03/03/2017    Gastritis       Social History     Socioeconomic History    Marital status:      Spouse name: Not on file    Number of children: Not on file    Years of education: Not on file    Highest education level: Not on file   Occupational History Employer: [employer]   Social Needs    Financial resource strain: Not on file    Food insecurity     Worry: Not on file     Inability: Not on file    Transportation needs     Medical: Not on file     Non-medical: Not on file   Tobacco Use    Smoking status: Never Smoker    Smokeless tobacco: Never Used   Substance and Sexual Activity    Alcohol use: No     Alcohol/week: 0.0 standard drinks    Drug use: No    Sexual activity: Never   Lifestyle    Physical activity     Days per week: Not on file     Minutes per session: Not on file    Stress: Not on file   Relationships    Social connections     Talks on phone: Not on file     Gets together: Not on file     Attends Rastafarian service: Not on file     Active member of club or organization: Not on file     Attends meetings of clubs or organizations: Not on file     Relationship status: Not on file    Intimate partner violence     Fear of current or ex partner: Not on file     Emotionally abused: Not on file     Physically abused: Not on file     Forced sexual activity: Not on file   Other Topics Concern    Not on file   Social History Narrative    Not on file       Family History   Problem Relation Age of Onset    Diabetes Mother     Heart Disease Mother     Cancer Mother         ovarian    Heart Disease Father     Stroke Father     Early Death Father         46 - heart attack    Heart Disease Sister     Stroke Sister     Cancer Sister         ovarian       Vitals:    04/19/21 1041   BP: 128/78   Pulse: 58   Resp: 14   SpO2: 95%       Wt Readings from Last 3 Encounters:   04/19/21 229 lb (103.9 kg)   03/10/21 229 lb 8 oz (104.1 kg)   12/30/20 229 lb (103.9 kg)       Review of Systems   Constitutional: Positive for fatigue. Negative for unexpected weight change. HENT: Positive for trouble swallowing. Respiratory: Negative for cough and shortness of breath. Cardiovascular: Positive for leg swelling. Bilat pedal edema,  Right greater. Seems sore / achy at times      Uses CPAP     Gastrointestinal: Negative for blood in stool, constipation and diarrhea. Occ dysphagia-  Dry foods more  Gets a lot of gas and cramps / abd discomfort. Not severe pain but uncomfortable. Right flank / thoracic area aches / sore some   Genitourinary: Negative for dysuria, hematuria and urgency. Musculoskeletal: Positive for arthralgias and back pain. Skin:        Lesion / skin thing on her nose. For a while. Thinks growing     Neurological: Negative for seizures, facial asymmetry and speech difficulty. Psychiatric/Behavioral: Positive for dysphoric mood and sleep disturbance. Negative for self-injury and suicidal ideas. Objective:   Physical Exam  Constitutional:       Appearance: She is not ill-appearing. Eyes:      General: No scleral icterus. Extraocular Movements: Extraocular movements intact. Comments: Neoplasm-  8-10 mm on the left side nose. Looks neoplastic / [address]   Cardiovascular:      Rate and Rhythm: Normal rate and regular rhythm. Pulmonary:      Effort: Pulmonary effort is normal. No respiratory distress. Breath sounds: Normal breath sounds. Abdominal:      Comments: Overweight  Soft. No mass  Not really tender   Musculoskeletal:      Comments: Trace edema lower extremities   Skin:     Comments: Neoplasm nose   Neurological:      General: No focal deficit present. Mental Status: She is alert and oriented to person, place, and time. Psychiatric:         Mood and Affect: Mood normal.         Assessment:      Narcolepsy history  Depressive disorder  Obesity  Cardiac syndrome X  Lumbar spondylosis  Neoplasm skin nose  Dyspepsia     Plan:     rec GI consult and consider EGD and colon    Refer for plastic eval for the nose. Continue basic meds. Follow-up with me after the GI evaluation  Continue with current specialist which include cardiology, neurosurgeon for a benign,, stable brain growth.         Current Outpatient Medications   Medication Sig Dispense Refill    methylphenidate (RITALIN) 10 MG tablet Take 1 tablet by mouth 2 times daily for 30 days. 60 tablet 0    sucralfate (CARAFATE) 1 GM tablet Take 1 tablet by mouth 4 times daily 120 tablet 5    pantoprazole (PROTONIX) 40 MG tablet Take 1 tablet by mouth twice daily 180 tablet 1    escitalopram (LEXAPRO) 10 MG tablet Take 1 tablet by mouth once daily 30 tablet 5    L-Arginine 500 MG TABS Take 1,000 mg by mouth 2 times daily 180 tablet 3    EUTHYROX 100 MCG tablet Take 1 tablet by mouth once daily 30 tablet 5    furosemide (LASIX) 20 MG tablet Take 1 tablet by mouth once daily 90 tablet 3    valsartan (DIOVAN) 160 MG tablet Take 1 tablet by mouth once daily 90 tablet 3    rosuvastatin (CRESTOR) 5 MG tablet Take 1 tablet by mouth daily 90 tablet 3    metoprolol tartrate (LOPRESSOR) 25 MG tablet Take 1 tablet by mouth twice daily 180 tablet 3    isosorbide mononitrate (IMDUR) 30 MG extended release tablet TAKE ONE TABLET BY MOUTH ONCE DAILY 90 tablet 3    topiramate (TOPAMAX) 25 MG tablet Take 2 tablets by mouth 2 times daily 120 tablet 5    NONFORMULARY 5 - LOXIN (given by Mabel WOODS)      aspirin 81 MG tablet Take 81 mg by mouth daily       No current facility-administered medications for this visit.

## 2021-04-19 NOTE — PATIENT INSTRUCTIONS
Call the plastic surgeon about the nose lesion    Call the gastroenterologist about the bowel and swallowing sx    Follow with me after    Continue follow-up with other specialists.

## 2021-04-22 ENCOUNTER — OFFICE VISIT (OUTPATIENT)
Dept: NEUROLOGY | Age: 71
End: 2021-04-22
Payer: MEDICARE

## 2021-04-22 VITALS
OXYGEN SATURATION: 98 % | SYSTOLIC BLOOD PRESSURE: 112 MMHG | DIASTOLIC BLOOD PRESSURE: 78 MMHG | HEART RATE: 45 BPM | BODY MASS INDEX: 38.15 KG/M2 | WEIGHT: 229 LBS | HEIGHT: 65 IN

## 2021-04-22 DIAGNOSIS — G47.33 OSA (OBSTRUCTIVE SLEEP APNEA): ICD-10-CM

## 2021-04-22 DIAGNOSIS — I10 HTN (HYPERTENSION), BENIGN: ICD-10-CM

## 2021-04-22 DIAGNOSIS — G43.119 INTRACTABLE MIGRAINE WITH AURA WITHOUT STATUS MIGRAINOSUS: Primary | ICD-10-CM

## 2021-04-22 PROCEDURE — 99213 OFFICE O/P EST LOW 20 MIN: CPT | Performed by: PSYCHIATRY & NEUROLOGY

## 2021-04-22 NOTE — PROGRESS NOTES
disease     Urgency of urination      Prior to Visit Medications    Medication Sig Taking? Authorizing Provider   methylphenidate (RITALIN) 10 MG tablet Take 1 tablet by mouth 2 times daily for 30 days.  Yes Kaci Mcconnell DO   sucralfate (CARAFATE) 1 GM tablet Take 1 tablet by mouth 4 times daily Yes Kaci Mcconnell DO   pantoprazole (PROTONIX) 40 MG tablet Take 1 tablet by mouth twice daily Yes Kaci Mcconnell DO   escitalopram (LEXAPRO) 10 MG tablet Take 1 tablet by mouth once daily Yes Kaci Mcconnell DO   L-Arginine 500 MG TABS Take 1,000 mg by mouth 2 times daily Yes Pavel Walker MD   EUTHYROX 100 MCG tablet Take 1 tablet by mouth once daily Yes Kaci Mcconnell DO   furosemide (LASIX) 20 MG tablet Take 1 tablet by mouth once daily Yes Pavel Walker MD   valsartan (DIOVAN) 160 MG tablet Take 1 tablet by mouth once daily Yes Pavel Walker MD   rosuvastatin (CRESTOR) 5 MG tablet Take 1 tablet by mouth daily Yes Pavel Walker MD   metoprolol tartrate (LOPRESSOR) 25 MG tablet Take 1 tablet by mouth twice daily Yes Pavel Walker MD   isosorbide mononitrate (IMDUR) 30 MG extended release tablet TAKE ONE TABLET BY MOUTH ONCE DAILY Yes Pavel Walker MD   topiramate (TOPAMAX) 25 MG tablet Take 2 tablets by mouth 2 times daily Yes Raúl Strong MD   NONFORMULARY 5 - LOXIN (given by Fran Rowell MD) Yes Historical Provider, MD   aspirin 81 MG tablet Take 81 mg by mouth daily Yes Historical Provider, MD     Allergies   Allergen Reactions    Latex Rash    Vistaril [Hydroxyzine Hcl]      Social History     Tobacco Use    Smoking status: Never Smoker    Smokeless tobacco: Never Used   Substance Use Topics    Alcohol use: No     Alcohol/week: 0.0 standard drinks     Family History   Problem Relation Age of Onset    Diabetes Mother     Heart Disease Mother     Cancer Mother         ovarian    Heart Disease Father     Stroke Father     Early Death Father         46 - heart attack    Heart Disease Sister  Stroke Sister     Cancer Sister         ovarian         Exam:   Constitutional:   Vitals:    04/22/21 1249   BP: 112/78   Pulse: (!) 45   SpO2: 98%   Weight: 229 lb (103.9 kg)   Height: 5' 5\" (1.651 m)       General appearance: well-nourished. Mental Status:   Oriented to person, place, problem, and time. Fluent speech. Good fund of knowledge. Normal attention span and concentration. Intact recent and remote memory  Cranial Nerves:   II:  Pupils: equal, round, reactive to light  III,IV,VI: Extra Ocular Movements are intact. No nystagmus  V: Facial sensation is intact  VII: Facial strength and movements: intact and symmetric  XII: Tongue movements are normal  Musculoskeletal: 5/5 in all 4 extremities. Normal tone. Reflexes: symmetric 2 in arms and legs   Coordination: no pronator drift, no dysmetria with FNF testing. No tremors. Sensation: normal to all modalities. Gait/Posture: steady    ROS : A 10-12 system review of constitutional, cardiovascular, respiratory, musculoskeletal, endocrine, hematological, skin, SHEENT, genitourinary, psychiatric and neurologic systems was obtained and updated today which is unremarkable except as mentioned in my HPI      Medical decision making:  I personally reviewed and updated social history, past medical history, medications, allergy, surgical history, and family history as documented in the patient's electronic health records. Labs and other test results reviewed and discussed with the patient. Last A1c 5.6  Reviewed notes from other physicians. rovided patient education regarding risk, benefits and treatment options as well as adherence to medication regimen and side effect from these medications. no change        Assessment:    1.  Intractable migraine with aura without status migrainosus    Continue Topamax 50 mg twice daily  Hydration  6-month refill  Side effect was discussed  Avoid OTC for migraine rescue  - topiramate (TOPAMAX) 25 MG tablet; Take 2 tablets by mouth 2 times daily  Dispense: 120 tablet; Refill: 5    2. Numbness and tingling, new onset. Could be related to Topamax. Will get B12, folate and A1c. We will consider EMG and NCV to rule out polyneuropathy or CTS if symptoms recur.    - Vitamin B12 & Folate; Future  - Hemoglobin A1C; Future    3. Restless legs syndrome (RLS)  Check iron testing with next blood testing  Avoid excessive coffee or chocolate  Would consider Requip or Mirapex if symptoms worsen    4. HTN (hypertension), benign  Continue current blood pressure medications  Monitor blood pressure at home    5. PRINCE (obstructive sleep apnea)  Continue weight reductions and CPAP treatment    6. Impaired glucose tolerance (oral)   Check A1c  Follow blood sugar monitoring and diet restriction  - Hemoglobin A1C; Future    Follow-up with me in 6 months or sooner if new symptoms arise.

## 2021-04-22 NOTE — PROGRESS NOTES
The patient came today for follow up regarding: chronic migraines. Since her last visit, she continues to have episodic migraine with aura. Frequency is once every few weeks. Degree is moderate. Duration is minutes to hours. The same description of unilateral pulsating and throbbing pain with nausea, photophobia and phonophobia. The same triggers with stress or weather changes. She takes Topamax 50 mg twice daily. No side effect from Topamax. She does not take any rescue medication for these migraines. No daily headache, weakness or numbness. She continues to be compliant with her PRINCE and CPAP machine. No severe EDS or severe insomnia. Blood pressure is controlled on medications. She denies any more severe RLS-like symptoms. Occasional numbness in her hands and feet. Other review of system was unremarkable. Past Medical History:   Diagnosis Date    Acid reflux     small stomach ulcer    Angina pectoris, variant (HCC)     Anxiety     Arthritis     Asthma     Benign esophageal stricture 12/2016    Benign tumor 03/20/2017     Benign brain tumor size of a quater    [address] [address]     ovarian    Depression     Frequency     Hyperlipidemia     Hypertension     Kidney stones     MI, old     Narcolepsy     Sleep apnea     Thyroid disease     Urgency of urination      Prior to Visit Medications    Medication Sig Taking? Authorizing Provider   methylphenidate (RITALIN) 10 MG tablet Take 1 tablet by mouth 2 times daily for 30 days.  Yes Pepe Sinning, DO   sucralfate (CARAFATE) 1 GM tablet Take 1 tablet by mouth 4 times daily Yes Pepe Perez, DO   pantoprazole (PROTONIX) 40 MG tablet Take 1 tablet by mouth twice daily Yes Pepe Perez, DO   escitalopram (LEXAPRO) 10 MG tablet Take 1 tablet by mouth once daily Yes Pepe Perez, DO   L-Arginine 500 MG TABS Take 1,000 mg by mouth 2 times daily Yes Corey Arias MD   EUTHYROX 100 MCG tablet Take 1 tablet by mouth once daily Yes Ronel Bull Mercado,    furosemide (LASIX) 20 MG tablet Take 1 tablet by mouth once daily Yes Jolene Tony MD   valsartan (DIOVAN) 160 MG tablet Take 1 tablet by mouth once daily Yes Jolene Tony MD   rosuvastatin (CRESTOR) 5 MG tablet Take 1 tablet by mouth daily Yes Jolene Tony MD   metoprolol tartrate (LOPRESSOR) 25 MG tablet Take 1 tablet by mouth twice daily Yes Jolene Tony MD   isosorbide mononitrate (IMDUR) 30 MG extended release tablet TAKE ONE TABLET BY MOUTH ONCE DAILY Yes Jolene Tony MD   topiramate (TOPAMAX) 25 MG tablet Take 2 tablets by mouth 2 times daily Yes Zhane Valenzuela MD   NONFORMULARY 5 - LOXIN (given by Shantanu Bennett MD) Yes Historical Provider, MD   aspirin 81 MG tablet Take 81 mg by mouth daily Yes Historical Provider, MD     Allergies   Allergen Reactions    Latex Rash    Vistaril [Hydroxyzine Hcl]      Social History     Tobacco Use    Smoking status: Never Smoker    Smokeless tobacco: Never Used   Substance Use Topics    Alcohol use: No     Alcohol/week: 0.0 standard drinks     Family History   Problem Relation Age of Onset    Diabetes Mother     Heart Disease Mother     Cancer Mother         ovarian    Heart Disease Father     Stroke Father     Early Death Father         46 - heart attack    Heart Disease Sister     Stroke Sister     Cancer Sister         ovarian     Past Surgical History:   Procedure Laterality Date    ARM SURGERY Right 8/19/14    exc.trapezium and flexor carpi radialis interpositional arthroplasty    BREAST SURGERY      reduction    CARPAL TUNNEL RELEASE Right     CHOLECYSTECTOMY      [facility]      ENDOSCOPY, COLON, DIAGNOSTIC  01/06/2017    Anastomotic ulcer, gastritis    ESOPHAGEAL DILATATION  12/2016    GASTRIC BYPASS SURGERY      GASTRIC BYPASS SURGERY      HYSTERECTOMY      LITHOTRIPSY Left 11/12/2013    LITHOTRIPSY Left 12/26/13    LEFT ESWL    HI NJX DX/THER SBST INTRLMNR CRV/THRC W/IMG GDN Right 8/27/2018    RIGHT LUMBAR FIVE SACRAL ONE EPIDURAL STEROID INJECTION SITE CONFIRMED BY FLUOROSCOPY performed by Radha Chicas MD at [address] [facility] ENDOSCOPY  03/03/2017    Gastritis         Exam:   Constitutional:   Vitals:    04/22/21 1249   BP: 112/78   Pulse: (!) 45   SpO2: 98%   Weight: 229 lb (103.9 kg)   Height: 5' 5\" (1.651 m)       General appearance: well-nourished. Cardiovascular:   Mild distal right lower extremity  Mental Status:   Oriented to person, place, problem, and time. Fluent speech. Good fund of knowledge. Normal attention span and concentration. Intact recent and remote memory  Cranial Nerves:   II:  Pupils: equal, round, reactive to light  III,IV,VI: Extra Ocular Movements are intact. No nystagmus  V: Facial sensation is intact  VII: Facial strength and movements: intact and symmetric  XII: Tongue movements are normal  Musculoskeletal: 5/5 in all 4 extremities. Normal tone. Coordination: no pronator drift, no dysmetria with FNF testing. No tremors. Sensation: normal to all modalities. Gait/Posture: steady    ROS : A 10-12 system review of constitutional, cardiovascular, respiratory, musculoskeletal, endocrine, hematological, skin, SHEENT, genitourinary, psychiatric and neurologic systems was obtained and updated today which is unremarkable except as mentioned in my HPI      Medical decision making:  I personally reviewed and updated social history, past medical history, medications, allergy, surgical history, and family history as documented in the patient's electronic health records. Labs and other test results reviewed and discussed with the patient. Reviewed notes from other physicians. Provided patient education regarding risk, benefits and treatment options as well as adherence to medication regimen and side effect from these medications. Assessment:    1.  Intractable migraine with aura without status migrainosus    Continue Topamax 50 mg twice daily  6-month refill  Discussed side effect  Hydration  Eye exam once a year  Headache diary      2 hypertension, controlled  Continue current blood pressure medications and watch blood pressure weekly  Aspirin for stroke prevention    3.   Sleep apnea    Continue CPAP machine  Improving sleep hygiene  Weight reduction    Follow-up 6-month

## 2021-04-26 DIAGNOSIS — G43.119 INTRACTABLE MIGRAINE WITH AURA WITHOUT STATUS MIGRAINOSUS: ICD-10-CM

## 2021-04-26 RX ORDER — TOPIRAMATE 25 MG/1
TABLET ORAL
Qty: 360 TABLET | Refills: 1 | Status: SHIPPED | OUTPATIENT
Start: 2021-04-26 | End: 2021-11-01

## 2021-04-26 NOTE — TELEPHONE ENCOUNTER
Last seen 04.22.21    Last office note states to RTO 6 months/10.2021    Next appointment scheduled for 10.07.21

## 2021-04-27 DIAGNOSIS — G47.419 PRIMARY NARCOLEPSY WITHOUT CATAPLEXY: ICD-10-CM

## 2021-04-27 RX ORDER — METHYLPHENIDATE HYDROCHLORIDE 10 MG/1
10 TABLET ORAL 2 TIMES DAILY
Qty: 60 TABLET | Refills: 0 | Status: SHIPPED | OUTPATIENT
Start: 2021-04-27 | End: 2021-05-27 | Stop reason: SDUPTHER

## 2021-04-27 NOTE — TELEPHONE ENCOUNTER
Patient needs a refill on methylphenidate (RITALIN) 10 MG tablet    They need a 30 day supply.          Pharmacy: [facility]

## 2021-05-05 ENCOUNTER — TELEPHONE (OUTPATIENT)
Dept: FAMILY MEDICINE CLINIC | Age: 71
End: 2021-05-05

## 2021-05-05 NOTE — TELEPHONE ENCOUNTER
Please call the patient and tell her that the doctors I referred her to our [facility], the same group I am with, and I would suspect that they are covered if I am.  If they are not, she needs to call the insurance company and get some names of doctors who are on her panel, and I will do a referral then, knowing that they are going to be covered.

## 2021-05-06 ENCOUNTER — TELEPHONE (OUTPATIENT)
Dept: FAMILY MEDICINE CLINIC | Age: 71
End: 2021-05-06

## 2021-05-06 DIAGNOSIS — G47.419 PRIMARY NARCOLEPSY WITHOUT CATAPLEXY: ICD-10-CM

## 2021-05-06 RX ORDER — METHYLPHENIDATE HYDROCHLORIDE 10 MG/1
10 TABLET ORAL 2 TIMES DAILY
Qty: 60 TABLET | Refills: 0 | OUTPATIENT
Start: 2021-05-06 | End: 2021-06-05

## 2021-05-06 NOTE — TELEPHONE ENCOUNTER
This prescription was refilled on April 28 at [facility]. Please advise the patient that if she requested this it is too early. If this is an error then she can disregard this until the end of May.

## 2021-05-06 NOTE — TELEPHONE ENCOUNTER
----- Message from Sandra Potts sent at 5/6/2021  1:23 PM EDT -----  Subject: Refill Request    QUESTIONS  Name of Medication? methylphenidate (RITALIN) 10 MG tablet  Patient-reported dosage and instructions? 2 10 mg tablets 1 time a day   How many days do you have left? 5  Preferred Pharmacy? [facility]  Pharmacy phone number (if available)? [phone]  ---------------------------------------------------------------------------  --------------  Jimmy GEIGER  What is the best way for the office to contact you? OK to leave message on   voicemail  Preferred Call Back Phone Number?  [phone]

## 2021-05-07 NOTE — TELEPHONE ENCOUNTER
Pt called back. Advised that the refill was sent on 4/27/21. Pt states she only has 4 days left. She then looked and found her bottle. Will call towards the end of May for refill.

## 2021-05-27 ENCOUNTER — TELEPHONE (OUTPATIENT)
Dept: FAMILY MEDICINE CLINIC | Age: 71
End: 2021-05-27

## 2021-05-27 DIAGNOSIS — G47.419 PRIMARY NARCOLEPSY WITHOUT CATAPLEXY: ICD-10-CM

## 2021-05-27 RX ORDER — LEVOTHYROXINE SODIUM 0.1 MG/1
TABLET ORAL
Qty: 30 TABLET | Refills: 5 | Status: SHIPPED | OUTPATIENT
Start: 2021-05-27 | End: 2021-12-06 | Stop reason: SDUPTHER

## 2021-05-27 RX ORDER — METHYLPHENIDATE HYDROCHLORIDE 10 MG/1
10 TABLET ORAL 2 TIMES DAILY
Qty: 60 TABLET | Refills: 0 | Status: SHIPPED | OUTPATIENT
Start: 2021-05-27 | End: 2021-06-29 | Stop reason: SDUPTHER

## 2021-05-27 RX ORDER — LEVOTHYROXINE SODIUM 0.1 MG/1
100 TABLET ORAL DAILY
Qty: 30 TABLET | Refills: 3 | Status: CANCELLED | OUTPATIENT
Start: 2021-05-27

## 2021-05-27 NOTE — TELEPHONE ENCOUNTER
Refill request-    methylphenidate (RITALIN) 10 MG tablet    levothyroxine (SYNTHROID) 100 MCG tablet     [id number] [facility],[address]   11-7 [phone]        Last appt.   4/19/2021    Future Appointments   Date Time Provider Kamran Molina   10/7/2021 10:30 AM Jay Jay Hebert MD AND [facility]

## 2021-07-01 ENCOUNTER — TELEPHONE (OUTPATIENT)
Dept: FAMILY MEDICINE CLINIC | Age: 71
End: 2021-07-01

## 2021-07-01 NOTE — TELEPHONE ENCOUNTER
Pt brought another pt to office for an appointment. Pt was waiting in lobby when registrar noticed pt looked \"really pale\". She asked pt if she felt okay and pt said \"not really\". I came out into lobby and took pt's BP. I asked her name and . I asked if pt was experiencing any pain and she stated she was. Told me it was on left side of her chest and started about 5 min prior. Stated it was a mix of sharp/stabbing and dull/aching. Pt also c/o slight blurry vision and nausea. I relayed info to Practice Manager and to Dr Jh Colmenares. He advised we call squ. At this time, we moved pt to an exam room and I took her glucose which was 128 and her Pulse (47) and Oxygen (95). Pt advised that while she was in the lobby, she thought she smelled a chemical. The Fire dept was advised and stated there were no chemical leaks in the building. Pt was transported to [facility].

## 2021-07-02 NOTE — TELEPHONE ENCOUNTER
Pt taken to [facility] [place] [facility],  I spoke to ER Doc,  Her caboxyhemaglobin was slightly up. Our building was checked by fire sdept. Pt's enzymes were normal and they anticipated being able to send her home.

## 2021-07-06 RX ORDER — PANTOPRAZOLE SODIUM 40 MG/1
TABLET, DELAYED RELEASE ORAL
Qty: 180 TABLET | Refills: 5 | Status: SHIPPED | OUTPATIENT
Start: 2021-07-06 | End: 2022-07-08

## 2021-07-08 ENCOUNTER — TELEPHONE (OUTPATIENT)
Dept: FAMILY MEDICINE CLINIC | Age: 71
End: 2021-07-08

## 2021-08-02 DIAGNOSIS — G47.419 PRIMARY NARCOLEPSY WITHOUT CATAPLEXY: ICD-10-CM

## 2021-08-02 RX ORDER — METHYLPHENIDATE HYDROCHLORIDE 10 MG/1
10 TABLET ORAL 2 TIMES DAILY
Qty: 60 TABLET | Refills: 0 | Status: SHIPPED | OUTPATIENT
Start: 2021-08-02 | End: 2021-09-02 | Stop reason: SDUPTHER

## 2021-08-02 NOTE — TELEPHONE ENCOUNTER
Future Appointments   Date Time Provider Kamran Molina   10/7/2021 10:30 AM Jay Jay Huang MD AND [facility]     LOV 4/19/2021

## 2021-09-01 DIAGNOSIS — G47.419 PRIMARY NARCOLEPSY WITHOUT CATAPLEXY: ICD-10-CM

## 2021-09-01 NOTE — TELEPHONE ENCOUNTER
Pt called in requesting refill on her Ritalin. Pt took her last one this morning.     Future Appointments   Date Time Provider Kamran Molina   10/7/2021 10:30 AM Jay Jay Burgess MD AND [facility]     LOV 4/19/21

## 2021-09-02 RX ORDER — METHYLPHENIDATE HYDROCHLORIDE 10 MG/1
10 TABLET ORAL 2 TIMES DAILY
Qty: 60 TABLET | Refills: 0 | Status: SHIPPED | OUTPATIENT
Start: 2021-09-02 | End: 2021-10-05 | Stop reason: SDUPTHER

## 2021-10-05 ENCOUNTER — TELEPHONE (OUTPATIENT)
Dept: FAMILY MEDICINE CLINIC | Age: 71
End: 2021-10-05

## 2021-10-05 DIAGNOSIS — G47.419 PRIMARY NARCOLEPSY WITHOUT CATAPLEXY: ICD-10-CM

## 2021-10-05 RX ORDER — METHYLPHENIDATE HYDROCHLORIDE 10 MG/1
10 TABLET ORAL 2 TIMES DAILY
Qty: 60 TABLET | Refills: 0 | Status: SHIPPED | OUTPATIENT
Start: 2021-10-05 | End: 2021-11-01 | Stop reason: SDUPTHER

## 2021-10-05 NOTE — TELEPHONE ENCOUNTER
Patient needs a refill on Ritalin. They need a 30 day supply.      Mail order or local pharmacy: local    Pharmacy: [place] on file    Patient  or mail to patient(If mail order):     Last OV 4/19/21    Future Appointments   Date Time Provider Kamran Molina   10/7/2021 10:30 AM Jay Jay Villafana MD AND [facility]

## 2021-10-07 ENCOUNTER — OFFICE VISIT (OUTPATIENT)
Dept: NEUROLOGY | Age: 71
End: 2021-10-07
Payer: MEDICARE

## 2021-10-07 VITALS
HEART RATE: 64 BPM | BODY MASS INDEX: 38.15 KG/M2 | OXYGEN SATURATION: 99 % | HEIGHT: 65 IN | WEIGHT: 229 LBS | SYSTOLIC BLOOD PRESSURE: 136 MMHG | RESPIRATION RATE: 16 BRPM | DIASTOLIC BLOOD PRESSURE: 82 MMHG

## 2021-10-07 DIAGNOSIS — R73.02 IMPAIRED GLUCOSE TOLERANCE (ORAL): ICD-10-CM

## 2021-10-07 DIAGNOSIS — I10 HTN (HYPERTENSION), BENIGN: ICD-10-CM

## 2021-10-07 DIAGNOSIS — G43.119 INTRACTABLE MIGRAINE WITH AURA WITHOUT STATUS MIGRAINOSUS: Primary | ICD-10-CM

## 2021-10-07 DIAGNOSIS — G25.81 RESTLESS LEGS SYNDROME (RLS): ICD-10-CM

## 2021-10-07 DIAGNOSIS — G47.33 OSA (OBSTRUCTIVE SLEEP APNEA): ICD-10-CM

## 2021-10-07 PROCEDURE — 99214 OFFICE O/P EST MOD 30 MIN: CPT | Performed by: PSYCHIATRY & NEUROLOGY

## 2021-10-07 NOTE — PROGRESS NOTES
The patient came today for follow up regarding: chronic migraines, history of RLS and history of sleep apnea. Since her last visit, she continues to Topamax 50 mg twice daily. She reports no side effect from Topamax. Recent eye exam was unremarkable. Migraines are episodic. Frequency is monthly up to 3 to 4 months. Duration is minutes to hours and degree is moderate. The same description per last visit with photophobia and phonophobia. She takes OTC as needed but not weekly for rescue. No more numbness or tingling. She cannot recall her last A1c. History of sleep apnea and she has been compliant with her CPAP machine. No change with her weight. She is on the same SSRI and blood pressure medications. No more issues with RLS-like symptoms at night. Other review of system was unremarkable. Past Medical History:   Diagnosis Date    Acid reflux     small stomach ulcer    Angina pectoris, variant (HCC)     Anxiety     Arthritis     Asthma     Benign esophageal stricture 12/2016    Benign tumor 03/20/2017     Benign brain tumor size of a quater    Cancer ([facility] [address]     ovarian    Depression     Frequency     Hyperlipidemia     Hypertension     Kidney stones     MI, old     Narcolepsy     Sleep apnea     Thyroid disease     Urgency of urination      Prior to Visit Medications    Medication Sig Taking? Authorizing Provider   methylphenidate (RITALIN) 10 MG tablet Take 1 tablet by mouth 2 times daily for 30 days.  Yes Cinthya Kramer, DO   pantoprazole (PROTONIX) 40 MG tablet Take 1 tablet by mouth twice daily Yes Cinthya Kramer DO   escitalopram (LEXAPRO) 10 MG tablet Take 1 tablet by mouth once daily Yes Cinthya Kramer, DO   levothyroxine (EUTHYROX) 100 MCG tablet Take 1 tablet by mouth once daily Yes Cinthya Kramer DO   topiramate (TOPAMAX) 25 MG tablet Take 2 tablets by mouth twice daily Yes Heidi Tapia MD   sucralfate (CARAFATE) 1 GM tablet Take 1 tablet by mouth 4 times daily Yes Joel Park Mercado, DO   L-Arginine 500 MG TABS Take 1,000 mg by mouth 2 times daily Yes Jose Brock MD   furosemide (LASIX) 20 MG tablet Take 1 tablet by mouth once daily Yes Jose Brock MD   valsartan (DIOVAN) 160 MG tablet Take 1 tablet by mouth once daily Yes Jose Brock MD   rosuvastatin (CRESTOR) 5 MG tablet Take 1 tablet by mouth daily Yes Jose Brock MD   metoprolol tartrate (LOPRESSOR) 25 MG tablet Take 1 tablet by mouth twice daily Yes Jose Brock MD   isosorbide mononitrate (IMDUR) 30 MG extended release tablet TAKE ONE TABLET BY MOUTH ONCE DAILY Yes Jose Brock MD   NONFORMULARY 5 - LOXIN (given by Denae WOODS) Yes Historical Provider, MD   aspirin 81 MG tablet Take 81 mg by mouth daily Yes Historical Provider, MD     Allergies   Allergen Reactions    Latex Rash    Vistaril [Hydroxyzine Hcl]      Social History     Tobacco Use    Smoking status: Never Smoker    Smokeless tobacco: Never Used   Substance Use Topics    Alcohol use: No     Alcohol/week: 0.0 standard drinks     Family History   Problem Relation Age of Onset    Diabetes Mother     Heart Disease Mother     Cancer Mother         ovarian    Heart Disease Father     Stroke Father     Early Death Father         46 - heart attack    Heart Disease Sister     Stroke Sister     Cancer Sister         ovarian         Exam:   Constitutional:   Vitals:    10/07/21 1100   BP: 136/82   Pulse: 64   Resp: 16   SpO2: 99%   Weight: 229 lb (103.9 kg)   Height: 5' 5\" (1.651 m)       General appearance: well-nourished. Mental Status: The same  Oriented to person, place, problem, and time. Fluent speech. Good fund of knowledge. Normal attention span and concentration.     Intact recent and remote memory  Cranial Nerves:   Pupils reactive and symmetric  No gaze preference  Normal sensation  Normal facial symmetry  Tongue is midline    Motor: Normal focal weakness  Tone is normal  No dysmetria or tremors  No sensory loss  Gait is steady         ROS : A 10-12 system review of constitutional, cardiovascular, respiratory, musculoskeletal, endocrine, hematological, skin, SHEENT, genitourinary, psychiatric and neurologic systems was obtained and updated today which is unremarkable except as mentioned in my HPI, no changes compared to last visit otherwise per H&P      Medical decision making:  I personally reviewed and updated social history, past medical history, medications, allergy, surgical history, and family history as documented in the patient's electronic health records. Reviewed notes from her prior physicians in my prior note  Reviewed recent blood test from December which showed sodium 137 and creatinine of  0.8. No recent A1c. Assessment:       Diagnosis Orders   1. Intractable migraine with aura without status migrainosus     2. HTN (hypertension), benign     3. PRINCE (obstructive sleep apnea)     4. Impaired glucose tolerance (oral)      5. Restless legs syndrome (RLS)         Continue with Topamax 50 mg twice daily  Refill for Topamax were needed  Continue OTC as needed but avoid more than 3 days a week. Discussed risk of rebound headaches  Hydration  Migraine diary  Eye exam once a year      Continue current blood pressure medications  Monitor blood pressure at home closely  Statin and aspirin for stroke prevention      Discussed the risk of sleep apnea.  The  patient has been compliant with her CPAP machine, the same setting    No need to start medication for RLS at this point since symptoms are better    Continue with home SSRI  Discussed possibility of increasing RLS symptoms with her SSRI    Follow blood sugar and A1c    Follow-up next year

## 2021-11-01 DIAGNOSIS — G43.119 INTRACTABLE MIGRAINE WITH AURA WITHOUT STATUS MIGRAINOSUS: ICD-10-CM

## 2021-11-01 DIAGNOSIS — G47.419 PRIMARY NARCOLEPSY WITHOUT CATAPLEXY: ICD-10-CM

## 2021-11-01 RX ORDER — TOPIRAMATE 25 MG/1
TABLET ORAL
Qty: 360 TABLET | Refills: 1 | Status: SHIPPED | OUTPATIENT
Start: 2021-11-01 | End: 2022-05-13 | Stop reason: SDUPTHER

## 2021-11-01 RX ORDER — ESCITALOPRAM OXALATE 10 MG/1
TABLET ORAL
Qty: 30 TABLET | Refills: 0 | Status: SHIPPED | OUTPATIENT
Start: 2021-11-01 | End: 2021-11-04 | Stop reason: DRUGHIGH

## 2021-11-01 RX ORDER — METHYLPHENIDATE HYDROCHLORIDE 10 MG/1
10 TABLET ORAL 2 TIMES DAILY
Qty: 60 TABLET | Refills: 0 | Status: SHIPPED | OUTPATIENT
Start: 2021-11-01 | End: 2021-12-06 | Stop reason: SDUPTHER

## 2021-11-01 NOTE — TELEPHONE ENCOUNTER
Last seen: 10/7/21    Assessment:    Diagnosis Orders   1. Intractable migraine with aura without status migrainosus      2. HTN (hypertension), benign      3. PRINCE (obstructive sleep apnea)      4. Impaired glucose tolerance (oral)       5. Restless legs syndrome (RLS)            Continue with Topamax 50 mg twice daily  Refill for Topamax were needed  Continue OTC as needed but avoid more than 3 days a week. Discussed risk of rebound headaches  Hydration  Migraine diary  Eye exam once a year     Continue current blood pressure medications  Monitor blood pressure at home closely  Statin and aspirin for stroke prevention     Discussed the risk of sleep apnea.  The  patient has been compliant with her CPAP machine, the same setting     No need to start medication for RLS at this point since symptoms are better     Continue with home SSRI  Discussed possibility of increasing RLS symptoms with her SSRI     Follow blood sugar and A1c     Follow-up next year    Next apt: 10/4/22    Last filled: 4/26/21 (#360 w/ 1 refill)

## 2021-11-01 NOTE — TELEPHONE ENCOUNTER
Future Appointments   Date Time Provider Kamran Molina   11/4/2021  2:00 PM DO KERWIN Saini [place] - DYD   10/4/2022 10:30 AM Jay Jay Villafana MD AND [facility]

## 2021-11-01 NOTE — TELEPHONE ENCOUNTER
----- Message from Esperanza Rutledge sent at 11/1/2021  1:03 PM EDT -----  Subject: Refill Request    QUESTIONS  Name of Medication? methylphenidate (RITALIN) 10 MG tablet  Patient-reported dosage and instructions? 2 daily  How many days do you have left? 0  Preferred Pharmacy? [facility]  Pharmacy phone number (if available)? [phone]  Additional Information for Provider? Pt has been out of this medication   for 3 days and pt stated that she requested this on Thursday or Friday.   ---------------------------------------------------------------------------  --------------  CALL BACK INFO  What is the best way for the office to contact you? OK to leave message on   voicemail  Preferred Call Back Phone Number?  [phone]

## 2021-11-04 ENCOUNTER — OFFICE VISIT (OUTPATIENT)
Dept: FAMILY MEDICINE CLINIC | Age: 71
End: 2021-11-04
Payer: MEDICARE

## 2021-11-04 VITALS
BODY MASS INDEX: 36.65 KG/M2 | WEIGHT: 220 LBS | HEIGHT: 65 IN | DIASTOLIC BLOOD PRESSURE: 86 MMHG | OXYGEN SATURATION: 98 % | SYSTOLIC BLOOD PRESSURE: 132 MMHG | HEART RATE: 57 BPM

## 2021-11-04 DIAGNOSIS — G47.419 PRIMARY NARCOLEPSY WITHOUT CATAPLEXY: ICD-10-CM

## 2021-11-04 DIAGNOSIS — D49.89 NEOPLASM OF FACE: ICD-10-CM

## 2021-11-04 DIAGNOSIS — F32.9 REACTIVE DEPRESSION: Primary | ICD-10-CM

## 2021-11-04 DIAGNOSIS — E66.01 MORBID OBESITY (HCC): ICD-10-CM

## 2021-11-04 PROCEDURE — 99214 OFFICE O/P EST MOD 30 MIN: CPT | Performed by: FAMILY MEDICINE

## 2021-11-04 RX ORDER — ESCITALOPRAM OXALATE 20 MG/1
20 TABLET ORAL DAILY
Qty: 30 TABLET | Refills: 2 | Status: SHIPPED | OUTPATIENT
Start: 2021-11-04 | End: 2022-02-15

## 2021-11-04 SDOH — ECONOMIC STABILITY: FOOD INSECURITY: WITHIN THE PAST 12 MONTHS, THE FOOD YOU BOUGHT JUST DIDN'T LAST AND YOU DIDN'T HAVE MONEY TO GET MORE.: NEVER TRUE

## 2021-11-04 SDOH — ECONOMIC STABILITY: FOOD INSECURITY: WITHIN THE PAST 12 MONTHS, YOU WORRIED THAT YOUR FOOD WOULD RUN OUT BEFORE YOU GOT MONEY TO BUY MORE.: NEVER TRUE

## 2021-11-04 ASSESSMENT — SOCIAL DETERMINANTS OF HEALTH (SDOH): HOW HARD IS IT FOR YOU TO PAY FOR THE VERY BASICS LIKE FOOD, HOUSING, MEDICAL CARE, AND HEATING?: NOT HARD AT ALL

## 2021-11-04 ASSESSMENT — ENCOUNTER SYMPTOMS: RESPIRATORY NEGATIVE: 1

## 2021-11-04 NOTE — PROGRESS NOTES
Subjective:      Patient ID: Kaden Noble is a 80 y.o. y.o. female. Here to follow up meds and nerves. Having issues since her \" adopted\" daughter moved to [place] with her special needs 9year old to be with her biologic mother who has not been in her life for years until recently  Pt lives with her brother. Has worries about the care of the grandson. About the same physically.     HPI      Chief Complaint   Patient presents with    Medication Check       Allergies   Allergen Reactions    Latex Rash    Vistaril [Hydroxyzine Hcl]        Past Medical History:   Diagnosis Date    Acid reflux     small stomach ulcer    Angina pectoris, variant (HCC)     Anxiety     Arthritis     Asthma     Benign esophageal stricture 12/2016    Benign tumor 03/20/2017     Benign brain tumor size of a quater    Cancer ([address]     ovarian    Depression     Frequency     Hyperlipidemia     Hypertension     Kidney stones     MI, old     Narcolepsy     Sleep apnea     Thyroid disease     Urgency of urination        Past Surgical History:   Procedure Laterality Date    ARM SURGERY Right 8/19/14    exc.trapezium and flexor carpi radialis interpositional arthroplasty    BREAST SURGERY      reduction    CARPAL TUNNEL RELEASE Right     CHOLECYSTECTOMY      COLONOSCOPY     [facility] DENTAL [facility]      ENDOSCOPY, COLON, DIAGNOSTIC  01/06/2017    Anastomotic ulcer, gastritis    ESOPHAGEAL DILATATION  12/2016    GASTRIC BYPASS SURGERY      GASTRIC BYPASS SURGERY      HYSTERECTOMY      LITHOTRIPSY Left 11/12/2013    LITHOTRIPSY Left 12/26/13    LEFT ESWL    VA NJX DX/THER SBST INTRLMNR CRV/THRC W/IMG GDN Right 8/27/2018    RIGHT LUMBAR FIVE SACRAL ONE EPIDURAL STEROID INJECTION SITE CONFIRMED BY FLUOROSCOPY performed by Cristina Cameron MD at [address] [facility]      UPPER GASTROINTESTINAL ENDOSCOPY  03/03/2017    Gastritis       Social History     Socioeconomic History    Marital status:  Spouse name: Not on file    Number of children: Not on file    Years of education: Not on file    Highest education level: Not on file   Occupational History     Employer: [employer]   Tobacco Use    Smoking status: Never Smoker    Smokeless tobacco: Never Used   Vaping Use    Vaping Use: Never used   Substance and Sexual Activity    Alcohol use: No     Alcohol/week: 0.0 standard drinks    Drug use: No    Sexual activity: Never   Other Topics Concern    Not on file   Social History Narrative    Not on file     Social Determinants of Health     Financial Resource Strain: Low Risk     Difficulty of Paying Living Expenses: Not hard at all   Food Insecurity: No Food Insecurity    Worried About [facility] in the Last Year: Never true    [employer] in the Last Year: Never true   Transportation Needs:     Lack of Transportation (Medical):      Lack of Transportation (Non-Medical):    Physical Activity:     Days of Exercise per Week:     Minutes of Exercise per Session:    Stress:     Feeling of Stress :    Social Connections:     Frequency of Communication with Friends and Family:     Frequency of Social Gatherings with Friends and Family:     Attends Advent Services:     Active Member of Clubs or Organizations:     Attends Club or Organization Meetings:     Marital Status:    Intimate Partner Violence:     Fear of Current or Ex-Partner:     Emotionally Abused:     Physically Abused:     Sexually Abused:        Family History   Problem Relation Age of Onset    Diabetes Mother     Heart Disease Mother     Cancer Mother         ovarian    Heart Disease Father     Stroke Father     Early Death Father         46 - heart attack    Heart Disease Sister     Stroke Sister     Cancer Sister         ovarian       Vitals:    11/04/21 1254   BP: 132/86   Pulse: 57   SpO2: 98%       Wt Readings from Last 3 Encounters:   11/04/21 220 lb (99.8 kg)   10/07/21 229 lb (103.9 kg)   04/22/21 229 lb (103.9 kg)       Review of Systems   Constitutional: Negative for activity change. Respiratory: Negative. Cardiovascular: Negative for leg swelling. Gastrointestinal:        No change in GI function   Neurological:        Headaches and narcolepsy are about the same. Saw neurology recently and no significant change in her program.  With stress patient concern things worsen, so we will follow clinically. Psychiatric/Behavioral: Positive for dysphoric mood. Negative for self-injury and suicidal ideas. No dark thoughts  Has what appears to be a degree of anger over the circumstance. Her nonbiologic child who she was used since infancy and who has become estranged from her over the past 3 to 5 years relocated suddenly to the [facility]. She is left her \"fiancé\" and taking her special needs son. Patient is concerned that the grandson will not get the care he needs. She appears her and frustrated. We discussed aspects of care and establishing ongoing thought and caring for herself. Objective:   Physical Exam  Vitals reviewed. Constitutional:       Appearance: She is not ill-appearing. Cardiovascular:      Rate and Rhythm: Normal rate and regular rhythm. Heart sounds: Normal heart sounds. Pulmonary:      Effort: Pulmonary effort is normal.      Breath sounds: Normal breath sounds. No wheezing. Musculoskeletal:      Right lower leg: No edema. Left lower leg: No edema. Skin:     General: Skin is dry. Comments: Fleshy neoplasm left nasal bridge. Will refer for bx / possible excision   Neurological:      General: No focal deficit present. Mental Status: She is alert and oriented to person, place, and time. Psychiatric:      Comments: Eye contact decent. No overt crying / behavior  Appears frustrated and somewhat angry although she has not been part of her grandsons life for at least 3 years.              Assessment:      Reactive depression, worsened by situations. Narcolepsy, treated  Mixed headache pattern  History of coronary disease, stable  Neoplasm, left nasal bridge. Plan:     Discussed possible therapy going forward. Can increase the Lexapro. Refer to Counseling. ENT for the nose lesion      Recheck 4-6 weeks    Current Outpatient Medications   Medication Sig Dispense Refill    escitalopram (LEXAPRO) 10 MG tablet Take 1 tablet by mouth once daily 30 tablet 0    topiramate (TOPAMAX) 25 MG tablet Take 2 tablets by mouth twice daily 360 tablet 1    methylphenidate (RITALIN) 10 MG tablet Take 1 tablet by mouth 2 times daily for 30 days. 60 tablet 0    pantoprazole (PROTONIX) 40 MG tablet Take 1 tablet by mouth twice daily 180 tablet 5    levothyroxine (EUTHYROX) 100 MCG tablet Take 1 tablet by mouth once daily 30 tablet 5    sucralfate (CARAFATE) 1 GM tablet Take 1 tablet by mouth 4 times daily 120 tablet 5    L-Arginine 500 MG TABS Take 1,000 mg by mouth 2 times daily 180 tablet 3    furosemide (LASIX) 20 MG tablet Take 1 tablet by mouth once daily 90 tablet 3    valsartan (DIOVAN) 160 MG tablet Take 1 tablet by mouth once daily 90 tablet 3    rosuvastatin (CRESTOR) 5 MG tablet Take 1 tablet by mouth daily 90 tablet 3    metoprolol tartrate (LOPRESSOR) 25 MG tablet Take 1 tablet by mouth twice daily 180 tablet 3    isosorbide mononitrate (IMDUR) 30 MG extended release tablet TAKE ONE TABLET BY MOUTH ONCE DAILY 90 tablet 3    NONFORMULARY 5 - LOXIN (given by Mabel WOODS)      aspirin 81 MG tablet Take 81 mg by mouth daily       No current facility-administered medications for this visit.

## 2021-11-04 NOTE — PATIENT INSTRUCTIONS
Take the new dose of the depression medication    Call Dr Ashlyn Sandoval about the nose lesion    Talk to the counselor about therapy. Follow-up with me in 4-6 weeks. Call if worsening mood or symptoms.

## 2021-11-08 ENCOUNTER — VIRTUAL VISIT (OUTPATIENT)
Dept: PSYCHOLOGY | Age: 71
End: 2021-11-08
Payer: MEDICARE

## 2021-11-08 DIAGNOSIS — F41.9 ANXIETY: Primary | ICD-10-CM

## 2021-11-08 PROCEDURE — 90791 PSYCH DIAGNOSTIC EVALUATION: CPT | Performed by: SOCIAL WORKER

## 2021-11-08 NOTE — PATIENT INSTRUCTIONS
1. Review handout on grief. 2. Continue spending time with your friend once/week. 3. Review list of pleasurable activities and create a list of your own. 4. Remind yourself frequently that your daughter is a good care taker for your grandson. Bereavement, Grief & Mourning        Bereavement is the state of having lost a significant other to death. Grief is the personal response to the loss. Mourning is the public expression of that loss. What is Normal Grief? Grief reactions vary depending on who we are, who we lost, our relationship with that person, the circumstances around their passing, and how much their loss affects our day-to-day functioning. Different people may express grief differently and you may even have different grief responses between one loss and another. Reactions to grief and loss include not just emotional symptoms, but also behavioral and physical symptoms. These reactions can often change over time. All are normal for a short period of time. Emotional Behavioral Physical   Shock, denial,                   numbness Crying unexpectedly Exhaustion/Fatigue      Sadness, anxiety, guilt,       fear Sleep changes (increase or decrease) Decreased energy        Anger (at others or        God), Not eating/Weight changes Memory problems        Irritability, frustration Withdrawing from others Stomach and intestinal upset    Restlessness, difficulty concentrating, trouble making decisions Pain and headaches     Symptoms that are not normal and may signal the need to talk to a professional include:  Use of drugs, alcohol, violence, and thoughts of killing oneself. The duration of grief varies from person to person. Current research shows that the average recovery time is 18 to 24 months. Also, grief reactions can be stronger around anniversary or other significant dates, such as the anniversary of the persons death, birthdays, and holidays.       The Stages of Grief  Grief therapists often describe stages of grief outlined by the research of Dr. Erica Mendoza. These stages do not always go in order. You may move back and forth among some of the stages and may even skip some of them. These stages are meant as a guide to help you understand your reactions and those of others who are grieving.  - Denial:  Denial (not acknowledging the loss) can help contain the shock of loss. Denial can act as a safety mechanism to block out grief until we are ready to handle it. - Sadness and depression:  Deep, intense grief and mourning appear during this stage. When the full understanding of our loss comes, it can seem overwhelming. During this stage, you may cry often and unexpectedly. You may not want to be around people or to do things that you normally enjoy. During this stage, it is best to remain as active as possible and to seek supportive people who will allow you to say what you need to or to cry when you need to. It is important to allow yourself to work through your full range and experience of emotions. - Anger:  Rage and anger can be intense toward the person who , toward friends and relatives, and even toward God. It is important to have an outlet to release anger through activities such as exercise, hobbies, or through therapy. Guilt, shame, and blame are feelings that need to be addressed, especially if it is toward you. - Acceptance: This stage includes coming to terms with the loss. It does not mean that you have found the answers to your questions or that you stop thinking about the person who is gone. It does signify a reinvestment in life and a willingness to readjust to your new circumstances while carrying the memory of your loved one with you. How to Help Yourself  1. Give yourself time to grieve. It is normal and important to express your grief and to work through the concerns that arise for you at this time.   Stuffing your feelings may not be helpful and may delay or prolong your grief. 2. Find supportive people to reach out to during your grief. This is the time when the support of others may be the most helpful. Dont be afraid to tell them how they can best help, even if it means just listening. It is often very helpful to talk about your loss with people who will allow you to express your emotions. 3. Take care of your health. Often after a loss, we stop doing the things we need to for health care, such as exercising, eating correctly, keeping Dr. appointments, or taking prescribed medications. If you are on a health care regimen, it is important to continue to adhere to your treatment. 4. Postpone major life changes. Give yourself time to adjust to your loss before making plans to change jobs, move or sell your home, remarry, etc.  Grief can sometimes cloud your judgment and ability to make decisions. 5. Consider keeping a journal.  It is often helpful to write or tell the story of your loss and what it means to you as a way to work through your feelings. 6. Participate in activities. Staying active through exercise, enjoyable activities, outings with supportive others, or even starting new hobbies can help us get through tough times while providing opportunities for constructive development and use of energy. 7. Find a way to memorialize your loved one. Planting a tree or garden in the name of your loved one, dedicating a work to their memory, contributing to a reji in their name, and other such activities can be helpful. 8. Consider joining grief-support groups or contacting a grief counselor for additional support and help. Remember that depressive symptoms (feeling sad) are a fundamental part of normal bereavement. Staying active and finding support from others can help you to work through the grief process. References  MILTON Berkowitz, Ranulfo Leger T, OSEI Mcelroy, Ritesh HC, & CORI Morales. (2005). Does widowhood affect memory performance of older persons? Psychological Medicine, 35(2), 217-226. Chloe Nguyen TM, & JOHN Pham. (2005). Health behaviors associated with better quality of life for older bereaved persons. Journal of Palliative Medicine, 8(1), . [facility].  (2004). Working with grieving adults. Advances in Psychiatric Treatment, 10, 164-170. Nallely Varela JT, Angelica, [address] [place] [address], & Clara Patel. (2004). Life after death: Greif therapy after the suddent traumatic death of a family member. Perspectives in Psychiatric Care, 40(4), 091-063. Sonja, [facility], , TOMA Mittal, & FISH Anguiano.  (2000). Bereavement services in acute care settings. Death Studies, 24, 51-64. Giselle Ryan, [address], [place]  (2000). Psychotherapy of traumatic grief:  A review of evidence for psychotherapeutic treatment. Death Studies, 24, 479-495. WONG Morris. (2004). Connections between counseling theories and current theories of grief and mourning. Journal of Mental Health Counseling, 26(2), 125-145.

## 2021-11-08 NOTE — PROGRESS NOTES
That Behavioral Health Consultation  MICHAEL Barroso  11/8/2021   9:16 AM JOE Ramachandran, was evaluated through a synchronous (real-time) audio-video encounter. The patient (or guardian if applicable) is aware that this is a billable service. Verbal consent to proceed has been obtained within the past 12 months. The visit was conducted pursuant to the emergency declaration under the [address] [place] [address] authority and the [employer] and "[employer] General Act. Patient identification was verified, and a caregiver was present when appropriate. The patient was located in a state where the provider was credentialed to provide care. Time spent with Patient: 38 minutes  This is patient's first [facility] appointment. Reason for Consult:    Chief Complaint   Patient presents with    Stress     Referring Provider: Sivan Mckenna DO  [address]    Patient provided informed consent for the behavioral health program. Discussed with patient model of service to include the limits of confidentiality (i.e. abuse reporting, suicide intervention, etc.) and short-term intervention focused approach. Pt indicated understanding. Feedback given to PCP. Verified the following information:  Patient's identification: Yes  Patient location: [facility] [id number]   Patient's call back number: [phone]   Patient's emergency contact's name and number, as well as permission to contact them if needed: Extended Emergency Contact Information  Primary Emergency Contact: Umesh Burr[address] Phone: [phone]  Relation: Brother/Sister   needed? No     Provider location: [address] [place] [address]:  Patient presents with concerns about her daughter, Umesh Burr, and her 9 year grandson, who has significant health problems. Pt adopted her daughter at birth, her biological mother was 13years old.  Pt reports that daughter's biological mother has come in and out of her life. Four years ago, Pt and daughter's relationship changed, due to inaccurate statements that the biological mother told Maritza Schneider. Pt hasn't seen her grandson in 3 years as a result, however, they had been living in [place]. Pt's biological mother returned last October. Pt's daughter (28 years old) and 9year old son just moved to [place] to be closer to her biological mother. Pt is worried about her grandson, he has Qwxce-Hcikwbf-Slcfjc syndrome. Pt is feeling very angry. Symptoms have been present for one month. Patient finds relief from her jonathon. Goals for treatment include managing symptoms. Patient lives with brother. Daily routine is sitting on the couch. Patient enjoys the following hobbies: scrap-booking, but this is expensive. Patient describes brother and friends as social support.  COVID vaccination status: Yes.    O:  MSE:    Appearance: good hygiene   Attitude: cooperative and friendly  Consciousness: alert  Orientation: oriented to person, place, time, general circumstance  Memory    recent and remote memory intact   Attention/Concentration    intact  Psychomotor Activity:normal  Eye Contact: normal  Speech: normal rate and volume, well-articulated  Mood    Anxious  Affect    normal affect  Perception: within normal limits  Thought Content: within normal limits  Thought Process: logical, coherent and goal-directed  Associations    logical connections  Insight: good  Judgment    Intact  Appetite normal  Sleep disturbance No  Fatigue No  Loss of pleasure No  Impulsive behavior Yes  Morbid Ideation: no   Suicide Assessment: no suicidal ideation, plan, or intent  Homicidal Ideation: no    History:    Medications:   Current Outpatient Medications   Medication Sig Dispense Refill    escitalopram (LEXAPRO) 20 MG tablet Take 1 tablet by mouth daily 30 tablet 2    topiramate (TOPAMAX) 25 MG tablet Take 2 tablets by mouth twice daily 360 tablet 1    methylphenidate (RITALIN) 10 MG tablet Take 1 tablet by mouth 2 times daily for 30 days. 60 tablet 0    pantoprazole (PROTONIX) 40 MG tablet Take 1 tablet by mouth twice daily 180 tablet 5    levothyroxine (EUTHYROX) 100 MCG tablet Take 1 tablet by mouth once daily 30 tablet 5    sucralfate (CARAFATE) 1 GM tablet Take 1 tablet by mouth 4 times daily 120 tablet 5    L-Arginine 500 MG TABS Take 1,000 mg by mouth 2 times daily 180 tablet 3    furosemide (LASIX) 20 MG tablet Take 1 tablet by mouth once daily 90 tablet 3    valsartan (DIOVAN) 160 MG tablet Take 1 tablet by mouth once daily 90 tablet 3    rosuvastatin (CRESTOR) 5 MG tablet Take 1 tablet by mouth daily 90 tablet 3    metoprolol tartrate (LOPRESSOR) 25 MG tablet Take 1 tablet by mouth twice daily 180 tablet 3    isosorbide mononitrate (IMDUR) 30 MG extended release tablet TAKE ONE TABLET BY MOUTH ONCE DAILY 90 tablet 3    NONFORMULARY 5 - LOXIN (given by Mabel WOODS)      aspirin 81 MG tablet Take 81 mg by mouth daily       No current facility-administered medications for this visit.      Social History:   Social History     Socioeconomic History    Marital status:      Spouse name: Not on file    Number of children: Not on file    Years of education: Not on file    Highest education level: Not on file   Occupational History     Employer: [employer]   Tobacco Use    Smoking status: Never Smoker    Smokeless tobacco: Never Used   Vaping Use    Vaping Use: Never used   Substance and Sexual Activity    Alcohol use: No     Alcohol/week: 0.0 standard drinks    Drug use: No    Sexual activity: Never   Other Topics Concern    Not on file   Social History Narrative    Not on file     Social Determinants of Health     Financial Resource Strain: Low Risk     Difficulty of Paying Living Expenses: Not hard at all   Food Insecurity: No Food Insecurity    Worried About [address] [employer] in the Last Year: Never true    [address] [facility] [address] in the Last Year: Never true   Transportation Needs:     Lack of Transportation (Medical): Not on file    Lack of Transportation (Non-Medical): Not on file   Physical Activity:     Days of Exercise per Week: Not on file    Minutes of Exercise per Session: Not on file   Stress:     Feeling of Stress : Not on file   Social Connections:     Frequency of Communication with Friends and Family: Not on file    Frequency of Social Gatherings with Friends and Family: Not on file    Attends Buddhism Services: Not on file    Active Member of [address] [place] [address] [employer] or Organizations: Not on file    Attends Club or Organization Meetings: Not on file    Marital Status: Not on file   Intimate Partner Violence:     Fear of Current or Ex-Partner: Not on file    Emotionally Abused: Not on file    Physically Abused: Not on file    Sexually Abused: Not on file   Housing Stability:     Unable to Pay for Housing in the Last Year: Not on file    Number of [place] in the Last Year: Not on file    Unstable Housing in the Last Year: Not on file     TOBACCO:   reports that she has never smoked. She has never used smokeless tobacco.  ETOH:   reports no history of alcohol use. Family History:   Family History   Problem Relation Age of Onset    Diabetes Mother     Heart Disease Mother     Cancer Mother         ovarian    Heart Disease Father     Stroke Father     Early Death Father         46 - heart attack    Heart Disease Sister     Stroke Sister     Cancer Sister         ovarian         A:  Patient endorses stable mood. Denies SI/HI, with good insight and motivation. Diagnosis:    1.  Anxiety          Past Diagnosis:        Diagnosis Date    Acid reflux     small stomach ulcer    Angina pectoris, variant (HCC)     Anxiety     Arthritis     Asthma     Benign esophageal stricture 12/2016    Benign tumor 03/20/2017     Benign brain tumor size of a quater    Cancer ([facility] [address]     ovarian    Depression     Frequency     Hyperlipidemia     Hypertension     Kidney stones     MI, old     Narcolepsy     Sleep apnea     Thyroid disease     Urgency of urination            Plan:  Pt interventions:  Trained in strategies for increasing balanced thinking  Established rapport  Conducted functional assessment  [place]-setting to identify pt's primary goals for [facility] visit / overall health  Collaborative treatment planning,Clarified role of [facility] in primary care,Recommended that pt establish with a mental health clinician with whom they can meet regularly for psychotherapy services    Pt Behavioral Change Plan:   See Pt Instructions

## 2021-11-22 ENCOUNTER — VIRTUAL VISIT (OUTPATIENT)
Dept: PSYCHOLOGY | Age: 71
End: 2021-11-22
Payer: MEDICARE

## 2021-11-22 DIAGNOSIS — F41.9 ANXIETY: Primary | ICD-10-CM

## 2021-11-22 PROCEDURE — 98968 PH1 ASSMT&MGMT NQHP 21-30: CPT | Performed by: SOCIAL WORKER

## 2021-11-22 NOTE — PROGRESS NOTES
Behavioral Health Consultation- Follow UP  MICHAEL Constantino  11/22/2021   12:22 PM      Tonya Ritter, was evaluated through a synchronous (real-time) audio encounter. The patient (or guardian if applicable) is aware that this is a billable service. Verbal consent to proceed has been obtained within the past 12 months. The visit was conducted pursuant to the emergency declaration under the [facility], [facility] and the [employer] and [employer] General Act. Patient identification was verified, and a caregiver was present when appropriate. The patient was located in a state where the provider was credentialed to provide care. Time spent with Patient: 33 minutes  This is patient's second  [facility] appointment. Reason for Consult:    Chief Complaint   Patient presents with    Anxiety     Referring Provider: Lulú Fernandes DO  [address] [place] [address]    Patient provided informed consent for the behavioral health program. Discussed with patient model of service to include the limits of confidentiality (i.e. abuse reporting, suicide intervention, etc.) and short-term intervention focused approach. Pt indicated understanding. Feedback given to PCP. Verified the following information:  Patient's identification: Yes  Patient location: [facility] [id number]   Patient's call back number: [phone]   Patient's emergency contact's name and number, as well as permission to contact them if needed: Extended Emergency Contact Information  Primary Emergency Contact: [facility] [address] Phone: [phone]  Relation: Brother/Sister   needed? No     Provider location: [address]:  Last appointment 11/8/21    Pt spent time with her oldest daughter. Pt states this was really helpful for her mood.  Pt states that she has been re-framing thinking about Priscila Arreaga and spending time with friends. This has helped her focus thoughts on other areas of her life. Pt states that she has several of her Ashley's belongings and is trying to decide what to do with it. Pt states that she has a strong support system with daughter, granddaughter, brother and friends. Pt and her brother continue to look for a house to buy. Pt's lease is up in February, and discussed that she will have to sign a year lease. Pt states that the landlord states they will let her out of the lease early if that is the case. Pt continues to walk as well. Pt has an appointment with ENT on 11/24 for a growth on her nose. [facility] beneficial for additional support. O:  MSE:    Appearance: Not assessed  Attitude: cooperative and friendly  Consciousness: alert  Orientation: oriented to person, place, time, general circumstance  Memory    recent and remote memory intact  Attention/Concentration    intact  Psychomotor Activity:Not assessed  Eye Contact: Not assessed  Speech: normal rate and volume, well-articulated  Mood    Anxious  Affect    normal affect  Perception: within normal limits  Thought Content: within normal limits  Thought Process: logical, coherent and goal-directed  Associations    logical connections  Insight: good  Judgment    Intact  Appetite normal  Sleep disturbance No  Fatigue No  Loss of pleasure No  Impulsive behavior Yes  Morbid Ideation: no   Suicide Assessment: no suicidal ideation, plan, or intent  Homicidal Ideation: no    History:    Medications:   Current Outpatient Medications   Medication Sig Dispense Refill    escitalopram (LEXAPRO) 20 MG tablet Take 1 tablet by mouth daily 30 tablet 2    topiramate (TOPAMAX) 25 MG tablet Take 2 tablets by mouth twice daily 360 tablet 1    methylphenidate (RITALIN) 10 MG tablet Take 1 tablet by mouth 2 times daily for 30 days.  60 tablet 0    pantoprazole (PROTONIX) 40 MG tablet Take 1 tablet by mouth twice daily 180 tablet 5    levothyroxine (EUTHYROX) 100 MCG tablet Take 1 tablet by mouth once daily 30 tablet 5    sucralfate (CARAFATE) 1 GM tablet Take 1 tablet by mouth 4 times daily 120 tablet 5    L-Arginine 500 MG TABS Take 1,000 mg by mouth 2 times daily 180 tablet 3    furosemide (LASIX) 20 MG tablet Take 1 tablet by mouth once daily 90 tablet 3    valsartan (DIOVAN) 160 MG tablet Take 1 tablet by mouth once daily 90 tablet 3    rosuvastatin (CRESTOR) 5 MG tablet Take 1 tablet by mouth daily 90 tablet 3    metoprolol tartrate (LOPRESSOR) 25 MG tablet Take 1 tablet by mouth twice daily 180 tablet 3    isosorbide mononitrate (IMDUR) 30 MG extended release tablet TAKE ONE TABLET BY MOUTH ONCE DAILY 90 tablet 3    NONFORMULARY 5 - LOXIN (given by Mabel WOODS)      aspirin 81 MG tablet Take 81 mg by mouth daily       No current facility-administered medications for this visit. Social History:   Social History     Socioeconomic History    Marital status:      Spouse name: Not on file    Number of children: Not on file    Years of education: Not on file    Highest education level: Not on file   Occupational History     Employer: [employer]   Tobacco Use    Smoking status: Never Smoker    Smokeless tobacco: Never Used   Vaping Use    Vaping Use: Never used   Substance and Sexual Activity    Alcohol use: No     Alcohol/week: 0.0 standard drinks    Drug use: No    Sexual activity: Never   Other Topics Concern    Not on file   Social History Narrative    Not on file     Social Determinants of Health     Financial Resource Strain: Low Risk     Difficulty of Paying Living Expenses: Not hard at all   Food Insecurity: No Food Insecurity    Worried About [address] [employer] in the Last Year: Never true    [facility] in the Last Year: Never true   Transportation Needs:     Lack of Transportation (Medical): Not on file    Lack of Transportation (Non-Medical):  Not on file   Physical Activity:     Days of Exercise per Week: Not on file    Minutes of Exercise per Session: Not on file   Stress:     Feeling of Stress : Not on file   Social Connections:     Frequency of Communication with Friends and Family: Not on file    Frequency of Social Gatherings with Friends and Family: Not on file    Attends Congregation Services: Not on file    Active Member of Clubs or Organizations: Not on file    Attends Club or Organization Meetings: Not on file    Marital Status: Not on file   Intimate Partner Violence:     Fear of Current or Ex-Partner: Not on file    Emotionally Abused: Not on file    Physically Abused: Not on file    Sexually Abused: Not on file   Housing Stability:     Unable to Pay for Housing in the Last Year: Not on file    Number of [place] in the Last Year: Not on file    Unstable Housing in the Last Year: Not on file     TOBACCO:   reports that she has never smoked. She has never used smokeless tobacco.  ETOH:   reports no history of alcohol use. Family History:   Family History   Problem Relation Age of Onset    Diabetes Mother     Heart Disease Mother     Cancer Mother         ovarian    Heart Disease Father     Stroke Father     Early Death Father         46 - heart attack    Heart Disease Sister     Stroke Sister     Cancer Sister         ovarian         A:  Patient endorses stable mood. Denies SI/HI, with good insight and motivation. Diagnosis:    1.  Anxiety          Past Diagnosis:        Diagnosis Date    Acid reflux     small stomach ulcer    Angina pectoris, variant (HCC)     Anxiety     Arthritis     Asthma     Benign esophageal stricture 12/2016    Benign tumor 03/20/2017     Benign brain tumor size of a quater    Cancer ([facility] [address]     ovarian    Depression     Frequency     Hyperlipidemia     Hypertension     Kidney stones     MI, old     Narcolepsy     Sleep apnea     Thyroid disease     Urgency of urination          Plan:  Patient interventions:  Emphasized self-care as important for managing overall health  Discussed and set plan for behavioral activation  Trained in strategies for increasing balanced thinking  Supportive techniques    Patient Behavioral Change Plan:   See Patient Instructions

## 2021-11-22 NOTE — PATIENT INSTRUCTIONS
1. Go through Ashley's belongings to identify what you will keep and what you will discard. 2. Continue spending time with your friend once/week. 3. Remind yourself frequently that your daughter is a good care taker for your grandson. 4. Continue to walk.

## 2021-12-03 ENCOUNTER — TELEPHONE (OUTPATIENT)
Dept: CARDIOLOGY CLINIC | Age: 71
End: 2021-12-03

## 2021-12-03 RX ORDER — ROSUVASTATIN CALCIUM 5 MG/1
TABLET, COATED ORAL
Qty: 90 TABLET | Refills: 3 | Status: SHIPPED | OUTPATIENT
Start: 2021-12-03

## 2021-12-03 RX ORDER — ISOSORBIDE MONONITRATE 30 MG/1
TABLET, EXTENDED RELEASE ORAL
Qty: 90 TABLET | Refills: 3 | Status: SHIPPED | OUTPATIENT
Start: 2021-12-03

## 2021-12-03 RX ORDER — FUROSEMIDE 20 MG/1
TABLET ORAL
Qty: 90 TABLET | Refills: 3 | Status: SHIPPED | OUTPATIENT
Start: 2021-12-03

## 2021-12-03 NOTE — TELEPHONE ENCOUNTER
JJP please sign off on pt's scripts Lasix 20 mg, Rosuvastatin 5mg, Metoprolol 25 mg, and Imdur 30 mg. scripts are to go to [facility].      TY

## 2021-12-03 NOTE — TELEPHONE ENCOUNTER
Pt calling regarding refill requests above. Pt has already been to the pharmacy once to pick them up and they were not ready. Please refill asap.

## 2021-12-06 ENCOUNTER — OFFICE VISIT (OUTPATIENT)
Dept: FAMILY MEDICINE CLINIC | Age: 71
End: 2021-12-06
Payer: MEDICARE

## 2021-12-06 VITALS
SYSTOLIC BLOOD PRESSURE: 148 MMHG | OXYGEN SATURATION: 96 % | WEIGHT: 218 LBS | DIASTOLIC BLOOD PRESSURE: 86 MMHG | HEART RATE: 74 BPM | TEMPERATURE: 98.5 F | RESPIRATION RATE: 16 BRPM | BODY MASS INDEX: 36.28 KG/M2

## 2021-12-06 DIAGNOSIS — G47.419 PRIMARY NARCOLEPSY WITHOUT CATAPLEXY: ICD-10-CM

## 2021-12-06 PROCEDURE — 99213 OFFICE O/P EST LOW 20 MIN: CPT | Performed by: FAMILY MEDICINE

## 2021-12-06 RX ORDER — METHYLPHENIDATE HYDROCHLORIDE 10 MG/1
10 TABLET ORAL 2 TIMES DAILY
Qty: 60 TABLET | Refills: 0 | Status: SHIPPED | OUTPATIENT
Start: 2021-12-06 | End: 2022-01-07 | Stop reason: SDUPTHER

## 2021-12-06 RX ORDER — LEVOTHYROXINE SODIUM 0.1 MG/1
TABLET ORAL
Qty: 30 TABLET | Refills: 5 | Status: SHIPPED | OUTPATIENT
Start: 2021-12-06 | End: 2022-03-17 | Stop reason: SDUPTHER

## 2021-12-06 ASSESSMENT — PATIENT HEALTH QUESTIONNAIRE - PHQ9
SUM OF ALL RESPONSES TO PHQ QUESTIONS 1-9: 1
2. FEELING DOWN, DEPRESSED OR HOPELESS: 1
SUM OF ALL RESPONSES TO PHQ9 QUESTIONS 1 & 2: 1
SUM OF ALL RESPONSES TO PHQ QUESTIONS 1-9: 1
SUM OF ALL RESPONSES TO PHQ QUESTIONS 1-9: 1
1. LITTLE INTEREST OR PLEASURE IN DOING THINGS: 0

## 2021-12-06 ASSESSMENT — ENCOUNTER SYMPTOMS: SHORTNESS OF BREATH: 0

## 2021-12-06 NOTE — PROGRESS NOTES
Subjective:      Patient ID: Jessica Brody is a 80 y.o. y.o. female. Here to follow up nerve situation / exacerbation    We increased the Lexapro dose-  She has met with counselor. Feels she is a bit less stressed- degree wise-. Feels meeting with Sarah Vences has helped.       HPI      Chief Complaint   Patient presents with    Medication Check     1 month f/u       Allergies   Allergen Reactions    Latex Rash    Vistaril [Hydroxyzine Hcl]        Past Medical History:   Diagnosis Date    Acid reflux     small stomach ulcer    Angina pectoris, variant (HCC)     Anxiety     Arthritis     Asthma     Benign esophageal stricture 12/2016    Benign tumor 03/20/2017     Benign brain tumor size of a quater    Cancer ([address]     ovarian    Depression     Frequency     Hyperlipidemia     Hypertension     Kidney stones     MI, old     Narcolepsy     Sleep apnea     Thyroid disease     Urgency of urination        Past Surgical History:   Procedure Laterality Date    ARM SURGERY Right 8/19/14    exc.trapezium and flexor carpi radialis interpositional arthroplasty    BREAST SURGERY      reduction    CARPAL TUNNEL RELEASE Right     CHOLECYSTECTOMY      COLONOSCOPY     Rebbecca Hinders DENTAL SURGERY      ENDOSCOPY, COLON, DIAGNOSTIC  01/06/2017    Anastomotic ulcer, gastritis    ESOPHAGEAL DILATATION  12/2016    GASTRIC BYPASS SURGERY      GASTRIC BYPASS SURGERY      HYSTERECTOMY      LITHOTRIPSY Left 11/12/2013    LITHOTRIPSY Left 12/26/13    LEFT ESWL    DC NJX DX/THER SBST INTRLMNR CRV/THRC W/IMG GDN Right 8/27/2018    RIGHT LUMBAR FIVE SACRAL ONE EPIDURAL STEROID INJECTION SITE CONFIRMED BY FLUOROSCOPY performed by Srinivasa Rosado MD at [address] [facility]      UPPER GASTROINTESTINAL ENDOSCOPY  03/03/2017    Gastritis       Social History     Socioeconomic History    Marital status:      Spouse name: Not on file    Number of children: Not on file    Years of education: Not on file    Highest education level: Not on file   Occupational History     Employer: [employer]   Tobacco Use    Smoking status: Never Smoker    Smokeless tobacco: Never Used   Vaping Use    Vaping Use: Never used   Substance and Sexual Activity    Alcohol use: No     Alcohol/week: 0.0 standard drinks    Drug use: No    Sexual activity: Never   Other Topics Concern    Not on file   Social History Narrative    Not on file     Social Determinants of Health     Financial Resource Strain: Low Risk     Difficulty of Paying Living Expenses: Not hard at all   Food Insecurity: No Food Insecurity    Worried About Running Out of Food in the Last Year: Never true    Moshe [employer] Food in the Last Year: Never true   Transportation Needs:     Lack of Transportation (Medical): Not on file    Lack of Transportation (Non-Medical):  Not on file   Physical Activity:     Days of Exercise per Week: Not on file    Minutes of Exercise per Session: Not on file   Stress:     Feeling of Stress : Not on file   Social Connections:     Frequency of Communication with Friends and Family: Not on file    Frequency of Social Gatherings with Friends and Family: Not on file    Attends Yarsanism Services: Not on file    Active Member of [address] [place] [address] [employer] or Organizations: Not on file    Attends Club or Organization Meetings: Not on file    Marital Status: Not on file   Intimate Partner Violence:     Fear of Current or Ex-Partner: Not on file    Emotionally Abused: Not on file    Physically Abused: Not on file    Sexually Abused: Not on file   Housing Stability:     Unable to Pay for Housing in the Last Year: Not on file    Number of [place] in the Last Year: Not on file    Unstable Housing in the Last Year: Not on file       Family History   Problem Relation Age of Onset    Diabetes Mother     Heart Disease Mother     Cancer Mother         ovarian    Heart Disease Father     Stroke Father     Early Death Father         46 - heart attack    Heart Disease Sister     Stroke Sister     Cancer Sister         ovarian       Vitals:    12/06/21 1147   BP: (!) 148/86   Pulse:    Resp:    Temp:    SpO2:        Wt Readings from Last 3 Encounters:   12/06/21 218 lb (98.9 kg)   11/04/21 220 lb (99.8 kg)   10/07/21 229 lb (103.9 kg)       Review of Systems   Constitutional:        Stable medically. Has been a bit less anxious. Counselor has given her strategies for coping and moving on. They are helping some. Respiratory: Negative for shortness of breath. Objective:   Physical Exam  Constitutional:       Appearance: She is not ill-appearing. Neurological:      Mental Status: She is alert. Psychiatric:      Comments: Sound better,  Does not seem as anxious / stressed. More emotionally set and in control. Assessment:      Narcolepsy  Depression  Situational stress/anxiety        Plan:     Continue the current , higher dose, of the Lexapro. Continue with counselor. Strategies discussed.     If stable, follow 2-3 months        Current Outpatient Medications   Medication Sig Dispense Refill    furosemide (LASIX) 20 MG tablet Take 1 tablet by mouth once daily 90 tablet 3    rosuvastatin (CRESTOR) 5 MG tablet Take 1 tablet by mouth once daily 90 tablet 3    metoprolol tartrate (LOPRESSOR) 25 MG tablet Take 1 tablet by mouth twice daily 180 tablet 3    isosorbide mononitrate (IMDUR) 30 MG extended release tablet Take 1 tablet by mouth once daily 90 tablet 3    escitalopram (LEXAPRO) 20 MG tablet Take 1 tablet by mouth daily 30 tablet 2    topiramate (TOPAMAX) 25 MG tablet Take 2 tablets by mouth twice daily 360 tablet 1    pantoprazole (PROTONIX) 40 MG tablet Take 1 tablet by mouth twice daily 180 tablet 5    levothyroxine (EUTHYROX) 100 MCG tablet Take 1 tablet by mouth once daily 30 tablet 5    sucralfate (CARAFATE) 1 GM tablet Take 1 tablet by mouth 4 times daily 120 tablet 5    L-Arginine 500 MG TABS Take 1,000 mg by mouth 2 times daily 180 tablet 3    valsartan (DIOVAN) 160 MG tablet Take 1 tablet by mouth once daily 90 tablet 3    NONFORMULARY 5 - LOXIN (given by Mabel WOODS)      aspirin 81 MG tablet Take 81 mg by mouth daily       No current facility-administered medications for this visit.

## 2021-12-06 NOTE — PATIENT INSTRUCTIONS
Continue the same dose of the lexapro,  Continue with Jennifer for counseling.     See me 2-3 months

## 2021-12-07 ENCOUNTER — TELEPHONE (OUTPATIENT)
Dept: PSYCHOLOGY | Age: 71
End: 2021-12-07

## 2021-12-07 ENCOUNTER — VIRTUAL VISIT (OUTPATIENT)
Dept: PSYCHOLOGY | Age: 71
End: 2021-12-07

## 2021-12-07 DIAGNOSIS — F43.21 GRIEF: ICD-10-CM

## 2021-12-07 DIAGNOSIS — F41.9 ANXIETY: Primary | ICD-10-CM

## 2021-12-07 PROCEDURE — 98968 PH1 ASSMT&MGMT NQHP 21-30: CPT | Performed by: SOCIAL WORKER

## 2021-12-07 NOTE — PROGRESS NOTES
Behavioral Health Consultation- Follow UP  MICHAEL Rodgers  12/7/2021   3:59 PM      Kaity Neal, was evaluated through a synchronous (real-time) audio-video encounter. The patient (or guardian if applicable) is aware that this is a billable service. Verbal consent to proceed has been obtained within the past 12 months. The visit was conducted pursuant to the emergency declaration under the [facility] [address] authority and the [employer] General Act. Patient identification was verified, and a caregiver was present when appropriate. The patient was located in a state where the provider was credentialed to provide care. Time spent with Patient: 40 minutes  This is patient's third  [facility] appointment. Reason for Consult:    Chief Complaint   Patient presents with    Other     grief     Referring Provider: Lisa Christianson DO  [address] [facility]  [address] [place] [address]    Patient provided informed consent for the behavioral health program. Discussed with patient model of service to include the limits of confidentiality (i.e. abuse reporting, suicide intervention, etc.) and short-term intervention focused approach. Pt indicated understanding. Feedback given to PCP. Verified the following information:  Patient's identification: Yes  Patient location: [place] [id number]   Patient's call back number: [phone]   Patient's emergency contact's name and number, as well as permission to contact them if needed: Extended Emergency Contact Information  Primary Emergency Contact: [address] Phone: [phone]  Relation: Brother/Sister   needed? No     Provider location: [address]:  Last appointment 11/22/21    Pt reports that she is doing well. Pt reports that she and her brother may have found a new house. They are planning to put in an offer on it.  Pt reports feeling less anxious. Pt reports no side effects from medications. Pt reports that she was out of her heart medication, despite requesting refill from the cardiologist last Monday. Pt advocated for herself and received her refill yesterday. Pt has lost 2 lbs. Pt reports that spending time with her daughter, and talking about her feelings is helpful. Pt reports decreased anger around her daughter and grandson. Pt reports that her jonathon is helpful. Pt discussed healthy coping techniques. Pt reports that she is thinking about finding her grandson's father. Discussed pros/cons of this. Pt reports that she does not have contact with her son. Pt states that she may see him on [address] and will attempt to talk with him about the upset in the relationship. Pt discussed relationships with her children and emotions related to discord in relationships. O:  MSE:    Appearance: Not assessed  Attitude: cooperative and friendly  Consciousness: alert  Orientation: oriented to person, place, time, general circumstance  Memory    recent and remote memory intact  Attention/Concentration    intact  Psychomotor Activity:Not assessed  Eye Contact: Not assessed  Speech: normal rate and volume, well-articulated  Mood    Euthymic  Affect    Not assessed  Perception: within normal limits  Thought Content: within normal limits  Thought Process: logical, coherent and goal-directed  Associations    logical connections  Insight: good  Judgment    Intact  Appetite normal  Sleep disturbance No  Fatigue No  Loss of pleasure No  Impulsive behavior No  Morbid Ideation: no   Suicide Assessment: no suicidal ideation, plan, or intent  Homicidal Ideation: no    History:    Medications:   Current Outpatient Medications   Medication Sig Dispense Refill    levothyroxine (EUTHYROX) 100 MCG tablet Take 1 tablet by mouth once daily 30 tablet 5    methylphenidate (RITALIN) 10 MG tablet Take 1 tablet by mouth 2 times daily for 30 days.  60 tablet 0    furosemide (LASIX) 20 MG tablet Take 1 tablet by mouth once daily 90 tablet 3    rosuvastatin (CRESTOR) 5 MG tablet Take 1 tablet by mouth once daily 90 tablet 3    metoprolol tartrate (LOPRESSOR) 25 MG tablet Take 1 tablet by mouth twice daily 180 tablet 3    isosorbide mononitrate (IMDUR) 30 MG extended release tablet Take 1 tablet by mouth once daily 90 tablet 3    escitalopram (LEXAPRO) 20 MG tablet Take 1 tablet by mouth daily 30 tablet 2    topiramate (TOPAMAX) 25 MG tablet Take 2 tablets by mouth twice daily 360 tablet 1    pantoprazole (PROTONIX) 40 MG tablet Take 1 tablet by mouth twice daily 180 tablet 5    sucralfate (CARAFATE) 1 GM tablet Take 1 tablet by mouth 4 times daily 120 tablet 5    L-Arginine 500 MG TABS Take 1,000 mg by mouth 2 times daily 180 tablet 3    valsartan (DIOVAN) 160 MG tablet Take 1 tablet by mouth once daily 90 tablet 3    NONFORMULARY 5 - LOXIN (given by Mabel WOODS)      aspirin 81 MG tablet Take 81 mg by mouth daily       No current facility-administered medications for this visit.      Social History:   Social History     Socioeconomic History    Marital status:      Spouse name: Not on file    Number of children: Not on file    Years of education: Not on file    Highest education level: Not on file   Occupational History     Employer: [employer]   Tobacco Use    Smoking status: Never Smoker    Smokeless tobacco: Never Used   Vaping Use    Vaping Use: Never used   Substance and Sexual Activity    Alcohol use: No     Alcohol/week: 0.0 standard drinks    Drug use: No    Sexual activity: Never   Other Topics Concern    Not on file   Social History Narrative    Not on file     Social Determinants of Health     Financial Resource Strain: Low Risk     Difficulty of Paying Living Expenses: Not hard at all   Food Insecurity: No Food Insecurity    Worried About [address] in the Last Year: Never true    [address] in the Last Year: Never true Transportation Needs:     Lack of Transportation (Medical): Not on file    Lack of Transportation (Non-Medical): Not on file   Physical Activity:     Days of Exercise per Week: Not on file    Minutes of Exercise per Session: Not on file   Stress:     Feeling of Stress : Not on file   Social Connections:     Frequency of Communication with Friends and Family: Not on file    Frequency of Social Gatherings with Friends and Family: Not on file    Attends Hoah[employer]ism Services: Not on file    Active Member of [address] [place] [address] [employer] or Organizations: Not on file    Attends Club or Organization Meetings: Not on file    Marital Status: Not on file   Intimate Partner Violence:     Fear of Current or Ex-Partner: Not on file    Emotionally Abused: Not on file    Physically Abused: Not on file    Sexually Abused: Not on file   Housing Stability:     Unable to Pay for Housing in the Last Year: Not on file    Number of [place] in the Last Year: Not on file    Unstable Housing in the Last Year: Not on file     TOBACCO:   reports that she has never smoked. She has never used smokeless tobacco.  ETOH:   reports no history of alcohol use. Family History:   Family History   Problem Relation Age of Onset    Diabetes Mother     Heart Disease Mother     Cancer Mother         ovarian    Heart Disease Father     Stroke Father     Early Death Father         46 - heart attack    Heart Disease Sister     Stroke Sister     Cancer Sister         ovarian         A:  Patient endorses stable mood. Denies SI/HI, with good insight and motivation. Diagnosis:    1. Anxiety    2.  Grief          Past Diagnosis:        Diagnosis Date    Acid reflux     small stomach ulcer    Angina pectoris, variant (HCC)     Anxiety     Arthritis     Asthma     Benign esophageal stricture 12/2016    Benign tumor 03/20/2017     Benign brain tumor size of a quater    Cancer ([facility] [address]     ovarian    Depression     Frequency     Hyperlipidemia     Hypertension     Kidney stones     MI, old     Narcolepsy     Sleep apnea     Thyroid disease     Urgency of urination          Plan:  Patient interventions:  Emphasized self-care as important for managing overall health  Trained in strategies for increasing balanced thinking  Supportive techniques    Patient Behavioral Change Plan:   See Patient Instructions

## 2021-12-10 ENCOUNTER — OFFICE VISIT (OUTPATIENT)
Dept: ENT CLINIC | Age: 71
End: 2021-12-10
Payer: MEDICARE

## 2021-12-10 VITALS
DIASTOLIC BLOOD PRESSURE: 80 MMHG | HEIGHT: 65 IN | WEIGHT: 218 LBS | SYSTOLIC BLOOD PRESSURE: 118 MMHG | TEMPERATURE: 97.2 F | BODY MASS INDEX: 36.32 KG/M2 | HEART RATE: 50 BPM

## 2021-12-10 DIAGNOSIS — T16.1XXA FOREIGN BODY OF RIGHT EAR, INITIAL ENCOUNTER: ICD-10-CM

## 2021-12-10 DIAGNOSIS — H93.13 TINNITUS OF BOTH EARS: ICD-10-CM

## 2021-12-10 DIAGNOSIS — L98.9 LESION OF FACE: Primary | ICD-10-CM

## 2021-12-10 PROCEDURE — 99203 OFFICE O/P NEW LOW 30 MIN: CPT | Performed by: OTOLARYNGOLOGY

## 2021-12-10 PROCEDURE — 11104 PUNCH BX SKIN SINGLE LESION: CPT | Performed by: OTOLARYNGOLOGY

## 2021-12-10 PROCEDURE — 69200 CLEAR OUTER EAR CANAL: CPT | Performed by: OTOLARYNGOLOGY

## 2021-12-10 ASSESSMENT — ENCOUNTER SYMPTOMS
COUGH: 0
SINUS PRESSURE: 0
SHORTNESS OF BREATH: 0
ROS SKIN COMMENTS: + SKIN LESION
SORE THROAT: 0
APNEA: 0
FACIAL SWELLING: 0
TROUBLE SWALLOWING: 0
VOICE CHANGE: 0
EYE ITCHING: 0

## 2021-12-10 NOTE — PROGRESS NOTES
[facility], [address], [address]  [place], [place] [place]  P: [phone]       Patient     Zelda Pimentel  1950    ChiefComplaint     Chief Complaint   Patient presents with    New Patient     Neoplasm of face. Patient states she has a lesion on her nose and below her left eye. Patient wears a CPAP and states it  the mask bothers those lesions       History of Present Illness     Gunjan Cadet is 73-year-old female here today with ulcerative lesion of the left face. Reports has been there for several years but has recently been growing in size. Notes her glasses and CPAP are starting to bother the area. No history of skin cancer. Also reports chronic bilateral tinnitus that is been present for several years. Finds it bothersome at night but is able to sleep.     Past Medical History     Past Medical History:   Diagnosis Date    Acid reflux     small stomach ulcer    Angina pectoris, variant (HCC)     Anxiety     Arthritis     Asthma     Benign esophageal stricture 12/2016    Benign tumor 03/20/2017     Benign brain tumor size of a quater    Cancer ([address]     ovarian    Depression     Frequency     Hyperlipidemia     Hypertension     Kidney stones     MI, old     Narcolepsy     Sleep apnea     Thyroid disease     Urgency of urination        Past Surgical History     Past Surgical History:   Procedure Laterality Date    ARM SURGERY Right 8/19/14    exc.trapezium and flexor carpi radialis interpositional arthroplasty    BREAST SURGERY      reduction    CARPAL TUNNEL RELEASE Right     CHOLECYSTECTOMY      COLONOSCOPY     Rory Rodrigues DENTAL SURGERY      ENDOSCOPY, COLON, DIAGNOSTIC  01/06/2017    Anastomotic ulcer, gastritis    ESOPHAGEAL DILATATION  12/2016    GASTRIC BYPASS SURGERY      GASTRIC BYPASS SURGERY      HYSTERECTOMY      LITHOTRIPSY Left 11/12/2013    LITHOTRIPSY Left 12/26/13    LEFT ESWL    AK NJX DX/THER SBST INTRLMNR CRV/THRC W/IMG GDN Right 8/27/2018 RIGHT LUMBAR FIVE SACRAL ONE EPIDURAL STEROID INJECTION SITE CONFIRMED BY FLUOROSCOPY performed by Edward Sarkar MD at [address] [facility]      UPPER GASTROINTESTINAL ENDOSCOPY  03/03/2017    Gastritis       Family History     Family History   Problem Relation Age of Onset    Diabetes Mother     Heart Disease Mother     Cancer Mother         ovarian    Heart Disease Father     Stroke Father     Early Death Father         46 - heart attack    Heart Disease Sister     Stroke Sister     Cancer Sister         ovarian       Social History     Social History     Tobacco Use    Smoking status: Never Smoker    Smokeless tobacco: Never Used   Vaping Use    Vaping Use: Never used   Substance Use Topics    Alcohol use: No     Alcohol/week: 0.0 standard drinks    Drug use: No        Allergies     Allergies   Allergen Reactions    Latex Rash    Vistaril [Hydroxyzine Hcl]        Medications     Current Outpatient Medications   Medication Sig Dispense Refill    levothyroxine (EUTHYROX) 100 MCG tablet Take 1 tablet by mouth once daily 30 tablet 5    methylphenidate (RITALIN) 10 MG tablet Take 1 tablet by mouth 2 times daily for 30 days.  60 tablet 0    furosemide (LASIX) 20 MG tablet Take 1 tablet by mouth once daily 90 tablet 3    rosuvastatin (CRESTOR) 5 MG tablet Take 1 tablet by mouth once daily 90 tablet 3    metoprolol tartrate (LOPRESSOR) 25 MG tablet Take 1 tablet by mouth twice daily 180 tablet 3    isosorbide mononitrate (IMDUR) 30 MG extended release tablet Take 1 tablet by mouth once daily 90 tablet 3    escitalopram (LEXAPRO) 20 MG tablet Take 1 tablet by mouth daily 30 tablet 2    topiramate (TOPAMAX) 25 MG tablet Take 2 tablets by mouth twice daily 360 tablet 1    pantoprazole (PROTONIX) 40 MG tablet Take 1 tablet by mouth twice daily 180 tablet 5    sucralfate (CARAFATE) 1 GM tablet Take 1 tablet by mouth 4 times daily 120 tablet 5    L-Arginine 500 MG TABS Take 1,000 mg by mouth 2 times daily 180 tablet 3    valsartan (DIOVAN) 160 MG tablet Take 1 tablet by mouth once daily 90 tablet 3    NONFORMULARY 5 - LOXIN (given by Mabel WOODS)      aspirin 81 MG tablet Take 81 mg by mouth daily       No current facility-administered medications for this visit. Review of Systems     Review of Systems   Constitutional: Negative for appetite change, chills, fatigue, fever and unexpected weight change. HENT: Positive for tinnitus. Negative for congestion, ear discharge, ear pain, facial swelling, hearing loss, nosebleeds, postnasal drip, sinus pressure, sneezing, sore throat, trouble swallowing and voice change. Eyes: Negative for itching. Respiratory: Negative for apnea, cough and shortness of breath. Endocrine: Negative for cold intolerance and heat intolerance. Musculoskeletal: Negative for myalgias and neck pain. Skin: Negative for rash. + skin lesion   Allergic/Immunologic: Negative for environmental allergies. Neurological: Negative for dizziness and headaches. Psychiatric/Behavioral: Negative for confusion, decreased concentration and sleep disturbance. PhysicalExam     Vitals:    12/10/21 1013   BP: 118/80   Site: Right Upper Arm   Position: Sitting   Pulse: 50   Temp: 97.2 °F (36.2 °C)   Weight: 218 lb (98.9 kg)   Height: 5' 5\" (1.651 m)       Physical Exam  Constitutional:       General: She is not in acute distress. Appearance: She is well-developed. HENT:      Head: Normocephalic and atraumatic. Right Ear: Tympanic membrane, ear canal and external ear normal. No drainage. No middle ear effusion. Tympanic membrane is not bulging. Tympanic membrane has normal mobility. Left Ear: External ear normal. No drainage. A foreign body is present. Nose: No mucosal edema or rhinorrhea. Mouth/Throat:      Lips: Pink. Mouth: Mucous membranes are moist.      Tongue: No lesions. Palate: No mass. Pharynx: Uvula midline. Eyes:      Pupils: Pupils are equal, round, and reactive to light. Neck:      Thyroid: No thyroid mass or thyromegaly. Trachea: Trachea and phonation normal.   Cardiovascular:      Pulses: Normal pulses. Pulmonary:      Effort: Pulmonary effort is normal. No accessory muscle usage or respiratory distress. Breath sounds: No stridor. Musculoskeletal:      Cervical back: Full passive range of motion without pain. Lymphadenopathy:      Head:      Right side of head: No submental or submandibular adenopathy. Left side of head: No submental or submandibular adenopathy. Cervical: No cervical adenopathy. Right cervical: No superficial, deep or posterior cervical adenopathy. Left cervical: No superficial, deep or posterior cervical adenopathy. Skin:     General: Skin is warm and dry. Neurological:      Mental Status: She is alert and oriented to person, place, and time. Cranial Nerves: No cranial nerve deficit. Coordination: Coordination normal.      Gait: Gait normal.   Psychiatric:         Thought Content: Thought content normal.           Procedure     Head/Neck Biopsy     Pre op: Skin lesion of the left medial cheek  Post op: Same  Procedure: Biopsy of skin lesion left medial cheek  Surgeon: Demetrius Brown  Anesthesia: 1% lidocaine with epinephrine 1:100,000; 0.5 cc used  Estimated Blood Loss: Minimal  The patient was placed in the examination chair in upright position. Risks and benefits of the procedure including pain, infection, inflammation, hemorrhage/hematoma were outlined. Local anesthesia was infiltrated with blanching appreciated. We waited 10 minutes to allow for adequate vasoconstriction. A 4mm punch biopsy was used to take a sample from the inferior border. This was placed into formalin for pathological evaluation. The site was closed with 5-0 fast absorbing gut in interrupted fashion.       *Patient tolerated the procedure well with no complications  *Patient instructed to place antibiotic ointment over the site three times daily for the next 4-5 days, then use vaseline      Removal foreign body in the left ear     An operating microscope was utilized to visualize the external auditory canals using a 3mm speculum. Alligator forcep used to remove Q-tip head from left EAC. The tympanic membrane is intact. No fluid visualized in the middle ear. No complications. Assessment and Plan     1. Lesion of face  -Punch biopsy performed, will call patient next week with diagnosis  -Discussed concern for squamous cell carcinoma given appearance, it does abut medial canthus and oculoplastics will likely need to be the ones to perform reconstruction due to its proximity to the eye    - SURGICAL PATHOLOGY    2. Tinnitus of both ears  -Chronic    3. Foreign body of right ear, initial encounter  -Removed without complication        Sharon Balderrama,   12/10/21      Portions of this note were dictated using Dragon.  There may be linguistic errors secondary to the use of this program.

## 2021-12-15 ENCOUNTER — TELEPHONE (OUTPATIENT)
Dept: ENT CLINIC | Age: 71
End: 2021-12-15

## 2021-12-15 DIAGNOSIS — C44.1192 BASAL CELL CARCINOMA (BCC) OF SKIN OF LEFT LOWER EYELID INCLUDING CANTHUS: Primary | ICD-10-CM

## 2021-12-15 NOTE — TELEPHONE ENCOUNTER
Voicemail left asking patient to return call. When patient returns call please let her know that the biopsy was positive for basal cell carcinoma which is a type of skin cancer like we discussed during biopsy. This needs to be excised by a Mohs surgeon and reconstructed by an eye physician. I have put in a referral for Dr. Richmond Santana, her office is going to reach out to the patient to schedule for an office removal and will get her to the eye surgeon she needs for reconstruction.

## 2021-12-15 NOTE — TELEPHONE ENCOUNTER
Pt called back and was given the information from Dr. Traci Maldonado. Pt was also given the phone number for the [facility] surgeon's office. Pt voiced understanding.

## 2021-12-16 ENCOUNTER — TELEPHONE (OUTPATIENT)
Dept: SURGERY | Age: 71
End: 2021-12-16

## 2021-12-16 NOTE — TELEPHONE ENCOUNTER
Call numbers on patient's chart and left voice messages to have her call me to set up an occuloplastic consultation with [facility]. Per Dr. Hayley Mead chart review. Sent documentation to [facility] to get a consult date.

## 2022-01-03 NOTE — PROGRESS NOTES
Behavioral Health Consultation- Follow UP  MICHAEL Samaniego  1/4/2022   12:07 PM      [place] Duty Sandrita Lawrence, was evaluated through a synchronous (real-time) audio encounter. The patient (or guardian if applicable) is aware that this is a billable service. Verbal consent to proceed has been obtained within the past 12 months. The visit was conducted pursuant to the emergency declaration under the [facility], [address] [place] [address] authority and the [employer] General Act. Patient identification was verified, and a caregiver was present when appropriate. The patient was located in a state where the provider was credentialed to provide care. Time spent with Patient: 31 minutes  This is patient's fourth  [facility] appointment. Reason for Consult:    Chief Complaint   Patient presents with    Follow-up     Referring Provider: Itzel Farris DO  [facility] [email or web address]  [address] [place] [address],  [address] [place] [address]    Patient provided informed consent for the behavioral health program. Discussed with patient model of service to include the limits of confidentiality (i.e. abuse reporting, suicide intervention, etc.) and short-term intervention focused approach. Pt indicated understanding. Feedback given to PCP. Verified the following information:  Patient's identification: Yes  Patient location: [place] [id number]   Patient's call back number: [phone]   Patient's emergency contact's name and number, as well as permission to contact them if needed: Extended Emergency Contact Information  Primary Emergency Contact: Lv Tierney[address] Phone: [phone]  Relation: Brother/Sister   needed? No     Provider location: [place]:  Last appointment 12/7/21    Pt reports doing \"great. \" Pt reports that she and her brother got a house. She states they are very excited, and have been talking about future plans for their home.  Pt states that she is having car trouble. Pt states that her brother will pay for her to fix the brakes in her car. Pt states that her brother has been and continues to be very supportive. Pt reports that she has several activities planned for the week. Pt will go to [facility] on Friday, and will get surgery to remove the spot on her nose. Pt reports this has been a busy week, which has been helpful for mood. Pt spent Jonathan with her daughter, and had a very fun Jonathan Beth. Pt reports some positive COVID cases within her family network. Pt continues to be in touch with family, and talked about her children and grandchildren. Pt states that she found out that Faizan Vega and Lisa Call are safe, and Lisa Call is getting good medical care. Pt discussed feelings related to Faizan Vega. O:  MSE:    Appearance: Not assessed  Attitude: cooperative and friendly  Consciousness: alert  Orientation: oriented to person, place, time, general circumstance  Memory    recent and remote memory intact  Attention/Concentration    intact  Psychomotor Activity:Not assessed  Eye Contact: Not assessed  Speech: normal rate and volume, well-articulated  Mood    Euthymic  Affect    Not assessed  Perception: within normal limits  Thought Content: within normal limits  Thought Process: logical, coherent and goal-directed  Associations    logical connections  Insight: good  Judgment    Intact  Appetite normal  Sleep disturbance No  Fatigue No  Loss of pleasure No  Impulsive behavior No  Morbid Ideation: no   Suicide Assessment: no suicidal ideation, plan, or intent  Homicidal Ideation: no    History:    Medications:   Current Outpatient Medications   Medication Sig Dispense Refill    levothyroxine (EUTHYROX) 100 MCG tablet Take 1 tablet by mouth once daily 30 tablet 5    methylphenidate (RITALIN) 10 MG tablet Take 1 tablet by mouth 2 times daily for 30 days.  60 tablet 0    furosemide (LASIX) 20 MG tablet Take 1 tablet by mouth once daily 90 tablet 3    rosuvastatin (CRESTOR) 5 MG tablet Take 1 tablet by mouth once daily 90 tablet 3    metoprolol tartrate (LOPRESSOR) 25 MG tablet Take 1 tablet by mouth twice daily 180 tablet 3    isosorbide mononitrate (IMDUR) 30 MG extended release tablet Take 1 tablet by mouth once daily 90 tablet 3    escitalopram (LEXAPRO) 20 MG tablet Take 1 tablet by mouth daily 30 tablet 2    topiramate (TOPAMAX) 25 MG tablet Take 2 tablets by mouth twice daily 360 tablet 1    pantoprazole (PROTONIX) 40 MG tablet Take 1 tablet by mouth twice daily 180 tablet 5    sucralfate (CARAFATE) 1 GM tablet Take 1 tablet by mouth 4 times daily 120 tablet 5    L-Arginine 500 MG TABS Take 1,000 mg by mouth 2 times daily 180 tablet 3    valsartan (DIOVAN) 160 MG tablet Take 1 tablet by mouth once daily 90 tablet 3    NONFORMULARY 5 - LOXIN (given by Mabel WOODS)      aspirin 81 MG tablet Take 81 mg by mouth daily       No current facility-administered medications for this visit. Social History:   Social History     Socioeconomic History    Marital status:      Spouse name: Not on file    Number of children: Not on file    Years of education: Not on file    Highest education level: Not on file   Occupational History     Employer: [employer]   Tobacco Use    Smoking status: Never Smoker    Smokeless tobacco: Never Used   Vaping Use    Vaping Use: Never used   Substance and Sexual Activity    Alcohol use: No     Alcohol/week: 0.0 standard drinks    Drug use: No    Sexual activity: Never   Other Topics Concern    Not on file   Social History Narrative    Not on file     Social Determinants of Health     Financial Resource Strain: Low Risk     Difficulty of Paying Living Expenses: Not hard at all   Food Insecurity: No Food Insecurity    Worried About [facility] in the Last Year: Never true    [facility] in the Last Year: Never true   Transportation Needs:     Lack of Transportation (Medical):  Not on file    Lack of Transportation (Non-Medical): Not on file   Physical Activity:     Days of Exercise per Week: Not on file    Minutes of Exercise per Session: Not on file   Stress:     Feeling of Stress : Not on file   Social Connections:     Frequency of Communication with Friends and Family: Not on file    Frequency of Social Gatherings with Friends and Family: Not on file    Attends Moravian Services: Not on file    Active Member of [address] [place] [address] [employer] or Organizations: Not on file    Attends Club or Organization Meetings: Not on file    Marital Status: Not on file   Intimate Partner Violence:     Fear of Current or Ex-Partner: Not on file    Emotionally Abused: Not on file    Physically Abused: Not on file    Sexually Abused: Not on file   Housing Stability:     Unable to Pay for Housing in the Last Year: Not on file    Number of [place] in the Last Year: Not on file    Unstable Housing in the Last Year: Not on file     TOBACCO:   reports that she has never smoked. She has never used smokeless tobacco.  ETOH:   reports no history of alcohol use. Family History:   Family History   Problem Relation Age of Onset    Diabetes Mother     Heart Disease Mother     Cancer Mother         ovarian    Heart Disease Father     Stroke Father     Early Death Father         46 - heart attack    Heart Disease Sister     Stroke Sister     Cancer Sister         ovarian         A:  Patient endorses stable mood. Denies SI/HI, with good insight and motivation. Diagnosis:    1.  Anxiety          Past Diagnosis:        Diagnosis Date    Acid reflux     small stomach ulcer    Angina pectoris, variant (HCC)     Anxiety     Arthritis     Asthma     Benign esophageal stricture 12/2016    Benign tumor 03/20/2017     Benign brain tumor size of a quater    Cancer ([facility] [address]     ovarian    Depression     Frequency     Hyperlipidemia     Hypertension     Kidney stones     MI, old     Narcolepsy     Sleep apnea     Thyroid disease  Urgency of urination          Plan:  Patient interventions:  Emphasized self-care as important for managing overall health  Supportive techniques  Promoted hope using strengths-based approach    Patient Behavioral Change Plan:   See Patient Instructions

## 2022-01-04 ENCOUNTER — VIRTUAL VISIT (OUTPATIENT)
Dept: PSYCHOLOGY | Age: 72
End: 2022-01-04
Payer: MEDICARE

## 2022-01-04 DIAGNOSIS — F41.9 ANXIETY: Primary | ICD-10-CM

## 2022-01-04 PROCEDURE — 98968 PH1 ASSMT&MGMT NQHP 21-30: CPT | Performed by: SOCIAL WORKER

## 2022-01-04 NOTE — PATIENT INSTRUCTIONS
1. Continue spending time with your friend once/week. 2. Continue to walk. 3. Remind yourself frequently that your daughter is a good care taker for your grandson.

## 2022-01-06 ENCOUNTER — HOSPITAL ENCOUNTER (OUTPATIENT)
Dept: MRI IMAGING | Age: 72
Discharge: HOME | End: 2022-01-06
Payer: MEDICARE

## 2022-01-06 DIAGNOSIS — D32.9 MENINGIOMA (HCC): ICD-10-CM

## 2022-01-06 PROCEDURE — 70553 MRI BRAIN STEM W/O & W/DYE: CPT

## 2022-01-06 PROCEDURE — A9579 GAD-BASE MR CONTRAST NOS,1ML: HCPCS | Performed by: NEUROLOGICAL SURGERY

## 2022-01-06 PROCEDURE — 6360000004 HC RX CONTRAST MEDICATION: Performed by: NEUROLOGICAL SURGERY

## 2022-01-06 RX ADMIN — GADOTERIDOL 20 ML: 279.3 INJECTION, SOLUTION INTRAVENOUS at 11:30

## 2022-01-07 DIAGNOSIS — G47.419 PRIMARY NARCOLEPSY WITHOUT CATAPLEXY: ICD-10-CM

## 2022-01-07 RX ORDER — METHYLPHENIDATE HYDROCHLORIDE 10 MG/1
10 TABLET ORAL 2 TIMES DAILY
Qty: 60 TABLET | Refills: 0 | Status: SHIPPED | OUTPATIENT
Start: 2022-01-07 | End: 2022-02-14 | Stop reason: SDUPTHER

## 2022-01-07 NOTE — TELEPHONE ENCOUNTER
12/6/2021      .   Future Appointments   Date Time Provider Kamran Molina   1/25/2022 11:30 AM Mikel Mo [facility]   10/4/2022 10:30 AM Jay Jay Fox MD [facility]

## 2022-01-17 DIAGNOSIS — I10 ESSENTIAL HYPERTENSION: Primary | ICD-10-CM

## 2022-01-17 DIAGNOSIS — I25.10 CORONARY ARTERY DISEASE INVOLVING NATIVE CORONARY ARTERY OF NATIVE HEART WITHOUT ANGINA PECTORIS: ICD-10-CM

## 2022-01-17 RX ORDER — VALSARTAN 160 MG/1
TABLET ORAL
Qty: 90 TABLET | Refills: 3 | Status: SHIPPED | OUTPATIENT
Start: 2022-01-17

## 2022-01-24 NOTE — PROGRESS NOTES
Behavioral Health Consultation- Follow UP  MICHAEL Moran  1/25/2022   12:09 PM      German Calles , was evaluated through a synchronous (real-time) audio encounter. The patient (or guardian if applicable) is aware that this is a billable service. Verbal consent to proceed has been obtained within the past 12 months. The visit was conducted pursuant to the emergency declaration under the [address] [place] [address], [address] [place] [address] authority and the [employer] General Act. Patient identification was verified, and a caregiver was present when appropriate. The patient was located in a state where the provider was credentialed to provide care. Time spent with Patient: 30 minutes  This is patient's fifth  [facility] appointment. Reason for Consult:    Chief Complaint   Patient presents with    Follow-up     Referring Provider: Ruchi Mendosa DO  [address]    Patient provided informed consent for the behavioral health program. Discussed with patient model of service to include the limits of confidentiality (i.e. abuse reporting, suicide intervention, etc.) and short-term intervention focused approach. Pt indicated understanding. Feedback given to PCP. Verified the following information:  Patient's identification: Yes  Patient location: [place] [id number]   Patient's call back number: [phone]   Patient's emergency contact's name and number, as well as permission to contact them if needed: Extended Emergency Contact Information  Primary Emergency Contact: Aakash Ridley [address] [place] [address] Dr Phone: [phone]  Relation: Brother/Sister   needed? No     Provider location: [place]:  Last appointment 1/4/22    Pt reports feeling \"great. \" Pt reports that she is packing and preparing to move into her new home. Pt reports they have completed closed and plan to move in the next few weeks. tablet 3    isosorbide mononitrate (IMDUR) 30 MG extended release tablet Take 1 tablet by mouth once daily 90 tablet 3    escitalopram (LEXAPRO) 20 MG tablet Take 1 tablet by mouth daily 30 tablet 2    topiramate (TOPAMAX) 25 MG tablet Take 2 tablets by mouth twice daily 360 tablet 1    pantoprazole (PROTONIX) 40 MG tablet Take 1 tablet by mouth twice daily 180 tablet 5    sucralfate (CARAFATE) 1 GM tablet Take 1 tablet by mouth 4 times daily 120 tablet 5    L-Arginine 500 MG TABS Take 1,000 mg by mouth 2 times daily 180 tablet 3    NONFORMULARY 5 - LOXIN (given by Mabel WOODS)      aspirin 81 MG tablet Take 81 mg by mouth daily       No current facility-administered medications for this visit. Social History:   Social History     Socioeconomic History    Marital status:      Spouse name: Not on file    Number of children: Not on file    Years of education: Not on file    Highest education level: Not on file   Occupational History     Employer: [employer]   Tobacco Use    Smoking status: Never Smoker    Smokeless tobacco: Never Used   Vaping Use    Vaping Use: Never used   Substance and Sexual Activity    Alcohol use: No     Alcohol/week: 0.0 standard drinks    Drug use: No    Sexual activity: Never   Other Topics Concern    Not on file   Social History Narrative    Not on file     Social Determinants of Health     Financial Resource Strain: Low Risk     Difficulty of Paying Living Expenses: Not hard at all   Food Insecurity: No Food Insecurity    Worried About [facility] in the Last Year: Never true    [facility] in the Last Year: Never true   Transportation Needs:     Lack of Transportation (Medical): Not on file    Lack of Transportation (Non-Medical):  Not on file   Physical Activity:     Days of Exercise per Week: Not on file    Minutes of Exercise per Session: Not on file   Stress:     Feeling of Stress : Not on file   Social Connections:     Frequency of Communication with Friends and Family: Not on file    Frequency of Social Gatherings with Friends and Family: Not on file    Attends Protestant Services: Not on file    Active Member of Clubs or Organizations: Not on file    Attends Club or Organization Meetings: Not on file    Marital Status: Not on file   Intimate Partner Violence:     Fear of Current or Ex-Partner: Not on file    Emotionally Abused: Not on file    Physically Abused: Not on file    Sexually Abused: Not on file   Housing Stability:     Unable to Pay for Housing in the Last Year: Not on file    Number of [place] in the Last Year: Not on file    Unstable Housing in the Last Year: Not on file     TOBACCO:   reports that she has never smoked. She has never used smokeless tobacco.  ETOH:   reports no history of alcohol use. Family History:   Family History   Problem Relation Age of Onset    Diabetes Mother     Heart Disease Mother     Cancer Mother         ovarian    Heart Disease Father     Stroke Father     Early Death Father         46 - heart attack    Heart Disease Sister     Stroke Sister     Cancer Sister         ovarian         A:  Patient endorses stable mood. Denies SI/HI, with good insight and motivation. Diagnosis:    1.  Anxiety          Past Diagnosis:        Diagnosis Date    Acid reflux     small stomach ulcer    Angina pectoris, variant (HCC)     Anxiety     Arthritis     Asthma     Benign esophageal stricture 12/2016    Benign tumor 03/20/2017     Benign brain tumor size of a quater    Cancer ([facility] [address]     ovarian    Depression     Frequency     Hyperlipidemia     Hypertension     Kidney stones     MI, old     Narcolepsy     Sleep apnea     Thyroid disease     Urgency of urination          Plan:  Patient interventions:  Supportive techniques  Promoted hope using strengths-based approach    Patient Behavioral Change Plan:   See Patient Instructions

## 2022-01-25 ENCOUNTER — VIRTUAL VISIT (OUTPATIENT)
Dept: PSYCHOLOGY | Age: 72
End: 2022-01-25
Payer: MEDICARE

## 2022-01-25 DIAGNOSIS — F41.9 ANXIETY: Primary | ICD-10-CM

## 2022-01-25 PROCEDURE — 98968 PH1 ASSMT&MGMT NQHP 21-30: CPT | Performed by: SOCIAL WORKER

## 2022-02-08 ENCOUNTER — TELEPHONE (OUTPATIENT)
Dept: FAMILY MEDICINE CLINIC | Age: 72
End: 2022-02-08

## 2022-02-08 DIAGNOSIS — G47.419 PRIMARY NARCOLEPSY WITHOUT CATAPLEXY: ICD-10-CM

## 2022-02-08 NOTE — TELEPHONE ENCOUNTER
----- Message from Jeanine Pill sent at 2/8/2022 11:01 AM EST -----  Subject: Refill Request    QUESTIONS  Name of Medication? methylphenidate (RITALIN) 10 MG tablet  Patient-reported dosage and instructions? Take 1 tablet by mouth 2 times   daily for 30 days. How many days do you have left? 1  Preferred Pharmacy? [facility]  Pharmacy phone number (if available)? [phone]  ---------------------------------------------------------------------------  --------------  Megha GEIGER  What is the best way for the office to contact you? OK to leave message on   voicemail  Preferred Call Back Phone Number?  [phone]

## 2022-02-14 RX ORDER — METHYLPHENIDATE HYDROCHLORIDE 10 MG/1
10 TABLET ORAL 2 TIMES DAILY
Qty: 60 TABLET | Refills: 0 | Status: SHIPPED | OUTPATIENT
Start: 2022-02-14 | End: 2022-03-16

## 2022-02-21 ENCOUNTER — OFFICE VISIT (OUTPATIENT)
Dept: FAMILY MEDICINE CLINIC | Age: 72
End: 2022-02-21
Payer: MEDICARE

## 2022-02-21 VITALS
HEART RATE: 56 BPM | OXYGEN SATURATION: 98 % | HEIGHT: 65 IN | TEMPERATURE: 97.1 F | BODY MASS INDEX: 35.16 KG/M2 | RESPIRATION RATE: 14 BRPM | WEIGHT: 211 LBS | DIASTOLIC BLOOD PRESSURE: 62 MMHG | SYSTOLIC BLOOD PRESSURE: 100 MMHG

## 2022-02-21 DIAGNOSIS — I10 ESSENTIAL HYPERTENSION: ICD-10-CM

## 2022-02-21 DIAGNOSIS — Z01.818 PRE-OP EXAM: ICD-10-CM

## 2022-02-21 DIAGNOSIS — M54.41 ACUTE RIGHT-SIDED LOW BACK PAIN WITH RIGHT-SIDED SCIATICA: ICD-10-CM

## 2022-02-21 DIAGNOSIS — I25.10 CORONARY ARTERY DISEASE INVOLVING NATIVE CORONARY ARTERY OF NATIVE HEART WITHOUT ANGINA PECTORIS: ICD-10-CM

## 2022-02-21 DIAGNOSIS — C44.111 BASAL CELL CARCINOMA (BCC) OF CANTHUS OF LEFT EYE: Primary | ICD-10-CM

## 2022-02-21 DIAGNOSIS — E03.9 HYPOTHYROIDISM, UNSPECIFIED TYPE: ICD-10-CM

## 2022-02-21 PROCEDURE — 99214 OFFICE O/P EST MOD 30 MIN: CPT | Performed by: FAMILY MEDICINE

## 2022-02-21 RX ORDER — ACETAMINOPHEN AND CODEINE PHOSPHATE 300; 15 MG/1; MG/1
1 TABLET ORAL EVERY 6 HOURS PRN
Qty: 20 TABLET | Refills: 0 | Status: SHIPPED | OUTPATIENT
Start: 2022-02-21 | End: 2022-02-26

## 2022-02-21 ASSESSMENT — ENCOUNTER SYMPTOMS
WHEEZING: 0
SHORTNESS OF BREATH: 0
GASTROINTESTINAL NEGATIVE: 1

## 2022-02-21 ASSESSMENT — PATIENT HEALTH QUESTIONNAIRE - PHQ9
SUM OF ALL RESPONSES TO PHQ QUESTIONS 1-9: 0
SUM OF ALL RESPONSES TO PHQ9 QUESTIONS 1 & 2: 0
2. FEELING DOWN, DEPRESSED OR HOPELESS: 0
SUM OF ALL RESPONSES TO PHQ QUESTIONS 1-9: 0
1. LITTLE INTEREST OR PLEASURE IN DOING THINGS: 0

## 2022-02-21 NOTE — PROGRESS NOTES
Subjective:      Patient ID: Sarabjit Gong is a 80 y.o. y.o. female. Here for pre-op. Has a BCC on the bridge of her nose. March 7-  Dr Royal Poe doing Moh's surgery for removal of the cancer and then Dr Reynaldo Gomes oculo plastic at [facility] will do reconstruction  And the lesion biopsied by Dr. Darryl Becker, ENT, to confirm basal cell. Has been overall ok,, stable  Meds and Hx reviewed. Needs preop for [address].   HPI      Chief Complaint   Patient presents with   Alvia      Dr Royal Poe removing basal cell 03/07/2022 and [facility] Left Eye Reconstruction  03/07/2022        Allergies   Allergen Reactions    Latex Rash    Vistaril [Hydroxyzine Hcl]        Past Medical History:   Diagnosis Date    Acid reflux     small stomach ulcer    Angina pectoris, variant (HCC)     Anxiety     Arthritis     Asthma     Benign esophageal stricture 12/2016    Benign tumor 03/20/2017     Benign brain tumor size of a quater    Cancer ([address]     ovarian    Depression     Frequency     Hyperlipidemia     Hypertension     Kidney stones     MI, old     Narcolepsy     Sleep apnea     Thyroid disease     Urgency of urination        Past Surgical History:   Procedure Laterality Date    ARM SURGERY Right 8/19/14    exc.trapezium and flexor carpi radialis interpositional arthroplasty    BREAST SURGERY      reduction    CARPAL TUNNEL RELEASE Right     CHOLECYSTECTOMY      COLONOSCOPY     [facility] DENT[facility] SURGERY      ENDOSCOPY, COLON, DIAGNOSTIC  01/06/2017    Anastomotic ulcer, gastritis    ESOPHAGEAL DILATATION  12/2016    GASTRIC BYPASS SURGERY      GASTRIC BYPASS SURGERY      HYSTERECTOMY      LITHOTRIPSY Left 11/12/2013    LITHOTRIPSY Left 12/26/13    LEFT ESWL    FL NJX DX/THER SBST INTRLMNR CRV/THRC W/IMG GDN Right 8/27/2018    RIGHT LUMBAR FIVE SACRAL ONE EPIDURAL STEROID INJECTION SITE CONFIRMED BY FLUOROSCOPY performed by Van Robertson MD at [facility] ENDOSCOPY  03/03/2017    Gastritis       Social History     Socioeconomic History    Marital status:      Spouse name: Not on file    Number of children: Not on file    Years of education: Not on file    Highest education level: Not on file   Occupational History     Employer: [employer]   Tobacco Use    Smoking status: Never Smoker    Smokeless tobacco: Never Used   Vaping Use    Vaping Use: Never used   Substance and Sexual Activity    Alcohol use: No     Alcohol/week: 0.0 standard drinks    Drug use: No    Sexual activity: Never   Other Topics Concern    Not on file   Social History Narrative    Not on file     Social Determinants of Health     Financial Resource Strain: Low Risk     Difficulty of Paying Living Expenses: Not hard at all   Food Insecurity: No Food Insecurity    Worried About [address] in the Last Year: Never true    [address] in the Last Year: Never true   Transportation Needs:     Lack of Transportation (Medical): Not on file    Lack of Transportation (Non-Medical):  Not on file   Physical Activity:     Days of Exercise per Week: Not on file    Minutes of Exercise per Session: Not on file   Stress:     Feeling of Stress : Not on file   Social Connections:     Frequency of Communication with Friends and Family: Not on file    Frequency of Social Gatherings with Friends and Family: Not on file    Attends Yazdanism Services: Not on file    Active Member of [address] [place] [address] or Organizations: Not on file    Attends Club or Organization Meetings: Not on file    Marital Status: Not on file   Intimate Partner Violence:     Fear of Current or Ex-Partner: Not on file    Emotionally Abused: Not on file    Physically Abused: Not on file    Sexually Abused: Not on file   Housing Stability:     Unable to Pay for Housing in the Last Year: Not on file    Number of [place] in the Last Year: Not on file    Unstable Housing in the Last Year: Not on file       Family History   Problem Relation Age of Onset    Diabetes Mother     Heart Disease Mother     Cancer Mother         ovarian    Heart Disease Father     Stroke Father     Early Death Father         46 - heart attack    Heart Disease Sister     Stroke Sister     Cancer Sister         ovarian       Vitals:    02/21/22 1102   BP: 100/62   Pulse: 56   Resp: 14   Temp: 97.1 °F (36.2 °C)   SpO2: 98%       Wt Readings from Last 3 Encounters:   02/21/22 211 lb (95.7 kg)   12/10/21 218 lb (98.9 kg)   12/06/21 218 lb (98.9 kg)       Review of Systems   Constitutional: Negative for activity change, chills and fever. Has been active packing up and moving to a new home. Is a little sore and achy from the increased activity. Has a history of narcolepsy. Stable on medication. No significant side effects or issues taking the stimulant medicine   HENT: Negative for sore throat and trouble swallowing. Eyes: Negative for visual disturbance. Respiratory: Negative for shortness of breath and wheezing. Uses CPAP    Sleeps 2 pillows   Cardiovascular: Negative for chest pain, palpitations and leg swelling. Gastrointestinal: Negative. Endocrine: Negative for cold intolerance and heat intolerance. Genitourinary: Negative for dysuria and frequency. Some pelvic relaxation with stress incontinence. Musculoskeletal: Positive for arthralgias and back pain. Back hurting as she moved this weekend and was packing boxes and lifting  Lower back pain and right sided sciatica is acting up. Urmila Danny, hit left knee,  2 days,  Sore, scraped up a bit   Skin:        Large growth left sided nose     Neurological: Negative for dizziness, tremors, seizures, facial asymmetry and speech difficulty. Hematological: Does not bruise/bleed easily. Psychiatric/Behavioral: Negative for self-injury and suicidal ideas. Happy about the house move.    Feels like doing OK with nerves       Objective:   Physical Exam  Vitals reviewed. Constitutional:       Appearance: She is not ill-appearing. Eyes:      General: No scleral icterus. Conjunctiva/sclera: Conjunctivae normal.   Neck:      Vascular: No carotid bruit. Cardiovascular:      Rate and Rhythm: Normal rate and regular rhythm. Pulses: Normal pulses. Heart sounds: Normal heart sounds. No murmur heard. Pulmonary:      Effort: Pulmonary effort is normal.      Breath sounds: Normal breath sounds. Abdominal:      General: Abdomen is flat. Bowel sounds are normal.      Palpations: Abdomen is soft. There is no mass. Tenderness: There is no abdominal tenderness. There is no right CVA tenderness or left CVA tenderness. Hernia: No hernia is present. Musculoskeletal:      Right lower leg: No edema. Left lower leg: No edema. Comments: Left knee some puffy and stiff. Superficial abrasion, clean      Right lumbar and S-I tender / dysfunction. Moves guarded. Lymphadenopathy:      Cervical: No cervical adenopathy. Skin:     General: Skin is warm and dry. Findings: Lesion present. Comments: Lesion left side of nose   Neurological:      General: No focal deficit present. Mental Status: She is alert and oriented to person, place, and time. Psychiatric:         Mood and Affect: Mood normal.         Behavior: Behavior normal.         Thought Content: Thought content normal.         Judgment: Judgment normal.         Assessment:      BCC nose  Pre-op  Narcolepsy  CAD- stable, no angina  HTN  PRINCE        Plan:     Looks medically stable. OK for the surgery    Ice for the knee  Heat on the low back  Short term analgesic  Facility form for [employer] completed. General instructions regarding her meds and follow-ups.     Current Outpatient Medications   Medication Sig Dispense Refill    escitalopram (LEXAPRO) 20 MG tablet Take 1 tablet by mouth once daily 30 tablet 3    sucralfate (CARAFATE) 1 GM tablet Take 1 tablet by mouth 4 times daily 120 tablet 3    methylphenidate (RITALIN) 10 MG tablet Take 1 tablet by mouth 2 times daily for 30 days. 60 tablet 0    valsartan (DIOVAN) 160 MG tablet Take 1 tablet by mouth once daily 90 tablet 3    levothyroxine (EUTHYROX) 100 MCG tablet Take 1 tablet by mouth once daily 30 tablet 5    furosemide (LASIX) 20 MG tablet Take 1 tablet by mouth once daily 90 tablet 3    rosuvastatin (CRESTOR) 5 MG tablet Take 1 tablet by mouth once daily 90 tablet 3    metoprolol tartrate (LOPRESSOR) 25 MG tablet Take 1 tablet by mouth twice daily 180 tablet 3    isosorbide mononitrate (IMDUR) 30 MG extended release tablet Take 1 tablet by mouth once daily 90 tablet 3    topiramate (TOPAMAX) 25 MG tablet Take 2 tablets by mouth twice daily 360 tablet 1    pantoprazole (PROTONIX) 40 MG tablet Take 1 tablet by mouth twice daily 180 tablet 5    L-Arginine 500 MG TABS Take 1,000 mg by mouth 2 times daily 180 tablet 3    NONFORMULARY 5 - LOXIN (given by Mabel WOODS)      aspirin 81 MG tablet Take 81 mg by mouth daily       No current facility-administered medications for this visit.

## 2022-02-21 NOTE — PATIENT INSTRUCTIONS
OK for the surgery      Get the labs done in the next few weeks    Continue routine and current meds and routine and current follow-ups.

## 2022-02-22 ASSESSMENT — ENCOUNTER SYMPTOMS
BACK PAIN: 1
TROUBLE SWALLOWING: 0
SORE THROAT: 0

## 2022-02-23 ENCOUNTER — TELEPHONE (OUTPATIENT)
Dept: CARDIOLOGY CLINIC | Age: 72
End: 2022-02-23

## 2022-02-23 NOTE — TELEPHONE ENCOUNTER
Covering for [facility] to proceed with surgery. Recommend to continue aspirin but can hold if necessary.

## 2022-02-23 NOTE — TELEPHONE ENCOUNTER
DR. Sepideh Rivera requests cardiac clearance for upcoming MOH's surgery on 3/7/2022. They request to hold ASA for 7 days. Please advise.      Fax letter [phone]

## 2022-02-23 NOTE — LETTER
[address] Cardiology - [address] [place] [id number] [facility]  Phone: [phone]  Fax: [phone], APRN - CNP        February 23, 2022    Bennett Gudino 1950      Ok to proceed with surgery. Recommend to continue aspirin but can hold if necessary.     Sincerely,        Con Delgadillo, APRN - CNP

## 2022-03-07 ENCOUNTER — PROCEDURE VISIT (OUTPATIENT)
Dept: SURGERY | Age: 72
End: 2022-03-07
Payer: MEDICARE

## 2022-03-07 VITALS — SYSTOLIC BLOOD PRESSURE: 129 MMHG | DIASTOLIC BLOOD PRESSURE: 75 MMHG | TEMPERATURE: 97 F | HEART RATE: 44 BPM

## 2022-03-07 DIAGNOSIS — C44.319 BASAL CELL CARCINOMA OF LEFT CHEEK: Primary | ICD-10-CM

## 2022-03-07 PROCEDURE — 17311 MOHS 1 STAGE H/N/HF/G: CPT | Performed by: DERMATOLOGY

## 2022-03-07 PROCEDURE — 17312 MOHS ADDL STAGE: CPT | Performed by: DERMATOLOGY

## 2022-03-07 NOTE — PROGRESS NOTES
PRE-PROCEDURE SCREENING    Pacemaker/ICD: No  Difficulty with numbing in the past: No  Local Anesthesia Reaction/passing out: No  Latex or adhesive allergy:  Yes, Rash with latex  Bleeding/Clotting Disorders: No  Anticoagulant Therapy: Yes  Joint prosthesis: No  Artificial Heart Valve: No  Stroke or Seizures: No  Organ Transplant or Lymphoma: No  Immunosuppression: No  Respiratory Problems: No

## 2022-03-07 NOTE — PROGRESS NOTES
MOHS PROCEDURE NOTE    PHYSICIAN:  Trish Curran. Cristopher Catalan MD, Who operated in two distinct and integrated capacities as the surgeon removing the tissue and as the pathologist examining the tissue. ASSISTANT: Karishma Jackson RN     REFERRING PROVIDER:  Gregory Anguiano DO    PREOPERATIVE DIAGNOSIS: Nodular Basal Cell Carcinoma     SPECIFIC MOHS INDICATIONS:  location and need for tissue conservation    AUC SCORIN/9    POSTOPERATIVE DIAGNOSIS: SAME    LOCATION: Left cheek    OPERATIVE PROCEDURE:  MOHS MICROGRAPHIC SURGERY    RECONSTRUCTION OF DEFECT: Cirilo Coronado, oculoplastic surgery to perform repair as arranged    PREOPERATIVE SIZE: 12x11 MM    DEFECT SIZE: 21x15 MM    LENGTH OF REPAIRED WOUND/SIZE OF FLAP/SIZE OF GRAFT:  n/a     ANESTHESIA:  6mL 1% lidocaine with epinephrine 1:100,000 buffered. EBL:  MINIMAL    DURATION OF PROCEDURE:  1 HOUR    POSTOPERATIVE OBSERVATION: 40 MINUTES    SPECIMENS:  SEE MOHS MAP    COMPLICATIONS:  NONE    DESCRIPTION OF PROCEDURE:  The patient was given a mirror, as appropriate, and the biopsy site was identified, marked with a surgical marking pen, and verified by the patient. Options for treatment were discussed and the patient was informed that Mohs surgery was the selected treatment based on its lower recurrence rate, given the features listed above, as compared to other treatment modalities such as excision, radiation, or curettage, and agreed with this treatment plan. Risks and benefits including bruising, swelling, bleeding, infection, nerve injury, recurrence, and scarring were discussed with the patient prior to the procedure and a written consent detailing these and other risks was reviewed with the patient and signed. There was a time out for person and procedure verification. The surgical site was prepped with an antiseptic solution. Application of an antiseptic solution was repeated before each surgical stage.       Stage I:  The clinically-apparent tumor was carefully defined and debulked, determining the edge of the surgical excision. A thin layer of tumor-laden tissue was excised with a narrow margin of normal-appearing skin, using the technique of Mohs. A map was prepared to correspond to the area of skin from which it was excised. Hemostasis was achieved using electrosurgery. The wound was bandaged. The tissue was prepared for the cryostat and sectioned. 1 section(s) prepared. Each section was coded, cut, and stained for microscopic examination. The entire base and margins of the excised piece of tissue were examined by the surgeon. The tissue was examined to the level of subcutaneous fat. Stage I:  Nodular BCC: large basaloid lobules of varying shape and size with peripheral palisading present around the rim of the lobule, with retraction of the tumor lobules from their associated stroma. The remaining tumor was noted and the next stage was performed. Stage II:  A thin layer of tissue was removed at the histologically-identified sites of remaining tumor. The entire procedure as described in stage I was repeated to process the tissue according to Mohs technique. 1 section(s) prepared for stage II. No tumor was identified at the peripheral margins of stage II of microscopically controlled surgery. DEFECT MANAGEMENT:    REPAIR DESCRIPTION:    As had been arranged, the patient will be seen by Dr. Riya Koch MD, oculoplastics, for repair. WOUND COVERAGE:  The wound was cleaned with normal saline solution, dried off, Aquaphor ointment was applied, and the wound was covered. A dressing was applied for stabilization and light pressure. The patient was given detailed oral and written instructions on postoperative care. There were no complications. The patient left the Unit in good medical condition. FOLLOW-UP:  F/u as needed.

## 2022-03-07 NOTE — PATIENT INSTRUCTIONS
[facility] Office Hours:    Monday-Thursday  7:30 AM-4:30 PM    Friday  9:00 AM-1:00 PM    Postoperative instructions    1. Keep the area absolutely dry until you see Becka Grubbs for your repair. ? Bleeding: If bleeding occurs, DO NOT remove the bandage. Put firm pressure on the area with gauze for 20 minutes without peeking. If the bleeding continues, apply pressure for 20 minutes more. ? If the bleeding does not stop after you apply pressure, call us right away. If you cant call, go to the nearest emergency room or urgent care facility.         Call us at [phone] right away if you have any of the following symptoms:   Bleeding that you cant stop (see above)   Pain that lasts longer than 48 hours   Your wound becomes more painful, red or hot   Bruising and swelling that does not improve within 48 hours or gets worse suddenly

## 2022-03-11 NOTE — TELEPHONE ENCOUNTER
Behavioral Health Consultation- Follow UP  MICHAEL Martino  3/14/2022   11:28 AM      Shante Bowers, was evaluated through a synchronous (real-time) audio encounter. The patient (or guardian if applicable) is aware that this is a billable service, which includes applicable co-pays. This Virtual Visit was conducted with patient's (and/or legal guardian's) consent. . Verbal consent to proceed has been obtained within the past 12 months. The visit was conducted pursuant to the emergency declaration under the [facility], [facility] authority and the [employer] and [email or web address] General Act. Patient identification was verified, and a caregiver was present when appropriate. The patient was located in a state where the provider was licensed to provide care. Time spent with Patient: 30 minutes  This is patient's sixth  [facility] appointment. Reason for Consult:    Chief Complaint   Patient presents with   [facility]     Referring Provider: Aramis Velez DO  [address]  [address] [place] [address]    Patient provided informed consent for the behavioral health program. Discussed with patient model of service to include the limits of confidentiality (i.e. abuse reporting, suicide intervention, etc.) and short-term intervention focused approach. Pt indicated understanding. Feedback given to PCP. Verified the following information:  Patient's identification: Yes  Patient location: [address] [place] [address]   Patient's call back number: [phone]   Patient's emergency contact's name and number, as well as permission to contact them if needed: Extended Emergency Contact Information  Primary Emergency Contact: Umu [address] [place] [address]  Phone: [phone]  Relation: Brother/Sister   needed? No     Provider location: Manjit Ervin:  Last appointment 1/25/22    Pt reports feeling great.  She and her brother moved in to their new home at the end of February. They are working to get settled. Pt's friend assisted her with some gardening. Pt reports feeling sore the day after moving due to back pain from her sciatica. Additionally, she fell when going up the stairs, and hurt her knee. She was able to rest and had a lot of support from others. Pt followed up with her PCP. Pt's surgery was successful and they removed all of the cancer. Pt reports that her brother has been very supportive. Pt has not been able to use her Cpap, and states that she feels fatigued as a result. Pt will get bandages removed tomorrow, and will resume use. Pt reports that her daughter's aunt sent a picture of Pt's daughter and grandson. Pt is still hopeful that she will be able to see her grandson in the summer when her daughter visits. Pt has come to acceptance that her daughter will live in [place]. Pt reports that she continues to do well. Pt agreed to f/u with [facility] for appointments as needed in the future.      O:  MSE:    Appearance: Not assessed  Attitude: cooperative and friendly  Consciousness: alert  Orientation: oriented to person, place, time, general circumstance  Memory    recent and remote memory intact  Attention/Concentration    intact  Psychomotor Activity:Not assessed  Eye Contact: Not assessed  Speech: normal rate and volume, well-articulated  Mood    Euthymic  Affect    Not assessed  Perception: within normal limits  Thought Content: within normal limits  Thought Process: logical, coherent and goal-directed  Associations    logical connections  Insight: good  Judgment    Intact  Appetite normal  Sleep disturbance Yes  Fatigue Yes  Loss of pleasure No  Impulsive behavior No  Morbid Ideation: no   Suicide Assessment: no suicidal ideation, plan, or intent  Homicidal Ideation: no    History:    Medications:   Current Outpatient Medications   Medication Sig Dispense Refill    escitalopram (LEXAPRO) 20 MG tablet Take 1 tablet by mouth once daily 30 tablet 3    sucralfate (CARAFATE) 1 GM tablet Take 1 tablet by mouth 4 times daily 120 tablet 3    methylphenidate (RITALIN) 10 MG tablet Take 1 tablet by mouth 2 times daily for 30 days. 60 tablet 0    valsartan (DIOVAN) 160 MG tablet Take 1 tablet by mouth once daily 90 tablet 3    levothyroxine (EUTHYROX) 100 MCG tablet Take 1 tablet by mouth once daily 30 tablet 5    furosemide (LASIX) 20 MG tablet Take 1 tablet by mouth once daily 90 tablet 3    rosuvastatin (CRESTOR) 5 MG tablet Take 1 tablet by mouth once daily 90 tablet 3    metoprolol tartrate (LOPRESSOR) 25 MG tablet Take 1 tablet by mouth twice daily 180 tablet 3    isosorbide mononitrate (IMDUR) 30 MG extended release tablet Take 1 tablet by mouth once daily 90 tablet 3    topiramate (TOPAMAX) 25 MG tablet Take 2 tablets by mouth twice daily 360 tablet 1    pantoprazole (PROTONIX) 40 MG tablet Take 1 tablet by mouth twice daily 180 tablet 5    L-Arginine 500 MG TABS Take 1,000 mg by mouth 2 times daily 180 tablet 3    NONFORMULARY 5 - LOXIN (given by Mabel WOODS)      aspirin 81 MG tablet Take 81 mg by mouth daily (Patient not taking: Reported on 3/7/2022)       No current facility-administered medications for this visit.      Social History:   Social History     Socioeconomic History    Marital status:      Spouse name: Not on file    Number of children: Not on file    Years of education: Not on file    Highest education level: Not on file   Occupational History     Employer: [employer]   Tobacco Use    Smoking status: Never Smoker    Smokeless tobacco: Never Used   Vaping Use    Vaping Use: Never used   Substance and Sexual Activity    Alcohol use: No     Alcohol/week: 0.0 standard drinks    Drug use: No    Sexual activity: Never   Other Topics Concern    Not on file   Social History Narrative    Not on file     Social Determinants of Health     Financial Resource Strain: Low Risk     Difficulty of Paying Living Expenses: Not hard at all   Food Insecurity: No Food Insecurity    Worried About Running Out of Food in the Last Year: Never true    Ran Out of Food in the Last Year: Never true   Transportation Needs:     Lack of Transportation (Medical): Not on file    Lack of Transportation (Non-Medical): Not on file   Physical Activity:     Days of Exercise per Week: Not on file    Minutes of Exercise per Session: Not on file   Stress:     Feeling of Stress : Not on file   Social Connections:     Frequency of Communication with Friends and Family: Not on file    Frequency of Social Gatherings with Friends and Family: Not on file    Attends Pentecostalism Services: Not on file    Active Member of [address] [place] [address] [employer] or Organizations: Not on file    Attends Club or Organization Meetings: Not on file    Marital Status: Not on file   Intimate Partner Violence:     Fear of Current or Ex-Partner: Not on file    Emotionally Abused: Not on file    Physically Abused: Not on file    Sexually Abused: Not on file   Housing Stability:     Unable to Pay for Housing in the Last Year: Not on file    Number of [place] in the Last Year: Not on file    Unstable Housing in the Last Year: Not on file     TOBACCO:   reports that she has never smoked. She has never used smokeless tobacco.  ETOH:   reports no history of alcohol use. Family History:   Family History   Problem Relation Age of Onset    Diabetes Mother     Heart Disease Mother     Cancer Mother         ovarian    Heart Disease Father     Stroke Father     Early Death Father         46 - heart attack    Heart Disease Sister     Stroke Sister     Cancer Sister         ovarian         A:  Patient endorses stable mood. Denies SI/HI, with good insight and motivation. Diagnosis:    1.  Anxiety          Past Diagnosis:        Diagnosis Date    Acid reflux     small stomach ulcer    Angina pectoris, variant (HCC)     Anxiety     Arthritis     Asthma     Benign esophageal stricture 12/2016    Benign tumor 03/20/2017     Benign brain tumor size of a quater    Cancer ([address]     ovarian    Depression     Frequency     Hyperlipidemia     Hypertension     Kidney stones     MI, old     Narcolepsy     Sleep apnea     Thyroid disease     Urgency of urination          Plan:  Patient interventions:  Discussed self-care (sleep, nutrition, rewarding activities, social support, exercise)  Supportive techniques    Patient Behavioral Change Plan:   1. Continue spending time with your friend once/week. 2. Continue to walk. 3. Remind yourself frequently that your daughter is a good care taker for your grandson.   4. F/u with Markel Georges as needed

## 2022-03-14 ENCOUNTER — SCHEDULED TELEPHONE ENCOUNTER (OUTPATIENT)
Dept: PSYCHOLOGY | Age: 72
End: 2022-03-14
Payer: MEDICARE

## 2022-03-14 DIAGNOSIS — F41.9 ANXIETY: Primary | ICD-10-CM

## 2022-03-14 PROCEDURE — 98968 PH1 ASSMT&MGMT NQHP 21-30: CPT | Performed by: SOCIAL WORKER

## 2022-03-17 DIAGNOSIS — G47.419 PRIMARY NARCOLEPSY WITHOUT CATAPLEXY: ICD-10-CM

## 2022-03-17 RX ORDER — METHYLPHENIDATE HYDROCHLORIDE 10 MG/1
10 TABLET ORAL 2 TIMES DAILY
Qty: 60 TABLET | Refills: 0 | Status: SHIPPED | OUTPATIENT
Start: 2022-03-17 | End: 2022-04-22 | Stop reason: SDUPTHER

## 2022-03-17 RX ORDER — LEVOTHYROXINE SODIUM 0.1 MG/1
TABLET ORAL
Qty: 30 TABLET | Refills: 5 | Status: SHIPPED | OUTPATIENT
Start: 2022-03-17 | End: 2022-09-29 | Stop reason: SDUPTHER

## 2022-03-17 RX ORDER — ESCITALOPRAM OXALATE 20 MG/1
TABLET ORAL
Qty: 30 TABLET | Refills: 3 | Status: SHIPPED | OUTPATIENT
Start: 2022-03-17 | End: 2022-07-25 | Stop reason: SDUPTHER

## 2022-03-17 NOTE — TELEPHONE ENCOUNTER
Patient needs a refill on Ritalin   Synthroid   escitalopram (LEXAPRO) 20 MG tablet        They need a 30  day supply.          Pharmacy: [facility]

## 2022-04-22 DIAGNOSIS — G47.419 PRIMARY NARCOLEPSY WITHOUT CATAPLEXY: ICD-10-CM

## 2022-04-22 RX ORDER — METHYLPHENIDATE HYDROCHLORIDE 10 MG/1
10 TABLET ORAL 2 TIMES DAILY
Qty: 60 TABLET | Refills: 0 | Status: SHIPPED | OUTPATIENT
Start: 2022-04-22 | End: 2022-05-24 | Stop reason: SDUPTHER

## 2022-05-06 DIAGNOSIS — E03.9 HYPOTHYROIDISM, UNSPECIFIED TYPE: ICD-10-CM

## 2022-05-06 DIAGNOSIS — I25.10 CORONARY ARTERY DISEASE INVOLVING NATIVE CORONARY ARTERY OF NATIVE HEART WITHOUT ANGINA PECTORIS: ICD-10-CM

## 2022-05-06 DIAGNOSIS — I10 ESSENTIAL HYPERTENSION: ICD-10-CM

## 2022-05-06 LAB
A/G RATIO: 1.4 (ref 1.1–2.2)
ALBUMIN SERPL-MCNC: 4.1 G/DL (ref 3.4–5)
ALP BLD-CCNC: 147 U/L (ref 40–129)
ALT SERPL-CCNC: 10 U/L (ref 10–40)
ANION GAP SERPL CALCULATED.3IONS-SCNC: 15 MMOL/L (ref 3–16)
AST SERPL-CCNC: 14 U/L (ref 15–37)
BILIRUB SERPL-MCNC: 0.4 MG/DL (ref 0–1)
BUN BLDV-MCNC: 18 MG/DL (ref 7–20)
CALCIUM SERPL-MCNC: 9.1 MG/DL (ref 8.3–10.6)
CHLORIDE BLD-SCNC: 110 MMOL/L (ref 99–110)
CHOLESTEROL, TOTAL: 154 MG/DL (ref 0–199)
CO2: 19 MMOL/L (ref 21–32)
CREAT SERPL-MCNC: 0.9 MG/DL (ref 0.6–1.2)
GFR AFRICAN AMERICAN: >60
GFR NON-AFRICAN AMERICAN: >60
GLUCOSE BLD-MCNC: 92 MG/DL (ref 70–99)
HDLC SERPL-MCNC: 49 MG/DL (ref 40–60)
LDL CHOLESTEROL CALCULATED: 67 MG/DL
POTASSIUM SERPL-SCNC: 3.7 MMOL/L (ref 3.5–5.1)
SODIUM BLD-SCNC: 144 MMOL/L (ref 136–145)
T3 TOTAL: 0.85 NG/ML (ref 0.8–2)
T4 FREE: 1.4 NG/DL (ref 0.9–1.8)
TOTAL PROTEIN: 7.1 G/DL (ref 6.4–8.2)
TRIGL SERPL-MCNC: 190 MG/DL (ref 0–150)
TSH REFLEX: 0.1 UIU/ML (ref 0.27–4.2)
VLDLC SERPL CALC-MCNC: 38 MG/DL

## 2022-05-13 DIAGNOSIS — G43.119 INTRACTABLE MIGRAINE WITH AURA WITHOUT STATUS MIGRAINOSUS: ICD-10-CM

## 2022-05-14 RX ORDER — TOPIRAMATE 25 MG/1
TABLET ORAL
Qty: 360 TABLET | Refills: 1 | Status: SHIPPED | OUTPATIENT
Start: 2022-05-14 | End: 2022-10-25 | Stop reason: SDUPTHER

## 2022-05-23 ENCOUNTER — TELEPHONE (OUTPATIENT)
Dept: FAMILY MEDICINE CLINIC | Age: 72
End: 2022-05-23

## 2022-05-23 DIAGNOSIS — G47.419 PRIMARY NARCOLEPSY WITHOUT CATAPLEXY: ICD-10-CM

## 2022-05-23 RX ORDER — METHYLPHENIDATE HYDROCHLORIDE 10 MG/1
10 TABLET ORAL 2 TIMES DAILY
Qty: 60 TABLET | Refills: 0 | Status: CANCELLED | OUTPATIENT
Start: 2022-05-23 | End: 2022-06-22

## 2022-05-23 NOTE — TELEPHONE ENCOUNTER
Patient needs a refill - she took the last pill today    methylphenidate (RITALIN) 10 MG tablet      Send to [facility] in [facility]    2/21/2022 last ov    Future Appointments   Date Time Provider Kamran Molina   10/4/2022 10:30 AM Jay Jay Lopez MD AND [facility]

## 2022-05-24 DIAGNOSIS — G47.419 PRIMARY NARCOLEPSY WITHOUT CATAPLEXY: ICD-10-CM

## 2022-05-24 RX ORDER — METHYLPHENIDATE HYDROCHLORIDE 10 MG/1
10 TABLET ORAL 2 TIMES DAILY
Qty: 60 TABLET | Refills: 0 | Status: SHIPPED | OUTPATIENT
Start: 2022-05-24 | End: 2022-06-21 | Stop reason: SDUPTHER

## 2022-06-21 ENCOUNTER — TELEPHONE (OUTPATIENT)
Dept: FAMILY MEDICINE CLINIC | Age: 72
End: 2022-06-21

## 2022-06-21 DIAGNOSIS — G47.419 PRIMARY NARCOLEPSY WITHOUT CATAPLEXY: ICD-10-CM

## 2022-06-21 RX ORDER — METHYLPHENIDATE HYDROCHLORIDE 10 MG/1
10 TABLET ORAL 2 TIMES DAILY
Qty: 60 TABLET | Refills: 0 | Status: SHIPPED | OUTPATIENT
Start: 2022-06-21 | End: 2022-07-25 | Stop reason: SDUPTHER

## 2022-06-21 NOTE — TELEPHONE ENCOUNTER
Pt called wanting to speak with Dr Joi Guzman. She said she just found out that her Cpap machine is on recall. She wants to know if she should continue to use it. Please call back to discuss.

## 2022-06-21 NOTE — TELEPHONE ENCOUNTER
Please tell the patient that the recommendation is that she continue to use her CPAP until it can be replaced. The risk is very very low and the need for CPAP is probably far greater so the recommendation is to use the machine for treatment. There are a lot of machines recall that it will probably take a while for her to get a replacement.

## 2022-06-21 NOTE — TELEPHONE ENCOUNTER
Please call the patient and tell them that I refilled the prescription. Asked them to make an appointment for a follow-up.   Ask her to make an appointment in July or early August please

## 2022-06-21 NOTE — TELEPHONE ENCOUNTER
Patient needs a refill on Ritalin. They need a 30 day supply.      Mail order or local pharmacy: local    Pharmacy: [facility] on file    Patient  or mail to patient(If mail order):     Last visit 2/22    Future Appointments   Date Time Provider Kamran Molina   10/4/2022 10:30 AM Jay Jay Grewal MD AND [facility]

## 2022-06-21 NOTE — TELEPHONE ENCOUNTER
Pt would like to speak with Dr Riley Winters about the lab results from May. She has some questions. Please call back.

## 2022-07-08 RX ORDER — PANTOPRAZOLE SODIUM 40 MG/1
TABLET, DELAYED RELEASE ORAL
Qty: 180 TABLET | Refills: 0 | Status: SHIPPED | OUTPATIENT
Start: 2022-07-08 | End: 2022-07-15

## 2022-07-08 NOTE — TELEPHONE ENCOUNTER
Future Appointments   Date Time Provider Kamran Molina   7/25/2022 10:40 AM Roz Habermann, DO [facility] [place] [facility]   10/4/2022 10:30 AM Beth Hodge MD AND [facility]     LOV 2/21/2022

## 2022-07-15 RX ORDER — PANTOPRAZOLE SODIUM 40 MG/1
TABLET, DELAYED RELEASE ORAL
Qty: 180 TABLET | Refills: 0 | Status: SHIPPED | OUTPATIENT
Start: 2022-07-15

## 2022-07-15 NOTE — TELEPHONE ENCOUNTER
Future Appointments   Date Time Provider Kamran Molina   7/25/2022 10:40 AM DO KERWIN Art [place] - [facility]   10/4/2022 10:30 AM Karo Yanes MD AND [facility]       LOV 2/21/2022

## 2022-07-25 ENCOUNTER — OFFICE VISIT (OUTPATIENT)
Dept: FAMILY MEDICINE CLINIC | Age: 72
End: 2022-07-25
Payer: MEDICARE

## 2022-07-25 VITALS
OXYGEN SATURATION: 96 % | RESPIRATION RATE: 16 BRPM | SYSTOLIC BLOOD PRESSURE: 124 MMHG | BODY MASS INDEX: 33.78 KG/M2 | TEMPERATURE: 97.2 F | HEART RATE: 48 BPM | WEIGHT: 203 LBS | DIASTOLIC BLOOD PRESSURE: 70 MMHG

## 2022-07-25 DIAGNOSIS — Z12.11 COLON CANCER SCREENING: ICD-10-CM

## 2022-07-25 DIAGNOSIS — G47.419 PRIMARY NARCOLEPSY WITHOUT CATAPLEXY: Primary | ICD-10-CM

## 2022-07-25 DIAGNOSIS — I25.10 CORONARY ARTERY DISEASE INVOLVING NATIVE CORONARY ARTERY OF NATIVE HEART WITHOUT ANGINA PECTORIS: ICD-10-CM

## 2022-07-25 DIAGNOSIS — F32.9 REACTIVE DEPRESSION: ICD-10-CM

## 2022-07-25 DIAGNOSIS — Z12.31 ENCOUNTER FOR SCREENING MAMMOGRAM FOR BREAST CANCER: ICD-10-CM

## 2022-07-25 DIAGNOSIS — E03.9 HYPOTHYROIDISM, UNSPECIFIED TYPE: ICD-10-CM

## 2022-07-25 PROCEDURE — 99214 OFFICE O/P EST MOD 30 MIN: CPT | Performed by: FAMILY MEDICINE

## 2022-07-25 PROCEDURE — 1123F ACP DISCUSS/DSCN MKR DOCD: CPT | Performed by: FAMILY MEDICINE

## 2022-07-25 RX ORDER — ESCITALOPRAM OXALATE 20 MG/1
TABLET ORAL
Qty: 30 TABLET | Refills: 5 | Status: SHIPPED | OUTPATIENT
Start: 2022-07-25 | End: 2022-07-29

## 2022-07-25 RX ORDER — METHYLPHENIDATE HYDROCHLORIDE 10 MG/1
10 TABLET ORAL 2 TIMES DAILY
Qty: 60 TABLET | Refills: 0 | Status: SHIPPED | OUTPATIENT
Start: 2022-07-25 | End: 2022-08-29 | Stop reason: SDUPTHER

## 2022-07-25 ASSESSMENT — ANXIETY QUESTIONNAIRES
1. FEELING NERVOUS, ANXIOUS, OR ON EDGE: 0
6. BECOMING EASILY ANNOYED OR IRRITABLE: 0
GAD7 TOTAL SCORE: 0
IF YOU CHECKED OFF ANY PROBLEMS ON THIS QUESTIONNAIRE, HOW DIFFICULT HAVE THESE PROBLEMS MADE IT FOR YOU TO DO YOUR WORK, TAKE CARE OF THINGS AT HOME, OR GET ALONG WITH OTHER PEOPLE: NOT DIFFICULT AT ALL
3. WORRYING TOO MUCH ABOUT DIFFERENT THINGS: 0
4. TROUBLE RELAXING: 0
2. NOT BEING ABLE TO STOP OR CONTROL WORRYING: 0
7. FEELING AFRAID AS IF SOMETHING AWFUL MIGHT HAPPEN: 0
5. BEING SO RESTLESS THAT IT IS HARD TO SIT STILL: 0

## 2022-07-25 ASSESSMENT — PATIENT HEALTH QUESTIONNAIRE - PHQ9
SUM OF ALL RESPONSES TO PHQ9 QUESTIONS 1 & 2: 1
5. POOR APPETITE OR OVEREATING: 0
SUM OF ALL RESPONSES TO PHQ QUESTIONS 1-9: 1
6. FEELING BAD ABOUT YOURSELF - OR THAT YOU ARE A FAILURE OR HAVE LET YOURSELF OR YOUR FAMILY DOWN: 0
8. MOVING OR SPEAKING SO SLOWLY THAT OTHER PEOPLE COULD HAVE NOTICED. OR THE OPPOSITE, BEING SO FIGETY OR RESTLESS THAT YOU HAVE BEEN MOVING AROUND A LOT MORE THAN USUAL: 0
SUM OF ALL RESPONSES TO PHQ QUESTIONS 1-9: 1
SUM OF ALL RESPONSES TO PHQ QUESTIONS 1-9: 1
3. TROUBLE FALLING OR STAYING ASLEEP: 0
9. THOUGHTS THAT YOU WOULD BE BETTER OFF DEAD, OR OF HURTING YOURSELF: 0
1. LITTLE INTEREST OR PLEASURE IN DOING THINGS: 0
1. LITTLE INTEREST OR PLEASURE IN DOING THINGS: 0
SUM OF ALL RESPONSES TO PHQ QUESTIONS 1-9: 1
SUM OF ALL RESPONSES TO PHQ QUESTIONS 1-9: 1
2. FEELING DOWN, DEPRESSED OR HOPELESS: 1
SUM OF ALL RESPONSES TO PHQ QUESTIONS 1-9: 1
SUM OF ALL RESPONSES TO PHQ QUESTIONS 1-9: 1
10. IF YOU CHECKED OFF ANY PROBLEMS, HOW DIFFICULT HAVE THESE PROBLEMS MADE IT FOR YOU TO DO YOUR WORK, TAKE CARE OF THINGS AT HOME, OR GET ALONG WITH OTHER PEOPLE: 0
2. FEELING DOWN, DEPRESSED OR HOPELESS: 1
7. TROUBLE CONCENTRATING ON THINGS, SUCH AS READING THE NEWSPAPER OR WATCHING TELEVISION: 0
SUM OF ALL RESPONSES TO PHQ QUESTIONS 1-9: 1
SUM OF ALL RESPONSES TO PHQ9 QUESTIONS 1 & 2: 1
4. FEELING TIRED OR HAVING LITTLE ENERGY: 0

## 2022-07-25 ASSESSMENT — ENCOUNTER SYMPTOMS
SHORTNESS OF BREATH: 0
WHEEZING: 0
CONSTIPATION: 0

## 2022-07-25 NOTE — PATIENT INSTRUCTIONS
Do The cologuard test within a week of getting it.     Same medications for now    See me in November

## 2022-07-25 NOTE — PROGRESS NOTES
Subjective:      Patient ID: Joyce Dumont is a 71 y.o. y.o. female. Here for medication follow up. Meds and hx reviewed. Labs from May reviewed.    Thyroid dose better / ? borderline  There is no interval hx of hospitalization or significant illness    HPI      Chief Complaint   Patient presents with    Results     Blood test        Allergies   Allergen Reactions    Latex Rash    Vistaril [Hydroxyzine Hcl]        Past Medical History:   Diagnosis Date    Acid reflux     small stomach ulcer    Angina pectoris, variant (HCC)     Anxiety     Arthritis     Asthma     Benign esophageal stricture 12/2016    Benign tumor 03/20/2017     Benign brain tumor size of a quater    Cancer ([facility] [address]     ovarian    Depression     Frequency     Hyperlipidemia     Hypertension     Kidney stones     MI, old     Narcolepsy     Sleep apnea     Thyroid disease     Urgency of urination        Past Surgical History:   Procedure Laterality Date    ARM SURGERY Right 8/19/14    exc.trapezium and flexor carpi radialis interpositional arthroplasty    BREAST SURGERY      reduction    CARPAL TUNNEL RELEASE Right     CHOLECYSTECTOMY      COLONOSCOPY      DENTAL SURGERY      ENDOSCOPY, COLON, DIAGNOSTIC  01/06/2017    Anastomotic ulcer, gastritis    ESOPHAGEAL DILATATION  12/2016    GASTRIC BYPASS SURGERY      GASTRIC BYPASS SURGERY      HYSTERECTOMY (CERVIX STATUS UNKNOWN)      LITHOTRIPSY Left 11/12/2013    LITHOTRIPSY Left 12/26/13    LEFT ESWL    MOHS SURGERY  03/07/2022    Left cheek    OK NJX DX/THER SBST INTRLMNR CRV/THRC W/IMG GDN Right 8/27/2018    RIGHT LUMBAR FIVE SACRAL ONE EPIDURAL STEROID INJECTION SITE CONFIRMED BY FLUOROSCOPY performed by Analisa Briggs MD at [facility]   03/03/2017    Gastritis       Social History     Socioeconomic History    Marital status:      Spouse name: Not on file    Number of children: Not on file    Years of education: Not on file Highest education level: Not on file   Occupational History     Employer: [employer]   Tobacco Use    Smoking status: Never    Smokeless tobacco: Never   Vaping Use    Vaping Use: Never used   Substance and Sexual Activity    Alcohol use: No     Alcohol/week: 0.0 standard drinks    Drug use: No    Sexual activity: Never   Other Topics Concern    Not on file   Social History Narrative    Not on file     Social Determinants of Health     Financial Resource Strain: Low Risk     Difficulty of Paying Living Expenses: Not hard at all   Food Insecurity: No Food Insecurity    Worried About Running Out of Food in the Last Year: Never true    Ran Out of Food in the Last Year: Never true   Transportation Needs: Not on file   Physical Activity: Not on file   Stress: Not on file   Social Connections: Not on file   Intimate Partner Violence: Not on file   Housing Stability: Not on file       Family History   Problem Relation Age of Onset    Diabetes Mother     Heart Disease Mother     Cancer Mother         ovarian    Heart Disease Father     Stroke Father     Early Death Father         46 - heart attack    Heart Disease Sister     Stroke Sister     Cancer Sister         ovarian       Vitals:    07/25/22 1007   BP: 124/70   Pulse: (!) 48   Resp: 16   Temp: 97.2 °F (36.2 °C)   SpO2: 96%       Wt Readings from Last 3 Encounters:   07/25/22 203 lb (92.1 kg)   02/21/22 211 lb (95.7 kg)   12/10/21 218 lb (98.9 kg)       Review of Systems   Constitutional:  Positive for activity change. Says more active in her garden,    No angina    Weight down a bit. Respiratory:  Negative for shortness of breath and wheezing. Uses CPAP,  sleeps 2 pillows. No acute episodes       Cardiovascular:  Negative for chest pain and palpitations. Slight pedal edema. Down overnight     Gastrointestinal:  Negative for constipation. Musculoskeletal:  Positive for arthralgias. Hands ache some.    Neurological:         Topamax for migraine prevention- Dr Serene Pedro. Psychiatric/Behavioral:  Positive for sleep disturbance. Hx narcolepsy. Managed with stimulant. Has been stable. Sleeps pretty well  Mid afternoon gets tired and feels like needs a nap. .       Objective:   Physical Exam  Constitutional:       Appearance: She is not ill-appearing. Eyes:      General: No scleral icterus. Conjunctiva/sclera: Conjunctivae normal.   Neck:      Vascular: No carotid bruit. Cardiovascular:      Rate and Rhythm: Normal rate and regular rhythm. Pulmonary:      Effort: Pulmonary effort is normal.      Breath sounds: Normal breath sounds. Abdominal:      General: Bowel sounds are normal. There is no distension. Palpations: Abdomen is soft. There is no mass. Tenderness: There is no abdominal tenderness. Musculoskeletal:      Right lower leg: No edema. Left lower leg: No edema. Lymphadenopathy:      Cervical: No cervical adenopathy. Skin:     General: Skin is warm and dry. Neurological:      General: No focal deficit present. Mental Status: She is alert and oriented to person, place, and time. Psychiatric:         Mood and Affect: Mood normal.         Behavior: Behavior normal.       Assessment:       Diagnosis Orders   1. Encounter for screening mammogram for breast cancer        2. Colon cancer screening        3. Primary narcolepsy without cataplexy        Hypothyroid  CAD  HTN          Plan:     Labs discussed. TG, Alk phos, CO2-  all repeat in November  Colonoscopy prior-  no polyps  cologuard discussed. Patient desires to use this is her next screening. Mammogram-  call to schedule. Same meds  Patient's OARRS report is reviewed and no red flags regarding use of stimulant medicine for narcolepsy.     Follow November      Current Outpatient Medications   Medication Sig Dispense Refill    pantoprazole (PROTONIX) 40 MG tablet Take 1 tablet by mouth twice daily 180 tablet 0    topiramate (TOPAMAX) 25 MG tablet Take 2 tablets by mouth twice daily 360 tablet 1    escitalopram (LEXAPRO) 20 MG tablet Take 1 tablet by mouth once daily 30 tablet 3    levothyroxine (EUTHYROX) 100 MCG tablet Take 1 tablet by mouth once daily 30 tablet 5    sucralfate (CARAFATE) 1 GM tablet Take 1 tablet by mouth 4 times daily 120 tablet 3    valsartan (DIOVAN) 160 MG tablet Take 1 tablet by mouth once daily 90 tablet 3    furosemide (LASIX) 20 MG tablet Take 1 tablet by mouth once daily 90 tablet 3    rosuvastatin (CRESTOR) 5 MG tablet Take 1 tablet by mouth once daily 90 tablet 3    metoprolol tartrate (LOPRESSOR) 25 MG tablet Take 1 tablet by mouth twice daily 180 tablet 3    isosorbide mononitrate (IMDUR) 30 MG extended release tablet Take 1 tablet by mouth once daily 90 tablet 3    L-Arginine 500 MG TABS Take 1,000 mg by mouth 2 times daily 180 tablet 3    NONFORMULARY 5 - LOXIN (given by Mabel WOODS)      aspirin 81 MG tablet Take 81 mg by mouth in the morning. No current facility-administered medications for this visit.

## 2022-07-29 DIAGNOSIS — G47.419 PRIMARY NARCOLEPSY WITHOUT CATAPLEXY: ICD-10-CM

## 2022-07-29 RX ORDER — ESCITALOPRAM OXALATE 20 MG/1
TABLET ORAL
Qty: 30 TABLET | Refills: 5 | Status: SHIPPED | OUTPATIENT
Start: 2022-07-29 | End: 2022-09-29 | Stop reason: SDUPTHER

## 2022-08-19 LAB — NONINV COLON CA DNA+OCC BLD SCRN STL QL: NEGATIVE

## 2022-08-23 ENCOUNTER — HOSPITAL ENCOUNTER (OUTPATIENT)
Dept: MAMMOGRAPHY | Age: 72
Discharge: HOME | End: 2022-08-23
Payer: MEDICARE

## 2022-08-23 DIAGNOSIS — Z12.31 ENCOUNTER FOR SCREENING MAMMOGRAM FOR BREAST CANCER: ICD-10-CM

## 2022-08-23 PROCEDURE — 77063 BREAST TOMOSYNTHESIS BI: CPT

## 2022-08-29 DIAGNOSIS — G47.419 PRIMARY NARCOLEPSY WITHOUT CATAPLEXY: ICD-10-CM

## 2022-08-29 RX ORDER — METHYLPHENIDATE HYDROCHLORIDE 10 MG/1
10 TABLET ORAL 2 TIMES DAILY
Qty: 60 TABLET | Refills: 0 | Status: SHIPPED | OUTPATIENT
Start: 2022-08-29 | End: 2022-09-27 | Stop reason: SDUPTHER

## 2022-08-29 NOTE — TELEPHONE ENCOUNTER
Future Appointments   Date Time Provider Kamran Molina   10/4/2022 10:30 AM Jay Jay Mcgill MD AND [facility]   11/21/2022 11:00 AM DO KERWIN Crawford 7/25/2022

## 2022-09-18 NOTE — TELEPHONE ENCOUNTER
Called Pt for VV appointment. No answer. LVM encouraging her to call back to r/s. Wt Readings from Last 3 Encounters:   09/18/22 70 kg (154 lb 5 2 oz)   01/07/22 71 7 kg (158 lb)   07/16/21 68 2 kg (150 lb 4 8 oz)     · No known history of CHF noted in chart review  · Mild volume overload with elevated proBNP and chest x-ray showing vascular congestion on admission  · Received 1 dose of IV Lasix 20 mg  · Monitor I&Os and daily weights  · Diurese as needed, patient currently appears comfortable from the respiratory standpoint and saturating well on room air therefore will hold off on further diuretics  · Follow-up on echo  · Currently euvolemic on exam

## 2022-09-22 ENCOUNTER — TELEPHONE (OUTPATIENT)
Dept: FAMILY MEDICINE CLINIC | Age: 72
End: 2022-09-22

## 2022-09-22 DIAGNOSIS — G47.419 PRIMARY NARCOLEPSY WITHOUT CATAPLEXY: ICD-10-CM

## 2022-09-22 NOTE — TELEPHONE ENCOUNTER
Patient is requesting refill of the following med. She will be out tomorrow. methylphenidate (RITALIN) 10 MG tablet     [facility]      7/25/2022 last ov  Future Appointments   Date Time Provider Kamran Molina   10/4/2022 10:30 AM Jay Jay Simpson MD AND [facility]   11/21/2022 11:00 AM DO KERWIN Garibay

## 2022-09-27 RX ORDER — METHYLPHENIDATE HYDROCHLORIDE 10 MG/1
10 TABLET ORAL 2 TIMES DAILY
Qty: 60 TABLET | Refills: 0 | Status: SHIPPED | OUTPATIENT
Start: 2022-09-27 | End: 2022-10-27

## 2022-09-29 ENCOUNTER — TELEPHONE (OUTPATIENT)
Dept: FAMILY MEDICINE CLINIC | Age: 72
End: 2022-09-29

## 2022-09-29 DIAGNOSIS — G47.419 PRIMARY NARCOLEPSY WITHOUT CATAPLEXY: ICD-10-CM

## 2022-09-29 RX ORDER — ESCITALOPRAM OXALATE 20 MG/1
TABLET ORAL
Qty: 30 TABLET | Refills: 5 | Status: SHIPPED | OUTPATIENT
Start: 2022-09-29

## 2022-09-29 RX ORDER — ESCITALOPRAM OXALATE 20 MG/1
TABLET ORAL
Qty: 30 TABLET | Refills: 5 | Status: CANCELLED | OUTPATIENT
Start: 2022-09-29

## 2022-09-29 RX ORDER — LEVOTHYROXINE SODIUM 0.1 MG/1
TABLET ORAL
Qty: 30 TABLET | Refills: 5 | Status: SHIPPED | OUTPATIENT
Start: 2022-09-29

## 2022-09-29 RX ORDER — LEVOTHYROXINE SODIUM 0.1 MG/1
TABLET ORAL
Qty: 30 TABLET | Refills: 5 | Status: CANCELLED | OUTPATIENT
Start: 2022-09-29

## 2022-09-29 NOTE — TELEPHONE ENCOUNTER
Ritalin was already sent. Dr. Osmar Marquez, would you please fill while Dr. Abel Mcqueen is out of office?

## 2022-09-29 NOTE — TELEPHONE ENCOUNTER
Patient called to request refills of the following meds to be [facility]     methylphenidate (RITALIN) 10 MG tablet     escitalopram (LEXAPRO) 20 MG tablet     levothyroxine (EUTHYROX) 100 MCG tablet       7/25/2022 last ov    Future Appointments   Date Time Provider Kamran Molina   10/4/2022 10:30 AM Jay Jay Cerrato MD AND [facility]   11/21/2022 11:00 AM DO KERWIN Gandhi

## 2022-10-04 ENCOUNTER — OFFICE VISIT (OUTPATIENT)
Dept: NEUROLOGY | Age: 72
End: 2022-10-04
Payer: MEDICARE

## 2022-10-04 VITALS
HEART RATE: 43 BPM | DIASTOLIC BLOOD PRESSURE: 76 MMHG | BODY MASS INDEX: 34.32 KG/M2 | OXYGEN SATURATION: 96 % | SYSTOLIC BLOOD PRESSURE: 138 MMHG | WEIGHT: 206 LBS | HEIGHT: 65 IN

## 2022-10-04 DIAGNOSIS — G43.119 INTRACTABLE MIGRAINE WITH AURA WITHOUT STATUS MIGRAINOSUS: Primary | ICD-10-CM

## 2022-10-04 DIAGNOSIS — G47.33 OSA (OBSTRUCTIVE SLEEP APNEA): ICD-10-CM

## 2022-10-04 DIAGNOSIS — I10 HTN (HYPERTENSION), BENIGN: ICD-10-CM

## 2022-10-04 PROCEDURE — 1123F ACP DISCUSS/DSCN MKR DOCD: CPT | Performed by: PSYCHIATRY & NEUROLOGY

## 2022-10-04 PROCEDURE — 99213 OFFICE O/P EST LOW 20 MIN: CPT | Performed by: PSYCHIATRY & NEUROLOGY

## 2022-10-04 NOTE — PROGRESS NOTES
The patient came today for follow up regarding: chronic migraines    Since her last visit, she continues to take Topamax 50 Mg twice daily. No side effect from Topamax. Migraines are controlled. Frequency is sporadic once every few weeks. Degree is moderate. Duration is minutes. The same description of pulsating bilateral pain with nausea, photophobia and phonophobia. No daily headaches. She takes NSAID as needed. History of sleep apnea using CPAP machine faithfully. Blood pressure is controlled medications. No insomnia, weakness or numbness or fever or chills. Past Medical History:   Diagnosis Date    Acid reflux     small stomach ulcer    Angina pectoris, variant (HCC)     Anxiety     Arthritis     Asthma     Benign esophageal stricture 12/2016    Benign tumor 03/20/2017     Benign brain tumor size of a quater    Cancer ([facility] [address]     ovarian    Depression     Frequency     Hyperlipidemia     Hypertension     Kidney stones     MI, old     Narcolepsy     Sleep apnea     Thyroid disease     Urgency of urination      Prior to Visit Medications    Medication Sig Taking? Authorizing Provider   escitalopram (LEXAPRO) 20 MG tablet Take 1 tablet daily Yes Riki Michaels, DO   levothyroxine (EUTHYROX) 100 MCG tablet Take 1 tablet by mouth once daily Yes Riki Michaels, DO   methylphenidate (RITALIN) 10 MG tablet Take 1 tablet by mouth 2 times daily for 30 days.  Yes Lindsey Marques, DO   pantoprazole (PROTONIX) 40 MG tablet Take 1 tablet by mouth twice daily Yes Lindsey Marques DO   topiramate (TOPAMAX) 25 MG tablet Take 2 tablets by mouth twice daily Yes YOCASTA Nevarez CNP   sucralfate (CARAFATE) 1 GM tablet Take 1 tablet by mouth 4 times daily Yes Lindsey Marques DO   valsartan (DIOVAN) 160 MG tablet Take 1 tablet by mouth once daily Yes Miriam Velez MD   furosemide (LASIX) 20 MG tablet Take 1 tablet by mouth once daily Yes Miriam Velez MD   rosuvastatin (CRESTOR) 5 MG tablet Take 1 tablet by mouth once daily Yes Erica Mackey MD   metoprolol tartrate (LOPRESSOR) 25 MG tablet Take 1 tablet by mouth twice daily Yes Erica Mackey MD   isosorbide mononitrate (IMDUR) 30 MG extended release tablet Take 1 tablet by mouth once daily Yes Erica Mackey MD   L-Arginine 500 MG TABS Take 1,000 mg by mouth 2 times daily Yes Erica Mackey MD   NONFORMULARY 5 - LOXIN (given by Darwin Abdullahi MD) Yes Historical Provider, MD   aspirin 81 MG tablet Take 81 mg by mouth in the morning. Yes Historical Provider, MD     Allergies   Allergen Reactions    Latex Rash    Vistaril [Hydroxyzine Hcl]      Social History     Tobacco Use    Smoking status: Never    Smokeless tobacco: Never   Substance Use Topics    Alcohol use: No     Alcohol/week: 0.0 standard drinks     Family History   Problem Relation Age of Onset    Diabetes Mother     Heart Disease Mother     Cancer Mother         ovarian    Heart Disease Father     Stroke Father     Early Death Father         46 - heart attack    Heart Disease Sister     Stroke Sister     Cancer Sister         ovarian         Exam:   Constitutional:   Vitals:    10/04/22 1023   BP: 138/76   Site: Left Upper Arm   Position: Sitting   Pulse: (!) 43   SpO2: 96%   Weight: 206 lb (93.4 kg)   Height: 5' 5\" (1.651 m)       General appearance: well-nourished. Mental Status: The same  Oriented to person, place, problem, and time. Fluent speech. Good fund of knowledge. Normal attention span and concentration.     Intact recent and remote memory  Cranial Nerves:   Pupils reactive and symmetric  No gaze preference  Normal sensation  Normal facial symmetry  Tongue is midline    Motor: Normal focal weakness  Tone is normal  No dysmetria or tremors  No sensory loss  Gait is steady         ROS : A 10-12 system review of constitutional, cardiovascular, respiratory, musculoskeletal, endocrine, hematological, skin, SHEENT, genitourinary, psychiatric and neurologic systems was obtained and updated today which is unremarkable except as mentioned in my HPI, no changes compared to last visit otherwise per H&P      Medical decision making:  I personally reviewed and updated social history, past medical history, medications, allergy, surgical history, and family history as documented in the patient's electronic health records. Reviewed notes from her prior physicians in my prior note  Reviewed recent blood test from December which showed sodium 137 and creatinine of  0.8. No recent A1c. Assessment:       Diagnosis Orders   1. Intractable migraine with aura without status migrainosus        2. HTN (hypertension), benign        3.  PRINCE (obstructive sleep apnea)              Continue with Topamax 50 mg twice daily  Hydration  Side effect was discussed  Refill for Topamax  Continue CPAP machine  Weight reduction  Headache diary  Eye exam once a year  Continue current blood pressure medications  Aspirin  Statin for stroke prevention  Follow-up next year

## 2022-10-17 RX ORDER — SUCRALFATE 1 G/1
TABLET ORAL
Qty: 120 TABLET | Refills: 3 | Status: SHIPPED | OUTPATIENT
Start: 2022-10-17

## 2022-10-25 DIAGNOSIS — G43.119 INTRACTABLE MIGRAINE WITH AURA WITHOUT STATUS MIGRAINOSUS: ICD-10-CM

## 2022-10-25 RX ORDER — TOPIRAMATE 25 MG/1
TABLET ORAL
Qty: 360 TABLET | Refills: 1 | Status: SHIPPED | OUTPATIENT
Start: 2022-10-25

## 2022-10-25 NOTE — TELEPHONE ENCOUNTER
Pharmacy has Ritalin on back order, they did not give her an alternative suggestion. Pt has been waiting on medication that was sent on 9/29/22  to be available.  She would like us to send in an alternative or please advise

## 2022-10-26 DIAGNOSIS — G47.419 PRIMARY NARCOLEPSY WITHOUT CATAPLEXY: Primary | ICD-10-CM

## 2022-10-26 RX ORDER — ARMODAFINIL 150 MG/1
TABLET ORAL
Qty: 15 TABLET | Refills: 0 | Status: SHIPPED | OUTPATIENT
Start: 2022-10-26 | End: 2022-11-10 | Stop reason: SDUPTHER

## 2022-10-26 NOTE — TELEPHONE ENCOUNTER
Spoke to the patient. Diagnosis narcolepsy and Ritalin not available. We will try Nuvigil. Patient instructed.

## 2022-10-27 ENCOUNTER — TELEPHONE (OUTPATIENT)
Dept: ADMINISTRATIVE | Age: 72
End: 2022-10-27

## 2022-10-27 NOTE — TELEPHONE ENCOUNTER
Submitted PA for Armodafinil 150MG tablets,  Key: [id number]. Status: Approved, Coverage Starts on: 7/28/2022 12:00:00 AM, Coverage Ends on: 10/27/2023. Please notify patient. Thank you.

## 2022-11-10 ENCOUNTER — TELEPHONE (OUTPATIENT)
Dept: FAMILY MEDICINE CLINIC | Age: 72
End: 2022-11-10

## 2022-11-10 DIAGNOSIS — G47.419 PRIMARY NARCOLEPSY WITHOUT CATAPLEXY: ICD-10-CM

## 2022-11-10 RX ORDER — ARMODAFINIL 150 MG/1
TABLET ORAL
Qty: 30 TABLET | Refills: 1 | Status: SHIPPED | OUTPATIENT
Start: 2022-11-10 | End: 2022-11-25

## 2022-11-10 NOTE — TELEPHONE ENCOUNTER
Spoke to the patient. Says the Nuvigil is effective. We will continue it. Prescription sent and the patient instructed.

## 2022-11-10 NOTE — TELEPHONE ENCOUNTER
Patient is requesting refill of the following med to be sent to the [facility] in [facility] on file. She has one day left, and she knows Dr. Fadumo Olmos won't be in on Friday. She is expressing urgency for this.      Armodafinil (NUVIGIL) 150 MG TABS tablet     7/25/2022 last ov    Future Appointments   Date Time Provider Kamran Molina   11/21/2022 11:00 AM DO KERWIN B[place] - [facility]   10/10/2023 10:45 AM Dione Maldonado MD AND [facility] Neurology -

## 2022-11-21 ENCOUNTER — OFFICE VISIT (OUTPATIENT)
Dept: FAMILY MEDICINE CLINIC | Age: 72
End: 2022-11-21
Payer: MEDICARE

## 2022-11-21 VITALS
BODY MASS INDEX: 37.39 KG/M2 | OXYGEN SATURATION: 96 % | RESPIRATION RATE: 16 BRPM | HEIGHT: 63 IN | HEART RATE: 52 BPM | SYSTOLIC BLOOD PRESSURE: 122 MMHG | DIASTOLIC BLOOD PRESSURE: 83 MMHG | TEMPERATURE: 97.1 F | WEIGHT: 211 LBS

## 2022-11-21 DIAGNOSIS — E66.01 SEVERE OBESITY (BMI 35.0-39.9) WITH COMORBIDITY (HCC): ICD-10-CM

## 2022-11-21 DIAGNOSIS — D33.0 BENIGN NEOPLASM OF SUPRATENTORIAL REGION OF BRAIN (HCC): ICD-10-CM

## 2022-11-21 DIAGNOSIS — E03.9 HYPOTHYROIDISM, UNSPECIFIED TYPE: ICD-10-CM

## 2022-11-21 DIAGNOSIS — F33.0 MILD EPISODE OF RECURRENT MAJOR DEPRESSIVE DISORDER (HCC): ICD-10-CM

## 2022-11-21 DIAGNOSIS — Z00.00 MEDICARE ANNUAL WELLNESS VISIT, SUBSEQUENT: Primary | ICD-10-CM

## 2022-11-21 DIAGNOSIS — M46.90 INFLAMMATORY SPONDYLOPATHY, UNSPECIFIED SPINAL REGION (HCC): ICD-10-CM

## 2022-11-21 DIAGNOSIS — I10 HTN (HYPERTENSION), BENIGN: ICD-10-CM

## 2022-11-21 DIAGNOSIS — I20.8 SYNDROME X, CARDIAC (HCC): ICD-10-CM

## 2022-11-21 LAB
T4 FREE: 1.2 NG/DL (ref 0.9–1.8)
TSH SERPL DL<=0.05 MIU/L-ACNC: 0.63 UIU/ML (ref 0.27–4.2)

## 2022-11-21 PROCEDURE — G0439 PPPS, SUBSEQ VISIT: HCPCS | Performed by: FAMILY MEDICINE

## 2022-11-21 PROCEDURE — 3078F DIAST BP <80 MM HG: CPT | Performed by: FAMILY MEDICINE

## 2022-11-21 PROCEDURE — 99213 OFFICE O/P EST LOW 20 MIN: CPT | Performed by: FAMILY MEDICINE

## 2022-11-21 PROCEDURE — 3074F SYST BP LT 130 MM HG: CPT | Performed by: FAMILY MEDICINE

## 2022-11-21 PROCEDURE — 1123F ACP DISCUSS/DSCN MKR DOCD: CPT | Performed by: FAMILY MEDICINE

## 2022-11-21 SDOH — ECONOMIC STABILITY: TRANSPORTATION INSECURITY
IN THE PAST 12 MONTHS, HAS LACK OF TRANSPORTATION KEPT YOU FROM MEETINGS, WORK, OR FROM GETTING THINGS NEEDED FOR DAILY LIVING?: NO

## 2022-11-21 SDOH — ECONOMIC STABILITY: TRANSPORTATION INSECURITY
IN THE PAST 12 MONTHS, HAS THE LACK OF TRANSPORTATION KEPT YOU FROM MEDICAL APPOINTMENTS OR FROM GETTING MEDICATIONS?: NO

## 2022-11-21 SDOH — ECONOMIC STABILITY: FOOD INSECURITY: WITHIN THE PAST 12 MONTHS, THE FOOD YOU BOUGHT JUST DIDN'T LAST AND YOU DIDN'T HAVE MONEY TO GET MORE.: NEVER TRUE

## 2022-11-21 SDOH — ECONOMIC STABILITY: HOUSING INSECURITY
IN THE LAST 12 MONTHS, WAS THERE A TIME WHEN YOU DID NOT HAVE A STEADY PLACE TO SLEEP OR SLEPT IN A SHELTER (INCLUDING NOW)?: NO

## 2022-11-21 SDOH — ECONOMIC STABILITY: FOOD INSECURITY: WITHIN THE PAST 12 MONTHS, YOU WORRIED THAT YOUR FOOD WOULD RUN OUT BEFORE YOU GOT MONEY TO BUY MORE.: NEVER TRUE

## 2022-11-21 SDOH — ECONOMIC STABILITY: INCOME INSECURITY: IN THE LAST 12 MONTHS, WAS THERE A TIME WHEN YOU WERE NOT ABLE TO PAY THE MORTGAGE OR RENT ON TIME?: NO

## 2022-11-21 ASSESSMENT — PATIENT HEALTH QUESTIONNAIRE - PHQ9
4. FEELING TIRED OR HAVING LITTLE ENERGY: 0
3. TROUBLE FALLING OR STAYING ASLEEP: 0
8. MOVING OR SPEAKING SO SLOWLY THAT OTHER PEOPLE COULD HAVE NOTICED. OR THE OPPOSITE, BEING SO FIGETY OR RESTLESS THAT YOU HAVE BEEN MOVING AROUND A LOT MORE THAN USUAL: 0
2. FEELING DOWN, DEPRESSED OR HOPELESS: 0
SUM OF ALL RESPONSES TO PHQ QUESTIONS 1-9: 0
7. TROUBLE CONCENTRATING ON THINGS, SUCH AS READING THE NEWSPAPER OR WATCHING TELEVISION: 0
10. IF YOU CHECKED OFF ANY PROBLEMS, HOW DIFFICULT HAVE THESE PROBLEMS MADE IT FOR YOU TO DO YOUR WORK, TAKE CARE OF THINGS AT HOME, OR GET ALONG WITH OTHER PEOPLE: 0
9. THOUGHTS THAT YOU WOULD BE BETTER OFF DEAD, OR OF HURTING YOURSELF: 0
SUM OF ALL RESPONSES TO PHQ QUESTIONS 1-9: 0
SUM OF ALL RESPONSES TO PHQ9 QUESTIONS 1 & 2: 0
1. LITTLE INTEREST OR PLEASURE IN DOING THINGS: 0
6. FEELING BAD ABOUT YOURSELF - OR THAT YOU ARE A FAILURE OR HAVE LET YOURSELF OR YOUR FAMILY DOWN: 0
5. POOR APPETITE OR OVEREATING: 0

## 2022-11-21 ASSESSMENT — ENCOUNTER SYMPTOMS
SHORTNESS OF BREATH: 0
COUGH: 0

## 2022-11-21 ASSESSMENT — SOCIAL DETERMINANTS OF HEALTH (SDOH): HOW HARD IS IT FOR YOU TO PAY FOR THE VERY BASICS LIKE FOOD, HOUSING, MEDICAL CARE, AND HEATING?: NOT HARD AT ALL

## 2022-11-21 NOTE — TELEPHONE ENCOUNTER
Please schedule pt first available OV with [facility] for med refills, then route back to board. [place] [facility] only has [place] dates available for the rest of the year, if pt cannot make these dates, ok to schedule with [facility].  Thank you!!     Last OV [facility] 3/10/21

## 2022-11-21 NOTE — TELEPHONE ENCOUNTER
11/21 called ([phone]) unable to make contact with pt. SHANTELLE for pt to call [facility] to schedule appt.

## 2022-11-21 NOTE — TELEPHONE ENCOUNTER
Refill  metoprolol tartrate (LOPRESSOR) 25 MG tablet  [facility] [address] IRMA CARREON [address] [facility], [address] [phone]     Next appt 12/7/22. Pt will be out tomorrow.  11/22/22

## 2022-11-21 NOTE — PATIENT INSTRUCTIONS
Check with me about the blood results in 3-4 days  if we have not called you,    Continue the Nuvigil for the narcolepsy. Personalized Preventive Plan for Gearld Stagers - 11/21/2022  Medicare offers a range of preventive health benefits. Some of the tests and screenings are paid in full while other may be subject to a deductible, co-insurance, and/or copay. Some of these benefits include a comprehensive review of your medical history including lifestyle, illnesses that may run in your family, and various assessments and screenings as appropriate. After reviewing your medical record and screening and assessments performed today your provider may have ordered immunizations, labs, imaging, and/or referrals for you. A list of these orders (if applicable) as well as your Preventive Care list are included within your After Visit Summary for your review. Other Preventive Recommendations:    A preventive eye exam performed by an eye specialist is recommended every 1-2 years to screen for glaucoma; cataracts, macular degeneration, and other eye disorders. A preventive dental visit is recommended every 6 months. Try to get at least 150 minutes of exercise per week or 10,000 steps per day on a pedometer . Order or download the FREE \"Exercise & Physical Activity: Your Everyday Guide\" from [employer]. Call 0-848.425.1703 or search [employer] online. You need 0487-1923 mg of calcium and 7440-6165 IU of vitamin D per day. It is possible to meet your calcium requirement with diet alone, but a vitamin D supplement is usually necessary to meet this goal.  When exposed to the sun, use a sunscreen that protects against both UVA and UVB radiation with an SPF of 30 or greater. Reapply every 2 to 3 hours or after sweating, drying off with a towel, or swimming. Always wear a seat belt when traveling in a car. Always wear a helmet when riding a bicycle or motorcycle.

## 2022-11-21 NOTE — PROGRESS NOTES
Subjective:      Patient ID: Cristino Hebert is a 71 y.o. y.o. female. Here for medicare wellness. Also follow-  Narcolepsy, Meningioma, Syndrome X, Hypothyroid, Depression,  Migraines,  Feels has been pretty stable. Meds reviewed    The Shirline Alyssa is working well for the Narcolepsy. Dose good,  will continue      Other  Pertinent negatives include no coughing.        Chief Complaint   Patient presents with    Medicare AWV     Not Fasting  Not interested in any vaccines today    Other     [facility] [address] gaps needing addressed:  Cardiac syndrome  Inflammatory spondylopathy  Depressive disorder  - depression screening negative  Morbid obesity         Allergies   Allergen Reactions    Latex Rash    Vistaril [Hydroxyzine Hcl]        Past Medical History:   Diagnosis Date    Acid reflux     small stomach ulcer    Angina pectoris, variant (HCC)     Anxiety     Arthritis     Asthma     Benign esophageal stricture 12/2016    Benign tumor 03/20/2017     Benign brain tumor size of a quater    Cancer ([facility] [address]     ovarian    Depression     Frequency     Hyperlipidemia     Hypertension     Kidney stones     MI, old     Narcolepsy     Sleep apnea     Thyroid disease     Urgency of urination        Past Surgical History:   Procedure Laterality Date    ARM SURGERY Right 08/19/2014    exc.trapezium and flexor carpi radialis interpositional arthroplasty    BREAST REDUCTION SURGERY      BREAST SURGERY      reduction    CARPAL TUNNEL RELEASE Right     CHOLECYSTECTOMY      COLONOSCOPY      DENTAL SURGERY      ENDOSCOPY, COLON, DIAGNOSTIC  01/06/2017    Anastomotic ulcer, gastritis    ESOPHAGEAL DILATATION  12/2016    GASTRIC BYPASS SURGERY      GASTRIC BYPASS SURGERY      HYSTERECTOMY (CERVIX STATUS UNKNOWN)      LITHOTRIPSY Left 11/12/2013    LITHOTRIPSY Left 12/26/2013    LEFT ESWL    MOHS SURGERY  03/07/2022    Left cheek    OVARY REMOVAL      VT NJX DX/THER SBST INTRLMNR CRV/THRC W/IMG GDN Right 08/27/2018    RIGHT LUMBAR FIVE SACRAL ONE EPIDURAL STEROID INJECTION SITE CONFIRMED BY FLUOROSCOPY performed by Jerald Self MD at [address] ENDOSCOPY  03/03/2017    Gastritis       Social History     Socioeconomic History    Marital status:      Spouse name: Not on file    Number of children: Not on file    Years of education: Not on file    Highest education level: Not on file   Occupational History     Employer: [employer]   Tobacco Use    Smoking status: Never    Smokeless tobacco: Never   Vaping Use    Vaping Use: Never used   Substance and Sexual Activity    Alcohol use: No     Alcohol/week: 0.0 standard drinks    Drug use: No    Sexual activity: Never   Other Topics Concern    Not on file   Social History Narrative    Not on file     Social Determinants of Health     Financial Resource Strain: Low Risk     Difficulty of Paying Living Expenses: Not hard at all   Food Insecurity: No Food Insecurity    Worried About [address] [employer] in the Last Year: Never true    [address] [place] [address] [employer] in the Last Year: Never true   Transportation Needs: No Transportation Needs    Lack of Transportation (Medical): No    Lack of Transportation (Non-Medical):  No   Physical Activity: Insufficiently Active    Days of Exercise per Week: 3 days    Minutes of Exercise per Session: 30 min   Stress: Not on file   Social Connections: Not on file   Intimate Partner Violence: Not on file   Housing Stability: Unknown    Unable to Pay for Housing in the Last Year: No    Number of Places Lived in the Last Year: Not on file    Unstable Housing in the Last Year: No       Family History   Problem Relation Age of Onset    Diabetes Mother     Heart Disease Mother     Cancer Mother         ovarian    Heart Disease Father     Stroke Father     Early Death Father         46 - heart attack    Heart Disease Sister     Stroke Sister     Cancer Sister         ovarian       Vitals:    11/21/22 1033   BP: 122/83   Pulse: 52   Resp: 16   Temp: 97.1 °F (36.2 °C)   SpO2: 96%       Wt Readings from Last 3 Encounters:   11/21/22 211 lb (95.7 kg)   10/04/22 206 lb (93.4 kg)   07/25/22 203 lb (92.1 kg)       Review of Systems   Constitutional:  Positive for activity change. Negative for unexpected weight change. Has been more active. Respiratory:  Negative for cough and shortness of breath. Cardiovascular:  Negative for palpitations and leg swelling. Stable. Not having recurring CP   Neurological:         History of meningioma. Follows up routinely with neurosurgery and condition has been stable. She has no focal neurologic signs and no intervention is planned at this time. Psychiatric/Behavioral:  Negative for self-injury and suicidal ideas. Has been doing well. She is living with her brother and the situation is working out very well. She is content and may get along well. There is some financial stability now which is 1 less worry and 1 less stress     Objective:   Physical Exam  Constitutional:       Appearance: She is obese. She is not ill-appearing. Eyes:      General: No scleral icterus. Extraocular Movements: Extraocular movements intact. Cardiovascular:      Rate and Rhythm: Normal rate and regular rhythm. Heart sounds: No murmur heard. Pulmonary:      Effort: Pulmonary effort is normal.      Breath sounds: Normal breath sounds. Musculoskeletal:      Right lower leg: No edema. Left lower leg: No edema. Skin:     General: Skin is warm and dry. Neurological:      Mental Status: She is alert and oriented to person, place, and time. Psychiatric:         Mood and Affect: Mood normal.         Behavior: Behavior normal.         Thought Content:  Thought content normal.       Assessment:      Narcolepsy, treated     Cardiac syndrome X     Hypothyroid  Depression, treated, remission              Plan:     Discussed her meds and conditions,  Looks stable    doing well with new cain which has enabled her to get off of the Ritalin/stimulant. This probably will be of benefit in the long run. Medicare wellness discussed. Reviewed. Check thyroid labs today    Looks stable overall so continue the current medication program pending the labs. Current Outpatient Medications   Medication Sig Dispense Refill    Armodafinil (NUVIGIL) 150 MG TABS tablet Take one daily for narcolepsey 30 tablet 1    topiramate (TOPAMAX) 25 MG tablet Take 2 tablets by mouth twice daily 360 tablet 1    sucralfate (CARAFATE) 1 GM tablet Take 1 tablet by mouth 4 times daily 120 tablet 3    escitalopram (LEXAPRO) 20 MG tablet Take 1 tablet daily 30 tablet 5    levothyroxine (EUTHYROX) 100 MCG tablet Take 1 tablet by mouth once daily 30 tablet 5    pantoprazole (PROTONIX) 40 MG tablet Take 1 tablet by mouth twice daily 180 tablet 0    valsartan (DIOVAN) 160 MG tablet Take 1 tablet by mouth once daily 90 tablet 3    furosemide (LASIX) 20 MG tablet Take 1 tablet by mouth once daily 90 tablet 3    rosuvastatin (CRESTOR) 5 MG tablet Take 1 tablet by mouth once daily 90 tablet 3    metoprolol tartrate (LOPRESSOR) 25 MG tablet Take 1 tablet by mouth twice daily 180 tablet 3    isosorbide mononitrate (IMDUR) 30 MG extended release tablet Take 1 tablet by mouth once daily 90 tablet 3    L-Arginine 500 MG TABS Take 1,000 mg by mouth 2 times daily 180 tablet 3    NONFORMULARY 5 - LOXIN (given by Mabel WOODS)      aspirin 81 MG tablet Take 81 mg by mouth in the morning. No current facility-administered medications for this visit. Medicare Annual Wellness Visit    Josiah Viveros is here for Medicare AWV (Not Fasting/Not interested in any vaccines today) and Other ([facility] [address] gaps needing addressed:/Cardiac syndrome/Inflammatory spondylopathy/Depressive disorder  - depression screening negative/Morbid obesity/)    Assessment & Plan   HTN (hypertension), benign  Hypothyroidism, unspecified type  -     TSH;  Future  -     T4, Free; Future  Severe obesity (BMI 35.0-39. 9) with comorbidity ([address]  Inflammatory spondylopathy, unspecified spinal region ([address]  Mild episode of recurrent major depressive disorder ([address]  Benign neoplasm of supratentorial region of brain ([address]  Syndrome X, cardiac ([address]    Recommendations for Preventive Services Due: see orders and patient instructions/AVS.  Recommended screening schedule for the next 5-10 years is provided to the patient in written form: see Patient Instructions/AVS.     No follow-ups on file. Subjective       Patient's complete Health Risk Assessment and screening values have been reviewed and are found in Flowsheets. The following problems were reviewed today and where indicated follow up appointments were made and/or referrals ordered. Positive Risk Factor Screenings with Interventions:    Fall Risk:  Do you feel unsteady or are you worried about falling? : no  2 or more falls in past year?: (!) yes  Fall with injury in past year?: (!) yes (3 weeks ago)   Fall Risk Interventions:    Home safety tips provided            General Health and ACP:  General  In general, how would you say your health is?: Good  In the past 7 days, have you experienced any of the following: New or Increased Pain, New or Increased Fatigue, Loneliness, Social Isolation, Stress or Anger?: No  Do you get the social and emotional support that you need?: Yes  Do you have a Living Will?: Yes    Advance Directives       Power of  Living Will ACP-Advance Directive ACP-Power of     Not on File Not on File Not on File Not on File        General Health Risk Interventions:  Discussed the concept of advanced directive. General health issues and surveillance and maintenance discussed.     Health Habits/Nutrition:  Physical Activity: Insufficiently Active    Days of Exercise per Week: 3 days    Minutes of Exercise per Session: 30 min     Have you lost any weight without trying in the past 3 months?: No  Body mass index: (!) 37.37  Have you seen the dentist within the past year?: (!) No  Health Habits/Nutrition Interventions:  Reminded of recommendation for yearly dental exam.    Hearing/Vision:  Do you or your family notice any trouble with your hearing that hasn't been managed with hearing aids?: (!) Yes  Do you have difficulty driving, watching TV, or doing any of your daily activities because of your eyesight?: No (has glasses for distance viewing)  Have you had an eye exam within the past year?: (!) No  No results found. Hearing/Vision Interventions:  Reminded of recommendation for yearly eye exam.            Objective   Vitals:    11/21/22 1033   BP: 122/83   Site: Left Upper Arm   Position: Sitting   Pulse: 52   Resp: 16   Temp: 97.1 °F (36.2 °C)   SpO2: 96%   Weight: 211 lb (95.7 kg)   Height: 5' 3\" (1.6 m)      Body mass index is 37.38 kg/m². Allergies   Allergen Reactions    Latex Rash    Vistaril [Hydroxyzine Hcl]      Prior to Visit Medications    Medication Sig Taking?  Authorizing Provider   metoprolol tartrate (LOPRESSOR) 25 MG tablet Take 1 tablet by mouth twice daily  Erica Mackey MD   Armodafinil (NUVIGIL) 150 MG TABS tablet Take one daily for narcolepsey Yes Franky Delatorre, DO   topiramate (TOPAMAX) 25 MG tablet Take 2 tablets by mouth twice daily Yes Jay Jay Hollingsworth MD   sucralfate (CARAFATE) 1 GM tablet Take 1 tablet by mouth 4 times daily Yes Franky Delatorre, DO   escitalopram (LEXAPRO) 20 MG tablet Take 1 tablet daily Yes Rubin  Michaels, DO   levothyroxine (EUTHYROX) 100 MCG tablet Take 1 tablet by mouth once daily Yes Rubin Spears Michaels, DO   pantoprazole (PROTONIX) 40 MG tablet Take 1 tablet by mouth twice daily Yes Franky Delatorre, DO   valsartan (DIOVAN) 160 MG tablet Take 1 tablet by mouth once daily Yes Erica Mackey MD   furosemide (LASIX) 20 MG tablet Take 1 tablet by mouth once daily Yes Erica Mackey MD   rosuvastatin (CRESTOR) 5 MG tablet Take 1 tablet by mouth once daily Yes Dimas Sauer MD   isosorbide mononitrate (IMDUR) 30 MG extended release tablet Take 1 tablet by mouth once daily Yes Dimas Sauer MD   L-Arginine 500 MG TABS Take 1,000 mg by mouth 2 times daily Yes Dimas Sauer MD   NONFORMULARY 5 - LOXIN (given by Jonathan Pope MD) Yes Historical Provider, MD   aspirin 81 MG tablet Take 81 mg by mouth in the morning. Yes Historical Provider, MD [place] (Including outside providers/suppliers regularly involved in providing care):   Patient Care Team:  Jaime Blackman DO as PCP - Mark Valentino DO as PCP - [facility] Empaneled Provider  Montse Conde MD (Orthopedic Surgery)  Nigel Mcguire MD as Consulting Physician ([address])     Reviewed and updated this visit:  Tobacco  Allergies  Meds  Med Hx  Surg Hx  Soc Hx  Fam Hx        Reviewed her meds and medical conditions. Surveillance and health maintenance and management of conditions discussed.

## 2022-11-22 NOTE — TELEPHONE ENCOUNTER
Last OV NPDD 3/10/21   Upcoming OV JJP 12/7/2022    Med pending-30 day supply, will need yearly refill at time of office visit

## 2022-11-28 NOTE — TELEPHONE ENCOUNTER
11/28 called ([phone]) unable to make contact with pt. LM for pt to call [facility] to schedule appt. After several attempts to reach pt, a letter has been mailed to listed address.

## 2022-11-29 ENCOUNTER — TELEPHONE (OUTPATIENT)
Dept: FAMILY MEDICINE CLINIC | Age: 72
End: 2022-11-29

## 2022-12-07 ENCOUNTER — OFFICE VISIT (OUTPATIENT)
Dept: CARDIOLOGY CLINIC | Age: 72
End: 2022-12-07
Payer: MEDICARE

## 2022-12-07 VITALS
HEART RATE: 47 BPM | WEIGHT: 210 LBS | HEIGHT: 63 IN | OXYGEN SATURATION: 97 % | DIASTOLIC BLOOD PRESSURE: 74 MMHG | SYSTOLIC BLOOD PRESSURE: 116 MMHG | BODY MASS INDEX: 37.21 KG/M2

## 2022-12-07 DIAGNOSIS — I25.10 CORONARY ARTERY DISEASE INVOLVING NATIVE CORONARY ARTERY OF NATIVE HEART WITHOUT ANGINA PECTORIS: ICD-10-CM

## 2022-12-07 DIAGNOSIS — E78.5 DYSLIPIDEMIA: ICD-10-CM

## 2022-12-07 DIAGNOSIS — R06.02 SOB (SHORTNESS OF BREATH): Primary | ICD-10-CM

## 2022-12-07 DIAGNOSIS — I10 ESSENTIAL HYPERTENSION: ICD-10-CM

## 2022-12-07 DIAGNOSIS — R06.02 SOB (SHORTNESS OF BREATH): ICD-10-CM

## 2022-12-07 DIAGNOSIS — R94.31 ABNORMAL ECG: ICD-10-CM

## 2022-12-07 DIAGNOSIS — I25.9 CHRONIC ISCHEMIC HEART DISEASE: ICD-10-CM

## 2022-12-07 LAB
PRO-BNP: 383 PG/ML (ref 0–124)
PROTEIN PROTEIN: 0.03 G/DL
PROTEIN PROTEIN: 33 MG/DL

## 2022-12-07 PROCEDURE — 99214 OFFICE O/P EST MOD 30 MIN: CPT | Performed by: INTERNAL MEDICINE

## 2022-12-07 PROCEDURE — 1123F ACP DISCUSS/DSCN MKR DOCD: CPT | Performed by: INTERNAL MEDICINE

## 2022-12-07 PROCEDURE — 3074F SYST BP LT 130 MM HG: CPT | Performed by: INTERNAL MEDICINE

## 2022-12-07 PROCEDURE — 3078F DIAST BP <80 MM HG: CPT | Performed by: INTERNAL MEDICINE

## 2022-12-07 PROCEDURE — 93000 ELECTROCARDIOGRAM COMPLETE: CPT | Performed by: INTERNAL MEDICINE

## 2022-12-07 RX ORDER — FUROSEMIDE 20 MG/1
TABLET ORAL
Qty: 90 TABLET | Refills: 3 | Status: SHIPPED | OUTPATIENT
Start: 2022-12-07

## 2022-12-07 RX ORDER — ROSUVASTATIN CALCIUM 5 MG/1
TABLET, COATED ORAL
Qty: 90 TABLET | Refills: 3 | Status: SHIPPED | OUTPATIENT
Start: 2022-12-07

## 2022-12-07 RX ORDER — ISOSORBIDE MONONITRATE 30 MG/1
TABLET, EXTENDED RELEASE ORAL
Qty: 90 TABLET | Refills: 3 | Status: SHIPPED | OUTPATIENT
Start: 2022-12-07

## 2022-12-07 RX ORDER — VALSARTAN 160 MG/1
TABLET ORAL
Qty: 90 TABLET | Refills: 3 | Status: SHIPPED | OUTPATIENT
Start: 2022-12-07

## 2022-12-07 NOTE — PATIENT INSTRUCTIONS
Patient Plan:  1 Hold for a week metoprolol tartrate (lopressor) due to lower heart rate    If you notice you feel better on this you can restart the medication. If you feel no different off the medication you can stay off of it   - let me know how you feel  2. Continue all other medications  3. Echocardiogram with strain  4. Labs now: BNP, SPEP,  UPEP, Serum light chains  5. Follow up in 2-3 months    Your provider has ordered testing for further evaluation. An order/prescription has been included in your paper work. To schedule outpatient testing, contact Central Scheduling by calling [employer] ([phone]).

## 2022-12-07 NOTE — PROGRESS NOTES
[address] [facility]   Cardiovascular Evaluation    PATIENT: Manuel Diggs  DATE: 2022  MRN: [id number]  CSN: [id number]  : 1950      Primary Care Doctor: Daiana Garcia DO  Reason for evaluation:   Follow-up, Coronary Artery Disease, Hypertension, and Shortness of Breath      Subjective:   History of present illness on initial date of evaluation:   Manuel Diggs is a 71 y.o. patient who presents who presents for follow up for microvascular angina, CAD, HTN, HLD. She follows Dr. Raquel Preciado for COPD. Today she reports feeling ok. She is unable to afford Renexa. She states she has chest pain almost every day. She states it is sharp. She states the Imdur helps some, but feels the Renexa helped more. OV 2020, she reports she has SOB and chest pain, but states this is not new. She states she still sleeps with 2 pillows to prop her up. She states the chest pain comes and goes depending upon what she is doing and states when she is stressed it is worse. She states she has been having BLE edema. Somedays are worse than others and normally worse by the end of the day. She states since last visit she was diagnosed with a Meningioma 19 and has had 5 radiation treatments. She states she has some balance issues due to this. She is following with Dr. Marquise Monaco for this. Echo 2020 EF of 55%. There is mild concentric left ventricular hypertrophy. Grade II diastolic dysfunction with elevated left ventricular filling pressure. Mild biatrial enlargement. Mild mitral regurgitation. Stress test  ischemia and infarction in the territory of the mid  and distal LAD. EF>65% with uniform  wall motion. Cardiac cath  mild nonobstructive CAD. Today 2022 she says she is SOB. She says she has fallen a few times recently. She walks outside and works in the yard. She has to stop to sit down and rest during these activities.  She says if she over exerts herself she does get chest discomfort. Patient is taking all cardiac medications as prescribed and tolerates them well. Patient denies current edema, palpitations, dizziness or syncope. Patient Active Problem List   Diagnosis    Excessive sleepiness    Restless legs syndrome (RLS)    Depressive disorder, not elsewhere classified    Narcolepsy    Other chest pain    Chronic ischemic heart disease    CMC arthritis, thumb, degenerative    GERD (gastroesophageal reflux disease)    Anxiety    Cardiac syndrome X (HCC)    CAD (coronary artery disease)    New persistent daily headache    Migraine with aura and without status migrainosus, not intractable    Benign neoplasm of supratentorial region of brain [facility])    Essential hypertension    Asthma    Diaphragmatic hernia    Female genuine stress incontinence    Blood in the urine    Morbid obesity ([address]    Pain of right hip joint    Greater trochanteric bursitis of right hip    Intractable migraine with aura without status migrainosus    Chronic tension-type headache, intractable    Dyslipidemia    Paroxysmal dyspnea    Skipped beats    PRINCE (obstructive sleep apnea)    SOB (shortness of breath)    Hypothyroidism    Mild episode of recurrent major depressive disorder (HCC)    Obesity (BMI 30.0-34. 9)    Lumbosacral spondylosis without myelopathy    Inflammatory spondylopathy (HCC)    Numbness and tingling    Impaired glucose tolerance (oral)     HTN (hypertension), benign         Past Medical History:   has a past medical history of Acid reflux, Angina pectoris, variant (HCC), Anxiety, Arthritis, Asthma, Benign esophageal stricture, Benign tumor, Cancer ([address] Depression, Frequency, Hyperlipidemia, Hypertension, Kidney stones, MI, old, Narcolepsy, Sleep apnea, Thyroid disease, and Urgency of urination. Surgical History:   has a past surgical history that includes Cholecystectomy; Hysterectomy; Gastric bypass surgery; Breast surgery; Dental surgery;  Lithotripsy (Left, 11/12/2013); Lithotripsy (Left, 12/26/2013); Colonoscopy; Carpal tunnel release (Right); Arm Surgery (Right, 08/19/2014); Gastric bypass surgery; Endoscopy, colon, diagnostic (01/06/2017); Esophagus dilation (12/2016); Upper gastrointestinal endoscopy (03/03/2017); pr njx dx/ther sbst intrlmnr crv/thrc w/img gdn (Right, 08/27/2018); Percutaneous Transluminal Coronary Angio; Mohs surgery (03/07/2022); Breast reduction surgery; and Ovary removal.     Social History:   reports that she has never smoked. She has never used smokeless tobacco. She reports that she does not drink alcohol and does not use drugs. Family History:  No evidence for sudden cardiac death or premature CAD    Home Medications:  Reviewed and are listed in nursing record.  and/or listed below  Current Outpatient Medications   Medication Sig Dispense Refill    metoprolol tartrate (LOPRESSOR) 25 MG tablet Take 1 tablet by mouth twice daily 180 tablet 0    metoprolol tartrate (LOPRESSOR) 25 MG tablet Take 1 tablet by mouth twice daily 60 tablet 0    topiramate (TOPAMAX) 25 MG tablet Take 2 tablets by mouth twice daily 360 tablet 1    sucralfate (CARAFATE) 1 GM tablet Take 1 tablet by mouth 4 times daily 120 tablet 3    escitalopram (LEXAPRO) 20 MG tablet Take 1 tablet daily 30 tablet 5    levothyroxine (EUTHYROX) 100 MCG tablet Take 1 tablet by mouth once daily 30 tablet 5    pantoprazole (PROTONIX) 40 MG tablet Take 1 tablet by mouth twice daily 180 tablet 0    valsartan (DIOVAN) 160 MG tablet Take 1 tablet by mouth once daily 90 tablet 3    furosemide (LASIX) 20 MG tablet Take 1 tablet by mouth once daily 90 tablet 3    rosuvastatin (CRESTOR) 5 MG tablet Take 1 tablet by mouth once daily 90 tablet 3    isosorbide mononitrate (IMDUR) 30 MG extended release tablet Take 1 tablet by mouth once daily 90 tablet 3    L-Arginine 500 MG TABS Take 1,000 mg by mouth 2 times daily 180 tablet 3    NONFORMULARY 5 - LOXIN (given by Mabel WOODS)      aspirin 81 MG tablet Take 81 mg by mouth in the morning. No current facility-administered medications for this visit. Allergies:  Latex and Vistaril [hydroxyzine hcl]     Review of Systems:   A 14 point review of symptoms completed. Pertinent positives identified in the HPI, all other review of symptoms negative as below.     Objective:   PHYSICAL EXAM:    Vitals:    12/07/22 1016   BP: 116/74   Pulse: (!) 47   SpO2: 97%      Weight: 210 lb (95.3 kg)     Wt Readings from Last 3 Encounters:   12/07/22 210 lb (95.3 kg)   11/21/22 211 lb (95.7 kg)   10/04/22 206 lb (93.4 kg)       General Appearance:  Alert, cooperative, no distress, appears stated age   Head:  Normocephalic, atraumatic   Eyes:  PERRL, conjunctiva/corneas clear   Nose: Nares normal, no drainage or sinus tenderness   Throat: Lips, mucosa, and tongue normal   Neck: Supple, symmetrical, trachea midline, NL thyroid no carotid bruit or JVD   Lungs:   CTAB, respirations unlabored   Chest Wall:  No tenderness or deformity   Heart:  Regular rhythm and rocio rate; S1, S2 are normal;   no murmur noted; no rub or gallop   Abdomen:   Soft, non-tender, +BS x 4, no masses, no organomegaly   Extremities: Extremities normal, atraumatic, no cyanosis or edema   Pulses: 2+ and symmetric   Skin: Skin color, texture, turgor normal, no rashes or lesions   Pysch: Normal mood and affect   Neurologic: Normal gross motor and sensory exam.         LABS   CBC:      Lab Results   Component Value Date/Time    WBC 5.1 12/09/2020 07:28 AM    RBC 3.91 12/09/2020 07:28 AM    HGB 12.1 12/09/2020 07:28 AM    HCT 37.0 12/09/2020 07:28 AM    MCV 94.6 12/09/2020 07:28 AM    RDW 15.2 12/09/2020 07:28 AM     12/09/2020 07:28 AM     CMP:  Lab Results   Component Value Date/Time     05/06/2022 12:10 PM    K 3.7 05/06/2022 12:10 PM    K 3.7 12/09/2020 07:28 AM     05/06/2022 12:10 PM    CO2 19 05/06/2022 12:10 PM    BUN 18 05/06/2022 12:10 PM    CREATININE 0.9 05/06/2022 12:10 PM    GFRAA >60 2022 12:10 PM    GFRAA >60 2012 03:20 PM    AGRATIO 1.4 2022 12:10 PM    LABGLOM >60 2022 12:10 PM    GLUCOSE 92 2022 12:10 PM    PROT 7.1 2022 12:10 PM    PROT 7.2 2012 03:20 PM    CALCIUM 9.1 2022 12:10 PM    BILITOT 0.4 2022 12:10 PM    ALKPHOS 147 2022 12:10 PM    AST 14 2022 12:10 PM    ALT 10 2022 12:10 PM     PT/INR:   No results found for: PTINR  Liver:  No components found for: CHLPL  Lab Results   Component Value Date    ALT 10 2022    AST 14 (L) 2022    ALKPHOS 147 (H) 2022    BILITOT 0.4 2022     Lab Results   Component Value Date    LABA1C 5.6 11/10/2020     Lipids:         Lab Results   Component Value Date    TRIG 190 (H) 2022    TRIG 176 (H) 2020    TRIG 136 07/15/2019            Lab Results   Component Value Date    HDL 49 2022    HDL 52 2020    HDL 52 07/15/2019            Lab Results   Component Value Date    LDLCALC 67 2022    LDLCALC 71 2020    LDLCALC 47 07/15/2019            Lab Results   Component Value Date    LABVLDL 38 2022    LABVLDL 35 2020    LABVLDL 27 07/15/2019         CARDIAC DATA   EK2017 Sinus rocio, low voltage, NSST changes    2022. Personally reviewed and interpreted  Marked sinus bradycardia at 43 bpm.  Incomplete right bundle branch block with very low voltage throughout    ECHO 2020   Summary   LV systolic function is normal with an EF of 55%. Possible mild HK of basal inferior wall on certain views. Endocardium not   entirely well visualized. There is mild concentric left ventricular hypertrophy. Grade II diastolic dysfunction with elevated left ventricular filling   pressure. Mild biatrial enlargement. Mild mitral regurgitation. Inadequate tricuspid regurgitation jet to estimate systolic artery pressure   (SPAP).     Stress 2020  Summary Small-moderate sized anteroseptal and apical septal partial reversibility  defect consistent with ischemia and infarction in the territory of the mid  and distal LAD. Hyperdynamic LV systolic function with DP>24% with uniform  wall motion. Intermediate-higher risk abnormal study. STRESS TEST: 3/20/17  Summary  There is no evidence of stress induced ischemia. Small, fixed defect in the  anteroseptal wall c/w breast attenuation artifact. Normal LV function with  ejection fraction of 64%. There are no regional wall motion abnormalities. CARDIAC CATH:    LEFT HEART CATH: 12/9/2020  Artery Findings/Result   LM  no angiographic CAD   LAD  mild luminal calcification proximally. 10% stenosis   Cx  very small insignificant vessel. RI  large vessel with branches to the lateral wall. Luminal irregularities. RCA  dominant. Sequential 20% stenosis throughout the mid and distal   LVEDP  12. LVG  55%. No obvious wall motion abnormalities. No significant MR, no significant aortic pullback gradient         Assessment/Plan  1. Mild nonobstructive CAD as above. 2.  Given abnormal stress test and echo with normal LV gram today suspect a possible transient event                -We will give 1 month trial of antianginals and see if this improves patient symptomatology                -Continue aspirin, beta-blocker, statin for CV risk reduction       CATH: 6/12/14   1. Mild coronary artery disease. 2. Normal left ventricular systolic function, ejection fraction 60%. 3. Mildly elevated left ventricular end-diastolic pressure 14 mmHg. VASCULAR/OTHER IMAGING:  MRI Brain 5/14/19  Stable right frontal meningioma. Mild parenchymal volume loss and sequela of chronic microvascular ischemic changes. PFT 9/11/17  1. No evidence of obstructive defect or restrictive defect. 2.  Isolated, mildly decreased diffuse capacity that could be seen emphysema, interstitial lung disease, anemia, pulmonary vascular  disease.   Clinical correlation is warranted        Assessment and Plan   Jessica Morris is a 71 y.o. female who presents today for the following problems:        1. Cardiac microvascular disease: Resolved  2. CAD: mild on 2020 cath  3. HTN: controlled  4. HLD: controlled  5. Dizziness:continues and favors wobbling to right   - meningoma and getting gamma knife tx  6. Sinus bradycardia: ? New falls with injury  7. Meningioma of brain  8. Abnormal EKG: Severe bradycardia and low voltage  9. Shortness of breath: Does not appear to be in decompensated heart failure at this time    Hx of esophogeal stricture and dilation  Hx of Gastrojejunal anastomosis ulcer    MD PLAN  1. Patient with increasing amount of falls with injury. Although multifactorial due to of brain meningioma and unsteady gait given her significant bradycardia will DC low-dose metoprolol to see if this improves    2. Concern for reports of shortness of breath and discrepancy in between LVH on echo and significant low voltage on EKG. tree of carpal tunnel   -BNP. SPEP/UPEP/plasma light chains   -Low threshold for PYP nuclear scan to assess for amyloid if above is negative     3. Update echocardiogram for intracardiac filling pressures, LVH    4.  Okay for patient to try to wean off L-arginine hold for 1 week. Resume if chest pain continues    Would like to follow-up with Dr. Zac Carrasco upon my departure.   He should go to follow-up on the above testing      MD Plan:     Patient Active Problem List   Diagnosis    Excessive sleepiness    Restless legs syndrome (RLS)    Depressive disorder, not elsewhere classified    Narcolepsy    Other chest pain    Chronic ischemic heart disease    CMC arthritis, thumb, degenerative    GERD (gastroesophageal reflux disease)    Anxiety    Cardiac syndrome X (HCC)    CAD (coronary artery disease)    New persistent daily headache    Migraine with aura and without status migrainosus, not intractable    Benign neoplasm of supratentorial region of brain [facility])    Essential hypertension    Asthma    Diaphragmatic hernia    Female genuine stress incontinence    Blood in the urine    Morbid obesity (HCC)    Pain of right hip joint    Greater trochanteric bursitis of right hip    Intractable migraine with aura without status migrainosus    Chronic tension-type headache, intractable    Dyslipidemia    Paroxysmal dyspnea    Skipped beats    PRINCE (obstructive sleep apnea)    SOB (shortness of breath)    Hypothyroidism    Mild episode of recurrent major depressive disorder (HCC)    Obesity (BMI 30.0-34. 9)    Lumbosacral spondylosis without myelopathy    Inflammatory spondylopathy (HCC)    Numbness and tingling    Impaired glucose tolerance (oral)     HTN (hypertension), benign       Patient Plan:  1 Hold for a week metoprolol tartrate (lopressor) due to lower heart rate    If you notice you feel better on this you can restart the medication. If you feel no different off the medication you can stay off of it   - let me know how you feel  2. Continue all other medications  3. Echocardiogram with strain   Call [phone] to schedule   4. Labs now: BNP, SPEP,  UPEP, Serum light chains  5. Follow up in 2-3 months      This note was scribed in the presence of Judith Kumar MD by Tesha Xie RN.        Davida Siemens MD, personally performed the services described in this documentation as scribed by the above signed scribe in my presence, and it is both accurate and complete to the best of our ability and knowledge. I agree with the details independently gathered by my clinical support staff, while the remaining scribed note accurately describes my personal service to the patient. The above RN is working as a scribe for and in the presence of myself . Working as a scribe, the RN may have prepopulated components of this note with my historical intellectual property under my direct supervision. Any additions to this intellectual property were performed at my direction. Furthermore, the content and accuracy of this note have been reviewed by me to the best of my ability. This chart may be generated in part by using Dragon Dictation system and may contain errors related to that system including errors in grammar, punctuation, and spelling, as well as words and phrases that may be inappropriate. If there are any questions or concerns please feel free to contact the dictating provider for clarification.          Kaylah Ruelas MD, [facility] Cardiologist  [facility]  [phone] [address] [place] [address]  [phone] [address] [place] [address]

## 2022-12-09 LAB
ALBUMIN SERPL-MCNC: 3.1 G/DL (ref 3.1–4.9)
ALPHA-1-GLOBULIN: 0.2 G/DL (ref 0.2–0.4)
ALPHA-2-GLOBULIN: 0.7 G/DL (ref 0.4–1.1)
BETA GLOBULIN: 1.2 G/DL (ref 0.9–1.6)
GAMMA GLOBULIN: 1.2 G/DL (ref 0.6–1.8)
KAPPA, FREE LIGHT CHAINS, SERUM: 37.79 MG/L (ref 3.3–19.4)
KAPPA/LAMBDA RATIO: 2.22 (ref 0.26–1.65)
KAPPA/LAMBDA TEST COMMENT: ABNORMAL
LAMBDA, FREE LIGHT CHAINS, SERUM: 17 MG/L (ref 5.71–26.3)
SPE/IFE INTERPRETATION: NORMAL
TOTAL PROTEIN: 6.4 G/DL (ref 6.4–8.2)
URINE ELECTROPHORESIS INTERP: NORMAL

## 2022-12-12 ENCOUNTER — TELEPHONE (OUTPATIENT)
Dept: FAMILY MEDICINE CLINIC | Age: 72
End: 2022-12-12

## 2022-12-12 ENCOUNTER — TELEPHONE (OUTPATIENT)
Dept: CARDIOLOGY CLINIC | Age: 72
End: 2022-12-12

## 2022-12-12 DIAGNOSIS — G47.419 PRIMARY NARCOLEPSY WITHOUT CATAPLEXY: ICD-10-CM

## 2022-12-12 DIAGNOSIS — E85.9 AMYLOIDOSIS, UNSPECIFIED TYPE (HCC): Primary | ICD-10-CM

## 2022-12-12 DIAGNOSIS — R80.9 PROTEINURIA, UNSPECIFIED TYPE: ICD-10-CM

## 2022-12-12 RX ORDER — ARMODAFINIL 150 MG/1
TABLET ORAL
Qty: 30 TABLET | Refills: 1 | Status: SHIPPED | OUTPATIENT
Start: 2022-12-12 | End: 2022-12-27

## 2022-12-12 NOTE — TELEPHONE ENCOUNTER
Patient called  Refill  30 day  Armodafinil (Nuvigal)150 mg qd for [address] Marcus =Angelika Major    Patient is out of medication    11/21/2022 last ov  Future Appointments   Date Time Provider Kamran Molina   1/24/2023 11:30 AM [facility] ECHO ROOM [facility] ECHO [place] H[facility]   10/10/2023 10:45 AM Vitor Reyez MD AND NEURO Neurology -

## 2022-12-12 NOTE — TELEPHONE ENCOUNTER
----- Message from Rupinder Read MD sent at 12/9/2022  3:44 PM EST -----  Let patient know their urine test showed abnormal proteins. Rec: referral to nephrology and hematology for assessment of amyloid/myeloma. Lets order Technetium PYP nuclear study for amyloid assessment as well as cardiac MRI. Thanks.

## 2022-12-15 ENCOUNTER — TELEPHONE (OUTPATIENT)
Dept: CARDIOLOGY CLINIC | Age: 72
End: 2022-12-15

## 2022-12-15 NOTE — TELEPHONE ENCOUNTER
[facility] received referral for pt and have have tried multiple times to reach pt with no luck. Please advise.

## 2022-12-15 NOTE — TELEPHONE ENCOUNTER
Spoke to pt to relay message. V/U     Pt stated she had not received call from [facility] [place].  [facility]'s phone number provided for pt to call

## 2022-12-27 NOTE — TELEPHONE ENCOUNTER
Pt returned phone call, stated she would like to stay off of the Metoprolol. States she is feeling fine. Please advise.

## 2022-12-27 NOTE — TELEPHONE ENCOUNTER
L/M for patient to call back. Per last OV 12/7/22, she was to hold the metoprolol and see how she felt. She was suppose to call the office back to let us know if she restarted it or stayed off of it.

## 2023-01-05 ENCOUNTER — HOSPITAL ENCOUNTER (OUTPATIENT)
Dept: MRI IMAGING | Age: 73
Discharge: HOME | End: 2023-01-05
Payer: MEDICARE

## 2023-01-05 DIAGNOSIS — D32.9 MENINGIOMA (HCC): ICD-10-CM

## 2023-01-05 PROCEDURE — 70553 MRI BRAIN STEM W/O & W/DYE: CPT

## 2023-01-05 PROCEDURE — 6360000004 HC RX CONTRAST MEDICATION: Performed by: NEUROLOGICAL SURGERY

## 2023-01-05 PROCEDURE — A9579 GAD-BASE MR CONTRAST NOS,1ML: HCPCS | Performed by: NEUROLOGICAL SURGERY

## 2023-01-05 RX ADMIN — GADOTERIDOL 20 ML: 279.3 INJECTION, SOLUTION INTRAVENOUS at 12:38

## 2023-01-09 ENCOUNTER — TELEPHONE (OUTPATIENT)
Dept: FAMILY MEDICINE CLINIC | Age: 73
End: 2023-01-09

## 2023-01-09 DIAGNOSIS — G47.419 PRIMARY NARCOLEPSY WITHOUT CATAPLEXY: ICD-10-CM

## 2023-01-09 RX ORDER — ARMODAFINIL 150 MG/1
TABLET ORAL
Qty: 30 TABLET | Refills: 2 | Status: SHIPPED | OUTPATIENT
Start: 2023-01-09 | End: 2023-04-07

## 2023-01-09 RX ORDER — PANTOPRAZOLE SODIUM 40 MG/1
TABLET, DELAYED RELEASE ORAL
Qty: 180 TABLET | Refills: 1 | Status: SHIPPED | OUTPATIENT
Start: 2023-01-09

## 2023-01-09 NOTE — TELEPHONE ENCOUNTER
Patient called  Refills   30 day Armodafinil 150 mg once daily (nuvigal)  Pantoprazole 40 mg one twice daily  Elieser Schwartz = pharmacy    11/21/2022 last ov  Future Appointments   Date Time Provider Kamran Molina   1/24/2023 11:30 AM [facility] ECHO ROOM 01 [facility] ECHO [place] HOD   10/10/2023 10:45 AM Benson Yeung MD AND NEURO Neurology -

## 2023-01-24 ENCOUNTER — HOSPITAL ENCOUNTER (OUTPATIENT)
Dept: NON INVASIVE DIAGNOSTICS | Age: 73
Discharge: HOME | End: 2023-01-24

## 2023-01-24 DIAGNOSIS — R06.02 SOB (SHORTNESS OF BREATH): ICD-10-CM

## 2023-01-24 DIAGNOSIS — I25.10 CORONARY ARTERY DISEASE INVOLVING NATIVE CORONARY ARTERY OF NATIVE HEART WITHOUT ANGINA PECTORIS: ICD-10-CM

## 2023-01-24 DIAGNOSIS — I25.9 CHRONIC ISCHEMIC HEART DISEASE: ICD-10-CM

## 2023-02-13 DIAGNOSIS — G47.419 PRIMARY NARCOLEPSY WITHOUT CATAPLEXY: ICD-10-CM

## 2023-02-13 RX ORDER — ARMODAFINIL 150 MG/1
TABLET ORAL
Qty: 30 TABLET | Refills: 2 | Status: SHIPPED | OUTPATIENT
Start: 2023-02-13 | End: 2023-05-12

## 2023-02-13 NOTE — TELEPHONE ENCOUNTER
Patient needs a refill on Armodafinil (NUVIGIL) 150 MG TABS tablet   . They need a 30 day supply. Pharmacy: [facility] [address] [facility], [address] [address] [phone]  Patient is out !         Patient also asking if provider wants to call something in to replace   sucralfate (CARAFATE) 1 GM tablet   Since has been on back order

## 2023-02-15 ENCOUNTER — TELEPHONE (OUTPATIENT)
Dept: FAMILY MEDICINE CLINIC | Age: 73
End: 2023-02-15

## 2023-02-15 ENCOUNTER — HOSPITAL ENCOUNTER (OUTPATIENT)
Dept: NON INVASIVE DIAGNOSTICS | Age: 73
Discharge: HOME | End: 2023-02-15
Payer: MEDICARE

## 2023-02-15 LAB
LV EF: 67 %
LVEF MODALITY: NORMAL

## 2023-02-15 PROCEDURE — 93356 MYOCRD STRAIN IMG SPCKL TRCK: CPT

## 2023-02-15 PROCEDURE — 93306 TTE W/DOPPLER COMPLETE: CPT

## 2023-02-15 NOTE — TELEPHONE ENCOUNTER
LMTCB  Per Dr. Tsang Masters there is no other like Carafate (on back order)  Except otc Gaviscon - she can try that until she can get the medication

## 2023-03-17 ENCOUNTER — TELEPHONE (OUTPATIENT)
Dept: FAMILY MEDICINE CLINIC | Age: 73
End: 2023-03-17

## 2023-03-17 DIAGNOSIS — G47.419 PRIMARY NARCOLEPSY WITHOUT CATAPLEXY: ICD-10-CM

## 2023-03-17 NOTE — TELEPHONE ENCOUNTER
Patient needs a refill on Nuvigil. They need a 30 day supply.      Mail order or local pharmacy: local    Ph[facility]: [facility]    Last OV: 11/21/22    Future Appointments   Date Time Provider Kamran Molina   3/28/2023  2:45 MD IVANIA Ortiz   10/10/2023 10:45 AM Jay Jay Caba MD AND NEURO Neurology -

## 2023-03-28 ENCOUNTER — OFFICE VISIT (OUTPATIENT)
Dept: CARDIOLOGY CLINIC | Age: 73
End: 2023-03-28
Payer: MEDICARE

## 2023-03-28 VITALS
OXYGEN SATURATION: 96 % | HEART RATE: 70 BPM | SYSTOLIC BLOOD PRESSURE: 128 MMHG | DIASTOLIC BLOOD PRESSURE: 72 MMHG | BODY MASS INDEX: 37.32 KG/M2 | HEIGHT: 63 IN | WEIGHT: 210.6 LBS

## 2023-03-28 DIAGNOSIS — G47.33 OSA (OBSTRUCTIVE SLEEP APNEA): ICD-10-CM

## 2023-03-28 DIAGNOSIS — E78.5 DYSLIPIDEMIA: ICD-10-CM

## 2023-03-28 DIAGNOSIS — G47.419 PRIMARY NARCOLEPSY WITHOUT CATAPLEXY: ICD-10-CM

## 2023-03-28 DIAGNOSIS — I49.9 IRREGULAR HEART RATE: ICD-10-CM

## 2023-03-28 DIAGNOSIS — Z79.899 MEDICATION MANAGEMENT: ICD-10-CM

## 2023-03-28 DIAGNOSIS — R60.0 LOCALIZED EDEMA: ICD-10-CM

## 2023-03-28 DIAGNOSIS — I25.10 CORONARY ARTERY DISEASE INVOLVING NATIVE CORONARY ARTERY OF NATIVE HEART WITHOUT ANGINA PECTORIS: Primary | ICD-10-CM

## 2023-03-28 DIAGNOSIS — I10 ESSENTIAL HYPERTENSION: ICD-10-CM

## 2023-03-28 PROCEDURE — 1123F ACP DISCUSS/DSCN MKR DOCD: CPT | Performed by: INTERNAL MEDICINE

## 2023-03-28 PROCEDURE — 3078F DIAST BP <80 MM HG: CPT | Performed by: INTERNAL MEDICINE

## 2023-03-28 PROCEDURE — 99214 OFFICE O/P EST MOD 30 MIN: CPT | Performed by: INTERNAL MEDICINE

## 2023-03-28 PROCEDURE — 93000 ELECTROCARDIOGRAM COMPLETE: CPT | Performed by: INTERNAL MEDICINE

## 2023-03-28 PROCEDURE — 3074F SYST BP LT 130 MM HG: CPT | Performed by: INTERNAL MEDICINE

## 2023-03-28 RX ORDER — ESCITALOPRAM OXALATE 20 MG/1
TABLET ORAL
Qty: 30 TABLET | Refills: 3 | Status: SHIPPED | OUTPATIENT
Start: 2023-03-28

## 2023-03-28 RX ORDER — LEVOTHYROXINE SODIUM 0.1 MG/1
TABLET ORAL
Qty: 30 TABLET | Refills: 3 | Status: SHIPPED | OUTPATIENT
Start: 2023-03-28

## 2023-03-28 NOTE — PROGRESS NOTES
normal liver and spleen  Extremities:   No edema of either leg  no clubbing, cyanosis,  Pulses:  pedal pulses are normal.  Neuro: intact    Medications:   Outpatient Encounter Medications as of 3/28/2023   Medication Sig Dispense Refill    levothyroxine (SYNTHROID) 100 MCG tablet Take 1 tablet by mouth once daily 30 tablet 3    escitalopram (LEXAPRO) 20 MG tablet Take 1 tablet by mouth once daily 30 tablet 3    Armodafinil (NUVIGIL) 150 MG TABS tablet Take one daily for narcolepsey 30 tablet 2    pantoprazole (PROTONIX) 40 MG tablet Take 1 tablet by mouth twice daily 180 tablet 1    valsartan (DIOVAN) 160 MG tablet Take one tablet daily 90 tablet 3    furosemide (LASIX) 20 MG tablet Take one tablet daily 90 tablet 3    rosuvastatin (CRESTOR) 5 MG tablet Take 1 tablet by mouth once daily 90 tablet 3    isosorbide mononitrate (IMDUR) 30 MG extended release tablet Take 1 tablet by mouth once daily 90 tablet 3    topiramate (TOPAMAX) 25 MG tablet Take 2 tablets by mouth twice daily 360 tablet 1    L-Arginine 500 MG TABS Take 1,000 mg by mouth 2 times daily 180 tablet 3    NONFORMULARY 5 - LOXIN (given by Mabel WOODS)      aspirin 81 MG tablet Take 81 mg by mouth in the morning. [DISCONTINUED] sucralfate (CARAFATE) 1 GM tablet Take 1 tablet by mouth 4 times daily 120 tablet 3    [DISCONTINUED] escitalopram (LEXAPRO) 20 MG tablet Take 1 tablet daily 30 tablet 5    [DISCONTINUED] levothyroxine (EUTHYROX) 100 MCG tablet Take 1 tablet by mouth once daily 30 tablet 5     No facility-administered encounter medications on file as of 3/28/2023. Lab Data:  CBC: No results for input(s): WBC, HGB, HCT, MCV, PLT in the last 72 hours. BMP: No results for input(s): NA, K, CL, CO2, PHOS, BUN, CREATININE, CA in the last 72 hours. LIVER PROFILE: No results for input(s): AST, ALT, LIPASE, BILIDIR, BILITOT, ALKPHOS in the last 72 hours. Invalid input(s):   AMYLASE,  ALB  LIPID:   Lab Results   Component Value Date    CHOL

## 2023-03-28 NOTE — PATIENT INSTRUCTIONS
Plan:  Labs reviewed in epic and discussed with patient. Current medications reviewed. Refills given as warranted. Repeat blood work in May  -CBC, CMP, TSH, Lipids   -you will need to be fasting for blood work  -it is ok to take your medicine with sips of water or black coffee  4. No cardiac testing at this time.     Follow up with me in 6 months

## 2023-04-26 ENCOUNTER — TELEPHONE (OUTPATIENT)
Dept: NEUROLOGY | Age: 73
End: 2023-04-26

## 2023-04-26 DIAGNOSIS — G43.119 INTRACTABLE MIGRAINE WITH AURA WITHOUT STATUS MIGRAINOSUS: ICD-10-CM

## 2023-04-26 NOTE — TELEPHONE ENCOUNTER
LOV: 10/4/22  NOV: 10/10/23    Preferred pharmacy: [facility]    Name of caller: Gillian Bar for call: Patient called today to request a refill for her Topamax 25 mg. Said she has reached out to pharmacy and all she gets is a message that she has no refills and to reach out to her dr. Jakob Steward confirmed that patient is taking  Topamax 25 mg 2 tablets by mouth twice a day.

## 2023-04-27 DIAGNOSIS — G43.119 INTRACTABLE MIGRAINE WITH AURA WITHOUT STATUS MIGRAINOSUS: ICD-10-CM

## 2023-04-27 RX ORDER — TOPIRAMATE 25 MG/1
TABLET ORAL
Qty: 360 TABLET | Refills: 1 | Status: SHIPPED | OUTPATIENT
Start: 2023-04-27

## 2023-04-27 RX ORDER — TOPIRAMATE 25 MG/1
TABLET ORAL
Qty: 360 TABLET | Refills: 0 | OUTPATIENT
Start: 2023-04-27

## 2023-05-22 DIAGNOSIS — G47.419 PRIMARY NARCOLEPSY WITHOUT CATAPLEXY: ICD-10-CM

## 2023-05-22 RX ORDER — ARMODAFINIL 150 MG/1
TABLET ORAL
Qty: 30 TABLET | Refills: 2 | Status: SHIPPED | OUTPATIENT
Start: 2023-05-22 | End: 2023-08-18

## 2023-05-22 NOTE — TELEPHONE ENCOUNTER
Patient needs a refill on Armodafinil (NUVIGIL) 150 MG TABS tablet   . They need a 30 day supply.        [facility] [place] [address] [facility] [address]   [phone]

## 2023-05-22 NOTE — TELEPHONE ENCOUNTER
Future Appointments   Date Time Provider Kamran Molina   5/31/2023 10:20 AM DO KERWIN [facility] [place] - [facility]   10/10/2023 10:45 AM Christian Liriano MD AND NEURO Neurology -     Veronica Chaudhari 11/21/2022

## 2023-05-23 ENCOUNTER — HOSPITAL ENCOUNTER (OUTPATIENT)
Age: 73
Discharge: HOME | End: 2023-05-23
Payer: MEDICARE

## 2023-05-23 DIAGNOSIS — Z79.899 MEDICATION MANAGEMENT: ICD-10-CM

## 2023-05-23 LAB
ALBUMIN SERPL-MCNC: 4 G/DL (ref 3.4–5)
ALBUMIN/GLOB SERPL: 1.3 {RATIO} (ref 1.1–2.2)
ALP SERPL-CCNC: 127 U/L (ref 40–129)
ALT SERPL-CCNC: 8 U/L (ref 10–40)
ANION GAP SERPL CALCULATED.3IONS-SCNC: 9 MMOL/L (ref 3–16)
AST SERPL-CCNC: 13 U/L (ref 15–37)
BASOPHILS # BLD: 0 K/UL (ref 0–0.2)
BASOPHILS NFR BLD: 0.2 %
BILIRUB SERPL-MCNC: 0.4 MG/DL (ref 0–1)
BUN SERPL-MCNC: 17 MG/DL (ref 7–20)
CALCIUM SERPL-MCNC: 9.1 MG/DL (ref 8.3–10.6)
CHLORIDE SERPL-SCNC: 111 MMOL/L (ref 99–110)
CHOLEST SERPL-MCNC: 121 MG/DL (ref 0–199)
CO2 SERPL-SCNC: 23 MMOL/L (ref 21–32)
CREAT SERPL-MCNC: 1 MG/DL (ref 0.6–1.2)
DEPRECATED RDW RBC AUTO: 14.2 % (ref 12.4–15.4)
EOSINOPHIL # BLD: 0 K/UL (ref 0–0.6)
EOSINOPHIL NFR BLD: 0.3 %
GFR SERPLBLD CREATININE-BSD FMLA CKD-EPI: 60 ML/MIN/{1.73_M2}
GLUCOSE SERPL-MCNC: 102 MG/DL (ref 70–99)
HCT VFR BLD AUTO: 37.4 % (ref 36–48)
HDLC SERPL-MCNC: 50 MG/DL (ref 40–60)
HGB BLD-MCNC: 12.9 G/DL (ref 12–16)
LDLC SERPL CALC-MCNC: 46 MG/DL
LYMPHOCYTES # BLD: 2 K/UL (ref 1–5.1)
LYMPHOCYTES NFR BLD: 32.8 %
MCH RBC QN AUTO: 33.6 PG (ref 26–34)
MCHC RBC AUTO-ENTMCNC: 34.4 G/DL (ref 31–36)
MCV RBC AUTO: 97.5 FL (ref 80–100)
MONOCYTES # BLD: 0.4 K/UL (ref 0–1.3)
MONOCYTES NFR BLD: 6.1 %
NEUTROPHILS # BLD: 3.6 K/UL (ref 1.7–7.7)
NEUTROPHILS NFR BLD: 60.6 %
PLATELET # BLD AUTO: 165 K/UL (ref 135–450)
PMV BLD AUTO: 8.2 FL (ref 5–10.5)
POTASSIUM SERPL-SCNC: 3.8 MMOL/L (ref 3.5–5.1)
PROT SERPL-MCNC: 7.1 G/DL (ref 6.4–8.2)
RBC # BLD AUTO: 3.83 M/UL (ref 4–5.2)
SODIUM SERPL-SCNC: 143 MMOL/L (ref 136–145)
TRIGL SERPL-MCNC: 125 MG/DL (ref 0–150)
TSH SERPL DL<=0.005 MIU/L-ACNC: 0.41 UIU/ML (ref 0.27–4.2)
VLDLC SERPL CALC-MCNC: 25 MG/DL
WBC # BLD AUTO: 6 K/UL (ref 4–11)

## 2023-05-23 PROCEDURE — 36415 COLL VENOUS BLD VENIPUNCTURE: CPT

## 2023-05-23 PROCEDURE — 84443 ASSAY THYROID STIM HORMONE: CPT

## 2023-05-23 PROCEDURE — 80053 COMPREHEN METABOLIC PANEL: CPT

## 2023-05-23 PROCEDURE — 80061 LIPID PANEL: CPT

## 2023-05-23 PROCEDURE — 85025 COMPLETE CBC W/AUTO DIFF WBC: CPT

## 2023-05-24 ENCOUNTER — TELEPHONE (OUTPATIENT)
Dept: CARDIOLOGY CLINIC | Age: 73
End: 2023-05-24

## 2023-05-30 ASSESSMENT — PATIENT HEALTH QUESTIONNAIRE - PHQ9
SUM OF ALL RESPONSES TO PHQ QUESTIONS 1-9: 6
5. POOR APPETITE OR OVEREATING: MORE THAN HALF THE DAYS
9. THOUGHTS THAT YOU WOULD BE BETTER OFF DEAD, OR OF HURTING YOURSELF: NOT AT ALL
8. MOVING OR SPEAKING SO SLOWLY THAT OTHER PEOPLE COULD HAVE NOTICED. OR THE OPPOSITE, BEING SO FIGETY OR RESTLESS THAT YOU HAVE BEEN MOVING AROUND A LOT MORE THAN USUAL: 0
SUM OF ALL RESPONSES TO PHQ QUESTIONS 1-9: 6
10. IF YOU CHECKED OFF ANY PROBLEMS, HOW DIFFICULT HAVE THESE PROBLEMS MADE IT FOR YOU TO DO YOUR WORK, TAKE CARE OF THINGS AT HOME, OR GET ALONG WITH OTHER PEOPLE: 0
6. FEELING BAD ABOUT YOURSELF - OR THAT YOU ARE A FAILURE OR HAVE LET YOURSELF OR YOUR FAMILY DOWN: NOT AT ALL
SUM OF ALL RESPONSES TO PHQ QUESTIONS 1-9: 6
8. MOVING OR SPEAKING SO SLOWLY THAT OTHER PEOPLE COULD HAVE NOTICED. OR THE OPPOSITE - BEING SO FIDGETY OR RESTLESS THAT YOU HAVE BEEN MOVING AROUND A LOT MORE THAN USUAL: NOT AT ALL
4. FEELING TIRED OR HAVING LITTLE ENERGY: 2
7. TROUBLE CONCENTRATING ON THINGS, SUCH AS READING THE NEWSPAPER OR WATCHING TELEVISION: 0
9. THOUGHTS THAT YOU WOULD BE BETTER OFF DEAD, OR OF HURTING YOURSELF: 0
10. IF YOU CHECKED OFF ANY PROBLEMS, HOW DIFFICULT HAVE THESE PROBLEMS MADE IT FOR YOU TO DO YOUR WORK, TAKE CARE OF THINGS AT HOME, OR GET ALONG WITH OTHER PEOPLE: NOT DIFFICULT AT ALL
3. TROUBLE FALLING OR STAYING ASLEEP: 2
3. TROUBLE FALLING OR STAYING ASLEEP: MORE THAN HALF THE DAYS
4. FEELING TIRED OR HAVING LITTLE ENERGY: MORE THAN HALF THE DAYS
SUM OF ALL RESPONSES TO PHQ QUESTIONS 1-9: 6
5. POOR APPETITE OR OVEREATING: 2
6. FEELING BAD ABOUT YOURSELF - OR THAT YOU ARE A FAILURE OR HAVE LET YOURSELF OR YOUR FAMILY DOWN: 0
7. TROUBLE CONCENTRATING ON THINGS, SUCH AS READING THE NEWSPAPER OR WATCHING TELEVISION: NOT AT ALL

## 2023-05-31 ENCOUNTER — OFFICE VISIT (OUTPATIENT)
Dept: FAMILY MEDICINE CLINIC | Age: 73
End: 2023-05-31

## 2023-05-31 VITALS
TEMPERATURE: 97.2 F | OXYGEN SATURATION: 93 % | BODY MASS INDEX: 35.43 KG/M2 | SYSTOLIC BLOOD PRESSURE: 108 MMHG | RESPIRATION RATE: 16 BRPM | WEIGHT: 200 LBS | DIASTOLIC BLOOD PRESSURE: 74 MMHG | HEART RATE: 74 BPM

## 2023-05-31 DIAGNOSIS — I20.8 SYNDROME X, CARDIAC (HCC): ICD-10-CM

## 2023-05-31 DIAGNOSIS — N39.41 URGE URINARY INCONTINENCE: Primary | ICD-10-CM

## 2023-05-31 DIAGNOSIS — E66.01 SEVERE OBESITY (BMI 35.0-39.9) WITH COMORBIDITY (HCC): ICD-10-CM

## 2023-05-31 DIAGNOSIS — M46.90 INFLAMMATORY SPONDYLOPATHY, UNSPECIFIED SPINAL REGION (HCC): ICD-10-CM

## 2023-05-31 DIAGNOSIS — F33.0 MILD EPISODE OF RECURRENT MAJOR DEPRESSIVE DISORDER (HCC): ICD-10-CM

## 2023-05-31 DIAGNOSIS — I10 HTN (HYPERTENSION), BENIGN: ICD-10-CM

## 2023-05-31 DIAGNOSIS — D33.0 BENIGN NEOPLASM OF SUPRATENTORIAL REGION OF BRAIN (HCC): ICD-10-CM

## 2023-05-31 DIAGNOSIS — G47.419 PRIMARY NARCOLEPSY WITHOUT CATAPLEXY: ICD-10-CM

## 2023-05-31 DIAGNOSIS — N39.46 MIXED STRESS AND URGE URINARY INCONTINENCE: ICD-10-CM

## 2023-05-31 LAB
BILIRUBIN, POC: NORMAL
BLOOD URINE, POC: NORMAL
CLARITY, POC: NORMAL
COLOR, POC: NORMAL
GLUCOSE URINE, POC: NORMAL
KETONES, POC: NORMAL
LEUKOCYTE EST, POC: NORMAL
NITRITE, POC: NORMAL
PH, POC: 7
PROTEIN, POC: NORMAL
SPECIFIC GRAVITY, POC: 1.02
UROBILINOGEN, POC: NORMAL

## 2023-05-31 SDOH — ECONOMIC STABILITY: FOOD INSECURITY: WITHIN THE PAST 12 MONTHS, YOU WORRIED THAT YOUR FOOD WOULD RUN OUT BEFORE YOU GOT MONEY TO BUY MORE.: NEVER TRUE

## 2023-05-31 SDOH — ECONOMIC STABILITY: FOOD INSECURITY: WITHIN THE PAST 12 MONTHS, THE FOOD YOU BOUGHT JUST DIDN'T LAST AND YOU DIDN'T HAVE MONEY TO GET MORE.: NEVER TRUE

## 2023-05-31 SDOH — ECONOMIC STABILITY: INCOME INSECURITY: HOW HARD IS IT FOR YOU TO PAY FOR THE VERY BASICS LIKE FOOD, HOUSING, MEDICAL CARE, AND HEATING?: NOT HARD AT ALL

## 2023-05-31 ASSESSMENT — ENCOUNTER SYMPTOMS
WHEEZING: 0
SHORTNESS OF BREATH: 0

## 2023-05-31 NOTE — PROGRESS NOTES
Subjective:      Patient ID: Ines Ortez is a 71 y.o. y.o. female. Following up meds    Having bladder issues / emptying issues. Feels like something dropping     NO uterus  / ovaries  Had bladder suspension at least 25 years ago. Has in general been doing well. No interval history. Bladder Problem  Pertinent negatives include no chest pain. Chief Complaint   Patient presents with    Bladder Problem     Feels like it is trying to fall out - states not UTI related        Allergies   Allergen Reactions    Latex Rash    Vistaril [Hydroxyzine Hcl]        Past Medical History:   Diagnosis Date    Acid reflux     small stomach ulcer    Angina pectoris, variant (HCC)     Anxiety     Arthritis     Asthma     Benign esophageal stricture 12/2016    Benign tumor 03/20/2017     Benign brain tumor size of a quater    Cancer ([facility] [address]     ovarian    Depression     Frequency     Headache     Hyperlipidemia     Hypertension     Hypothyroidism     Irritable bowel syndrome     Kidney stones     MI, old     Narcolepsy     Obesity     Restless legs syndrome     Sleep apnea     Thyroid disease     Urgency of urination     Urinary incontinence        Past Surgical History:   Procedure Laterality Date    ARM SURGERY Right 08/19/2014    exc.trapezium and flexor carpi radialis interpositional arthroplasty    BREAST REDUCTION SURGERY      BREAST SURGERY      reduction    CARPAL TUNNEL RELEASE Right     CHOLECYSTECTOMY      COLONOSCOPY      COSMETIC SURGERY  3/6/2022    Had surgery on left side of nose to cover up hole using skin from eyelid from right eye. DENTAL SURGERY      ENDOSCOPY, COLON, DIAGNOSTIC  01/06/2017    Anastomotic ulcer, gastritis    ESOPHAGEAL DILATATION  12/2016    GASTRIC BYPASS SURGERY      GASTRIC BYPASS SURGERY      HERNIA REPAIR      Had a hiatal hernia removed when they done my gastric  bipass.     HYSTERECTOMY (CERVIX STATUS UNKNOWN)      HYSTERECTOMY, VAGINAL  12/26/1990    Had ovarian cancer and

## 2023-06-01 LAB — BACTERIA UR CULT: NORMAL

## 2023-06-08 ENCOUNTER — OFFICE VISIT (OUTPATIENT)
Dept: NEUROLOGY | Age: 73
End: 2023-06-08
Payer: MEDICARE

## 2023-06-08 VITALS
HEART RATE: 71 BPM | SYSTOLIC BLOOD PRESSURE: 131 MMHG | WEIGHT: 199 LBS | DIASTOLIC BLOOD PRESSURE: 82 MMHG | OXYGEN SATURATION: 95 % | HEIGHT: 64 IN | BODY MASS INDEX: 33.97 KG/M2

## 2023-06-08 DIAGNOSIS — G43.119 INTRACTABLE MIGRAINE WITH AURA WITHOUT STATUS MIGRAINOSUS: Primary | ICD-10-CM

## 2023-06-08 DIAGNOSIS — E53.8 VITAMIN B12 DEFICIENCY DISEASE: ICD-10-CM

## 2023-06-08 DIAGNOSIS — G47.33 OSA (OBSTRUCTIVE SLEEP APNEA): ICD-10-CM

## 2023-06-08 DIAGNOSIS — I10 HTN (HYPERTENSION), BENIGN: ICD-10-CM

## 2023-06-08 PROCEDURE — 1123F ACP DISCUSS/DSCN MKR DOCD: CPT | Performed by: PSYCHIATRY & NEUROLOGY

## 2023-06-08 PROCEDURE — 99214 OFFICE O/P EST MOD 30 MIN: CPT | Performed by: PSYCHIATRY & NEUROLOGY

## 2023-06-08 PROCEDURE — 3075F SYST BP GE 130 - 139MM HG: CPT | Performed by: PSYCHIATRY & NEUROLOGY

## 2023-06-08 PROCEDURE — 3079F DIAST BP 80-89 MM HG: CPT | Performed by: PSYCHIATRY & NEUROLOGY

## 2023-06-08 RX ORDER — TOPIRAMATE 25 MG/1
TABLET ORAL
Qty: 360 TABLET | Refills: 1 | Status: SHIPPED | OUTPATIENT
Start: 2023-06-08

## 2023-06-08 NOTE — PROGRESS NOTES
The patient came today for follow up regarding: chronic migraines and leg numbness. Since her last visit, she continues to take Topamax 50 mg twice daily. No side effect from Topamax. Headaches and migraines are episodic. Once every few weeks but no daily headache. No recent weakness in the arms or legs but she is complaining of leg numbness and dysesthesia for the last several months. Degree is mild to moderate but persistent. No falling or injury but feels unsteady. Last A1c was 5.6. No recent B12 level and TSH from last year was normal.  She denies any sleep disturbance, insomnia or parasomnia. She is consistent with CPAP machine. Other review of system was unremarkable      Past Medical History:   Diagnosis Date    Acid reflux     small stomach ulcer    Angina pectoris, variant (HCC)     Anxiety     Arthritis     Asthma     Benign esophageal stricture 12/2016    Benign tumor 03/20/2017     Benign brain tumor size of a quater    Cancer ([facility] [address]     ovarian    Depression     Frequency     Headache     Hyperlipidemia     Hypertension     Hypothyroidism     Irritable bowel syndrome     Kidney stones     MI, old     Narcolepsy     Obesity     Restless legs syndrome     Sleep apnea     Thyroid disease     Urgency of urination     Urinary incontinence      Prior to Visit Medications    Medication Sig Taking?  Authorizing Provider   topiramate (TOPAMAX) 25 MG tablet Take 2 tablets by mouth twice daily Yes Jay Jay Fuentes MD   Armodafinil (NUVIGIL) 150 MG TABS tablet Take one daily for narcolepsey Yes Salvador Queen DO   levothyroxine (SYNTHROID) 100 MCG tablet Take 1 tablet by mouth once daily Yes Salvador Queen DO   escitalopram (LEXAPRO) 20 MG tablet Take 1 tablet by mouth once daily Yes Salvador Queen DO   pantoprazole (PROTONIX) 40 MG tablet Take 1 tablet by mouth twice daily Yes Salvador Queen DO   valsartan (DIOVAN) 160 MG tablet Take one tablet daily Yes Earnest Jones MD   furosemide (LASIX) 20

## 2023-07-03 ENCOUNTER — HOSPITAL ENCOUNTER (OUTPATIENT)
Age: 73
Discharge: HOME | End: 2023-07-03
Payer: MEDICARE

## 2023-07-03 DIAGNOSIS — G43.119 INTRACTABLE MIGRAINE WITH AURA WITHOUT STATUS MIGRAINOSUS: ICD-10-CM

## 2023-07-03 PROCEDURE — 82607 VITAMIN B-12: CPT

## 2023-07-03 PROCEDURE — 82746 ASSAY OF FOLIC ACID SERUM: CPT

## 2023-07-03 PROCEDURE — 36415 COLL VENOUS BLD VENIPUNCTURE: CPT

## 2023-07-03 RX ORDER — PANTOPRAZOLE SODIUM 40 MG/1
TABLET, DELAYED RELEASE ORAL
Qty: 180 TABLET | Refills: 1 | Status: SHIPPED | OUTPATIENT
Start: 2023-07-03 | End: 2023-07-07

## 2023-07-03 NOTE — TELEPHONE ENCOUNTER
Last ov 05/31/2023   Future Appointments   Date Time Provider [address]   7/18/2023  9:45 AM Laquita Alfredo MD [facility]   6/11/2024 11:45 AM You Mondragon MD AND [facility] Neurology -

## 2023-07-04 LAB
FOLATE SERPL-MCNC: 4.6 NG/ML (ref 4.78–24.2)
VIT B12 SERPL-MCNC: 370 PG/ML (ref 211–911)

## 2023-07-07 RX ORDER — PANTOPRAZOLE SODIUM 40 MG/1
TABLET, DELAYED RELEASE ORAL
Qty: 180 TABLET | Refills: 0 | Status: SHIPPED | OUTPATIENT
Start: 2023-07-07

## 2023-07-07 NOTE — TELEPHONE ENCOUNTER
Future Appointments   Date Time Provider [address]   7/18/2023  9:45 AM Maeve Barrett MD [facility]   6/11/2024 11:45  [address], MD AND NEURO Neurology -     Kady Dennis 5/31/2023

## 2023-07-18 ENCOUNTER — OFFICE VISIT (OUTPATIENT)
Dept: UROGYNECOLOGY | Age: 73
End: 2023-07-18
Payer: MEDICARE

## 2023-07-18 VITALS
OXYGEN SATURATION: 98 % | HEART RATE: 65 BPM | TEMPERATURE: 97.9 F | DIASTOLIC BLOOD PRESSURE: 85 MMHG | SYSTOLIC BLOOD PRESSURE: 124 MMHG | RESPIRATION RATE: 18 BRPM

## 2023-07-18 DIAGNOSIS — R35.0 URINARY FREQUENCY: ICD-10-CM

## 2023-07-18 DIAGNOSIS — R39.15 URINARY URGENCY: ICD-10-CM

## 2023-07-18 DIAGNOSIS — N30.01 ACUTE CYSTITIS WITH HEMATURIA: Primary | ICD-10-CM

## 2023-07-18 LAB
BILIRUBIN, POC: NORMAL
BLOOD URINE, POC: NORMAL
CLARITY, POC: NORMAL
COLOR, POC: YELLOW
GLUCOSE URINE, POC: NORMAL
KETONES, POC: NORMAL
LEUKOCYTE EST, POC: NORMAL
NITRITE, POC: POSITIVE
PH, POC: 7.5
PROTEIN, POC: POSITIVE
SPECIFIC GRAVITY, POC: 1.01
UROBILINOGEN, POC: 1

## 2023-07-18 PROCEDURE — 99203 OFFICE O/P NEW LOW 30 MIN: CPT | Performed by: OBSTETRICS & GYNECOLOGY

## 2023-07-18 PROCEDURE — 3079F DIAST BP 80-89 MM HG: CPT | Performed by: OBSTETRICS & GYNECOLOGY

## 2023-07-18 PROCEDURE — 3074F SYST BP LT 130 MM HG: CPT | Performed by: OBSTETRICS & GYNECOLOGY

## 2023-07-18 PROCEDURE — 81002 URINALYSIS NONAUTO W/O SCOPE: CPT | Performed by: OBSTETRICS & GYNECOLOGY

## 2023-07-18 PROCEDURE — 51701 INSERT BLADDER CATHETER: CPT | Performed by: OBSTETRICS & GYNECOLOGY

## 2023-07-18 RX ORDER — SUCRALFATE 1 G/1
1 TABLET ORAL 4 TIMES DAILY
COMMUNITY
Start: 2023-07-14

## 2023-07-18 RX ORDER — NITROFURANTOIN 25; 75 MG/1; MG/1
100 CAPSULE ORAL 2 TIMES DAILY
Qty: 20 CAPSULE | Refills: 0 | Status: SHIPPED | OUTPATIENT
Start: 2023-07-18 | End: 2023-07-28

## 2023-07-18 ASSESSMENT — ENCOUNTER SYMPTOMS
VOICE CHANGE: 1
EYE ITCHING: 1
CHOKING: 1
SORE THROAT: 1
ABDOMINAL DISTENTION: 1
BACK PAIN: 1
TROUBLE SWALLOWING: 1
NAUSEA: 1
VOMITING: 1
CHEST TIGHTNESS: 1
SHORTNESS OF BREATH: 1
ABDOMINAL PAIN: 1
EYE PAIN: 1
APNEA: 1

## 2023-07-18 NOTE — PROGRESS NOTES
by mouth twice daily 360 tablet 1    Armodafinil (NUVIGIL) 150 MG TABS tablet Take one daily for narcolepsey 30 tablet 2    levothyroxine (SYNTHROID) 100 MCG tablet Take 1 tablet by mouth once daily 30 tablet 3    escitalopram (LEXAPRO) 20 MG tablet Take 1 tablet by mouth once daily 30 tablet 3    valsartan (DIOVAN) 160 MG tablet Take one tablet daily 90 tablet 3    furosemide (LASIX) 20 MG tablet Take one tablet daily 90 tablet 3    rosuvastatin (CRESTOR) 5 MG tablet Take 1 tablet by mouth once daily 90 tablet 3    isosorbide mononitrate (IMDUR) 30 MG extended release tablet Take 1 tablet by mouth once daily 90 tablet 3    L-Arginine 500 MG TABS Take 1,000 mg by mouth 2 times daily 180 tablet 3    aspirin 81 MG tablet Take 1 tablet by mouth daily       No current facility-administered medications for this visit. Social History:   Social History     Socioeconomic History    Marital status:      Spouse name: Not on file    Number of children: Not on file    Years of education: Not on file    Highest education level: Not on file   Occupational History     Employer: [employer]   Tobacco Use    Smoking status: Never     Passive exposure: Past    Smokeless tobacco: Never   Vaping Use    Vaping Use: Never used   Substance and Sexual Activity    Alcohol use: No     Comment: rarely    Drug use: No    Sexual activity: Not Currently   Other Topics Concern    Not on file   Social History Narrative    Not on file     Social Determinants of Health     Financial Resource Strain: Low Risk     Difficulty of Paying Living Expenses: Not hard at all   Food Insecurity: No Food Insecurity    Worried About [place] in the Last Year: Never true    [address] in the Last Year: Never true   Transportation Needs: No Transportation Needs    Lack of Transportation (Medical): No    Lack of Transportation (Non-Medical):  No   Physical Activity: Insufficiently Active    Days of Exercise per Week: 3 days    Minutes of

## 2023-07-23 LAB
BACTERIA UR CULT: ABNORMAL
BACTERIA UR CULT: ABNORMAL
ORGANISM: ABNORMAL
ORGANISM: ABNORMAL

## 2023-07-26 ENCOUNTER — PATIENT MESSAGE (OUTPATIENT)
Dept: FAMILY MEDICINE CLINIC | Age: 73
End: 2023-07-26

## 2023-07-26 DIAGNOSIS — G47.419 PRIMARY NARCOLEPSY WITHOUT CATAPLEXY: ICD-10-CM

## 2023-07-26 RX ORDER — LEVOTHYROXINE SODIUM 0.1 MG/1
TABLET ORAL
Qty: 30 TABLET | Refills: 5 | Status: SHIPPED | OUTPATIENT
Start: 2023-07-26

## 2023-07-26 RX ORDER — ESCITALOPRAM OXALATE 20 MG/1
TABLET ORAL
Qty: 30 TABLET | Refills: 5 | Status: SHIPPED | OUTPATIENT
Start: 2023-07-26

## 2023-07-26 NOTE — TELEPHONE ENCOUNTER
5/31/2023    Future Appointments   Date Time Provider [address]   8/2/2023 10:30 AM Radha Heart, APRN - CNP Lyndsey P[facility]   6/11/2024 11:45 AM Sindy Morrell MD AND NEURO Neurology -

## 2023-07-31 ENCOUNTER — TELEPHONE (OUTPATIENT)
Dept: FAMILY MEDICINE CLINIC | Age: 73
End: 2023-07-31

## 2023-07-31 RX ORDER — FOLIC ACID 1 MG/1
1 TABLET ORAL DAILY
Qty: 30 TABLET | Refills: 4 | Status: SHIPPED | OUTPATIENT
Start: 2023-07-31

## 2023-07-31 NOTE — TELEPHONE ENCOUNTER
Patient called to state she had recent labs for B12 and Folate ordered by Dr. Lidia Heck. He recommended the patient take a multivitamin for her decreased folate level. Patient reports she's discussed taking a multivitamin before with Dr. Zohra Mendoza and she states this is \"a waste\" and her body doesn't absorb them. She is asking if there is another recommendation so that she can increase her folate.

## 2023-07-31 NOTE — TELEPHONE ENCOUNTER
Please call the patient and tell her that I sent prescription folic acid to her pharmacy. Lets see if it is covered, and we will recheck your folic acid in 3 months of being on the meds.

## 2023-08-02 ENCOUNTER — OFFICE VISIT (OUTPATIENT)
Dept: UROGYNECOLOGY | Age: 73
End: 2023-08-02
Payer: MEDICARE

## 2023-08-02 VITALS
DIASTOLIC BLOOD PRESSURE: 82 MMHG | SYSTOLIC BLOOD PRESSURE: 136 MMHG | RESPIRATION RATE: 16 BRPM | TEMPERATURE: 98 F | OXYGEN SATURATION: 99 % | HEART RATE: 64 BPM

## 2023-08-02 DIAGNOSIS — N30.01 ACUTE CYSTITIS WITH HEMATURIA: ICD-10-CM

## 2023-08-02 DIAGNOSIS — R39.15 URINARY URGENCY: ICD-10-CM

## 2023-08-02 DIAGNOSIS — N39.8 VOIDING DYSFUNCTION: ICD-10-CM

## 2023-08-02 DIAGNOSIS — R33.9 INCOMPLETE BLADDER EMPTYING: Primary | ICD-10-CM

## 2023-08-02 DIAGNOSIS — R35.0 URINARY FREQUENCY: ICD-10-CM

## 2023-08-02 LAB
BILIRUBIN, POC: NORMAL
BLOOD URINE, POC: NORMAL
CLARITY, POC: NORMAL
COLOR, POC: YELLOW
GLUCOSE URINE, POC: NORMAL
KETONES, POC: NORMAL
LEUKOCYTE EST, POC: NORMAL
NITRITE, POC: NORMAL
PH, POC: 6.5
PROTEIN, POC: NORMAL
SPECIFIC GRAVITY, POC: 1.01
UROBILINOGEN, POC: NORMAL

## 2023-08-02 PROCEDURE — 3079F DIAST BP 80-89 MM HG: CPT | Performed by: NURSE PRACTITIONER

## 2023-08-02 PROCEDURE — 81002 URINALYSIS NONAUTO W/O SCOPE: CPT | Performed by: NURSE PRACTITIONER

## 2023-08-02 PROCEDURE — 3075F SYST BP GE 130 - 139MM HG: CPT | Performed by: NURSE PRACTITIONER

## 2023-08-02 PROCEDURE — 51701 INSERT BLADDER CATHETER: CPT | Performed by: NURSE PRACTITIONER

## 2023-08-02 PROCEDURE — 1123F ACP DISCUSS/DSCN MKR DOCD: CPT | Performed by: NURSE PRACTITIONER

## 2023-08-02 PROCEDURE — 99213 OFFICE O/P EST LOW 20 MIN: CPT | Performed by: NURSE PRACTITIONER

## 2023-08-02 NOTE — PROGRESS NOTES
2023       HPI:     Name: Kenyon Ji  YOB: 1950    CC: Patient is a 68 y.o. presenting for evaluation of  urinary urgency and frequency . Patient positive for UTI on , treated and now back for CMG and ROMMEL. HPI: How long have you had this problem? months  Please rate the severity of your problem: moderate  Anything make it better? Patient states she is feeling much better since completion of antibiotics.     Reports pain and burning have resolved but still has urgency and feeling of not emptying    Component    Organism Enterococcus faecalis Abnormal     Urine Culture, Routine 50,000 CFU/ml    Organism Streptococcus anginosus Abnormal     Urine Culture, Routine >100,000 CFU/ml   No further workup    Resulting Agency [facility]        Susceptibility    Enterococcus faecalis (2)    Antibiotic Interpretation Microscan  Method Status    ampicillin Sensitive <=2 mcg/mL BACTERIAL SUSCEPTIBILITY PANEL BY TORI     ciprofloxacin Intermediate 2 mcg/mL BACTERIAL SUSCEPTIBILITY PANEL BY TORI     levofloxacin Sensitive 2 mcg/mL BACTERIAL SUSCEPTIBILITY PANEL BY TORI     nitrofurantoin Sensitive <=16 mcg/mL BACTERIAL SUSCEPTIBILITY PANEL BY TORI     tetracycline Resistant >=16 mcg/mL BACTERIAL SUSCEPTIBILITY PANEL BY TORI     vancomycin Sensitive 1 mcg/mL BACTERIAL SUSCEPTIBILITY PANEL BY TORI        Narrative  Performed by: [facility]  ORDER#: [id number]                          ORDERED BY: Susy Arthur   SOURCE: Urine Straight Cath                COLLECTED:  23 10:45   ANTIBIOTICS AT LIANG.:                      RECEIVED :  23 00:50      Specimen Collected: 23 10:45 EDT               Ob/Gyn History:    OB History    Para Term  AB Living   2 2 2         SAB IAB Ectopic Molar Multiple Live Births                    # Outcome Date GA Lbr Pancho/2nd Weight Sex Delivery Anes PTL Lv   2 Term            1 Term               Obstetric Comments   Patient

## 2023-08-06 LAB
BACTERIA UR CULT: ABNORMAL
BACTERIA UR CULT: ABNORMAL
ORGANISM: ABNORMAL
ORGANISM: ABNORMAL

## 2023-08-07 RX ORDER — CIPROFLOXACIN 500 MG/1
500 TABLET, FILM COATED ORAL 2 TIMES DAILY
Qty: 10 TABLET | Refills: 0 | Status: SHIPPED | OUTPATIENT
Start: 2023-08-07 | End: 2023-08-12

## 2023-08-18 RX ORDER — SUCRALFATE 1 G/1
1 TABLET ORAL 4 TIMES DAILY
Qty: 120 TABLET | Refills: 0 | Status: CANCELLED | OUTPATIENT
Start: 2023-08-18

## 2023-08-18 NOTE — TELEPHONE ENCOUNTER
Future Appointments   Date Time Provider [facility]  [facility]   10/5/2023 12:30 PM Susanna Mendoza, PT [facility] PT [facility]   11/3/2023 10:30 AM YOCASTA Goodwin - CNP [facility]   6/11/2024 11:45  [address], MD AND NEURO Neurology -     Junie August 5/31/2023  Gayatri Hansen CNP, will you please fill in absence of Dr. Delta Bumpers?

## 2023-08-18 NOTE — TELEPHONE ENCOUNTER
LM for pt with Marge's message. Pt to reach out to provider that prescribes Sucralfate for the refill.

## 2023-08-18 NOTE — TELEPHONE ENCOUNTER
It does not look like Dr. Jefry Thomas prescribes this medication. She should contact the provider who originally sent it in.

## 2023-08-29 ENCOUNTER — TELEPHONE (OUTPATIENT)
Dept: FAMILY MEDICINE CLINIC | Age: 73
End: 2023-08-29

## 2023-08-29 DIAGNOSIS — G47.419 PRIMARY NARCOLEPSY WITHOUT CATAPLEXY: ICD-10-CM

## 2023-08-29 RX ORDER — SUCRALFATE 1 G/1
1 TABLET ORAL 4 TIMES DAILY
Qty: 120 TABLET | Refills: 1 | Status: SHIPPED | OUTPATIENT
Start: 2023-08-29

## 2023-08-29 RX ORDER — ARMODAFINIL 150 MG/1
TABLET ORAL
Qty: 30 TABLET | Refills: 2 | Status: SHIPPED | OUTPATIENT
Start: 2023-08-29 | End: 2023-11-25

## 2023-08-29 NOTE — TELEPHONE ENCOUNTER
Patient needs a refill on Carafate and Nuvigil. They need a 30 day supply.      Mail order or local pharmacy: local    Pharmacy: [facility]    Last OV: 5/31/23    Future Appointments   Date Time Provider [address] [place] [address]   10/5/2023 12:30 PM Nash Acuna, PT [facility] PT Katie Winslow [facility]   11/3/2023 10:30 AM YOCASTA Clarke - CNP [facility] UROG[facility]   6/11/2024 11:45 AM Sally Guzman MD AND [facility] Neurology -

## 2023-09-27 ENCOUNTER — TELEPHONE (OUTPATIENT)
Dept: UROGYNECOLOGY | Age: 73
End: 2023-09-27

## 2023-09-27 NOTE — TELEPHONE ENCOUNTER
Patient calling in, states we need to fill out a continuity of care. Informed her that medical offices will not be filling out this form, and that she will need to mail a completed copy back to the correct address provided on the form in a timely manner. This RN confirmed her phone number, address, and email address. Paper copy sent in via mail. Electronic copy emailed as well. Patient verbalizes understanding of all of the above, and has no further questions or concerns at this time. Encouraged to call back the office should any questions/concerns arise.  9/27/2023 at 12:20 PM.

## 2023-09-29 ENCOUNTER — TELEPHONE (OUTPATIENT)
Dept: FAMILY MEDICINE CLINIC | Age: 73
End: 2023-09-29

## 2023-09-29 DIAGNOSIS — G47.419 PRIMARY NARCOLEPSY WITHOUT CATAPLEXY: ICD-10-CM

## 2023-09-29 RX ORDER — ARMODAFINIL 150 MG/1
TABLET ORAL
Qty: 30 TABLET | Refills: 2 | Status: SHIPPED | OUTPATIENT
Start: 2023-09-29 | End: 2023-12-26

## 2023-09-29 NOTE — TELEPHONE ENCOUNTER
Pt was told that Dr Jatinder Rowley is leaving [place]. She needs new referral as he was prescribing her topamax. Please advise.

## 2023-09-30 DIAGNOSIS — G43.109 MIGRAINE WITH AURA AND WITHOUT STATUS MIGRAINOSUS, NOT INTRACTABLE: ICD-10-CM

## 2023-09-30 DIAGNOSIS — G47.419 PRIMARY NARCOLEPSY WITHOUT CATAPLEXY: Primary | ICD-10-CM

## 2023-10-02 ENCOUNTER — TELEPHONE (OUTPATIENT)
Age: 73
End: 2023-10-02

## 2023-10-02 DIAGNOSIS — G43.119 INTRACTABLE MIGRAINE WITH AURA WITHOUT STATUS MIGRAINOSUS: ICD-10-CM

## 2023-10-02 NOTE — TELEPHONE ENCOUNTER
Spoke with patient.  Informed referral sent to [facility] so she can schedule with a new neurologist

## 2023-10-02 NOTE — TELEPHONE ENCOUNTER
Lm for pt to return call. Npt referral put in by PCP due to the fact pt was informed Dr. Rom Canales was no longer seeing pt. Pt is established with Dr. Rom Canales and has appt scheduled 06/2024. Pt needs topamax refilled. Please advise.

## 2023-10-02 NOTE — TELEPHONE ENCOUNTER
Medication:   Requested Prescriptions      No prescriptions requested or ordered in this encounter        Last Filled:      Patient Phone Number: [phone] (home)     Last appt: 6.8.2023  Next appt: Visit date not found    Last OARRS:       9/27/2022    11:21 AM   RX Monitoring   Periodic Controlled Substance Monitoring No signs of potential drug abuse or diversion identified.

## 2023-10-09 NOTE — TELEPHONE ENCOUNTER
Spoke to pt. Pharmacy [facility] in [facility]) will not refill Topomax until middle of Oct. Assured pt again that Dr. Mily Ford is not leaving the practice.  Pt has appt 6/11/24

## 2023-10-10 RX ORDER — TOPIRAMATE 25 MG/1
TABLET ORAL
Qty: 360 TABLET | Refills: 0 | Status: SHIPPED | OUTPATIENT
Start: 2023-10-10

## 2023-10-16 ENCOUNTER — OFFICE VISIT (OUTPATIENT)
Dept: CARDIOLOGY CLINIC | Age: 73
End: 2023-10-16
Payer: MEDICARE

## 2023-10-16 VITALS
DIASTOLIC BLOOD PRESSURE: 89 MMHG | OXYGEN SATURATION: 97 % | SYSTOLIC BLOOD PRESSURE: 161 MMHG | HEART RATE: 68 BPM | HEIGHT: 64 IN | WEIGHT: 208 LBS | BODY MASS INDEX: 35.51 KG/M2

## 2023-10-16 DIAGNOSIS — G47.33 OSA (OBSTRUCTIVE SLEEP APNEA): ICD-10-CM

## 2023-10-16 DIAGNOSIS — E78.5 DYSLIPIDEMIA: ICD-10-CM

## 2023-10-16 DIAGNOSIS — R60.0 EDEMA OF BOTH LOWER LEGS: ICD-10-CM

## 2023-10-16 DIAGNOSIS — Z79.899 MEDICATION MANAGEMENT: ICD-10-CM

## 2023-10-16 DIAGNOSIS — I25.9 CHRONIC ISCHEMIC HEART DISEASE: ICD-10-CM

## 2023-10-16 DIAGNOSIS — I10 ESSENTIAL HYPERTENSION: ICD-10-CM

## 2023-10-16 DIAGNOSIS — I25.10 CORONARY ARTERY DISEASE INVOLVING NATIVE CORONARY ARTERY OF NATIVE HEART WITHOUT ANGINA PECTORIS: Primary | ICD-10-CM

## 2023-10-16 DIAGNOSIS — G47.419 PRIMARY NARCOLEPSY WITHOUT CATAPLEXY: ICD-10-CM

## 2023-10-16 PROCEDURE — 3079F DIAST BP 80-89 MM HG: CPT | Performed by: INTERNAL MEDICINE

## 2023-10-16 PROCEDURE — 99214 OFFICE O/P EST MOD 30 MIN: CPT | Performed by: INTERNAL MEDICINE

## 2023-10-16 PROCEDURE — 1123F ACP DISCUSS/DSCN MKR DOCD: CPT | Performed by: INTERNAL MEDICINE

## 2023-10-16 PROCEDURE — 3077F SYST BP >= 140 MM HG: CPT | Performed by: INTERNAL MEDICINE

## 2023-10-16 RX ORDER — VALSARTAN 160 MG/1
TABLET ORAL
Qty: 90 TABLET | Refills: 3 | Status: SHIPPED | OUTPATIENT
Start: 2023-10-16

## 2023-10-16 RX ORDER — ISOSORBIDE MONONITRATE 30 MG/1
TABLET, EXTENDED RELEASE ORAL
Qty: 90 TABLET | Refills: 3 | Status: SHIPPED | OUTPATIENT
Start: 2023-10-16

## 2023-10-16 RX ORDER — FUROSEMIDE 40 MG/1
40 TABLET ORAL DAILY
Qty: 90 TABLET | Refills: 3 | Status: SHIPPED | OUTPATIENT
Start: 2023-10-16

## 2023-10-16 RX ORDER — ROSUVASTATIN CALCIUM 5 MG/1
TABLET, COATED ORAL
Qty: 90 TABLET | Refills: 3 | Status: SHIPPED | OUTPATIENT
Start: 2023-10-16

## 2023-10-16 NOTE — PATIENT INSTRUCTIONS
Plan:  Labs reviewed in epic and discussed with patient. Current medications reviewed. Refills given as warranted. Increase your lasix to 40 mg daily to help decrease your swelling and lower your bp  No cardiac testing at this time. Repeat bp 161/89   Repeat BMP in 3 weeks to check your kidneys since I am increasing your lasix  Repeat blood work In May 2024  -CBC, CMP, TSH, Lipids   -you will need to be fasting for blood work  -it is ok to take your medicine with sips of water or black coffee    Follow up with me in 6 months, call in December to make your next appointment.

## 2023-10-16 NOTE — PROGRESS NOTES
is moderately enlarged. Inadequate tricuspid valve regurgitation to estimate systolic pulmonary   artery pressure. EKG 12/7/22  Marked sinus  Bradycardia 43 bpm -Short MT syndrome   Jose = 98  -  Diffuse nonspecific T-abnormality. Low voltage -possible pulmonary disease. [facility] 12/9/20    Lexiscan 11/19/20  Small-moderate sized anteroseptal and apical septal partial reversibility  defect consistent with ischemia and infarction in the territory of the mid  and distal LAD. Hyperdynamic LV systolic function with IV>93% with uniform  wall motion. Intermediate-higher risk abnormal study. 30 day monitor Cardiac monitor 9/6/17  NSR avg 58 bpm    Assessment:  Graciela was seen today for follow-up, coronary artery disease, hypertension and edema. Diagnoses and all orders for this visit:    Coronary artery disease involving native coronary artery of native heart without angina pectoris  -     furosemide (LASIX) 40 MG tablet; Take 1 tablet by mouth daily Take one tablet daily  -     isosorbide mononitrate (IMDUR) 30 MG extended release tablet; Take 1 tablet by mouth once daily  -     rosuvastatin (CRESTOR) 5 MG tablet; Take 1 tablet by mouth once daily  -     valsartan (DIOVAN) 160 MG tablet; Take one tablet daily    Chronic ischemic heart disease  -     furosemide (LASIX) 40 MG tablet; Take 1 tablet by mouth daily Take one tablet daily    Essential hypertension  -     valsartan (DIOVAN) 160 MG tablet; Take one tablet daily    PRINCE (obstructive sleep apnea)    Primary narcolepsy without cataplexy    Dyslipidemia  -     rosuvastatin (CRESTOR) 5 MG tablet; Take 1 tablet by mouth once daily    Medication management  -     Basic Metabolic Panel; Future  -     CBC with Auto Differential; Future  -     Comprehensive Metabolic Panel; Future  -     TSH with Reflex to FT4; Future  -     Lipid Panel;  Future    Edema of both lower legs    CAD non obstructive no angina continue statin asa  Imdur 30mg daily  L arginine     HTN

## 2023-10-25 ENCOUNTER — APPOINTMENT (OUTPATIENT)
Dept: GENERAL RADIOLOGY | Age: 73
End: 2023-10-25
Payer: MEDICARE

## 2023-10-25 ENCOUNTER — APPOINTMENT (OUTPATIENT)
Dept: CT IMAGING | Age: 73
End: 2023-10-25
Payer: MEDICARE

## 2023-10-25 ENCOUNTER — HOSPITAL ENCOUNTER (EMERGENCY)
Age: 73
Discharge: HOME | End: 2023-10-26
Payer: MEDICARE
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
## 2023-10-25 DIAGNOSIS — M25.512 ACUTE PAIN OF LEFT SHOULDER: ICD-10-CM

## 2023-10-25 DIAGNOSIS — S92.355A CLOSED NONDISPLACED FRACTURE OF FIFTH METATARSAL BONE OF LEFT FOOT, INITIAL ENCOUNTER: ICD-10-CM

## 2023-10-25 DIAGNOSIS — D32.9 MENINGIOMA (HCC): ICD-10-CM

## 2023-10-25 PROCEDURE — 6360000002 HC RX W HCPCS: Performed by: REGISTERED NURSE

## 2023-10-25 PROCEDURE — 70450 CT HEAD/BRAIN W/O DYE: CPT

## 2023-10-25 PROCEDURE — 73060 X-RAY EXAM OF HUMERUS: CPT

## 2023-10-25 PROCEDURE — 73070 X-RAY EXAM OF ELBOW: CPT

## 2023-10-25 PROCEDURE — 73630 X-RAY EXAM OF FOOT: CPT

## 2023-10-25 PROCEDURE — 96374 THER/PROPH/DIAG INJ IV PUSH: CPT

## 2023-10-25 PROCEDURE — 73090 X-RAY EXAM OF FOREARM: CPT

## 2023-10-25 PROCEDURE — 73030 X-RAY EXAM OF SHOULDER: CPT

## 2023-10-25 PROCEDURE — 72125 CT NECK SPINE W/O DYE: CPT

## 2023-10-25 PROCEDURE — 99284 EMERGENCY DEPT VISIT MOD MDM: CPT

## 2023-10-25 RX ORDER — MORPHINE SULFATE 4 MG/ML
4 INJECTION, SOLUTION INTRAMUSCULAR; INTRAVENOUS ONCE
Status: COMPLETED | OUTPATIENT
Start: 2023-10-25 | End: 2023-10-25

## 2023-10-25 RX ADMIN — MORPHINE SULFATE 4 MG: 4 INJECTION, SOLUTION INTRAMUSCULAR; INTRAVENOUS at 22:57

## 2023-10-25 ASSESSMENT — PAIN - FUNCTIONAL ASSESSMENT: PAIN_FUNCTIONAL_ASSESSMENT: 0-10

## 2023-10-25 ASSESSMENT — PAIN SCALES - GENERAL: PAINLEVEL_OUTOF10: 6

## 2023-10-25 ASSESSMENT — PAIN DESCRIPTION - ORIENTATION: ORIENTATION: LEFT

## 2023-10-25 ASSESSMENT — PAIN DESCRIPTION - LOCATION: LOCATION: SHOULDER

## 2023-10-26 ENCOUNTER — TELEPHONE (OUTPATIENT)
Dept: FAMILY MEDICINE CLINIC | Age: 73
End: 2023-10-26

## 2023-10-26 ENCOUNTER — TELEPHONE (OUTPATIENT)
Dept: ORTHOPEDIC SURGERY | Age: 73
End: 2023-10-26

## 2023-10-26 VITALS
BODY MASS INDEX: 34.67 KG/M2 | WEIGHT: 202 LBS | OXYGEN SATURATION: 94 % | SYSTOLIC BLOOD PRESSURE: 118 MMHG | HEART RATE: 59 BPM | RESPIRATION RATE: 16 BRPM | DIASTOLIC BLOOD PRESSURE: 66 MMHG | TEMPERATURE: 98.1 F

## 2023-10-26 RX ORDER — HYDROCODONE BITARTRATE AND ACETAMINOPHEN 5; 325 MG/1; MG/1
1 TABLET ORAL EVERY 6 HOURS PRN
Qty: 10 TABLET | Refills: 0 | Status: SHIPPED | OUTPATIENT
Start: 2023-10-26 | End: 2023-10-29

## 2023-10-26 NOTE — ED PROVIDER NOTES
[address] [address] ED  EMERGENCY DEPARTMENT ENCOUNTER        Pt Name: Mendez Millan  MRN: [id number]  [address] 1950  Date of evaluation: 10/25/2023  Provider: YOCASTA Baumann CNP  PCP: Oleg Rincon DO    This patient was seen and evaluated by the attending physician Bambi Salinas MD.    I have evaluated this patient. My supervising physician was available for consultation. CHIEF COMPLAINT       Chief Complaint   Patient presents with    Shoulder Injury     C/o fall from a chair while painting. Landed on left shoulder. Pain with movement and pain in right foot as well. Denies n/t. Denies hitting head. Denies loss of consciousness. Not taking blood thinners. 100 mcg fentanyl given by ems. HISTORY OF PRESENT ILLNESS   (Location/Symptom, Timing/Onset, Context/Setting, Quality, Duration, Modifying Factors, Severity)  Note limiting factors. History from : Patient  Mendez Millan is a 68 y.o. female who presents via ems for  mechanical fall. Onset was just prior to arrival. Duration has been since the onset. Context includes patient presents to the emergency department via EMS after sustaining a mechanical fall. She was standing on a chair painting and she went to reach falling forward onto her left foot and left shoulder. She does not take blood thinners and denies hitting her head. She denies any changes in sensation such as numbness or tingling but is experiencing radiating pain from the left shoulder to the left arm as well as her left foot. She did receive fentanyl via EMS in route to the ER. . Quality is dull and aching with radiation to her left arm, she does endorse being right-hand dominant. Alleviating factors include rest and immobilization. Aggravating factors include movement, palpation. Pain is 8/10. Fentanyl just prior to arrival has been used for pain today.        Chart review reveals pt has significant PMHx of benign brain tumor, reflux, anxiety, Overlying soft tissues are unremarkable. At this time imaging of the left foot is pending. Care transition to my attending physician who will monitor for results of imaging and determine final disposition. Social Determinants Significantly Affecting Health : None    Is this patient to be included in the SEP-1 Core Measure due to severe sepsis or septic shock? No   Exclusion criteria - the patient is NOT to be included for SEP-1 Core Measure due to: Infection is not suspected    Discharge Time out:  CC Reviewed Yes   Test Results Yes     Vitals:    10/25/23 2200   BP: 118/66   Pulse:    Resp:    Temp:    SpO2: 94%              FINAL IMPRESSION      1. Fall, initial encounter    2. Meningioma ([address])    3. Acute pain of left shoulder    4. Closed nondisplaced fracture of fifth metatarsal bone of left foot, initial encounter          DISPOSITION/PLAN   DISPOSITION Decision To Discharge 10/26/2023 03:22:16 AM      PATIENT REFERREDTO:  Brandi Garza, DO  [address] [address]  [phone]    In 2 days  Re-evaluation    [facility] ED  [address] [facility] [address] [phone]    As needed, If symptoms worsen    Deangelo Quinones MD  [address] [place] [address]  [address]  [phone]            DISCHARGE MEDICATIONS:  Discharge Medication List as of 10/26/2023  4:08 AM        START taking these medications    Details   HYDROcodone-acetaminophen (NORCO) 5-325 MG per tablet Take 1 tablet by mouth every 6 hours as needed for Pain for up to 3 days. Intended supply: 3 days.  Take lowest dose possible to manage pain Max Daily Amount: 4 tablets, Disp-10 tablet, R-0Normal             DISCONTINUED MEDICATIONS:  Discharge Medication List as of 10/26/2023  4:08 AM                 (Please note that portions ofthis note were completed with a voice recognition program.  Efforts were made to edit the dictations but occasionally words are mis-transcribed.)    Jay Duckworth,

## 2023-10-26 NOTE — TELEPHONE ENCOUNTER
Patient called to report she was at [facility] ED last night. She fell off a chair while she was trying to paint. She has a fracture of her fifth metatarsal bone in her left foot and a shoulder injury. Nothing on the shoulder XR, but she is experiencing pain. The hospital referred her to [facility]. She is asking if this is who Dr. Hui Hamm would recommend, or if he would have another recommendation. She doesn't want to go downtown, she'd like to stay in the [place] area.

## 2023-10-26 NOTE — ED NOTES
[address]  sent to Dr. Celena Soto for Orthopedic consult      Calvin Juárez  10/26/23 5625

## 2023-10-26 NOTE — DISCHARGE INSTRUCTIONS
Follow-up with orthopedics as well as your primary doctor. Weightbearing as tolerated on your left foot. If you begin having increased or uncontrolled pain any motor or sensory changes, chest pain or shortness of breath, abdominal pain, neck or back pain, worsening headache, blurred vision, focal deficits, motor or sensory changes or any new change or worsening symptoms return to emergency department where was here for evaluation and never hesitate to return.

## 2023-10-26 NOTE — TELEPHONE ENCOUNTER
Spoke with patient. Patient is going to call Ortho office to see what locations they have. If they do not have a location close to the [place] area she would like a referral for somewhere closer. Patient is also asking for a referral for her shoulder. While she was in hospital she was given morphine and stated it did not help at all with pain in shoulder.

## 2023-10-26 NOTE — TELEPHONE ENCOUNTER
PT is fine to be overbooked at [facility] today on the hour or half hour.

## 2023-10-26 NOTE — TELEPHONE ENCOUNTER
Other PATIENT HAS CALLED BACK AND SHE CAN ONLY DO THE EASTSIDE [address] [place] SAYS THAT SHE USES MEDICARE AND TRANSPORT WONT BE ABLE TO COME IN UNTIL NEXT WEEK.  [facility] [phone]

## 2023-10-27 ENCOUNTER — TELEPHONE (OUTPATIENT)
Dept: ORTHOPEDIC SURGERY | Age: 73
End: 2023-10-27

## 2023-10-27 ENCOUNTER — CARE COORDINATION (OUTPATIENT)
Dept: CARE COORDINATION | Age: 73
End: 2023-10-27

## 2023-10-27 NOTE — CARE COORDINATION
Ambulatory Care Coordination  ED Follow up Call  Introduced role of ACM/CC  Patient denies need for CC  Reason for ED visit:  Fall, 5th metatarsal fracture left foot and meningioma  Status:     improved  Patient has f/u with ortho and scheduled earliest available appointment 11/7/23  Patient f/u with neurology for the hx of meningioma  Patient has transportation with [employer]  Patient using cane at all times  Patient reports she fell off chair while painting  Did you call your PCP prior to going to the ED? Not Applicable    Did you receive a discharge instructions from the Emergency Room? Yes  Review of Instructions:     Understands what to report/when to return?:  Yes   Understands discharge instructions?:  Yes   Following discharge instructions?:  Yes     Are there any new complaints of pain? No  New Pain Meds? Yes    Constipation prophylaxis needed?  denies  If you have a wound is the dressing clean, dry, and intact? Patient has a boot  Understands wound care regimen? Yes  New Medications?:   Yes   Medication Reconciliation by phone - No  Understands Medications? Yes  Taking Medications? Yes  Can you swallow your pills?   Yes        FU appts/Provider:    Future Appointments   Date Time Provider [address] [place] [address]   11/3/2023 10:30 AM YOCASTA Azul - CNP [facility] UROGYN [facility]   11/7/2023  8:00 AM Liv Peñaloza MD AND ORTHO [facility]   11/20/2023 11:15 AM OLYA Farley PT [facility]   6/11/2024 11:45 AM Betsy Meza MD [facility] -

## 2023-10-27 NOTE — TELEPHONE ENCOUNTER
Called Patient to schedule and appointment. Left VM to call back. Upon return call, Oked to Book at [facility] in the 8:45 or   8:00 time slot.

## 2023-10-27 NOTE — TELEPHONE ENCOUNTER
Anjali Wilkins hour ago (8:33 AM)     AP  Called Patient to schedule and appointment. Left VM to call back. Upon return call, Oked to Book at [facility] in the 8:45 or   8:00 time slot.           Note        Miriam More [phone] hour ago (8:31 AM)     AP  Schedule apt   Outgoing call

## 2023-10-28 ASSESSMENT — ENCOUNTER SYMPTOMS: COLOR CHANGE: 0

## 2023-10-30 NOTE — TELEPHONE ENCOUNTER
Patient was not able to arrange transport for 10/31 appointment, scheduled for 11/7. Patient need to be made aware this places her at the 2 week post fracture stage and if surgery is required, patient may be outside of window. Upon return call, patient can be scheduled at any location in an OPEN time slot. If none are available, OK to [facility] in half hour or on the hour.

## 2023-10-31 DIAGNOSIS — M25.519 CHRONIC SHOULDER PAIN, UNSPECIFIED LATERALITY: Primary | ICD-10-CM

## 2023-10-31 DIAGNOSIS — G89.29 CHRONIC SHOULDER PAIN, UNSPECIFIED LATERALITY: Primary | ICD-10-CM

## 2023-10-31 NOTE — TELEPHONE ENCOUNTER
[address] and [facility], [address] [facility], [place]   [address]   Phone: [phone]

## 2023-11-03 ENCOUNTER — OFFICE VISIT (OUTPATIENT)
Dept: UROGYNECOLOGY | Age: 73
End: 2023-11-03

## 2023-11-03 VITALS
OXYGEN SATURATION: 99 % | TEMPERATURE: 97.4 F | RESPIRATION RATE: 16 BRPM | SYSTOLIC BLOOD PRESSURE: 128 MMHG | DIASTOLIC BLOOD PRESSURE: 82 MMHG | HEART RATE: 74 BPM

## 2023-11-03 DIAGNOSIS — N30.01 ACUTE CYSTITIS WITH HEMATURIA: Primary | ICD-10-CM

## 2023-11-03 DIAGNOSIS — R39.15 URINARY URGENCY: ICD-10-CM

## 2023-11-03 DIAGNOSIS — R33.9 INCOMPLETE BLADDER EMPTYING: ICD-10-CM

## 2023-11-03 DIAGNOSIS — R35.0 URINARY FREQUENCY: ICD-10-CM

## 2023-11-03 LAB
BILIRUBIN, POC: NORMAL
BLOOD URINE, POC: NORMAL
CLARITY, POC: CLEAR
COLOR, POC: NORMAL
GLUCOSE URINE, POC: NORMAL
KETONES, POC: NORMAL
LEUKOCYTE EST, POC: NORMAL
NITRITE, POC: POSITIVE
PH, POC: 6.5
PROTEIN, POC: NORMAL
SPECIFIC GRAVITY, POC: 1.01
UROBILINOGEN, POC: NORMAL

## 2023-11-03 RX ORDER — NITROFURANTOIN 25; 75 MG/1; MG/1
100 CAPSULE ORAL 2 TIMES DAILY
Qty: 14 CAPSULE | Refills: 0 | Status: SHIPPED | OUTPATIENT
Start: 2023-11-03 | End: 2023-11-10

## 2023-11-03 NOTE — PROGRESS NOTES
POCT Urinalysis no Micro    NM INSJ NON-NDWELLG BLADDER CATHETER     Orders Placed This Encounter   Medications    nitrofurantoin, macrocrystal-monohydrate, (MACROBID) 100 MG capsule     Sig: Take 1 capsule by mouth 2 times daily for 7 days     Dispense:  14 capsule     Refill:  0       Crista Gaviria, APRN - CNP

## 2023-11-05 LAB
BACTERIA UR CULT: ABNORMAL
ORGANISM: ABNORMAL

## 2023-11-06 LAB
BACTERIA UR CULT: ABNORMAL
BACTERIA UR CULT: ABNORMAL
ORGANISM: ABNORMAL
ORGANISM: ABNORMAL

## 2023-11-07 ENCOUNTER — PATIENT MESSAGE (OUTPATIENT)
Dept: FAMILY MEDICINE CLINIC | Age: 73
End: 2023-11-07

## 2023-11-07 ENCOUNTER — OFFICE VISIT (OUTPATIENT)
Dept: ORTHOPEDIC SURGERY | Age: 73
End: 2023-11-07

## 2023-11-07 VITALS — HEIGHT: 64 IN | BODY MASS INDEX: 34.49 KG/M2 | WEIGHT: 202 LBS

## 2023-11-07 DIAGNOSIS — S92.352A DISPLACED FRACTURE OF FIFTH METATARSAL BONE, LEFT FOOT, INITIAL ENCOUNTER FOR CLOSED FRACTURE: Primary | ICD-10-CM

## 2023-11-07 DIAGNOSIS — S46.002A INJURY OF LEFT ROTATOR CUFF, INITIAL ENCOUNTER: ICD-10-CM

## 2023-11-07 RX ORDER — NAPROXEN 500 MG/1
500 TABLET ORAL 2 TIMES DAILY WITH MEALS
Qty: 60 TABLET | Refills: 0 | Status: SHIPPED | OUTPATIENT
Start: 2023-11-07 | End: 2023-12-07

## 2023-11-07 NOTE — PROGRESS NOTES
CHIEF COMPLAINT:   1- Left foot pain/ 5th MT proximal shaft minimally displaced fracture. 2- Left shoulder pain/ shoulder RTC contusion. DATE OF INJURY:  10/25/2023    HISTORY:  Ms. Nafisa Hagan 68 y.o.   female  presents today for the first visit for evaluation of a left foot and shoulder injury which occurred when she fell off a chair while painting her house. She is complaining of lateral foot and shoulder pain and swelling. Rates pain a 0/10 foot and 5/10 shoulder VAS. This is better with elevation and worse with bearing wt and ROM left  shoulder. The pain is sharp and not radiating. No other complaint. She was seen 1st at [facility], where she was x-rayed and splinted and asked to f/u with orthopaedics. Past Medical History:   Diagnosis Date    Acid reflux     small stomach ulcer    Angina pectoris, variant (HCC)     Anxiety     Arthritis     Asthma     Benign esophageal stricture 12/2016    Benign tumor 03/20/2017     Benign brain tumor size of a quater    Cancer ([address])     ovarian    Depression     Diabetes ([address])     Frequency     Headache     Hyperlipidemia     Hypertension     Hypothyroidism     Irritable bowel syndrome     Kidney stones     MI, old     Narcolepsy     Obesity     Restless legs syndrome     Sleep apnea     Thyroid disease     Urgency of urination     Urinary incontinence        Past Surgical History:   Procedure Laterality Date    ARM SURGERY Right 08/19/2014    exc.trapezium and flexor carpi radialis interpositional arthroplasty    BREAST REDUCTION SURGERY      BREAST SURGERY      reduction    CARPAL TUNNEL RELEASE Right     CHOLECYSTECTOMY      COLONOSCOPY      COSMETIC SURGERY  3/6/2022    Had surgery on left side of nose to cover up hole using skin from eyelid from right eye.     DENTAL SURGERY      ENDOSCOPY, COLON, DIAGNOSTIC  01/06/2017    Anastomotic ulcer, gastritis    ESOPHAGEAL DILATATION  12/2016    GASTRIC BYPASS SURGERY      GASTRIC BYPASS SURGERY      HERNIA REPAIR

## 2023-11-10 RX ORDER — SUCRALFATE 1 G/1
1 TABLET ORAL 4 TIMES DAILY
Qty: 120 TABLET | Refills: 3 | Status: SHIPPED | OUTPATIENT
Start: 2023-11-10

## 2023-11-10 NOTE — TELEPHONE ENCOUNTER
Future Appointments   Date Time Provider Department Center   11/20/2023 11:15 AM Jo Ann Morley, PT MHAZ PT [facility]   12/19/2023  8:45 AM Bradley Gonsalez MD AND ORTHO [facility]   2/2/2024 10:45 AM Mojgan Strickland APRN - CNP CLER UROGYN [facility]   6/11/2024 11:45 AM Jay Jay Meza MD AND NEURO Neurology -         LOV 5/31/2023

## 2023-11-13 ENCOUNTER — TELEPHONE (OUTPATIENT)
Dept: FAMILY MEDICINE CLINIC | Age: 73
End: 2023-11-13

## 2023-11-13 NOTE — TELEPHONE ENCOUNTER
Went to  Armodafinil 150 mg. Pharmacy said they sent PA 4 different times this month. Pt is out of this medication and has been for 2 weeks.      Please Advice

## 2023-11-14 DIAGNOSIS — G47.419 PRIMARY NARCOLEPSY WITHOUT CATAPLEXY: Primary | ICD-10-CM

## 2023-11-14 RX ORDER — ARMODAFINIL 150 MG/1
150 TABLET ORAL DAILY
Qty: 30 TABLET | Refills: 2 | Status: SHIPPED | OUTPATIENT
Start: 2023-11-14 | End: 2024-02-12

## 2023-11-14 NOTE — TELEPHONE ENCOUNTER
Submitted PA for Armodafinil 150MG tablets  [employer]ia [employer] AMAURY Key: [id number] STATUS: Status: Approved, Coverage Starts on: 8/15/2023 12:00:00 AM, Coverage Ends on: 11/13/2024 12:00:00 AM.    Please notify patient. Thank you.

## 2023-11-20 ENCOUNTER — HOSPITAL ENCOUNTER (OUTPATIENT)
Dept: PHYSICAL THERAPY | Age: 73
Setting detail: THERAPIES SERIES
Discharge: HOME | End: 2023-11-20
Payer: MEDICARE

## 2023-11-20 PROCEDURE — 97530 THERAPEUTIC ACTIVITIES: CPT

## 2023-11-20 PROCEDURE — 97161 PT EVAL LOW COMPLEX 20 MIN: CPT

## 2023-11-20 NOTE — THERAPY EVALUATION
(Lower score = Better function)        SUBJECTIVE: Patient reports \"well I did find out that I get UTIs and that is what took me to the doctor to begin with\". \"I was having a lot of pain and pressure down there I thought everything was falling out\". \"I was told it was my urethra that was coming down\". Reports \"I have had a total hysterectomy with a bladder tuck years ago\". Dr. Annabelle Mendes said that \"everything was where it was supposed to be, but that I had an extremely bad UTI\". Reports she recently ended a bout of anti-biotics, \"but I know that the UTI isn't gone, it is stinging when I go pee\". Plans to call MD back today. Reports she has straining with bowel movements as well. Reports she has urinary leakage on the way to the bathroom and with stress. Reports she has difficulty emptying her bladder according to the doctor, \"I didn't even realized I wasn't emptying, but I must be if I am getting UTIs\". Reports she hasn't had intercourse in 25 years, has a new partner, though hasn't had intercourse. No history of sexual abuse or trauma. Urinary frequency? Daytime? YES Nighttime? NO  Bowel problems? straining  Urinary or bowel leakage? Urinary   Leakage frequency? daily  Protection:    Type: Pads  Pregnancy:   # of pregnancies: 3   # of deliveries: 2 vaginal, 1 miscarriage    Episiotomy: no   Normal post-jeferson healing? yes    4 week or greater of failed trial of PFPT program?   [x] No    [] Yes    PFPT program as defined by \"Completing 4 weeks of an ordered plan of pelvic muscle exercises designed to increase periurethral muscle strength\".     Relevant Medical History: see medical chart, reviewed and updated with patient    Pain Scale: 0/10     Numbness/Tingling: denies    Occupation/School: retired     Living Status/Prior Level of Function: Prior to this injury / incident, pt was independent with ADLs and IADLs    OBJECTIVE:  Observation:   Posture: WFL   Gait analysis: WFL  Lumbar Mobility: WFL  Scar

## 2023-11-20 NOTE — PROGRESS NOTES
Met: [] Adjusted  5. Patient will return to functional activities including walking without increased symptoms or restriction.    [] Progressing: [] Met: [] Not Met: [] Adjusted          Plan: [x] Continue per plan of care [] Alter current plan (see comments)   [] Plan of care initiated [] Hold pending MD visit [] Discharge      Plan for Next Session:  review diary, TE, TA, manual PRN    Re-Certification Due Date:     visit 12    Signature:  Ravinder Liu, PT, DPT

## 2023-12-04 ENCOUNTER — HOSPITAL ENCOUNTER (OUTPATIENT)
Dept: PHYSICAL THERAPY | Age: 73
Setting detail: THERAPIES SERIES
Discharge: HOME | End: 2023-12-04
Payer: MEDICARE

## 2023-12-04 PROCEDURE — 97110 THERAPEUTIC EXERCISES: CPT

## 2023-12-04 NOTE — PROGRESS NOTES
[facility], [address] [facility]  Phone: [phone]  Fax [phone]      Physical Therapy Daily Treatment Note    Date:  2023    Patient Name:  Rekha Moore    :  1950  MRN: [id number]    Restrictions/Precautions:  universal   Allergies: Latex and Vistaril [hydroxyzine hcl]   Preferred Language for Healthcare:   [x] English       [] other:    Medical Diagnosis Information:  Diagnosis: R33.9 (ICD-10-CM) - Incomplete bladder emptying  N39.8 (ICD-10-CM) - Voiding dysfunction  Treatment Diagnosis:pelvic floor weakness      Insurance/Certification information:  PT Insurance Information: BCBS Medicare  Physician Information:  YOCASTA Adame CNP  Plan of care signed (Y/N):  Y    Visit# / total visits:   exp. 24       Functional Outcome (if applicable): PFDI-20: 127/300 (Lower score = Better function)     Medicare Cap (if applicable):  N/a= total amount used, updated 2023    Time in:   2:50pm      Timed Treatment: 40min Total Treatment Time:  40min  ________________________________________________________________________________________    Pain Level:    0/10  SUBJECTIVE: Patient reports no new complaints. Reports compliance with HEP. OBJECTIVE:     Exercise (TE) Resistance/Repetitions Other comments            PF strengthening        Short hold: (1sec) 10 times       Long hold: (5sec) 10 times      PF + Hip ADD  x10 W/ ball squeeze     PF + Hip ABD x10 W/ yellow tband     PF relaxation Belly breathing:x10         Hip ADD stretch:x10         Modified Happy Baby Pose:x10           Cat to cow: x10            HEP:  Access Code: [email or web address]  URL: [email or web address]  Date: 2023  Prepared by: Kwabena Porter    Exercises  - Seated Pelvic Floor Contraction  - 3 x daily - 7 x weekly - 1 sets - 10 reps - 1sec. hold  - Seated Pelvic Floor Contraction  - 3 x daily - 7

## 2023-12-26 RX ORDER — PANTOPRAZOLE SODIUM 40 MG/1
TABLET, DELAYED RELEASE ORAL
Qty: 180 TABLET | Refills: 0 | Status: SHIPPED | OUTPATIENT
Start: 2023-12-26

## 2023-12-26 NOTE — TELEPHONE ENCOUNTER
Future Appointments   Date Time Provider [address]   1/10/2024  1:15 PM Donna Limon PT [facility]   1/24/2024  1:15 PM OLYA Sigala PT [facility]   2/2/2024 10:45 AM YOCASTA Riley - CNP CLER UROGYN [facility]   2/5/2024  2:00 PM OLYA Sigala PT [facility]   2/27/2024 10:45 AM Mau Myles MD AND ORTHO [facility]   6/11/2024 11:45 AM Wong Schaefer MD AND NEURO Neurology -     Zelda Thompson 5/31/2023

## 2023-12-27 ENCOUNTER — APPOINTMENT (OUTPATIENT)
Dept: PHYSICAL THERAPY | Age: 73
End: 2023-12-27
Payer: MEDICARE

## 2024-01-10 ENCOUNTER — HOSPITAL ENCOUNTER (OUTPATIENT)
Dept: PHYSICAL THERAPY | Age: 74
Setting detail: THERAPIES SERIES
Discharge: HOME | End: 2024-01-10
Payer: MEDICARE

## 2024-01-10 PROCEDURE — 97110 THERAPEUTIC EXERCISES: CPT

## 2024-01-10 PROCEDURE — 97530 THERAPEUTIC ACTIVITIES: CPT

## 2024-01-10 NOTE — PROGRESS NOTES
01/10/2024  Prepared by: Jo Ann Morley    Exercises  - Seated Pelvic Floor Contraction  - 3 x daily - 7 x weekly - 1 sets - 10 reps - 1sec. hold  - Seated Pelvic Floor Contraction  - 3 x daily - 7 x weekly - 1 sets - 10 reps - 3-5sec. hold  - Seated Pelvic Floor Contraction with Isometric Hip Adduction  - 1 x daily - 7 x weekly - 1 sets - 10 reps - 2-3sec. hold  - Seated Pelvic Floor Contraction with Hip Abduction and Resistance Loop  - 1 x daily - 7 x weekly - 1 sets - 10 reps - 2-3sec. hold  - Supine Diaphragmatic Breathing  - 2 x daily - 7 x weekly - 1 sets - 10 reps  - Supine Pelvic Floor Stretch - Hands on Knees  - 1 x daily - 7 x weekly - 1 sets - 3-4 reps - 15-20sec. hold  - Supine Butterfly Groin Stretch  - 1 x daily - 7 x weekly - 1 sets - 3-4 reps - 15-20sec. hold  - Cat Cow  - 1 x daily - 7 x weekly - 1 sets - 10 reps  - Seated Cat Cow  - 1 x daily - 7 x weekly - 1 sets - 10 reps  - Sit to Stand with Pelvic Floor Contraction  - 2 x daily - 7 x weekly - 1 sets - 10 reps  - Mini Squat with Pelvic Floor Contraction  - 1 x daily - 7 x weekly - 1 sets - 10 reps  - Hooklying Transversus Abdominis Palpation  - 1 x daily - 7 x weekly - 1 sets - 10 reps - 10sec. hold  - Supine Transversus Abdominis Bracing with Pelvic Floor Contraction  - 1 x daily - 7 x weekly - 1 sets - 10 reps - 3-5sec. hold  - Seated March  - 1 x daily - 7 x weekly - 1 sets - 10 reps  - Supine Bridge with Pelvic Floor Contraction  - 1 x daily - 7 x weekly - 1 sets - 10 reps    Other Treatment:   TA:  Bladder re-training/education:    Bladder Diary: 38-42 day voids, 0 night voids, 3-4BMs/day    Voiding: patient educated on normal voiding and urinary cycle of every 2-3 hours.    Dietary: patient educated on the \"4Cs\" to reduce bladder irritation and leakage. Educated on fluid intake, patient drinking 170-208oz H20 per day, may be too much, patient to reach out to MD for fluid intake recommendations.     Other: lifting education.    Manual

## 2024-01-17 DIAGNOSIS — G47.419 PRIMARY NARCOLEPSY WITHOUT CATAPLEXY: ICD-10-CM

## 2024-01-17 RX ORDER — ESCITALOPRAM OXALATE 20 MG/1
TABLET ORAL
Qty: 30 TABLET | Refills: 1 | Status: SHIPPED | OUTPATIENT
Start: 2024-01-17

## 2024-01-17 RX ORDER — LEVOTHYROXINE SODIUM 0.1 MG/1
TABLET ORAL
Qty: 30 TABLET | Refills: 1 | Status: SHIPPED | OUTPATIENT
Start: 2024-01-17

## 2024-01-17 NOTE — TELEPHONE ENCOUNTER
5/31/2023    Future Appointments   Date Time Provider Department Center   1/24/2024  1:15 PM Jo Ann Morley, PT [facility] PT [facility]   1/29/2024 10:30 AM Mojgan Strickland APRN - CNP [facility] UROGYN [facility]   2/5/2024  2:00 PM Jo Ann Morley PT NATALIE PT [facility]   2/27/2024 10:45 AM Bradley Gonsalez MD AND ORTHO [facility]   6/11/2024 11:45 AM Jay Jay Meza MD AND NEURO Neurology -        home management/community/leisure

## 2024-01-18 ENCOUNTER — APPOINTMENT (OUTPATIENT)
Dept: CT IMAGING | Age: 74
End: 2024-01-18
Payer: MEDICARE

## 2024-01-18 ENCOUNTER — APPOINTMENT (OUTPATIENT)
Dept: GENERAL RADIOLOGY | Age: 74
End: 2024-01-18
Payer: MEDICARE

## 2024-01-18 ENCOUNTER — HOSPITAL ENCOUNTER (OUTPATIENT)
Age: 74
Setting detail: OBSERVATION
Discharge: HOME | End: 2024-01-20
Attending: EMERGENCY MEDICINE | Admitting: INTERNAL MEDICINE
Payer: MEDICARE

## 2024-01-18 DIAGNOSIS — R07.9 CHEST PAIN, UNSPECIFIED TYPE: Primary | ICD-10-CM

## 2024-01-18 PROBLEM — R60.0 LOCALIZED EDEMA: Status: RESOLVED | Noted: 2023-03-28 | Resolved: 2024-01-18

## 2024-01-18 PROBLEM — R20.0 NUMBNESS AND TINGLING: Status: RESOLVED | Noted: 2020-10-07 | Resolved: 2024-01-18

## 2024-01-18 PROBLEM — M70.61 GREATER TROCHANTERIC BURSITIS OF RIGHT HIP: Status: RESOLVED | Noted: 2017-06-06 | Resolved: 2024-01-18

## 2024-01-18 PROBLEM — R20.2 NUMBNESS AND TINGLING: Status: RESOLVED | Noted: 2020-10-07 | Resolved: 2024-01-18

## 2024-01-18 PROBLEM — M25.551 PAIN OF RIGHT HIP JOINT: Status: RESOLVED | Noted: 2017-06-06 | Resolved: 2024-01-18

## 2024-01-18 PROBLEM — S46.002A INJURY OF LEFT ROTATOR CUFF: Status: RESOLVED | Noted: 2023-11-07 | Resolved: 2024-01-18

## 2024-01-18 PROBLEM — R06.02 SOB (SHORTNESS OF BREATH): Status: RESOLVED | Noted: 2017-10-25 | Resolved: 2024-01-18

## 2024-01-18 PROBLEM — G43.119 INTRACTABLE MIGRAINE WITH AURA WITHOUT STATUS MIGRAINOSUS: Status: RESOLVED | Noted: 2017-07-26 | Resolved: 2024-01-18

## 2024-01-18 PROBLEM — S92.352A DISPLACED FRACTURE OF FIFTH METATARSAL BONE, LEFT FOOT, INITIAL ENCOUNTER FOR CLOSED FRACTURE: Status: RESOLVED | Noted: 2023-11-07 | Resolved: 2024-01-18

## 2024-01-18 PROBLEM — I45.9 SKIPPED BEATS: Status: RESOLVED | Noted: 2017-09-06 | Resolved: 2024-01-18

## 2024-01-18 LAB
ANION GAP SERPL CALCULATED.3IONS-SCNC: 10 MMOL/L (ref 3–16)
APTT BLD: 32.9 SEC (ref 22.7–35.9)
BASOPHILS # BLD: 0 K/UL (ref 0–0.2)
BASOPHILS NFR BLD: 0.5 %
BUN SERPL-MCNC: 18 MG/DL (ref 7–20)
CALCIUM SERPL-MCNC: 8.5 MG/DL (ref 8.3–10.6)
CHLORIDE SERPL-SCNC: 105 MMOL/L (ref 99–110)
CO2 SERPL-SCNC: 25 MMOL/L (ref 21–32)
CREAT SERPL-MCNC: 0.9 MG/DL (ref 0.6–1.2)
CRP SERPL-MCNC: <3 MG/L (ref 0–5.1)
D DIMER: 2.16 UG/ML FEU (ref 0–0.6)
DEPRECATED RDW RBC AUTO: 15.8 % (ref 12.4–15.4)
EKG ATRIAL RATE: 48 BPM
EKG DIAGNOSIS: NORMAL
EKG P AXIS: 31 DEGREES
EKG P-R INTERVAL: 138 MS
EKG Q-T INTERVAL: 480 MS
EKG QRS DURATION: 78 MS
EKG QTC CALCULATION (BAZETT): 428 MS
EKG R AXIS: -13 DEGREES
EKG T AXIS: 133 DEGREES
EKG VENTRICULAR RATE: 48 BPM
EOSINOPHIL # BLD: 0 K/UL (ref 0–0.6)
EOSINOPHIL NFR BLD: 0.5 %
ERYTHROCYTE [SEDIMENTATION RATE] IN BLOOD BY WESTERGREN METHOD: 22 MM/HR (ref 0–30)
GFR SERPLBLD CREATININE-BSD FMLA CKD-EPI: >60 ML/MIN/{1.73_M2}
GLUCOSE SERPL-MCNC: 96 MG/DL (ref 70–99)
HCT VFR BLD AUTO: 35.7 % (ref 36–48)
HGB BLD-MCNC: 11.8 G/DL (ref 12–16)
INR PPP: 1.02 (ref 0.84–1.16)
LYMPHOCYTES # BLD: 1.9 K/UL (ref 1–5.1)
LYMPHOCYTES NFR BLD: 37.9 %
MAGNESIUM SERPL-MCNC: 2.2 MG/DL (ref 1.8–2.4)
MCH RBC QN AUTO: 31.4 PG (ref 26–34)
MCHC RBC AUTO-ENTMCNC: 32.9 G/DL (ref 31–36)
MCV RBC AUTO: 95.3 FL (ref 80–100)
MONOCYTES # BLD: 0.3 K/UL (ref 0–1.3)
MONOCYTES NFR BLD: 5.6 %
NEUTROPHILS # BLD: 2.8 K/UL (ref 1.7–7.7)
NEUTROPHILS NFR BLD: 55.5 %
NT-PROBNP SERPL-MCNC: 416 PG/ML (ref 0–124)
PLATELET # BLD AUTO: 139 K/UL (ref 135–450)
PMV BLD AUTO: 7.7 FL (ref 5–10.5)
POTASSIUM SERPL-SCNC: 3.3 MMOL/L (ref 3.5–5.1)
PROTHROMBIN TIME: 13.4 SEC (ref 11.5–14.8)
RBC # BLD AUTO: 3.75 M/UL (ref 4–5.2)
SARS-COV-2 RDRP RESP QL NAA+PROBE: NOT DETECTED
SODIUM SERPL-SCNC: 140 MMOL/L (ref 136–145)
TROPONIN, HIGH SENSITIVITY: 10 NG/L (ref 0–14)
TROPONIN, HIGH SENSITIVITY: 9 NG/L (ref 0–14)
WBC # BLD AUTO: 5 K/UL (ref 4–11)

## 2024-01-18 PROCEDURE — 84484 ASSAY OF TROPONIN QUANT: CPT

## 2024-01-18 PROCEDURE — 36415 COLL VENOUS BLD VENIPUNCTURE: CPT

## 2024-01-18 PROCEDURE — 6370000000 HC RX 637 (ALT 250 FOR IP): Performed by: EMERGENCY MEDICINE

## 2024-01-18 PROCEDURE — 80048 BASIC METABOLIC PNL TOTAL CA: CPT

## 2024-01-18 PROCEDURE — 87635 SARS-COV-2 COVID-19 AMP PRB: CPT

## 2024-01-18 PROCEDURE — 83735 ASSAY OF MAGNESIUM: CPT

## 2024-01-18 PROCEDURE — 86140 C-REACTIVE PROTEIN: CPT

## 2024-01-18 PROCEDURE — 85610 PROTHROMBIN TIME: CPT

## 2024-01-18 PROCEDURE — 93010 ELECTROCARDIOGRAM REPORT: CPT | Performed by: INTERNAL MEDICINE

## 2024-01-18 PROCEDURE — 70496 CT ANGIOGRAPHY HEAD: CPT

## 2024-01-18 PROCEDURE — 6360000002 HC RX W HCPCS: Performed by: INTERNAL MEDICINE

## 2024-01-18 PROCEDURE — 85379 FIBRIN DEGRADATION QUANT: CPT

## 2024-01-18 PROCEDURE — 96375 TX/PRO/DX INJ NEW DRUG ADDON: CPT

## 2024-01-18 PROCEDURE — 70450 CT HEAD/BRAIN W/O DYE: CPT

## 2024-01-18 PROCEDURE — G0378 HOSPITAL OBSERVATION PER HR: HCPCS

## 2024-01-18 PROCEDURE — 99285 EMERGENCY DEPT VISIT HI MDM: CPT

## 2024-01-18 PROCEDURE — 85652 RBC SED RATE AUTOMATED: CPT

## 2024-01-18 PROCEDURE — 85730 THROMBOPLASTIN TIME PARTIAL: CPT

## 2024-01-18 PROCEDURE — 83880 ASSAY OF NATRIURETIC PEPTIDE: CPT

## 2024-01-18 PROCEDURE — 93005 ELECTROCARDIOGRAM TRACING: CPT | Performed by: EMERGENCY MEDICINE

## 2024-01-18 PROCEDURE — 71045 X-RAY EXAM CHEST 1 VIEW: CPT

## 2024-01-18 PROCEDURE — 85025 COMPLETE CBC W/AUTO DIFF WBC: CPT

## 2024-01-18 PROCEDURE — 6360000004 HC RX CONTRAST MEDICATION: Performed by: EMERGENCY MEDICINE

## 2024-01-18 RX ORDER — HEPARIN SODIUM 1000 [USP'U]/ML
3600 INJECTION, SOLUTION INTRAVENOUS; SUBCUTANEOUS PRN
Status: DISCONTINUED | OUTPATIENT
Start: 2024-01-18 | End: 2024-01-19

## 2024-01-18 RX ORDER — NITROGLYCERIN 0.4 MG/1
0.4 TABLET SUBLINGUAL EVERY 5 MIN PRN
Status: DISCONTINUED | OUTPATIENT
Start: 2024-01-18 | End: 2024-01-20 | Stop reason: HOSPADM

## 2024-01-18 RX ORDER — POTASSIUM CHLORIDE 20 MEQ/1
20 TABLET, EXTENDED RELEASE ORAL ONCE
Status: COMPLETED | OUTPATIENT
Start: 2024-01-18 | End: 2024-01-18

## 2024-01-18 RX ORDER — ACETAMINOPHEN 500 MG
1000 TABLET ORAL ONCE
Status: COMPLETED | OUTPATIENT
Start: 2024-01-18 | End: 2024-01-18

## 2024-01-18 RX ORDER — HEPARIN SODIUM 1000 [USP'U]/ML
7200 INJECTION, SOLUTION INTRAVENOUS; SUBCUTANEOUS PRN
Status: DISCONTINUED | OUTPATIENT
Start: 2024-01-18 | End: 2024-01-19

## 2024-01-18 RX ORDER — HEPARIN SODIUM 10000 [USP'U]/100ML
15 INJECTION, SOLUTION INTRAVENOUS CONTINUOUS
Status: DISCONTINUED | OUTPATIENT
Start: 2024-01-18 | End: 2024-01-19

## 2024-01-18 RX ORDER — METHOCARBAMOL 500 MG/1
1000 TABLET, FILM COATED ORAL ONCE
Status: COMPLETED | OUTPATIENT
Start: 2024-01-18 | End: 2024-01-18

## 2024-01-18 RX ORDER — MORPHINE SULFATE 2 MG/ML
1 INJECTION, SOLUTION INTRAMUSCULAR; INTRAVENOUS ONCE
Status: COMPLETED | OUTPATIENT
Start: 2024-01-18 | End: 2024-01-18

## 2024-01-18 RX ORDER — HEPARIN SODIUM 1000 [USP'U]/ML
7200 INJECTION, SOLUTION INTRAVENOUS; SUBCUTANEOUS ONCE
Status: COMPLETED | OUTPATIENT
Start: 2024-01-18 | End: 2024-01-19

## 2024-01-18 RX ADMIN — IOPAMIDOL 75 ML: 755 INJECTION, SOLUTION INTRAVENOUS at 18:07

## 2024-01-18 RX ADMIN — POTASSIUM CHLORIDE 20 MEQ: 1500 TABLET, EXTENDED RELEASE ORAL at 17:56

## 2024-01-18 RX ADMIN — METHOCARBAMOL TABLETS 1000 MG: 500 TABLET, COATED ORAL at 17:56

## 2024-01-18 RX ADMIN — MORPHINE SULFATE 1 MG: 2 INJECTION, SOLUTION INTRAMUSCULAR; INTRAVENOUS at 21:26

## 2024-01-18 RX ADMIN — ACETAMINOPHEN 1000 MG: 500 TABLET ORAL at 17:56

## 2024-01-18 ASSESSMENT — PAIN - FUNCTIONAL ASSESSMENT: PAIN_FUNCTIONAL_ASSESSMENT: 0-10

## 2024-01-18 ASSESSMENT — ENCOUNTER SYMPTOMS
ABDOMINAL PAIN: 0
BACK PAIN: 0
SHORTNESS OF BREATH: 0
EYE REDNESS: 0
COUGH: 0
DIARRHEA: 0
RHINORRHEA: 0
NAUSEA: 0
CONSTIPATION: 0
EYE PAIN: 0
VOMITING: 0

## 2024-01-18 ASSESSMENT — PAIN DESCRIPTION - LOCATION
LOCATION: CHEST

## 2024-01-18 ASSESSMENT — PAIN SCALES - GENERAL
PAINLEVEL_OUTOF10: 4
PAINLEVEL_OUTOF10: 6
PAINLEVEL_OUTOF10: 4
PAINLEVEL_OUTOF10: 2
PAINLEVEL_OUTOF10: 3
PAINLEVEL_OUTOF10: 4

## 2024-01-18 ASSESSMENT — HEART SCORE
ECG: 0
ECG: 0

## 2024-01-18 ASSESSMENT — PAIN DESCRIPTION - ORIENTATION
ORIENTATION: LEFT
ORIENTATION: MID;OTHER (COMMENT)
ORIENTATION: MID

## 2024-01-18 ASSESSMENT — PAIN DESCRIPTION - DESCRIPTORS
DESCRIPTORS: DISCOMFORT
DESCRIPTORS: STABBING
DESCRIPTORS: DISCOMFORT

## 2024-01-18 NOTE — ED PROVIDER NOTES
[facility] ED  EMERGENCY DEPARTMENT ENCOUNTER        Pt Name: Graciela Dean  MRN: [id number]  Birthdate 1950  Date of evaluation: 1/18/2024  Provider: Nona Cavazos DO  PCP: Brian Mercado DO  Note Started: 4:49 PM EST 1/18/24    CHIEF COMPLAINT      Chest Pain    HISTORY OF PRESENT ILLNESS: 1 or more Elements     Chief Complaint   Patient presents with    Chest Pain     L sided CP and pressure all day, endorses cardiac hx.      History from : Patient  Limitations to history : None    Graciela Dean is a 73 y.o. female who presents to the emergency department secondary to concern for chest pain.  Reports that it has been going on since this morning.  She localized the pain to the substernal region and does not radiate.  She reports the pain is \"stabbing\".  She has felt this pain before.  Denies history of heart stents, however she states that her cardiologist did want to put a stent in one of the vessels during her heart cath, however the vessel was too small and they did not have a stent to small enough for it.  In addition, she reports that she has been having some unusual headaches for the last week or so with blurry vision and left-sided neck soreness.    Past medical history noted below. Aside from what is stated above denies any other symptoms or modifying factors.   reports that she has never smoked. She has been exposed to tobacco smoke. She has never used smokeless tobacco. She reports that she does not drink alcohol and does not use drugs.  Nursing Notes were all reviewed and agreed with or any disagreements addressed in HPI/MDM.  REVIEW OF SYSTEMS :    Review of Systems   Constitutional:  Negative for chills and fever.   HENT:  Negative for congestion and rhinorrhea.    Eyes:  Positive for visual disturbance. Negative for pain and redness.   Respiratory:  Negative for cough and shortness of breath.    Cardiovascular:  Positive for chest pain. Negative for leg swelling.  cranial nerve deficit.   Psychiatric:         Mood and Affect: Mood normal.         Behavior: Behavior normal.       DIAGNOSTIC RESULTS   EKG: interpreted by the Emergency Department Physician who either signs or Co-signs this chart in the absence of a cardiologist.  Indication: Chest Pain  Interpretation: Sinus bradycardia with a ventricular rate of 48 bpm. No acute ST elevation. Left-sided axis. QTc of 428.    RADIOLOGY:   Non-plain film images such as CT, Ultrasound and MRI are read by the radiologist.   Plain radiographic images are visualized and preliminarily interpreted by the ED Provider with the below findings:  -Negative for pneumonia or pneumothorax  Interpretation per Radiologist below, if available at the time of this note:  CT HEAD WO CONTRAST   Final Result   1. No acute intracranial hemorrhage.   2. Extensive chronic microvascular ischemic changes, stable.   3. Stable right frontal meningioma.   4. No significant interval change compared to October 25, 2023.         CTA HEAD NECK W CONTRAST   Preliminary Result   No acute abnormality or flow-limiting stenosis of the major arteries of the   head and neck.      1.7 cm enhancing extra-axial mass along the parasagittal right frontal   convexity, likely related to meningioma.         XR CHEST PORTABLE   Final Result   Borderline cardiomegaly without acute abnormality.           Last 7 days No results found.    POCUS No results found.  ED BEDSIDE ULTRASOUND:   N/A    LABS:  Labs Reviewed   CBC WITH AUTO DIFFERENTIAL - Abnormal; Notable for the following components:       Result Value    RBC 3.75 (*)     Hemoglobin 11.8 (*)     Hematocrit 35.7 (*)     RDW 15.8 (*)     All other components within normal limits   BASIC METABOLIC PANEL - Abnormal; Notable for the following components:    Potassium 3.3 (*)     All other components within normal limits   BRAIN NATRIURETIC PEPTIDE - Abnormal; Notable for the following components:    Pro- (*)     All other

## 2024-01-19 ENCOUNTER — APPOINTMENT (OUTPATIENT)
Dept: CT IMAGING | Age: 74
End: 2024-01-19
Payer: MEDICARE

## 2024-01-19 PROBLEM — I50.32 CHRONIC DIASTOLIC CONGESTIVE HEART FAILURE (HCC): Status: ACTIVE | Noted: 2024-01-19

## 2024-01-19 LAB
ANION GAP SERPL CALCULATED.3IONS-SCNC: 8 MMOL/L (ref 3–16)
ANTI-XA UNFRAC HEPARIN: 1.06 IU/ML (ref 0.3–0.7)
ANTI-XA UNFRAC HEPARIN: >1.1 IU/ML (ref 0.3–0.7)
BASOPHILS # BLD: 0 K/UL (ref 0–0.2)
BASOPHILS NFR BLD: 0.3 %
BUN SERPL-MCNC: 18 MG/DL (ref 7–20)
CALCIUM SERPL-MCNC: 8.6 MG/DL (ref 8.3–10.6)
CHLORIDE SERPL-SCNC: 108 MMOL/L (ref 99–110)
CO2 SERPL-SCNC: 22 MMOL/L (ref 21–32)
CREAT SERPL-MCNC: 0.8 MG/DL (ref 0.6–1.2)
DEPRECATED RDW RBC AUTO: 15.6 % (ref 12.4–15.4)
DEPRECATED RDW RBC AUTO: 15.7 % (ref 12.4–15.4)
EOSINOPHIL # BLD: 0 K/UL (ref 0–0.6)
EOSINOPHIL NFR BLD: 0.7 %
GFR SERPLBLD CREATININE-BSD FMLA CKD-EPI: >60 ML/MIN/{1.73_M2}
GLUCOSE SERPL-MCNC: 89 MG/DL (ref 70–99)
HCT VFR BLD AUTO: 33.2 % (ref 36–48)
HCT VFR BLD AUTO: 36.1 % (ref 36–48)
HGB BLD-MCNC: 11.4 G/DL (ref 12–16)
HGB BLD-MCNC: 12.1 G/DL (ref 12–16)
INR PPP: 1.04 (ref 0.84–1.16)
LYMPHOCYTES # BLD: 2 K/UL (ref 1–5.1)
LYMPHOCYTES NFR BLD: 48.8 %
MAGNESIUM SERPL-MCNC: 2.2 MG/DL (ref 1.8–2.4)
MCH RBC QN AUTO: 31.5 PG (ref 26–34)
MCH RBC QN AUTO: 31.9 PG (ref 26–34)
MCHC RBC AUTO-ENTMCNC: 33.5 G/DL (ref 31–36)
MCHC RBC AUTO-ENTMCNC: 34.4 G/DL (ref 31–36)
MCV RBC AUTO: 92.8 FL (ref 80–100)
MCV RBC AUTO: 93.9 FL (ref 80–100)
MONOCYTES # BLD: 0.2 K/UL (ref 0–1.3)
MONOCYTES NFR BLD: 5.5 %
NEUTROPHILS # BLD: 1.9 K/UL (ref 1.7–7.7)
NEUTROPHILS NFR BLD: 44.7 %
PLATELET # BLD AUTO: 120 K/UL (ref 135–450)
PLATELET # BLD AUTO: 135 K/UL (ref 135–450)
PMV BLD AUTO: 7.5 FL (ref 5–10.5)
PMV BLD AUTO: 7.8 FL (ref 5–10.5)
POTASSIUM SERPL-SCNC: 3.1 MMOL/L (ref 3.5–5.1)
PROTHROMBIN TIME: 13.7 SEC (ref 11.5–14.8)
RBC # BLD AUTO: 3.57 M/UL (ref 4–5.2)
RBC # BLD AUTO: 3.84 M/UL (ref 4–5.2)
SODIUM SERPL-SCNC: 138 MMOL/L (ref 136–145)
T4 FREE SERPL-MCNC: 0.9 NG/DL (ref 0.9–1.8)
TSH SERPL DL<=0.005 MIU/L-ACNC: 8.65 UIU/ML (ref 0.27–4.2)
WBC # BLD AUTO: 4.2 K/UL (ref 4–11)
WBC # BLD AUTO: 4.5 K/UL (ref 4–11)

## 2024-01-19 PROCEDURE — 99223 1ST HOSP IP/OBS HIGH 75: CPT | Performed by: INTERNAL MEDICINE

## 2024-01-19 PROCEDURE — 85027 COMPLETE CBC AUTOMATED: CPT

## 2024-01-19 PROCEDURE — 6360000004 HC RX CONTRAST MEDICATION: Performed by: INTERNAL MEDICINE

## 2024-01-19 PROCEDURE — 84443 ASSAY THYROID STIM HORMONE: CPT

## 2024-01-19 PROCEDURE — 36415 COLL VENOUS BLD VENIPUNCTURE: CPT

## 2024-01-19 PROCEDURE — 99232 SBSQ HOSP IP/OBS MODERATE 35: CPT | Performed by: INTERNAL MEDICINE

## 2024-01-19 PROCEDURE — 2580000003 HC RX 258: Performed by: INTERNAL MEDICINE

## 2024-01-19 PROCEDURE — 85025 COMPLETE CBC W/AUTO DIFF WBC: CPT

## 2024-01-19 PROCEDURE — 84439 ASSAY OF FREE THYROXINE: CPT

## 2024-01-19 PROCEDURE — 93308 TTE F-UP OR LMTD: CPT

## 2024-01-19 PROCEDURE — 6370000000 HC RX 637 (ALT 250 FOR IP): Performed by: INTERNAL MEDICINE

## 2024-01-19 PROCEDURE — 6360000002 HC RX W HCPCS: Performed by: INTERNAL MEDICINE

## 2024-01-19 PROCEDURE — 93356 MYOCRD STRAIN IMG SPCKL TRCK: CPT

## 2024-01-19 PROCEDURE — 85520 HEPARIN ASSAY: CPT

## 2024-01-19 PROCEDURE — 71260 CT THORAX DX C+: CPT

## 2024-01-19 PROCEDURE — 94761 N-INVAS EAR/PLS OXIMETRY MLT: CPT

## 2024-01-19 PROCEDURE — G0378 HOSPITAL OBSERVATION PER HR: HCPCS

## 2024-01-19 PROCEDURE — 80048 BASIC METABOLIC PNL TOTAL CA: CPT

## 2024-01-19 PROCEDURE — 83735 ASSAY OF MAGNESIUM: CPT

## 2024-01-19 PROCEDURE — 96365 THER/PROPH/DIAG IV INF INIT: CPT

## 2024-01-19 PROCEDURE — 96366 THER/PROPH/DIAG IV INF ADDON: CPT

## 2024-01-19 RX ORDER — ACETAMINOPHEN 325 MG/1
650 TABLET ORAL EVERY 6 HOURS PRN
Status: DISCONTINUED | OUTPATIENT
Start: 2024-01-19 | End: 2024-01-20 | Stop reason: HOSPADM

## 2024-01-19 RX ORDER — SODIUM CHLORIDE 0.9 % (FLUSH) 0.9 %
5-40 SYRINGE (ML) INJECTION EVERY 12 HOURS SCHEDULED
Status: DISCONTINUED | OUTPATIENT
Start: 2024-01-19 | End: 2024-01-20 | Stop reason: HOSPADM

## 2024-01-19 RX ORDER — NAPROXEN 500 MG/1
500 TABLET ORAL 2 TIMES DAILY WITH MEALS
Status: DISCONTINUED | OUTPATIENT
Start: 2024-01-19 | End: 2024-01-20 | Stop reason: HOSPADM

## 2024-01-19 RX ORDER — FOLIC ACID 1 MG/1
1 TABLET ORAL DAILY
Status: DISCONTINUED | OUTPATIENT
Start: 2024-01-19 | End: 2024-01-20 | Stop reason: HOSPADM

## 2024-01-19 RX ORDER — TOPIRAMATE 25 MG/1
25 TABLET ORAL 2 TIMES DAILY
Status: DISCONTINUED | OUTPATIENT
Start: 2024-01-19 | End: 2024-01-20 | Stop reason: HOSPADM

## 2024-01-19 RX ORDER — ROSUVASTATIN CALCIUM 10 MG/1
5 TABLET, COATED ORAL DAILY
Status: DISCONTINUED | OUTPATIENT
Start: 2024-01-19 | End: 2024-01-20 | Stop reason: HOSPADM

## 2024-01-19 RX ORDER — PANTOPRAZOLE SODIUM 40 MG/1
40 TABLET, DELAYED RELEASE ORAL
Status: DISCONTINUED | OUTPATIENT
Start: 2024-01-19 | End: 2024-01-20 | Stop reason: HOSPADM

## 2024-01-19 RX ORDER — ESCITALOPRAM OXALATE 10 MG/1
20 TABLET ORAL DAILY
Status: DISCONTINUED | OUTPATIENT
Start: 2024-01-19 | End: 2024-01-20 | Stop reason: HOSPADM

## 2024-01-19 RX ORDER — SODIUM CHLORIDE 0.9 % (FLUSH) 0.9 %
5-40 SYRINGE (ML) INJECTION PRN
Status: DISCONTINUED | OUTPATIENT
Start: 2024-01-19 | End: 2024-01-20 | Stop reason: HOSPADM

## 2024-01-19 RX ORDER — MAGNESIUM SULFATE IN WATER 40 MG/ML
2000 INJECTION, SOLUTION INTRAVENOUS PRN
Status: DISCONTINUED | OUTPATIENT
Start: 2024-01-19 | End: 2024-01-20 | Stop reason: HOSPADM

## 2024-01-19 RX ORDER — POTASSIUM CHLORIDE 7.45 MG/ML
10 INJECTION INTRAVENOUS PRN
Status: DISCONTINUED | OUTPATIENT
Start: 2024-01-19 | End: 2024-01-20 | Stop reason: HOSPADM

## 2024-01-19 RX ORDER — ISOSORBIDE MONONITRATE 30 MG/1
30 TABLET, EXTENDED RELEASE ORAL DAILY
Status: DISCONTINUED | OUTPATIENT
Start: 2024-01-19 | End: 2024-01-20 | Stop reason: HOSPADM

## 2024-01-19 RX ORDER — FUROSEMIDE 40 MG/1
40 TABLET ORAL DAILY
Status: DISCONTINUED | OUTPATIENT
Start: 2024-01-19 | End: 2024-01-20 | Stop reason: HOSPADM

## 2024-01-19 RX ORDER — LEVOTHYROXINE SODIUM 0.1 MG/1
100 TABLET ORAL DAILY
Status: DISCONTINUED | OUTPATIENT
Start: 2024-01-19 | End: 2024-01-20 | Stop reason: HOSPADM

## 2024-01-19 RX ORDER — MORPHINE SULFATE 2 MG/ML
1 INJECTION, SOLUTION INTRAMUSCULAR; INTRAVENOUS
Status: DISCONTINUED | OUTPATIENT
Start: 2024-01-19 | End: 2024-01-20 | Stop reason: HOSPADM

## 2024-01-19 RX ORDER — ACETAMINOPHEN 650 MG/1
650 SUPPOSITORY RECTAL EVERY 6 HOURS PRN
Status: DISCONTINUED | OUTPATIENT
Start: 2024-01-19 | End: 2024-01-20 | Stop reason: HOSPADM

## 2024-01-19 RX ORDER — ASPIRIN 81 MG/1
81 TABLET, CHEWABLE ORAL DAILY
Status: DISCONTINUED | OUTPATIENT
Start: 2024-01-19 | End: 2024-01-20 | Stop reason: HOSPADM

## 2024-01-19 RX ORDER — SUCRALFATE 1 G/1
1 TABLET ORAL
Status: DISCONTINUED | OUTPATIENT
Start: 2024-01-19 | End: 2024-01-20 | Stop reason: HOSPADM

## 2024-01-19 RX ORDER — POLYETHYLENE GLYCOL 3350 17 G/17G
17 POWDER, FOR SOLUTION ORAL DAILY PRN
Status: DISCONTINUED | OUTPATIENT
Start: 2024-01-19 | End: 2024-01-20 | Stop reason: HOSPADM

## 2024-01-19 RX ORDER — VALSARTAN 80 MG/1
160 TABLET ORAL DAILY
Status: DISCONTINUED | OUTPATIENT
Start: 2024-01-19 | End: 2024-01-20 | Stop reason: HOSPADM

## 2024-01-19 RX ORDER — ONDANSETRON 4 MG/1
4 TABLET, ORALLY DISINTEGRATING ORAL EVERY 8 HOURS PRN
Status: DISCONTINUED | OUTPATIENT
Start: 2024-01-19 | End: 2024-01-20 | Stop reason: HOSPADM

## 2024-01-19 RX ORDER — ARMODAFINIL 150 MG/1
150 TABLET ORAL DAILY
Status: DISCONTINUED | OUTPATIENT
Start: 2024-01-19 | End: 2024-01-20 | Stop reason: HOSPADM

## 2024-01-19 RX ORDER — SODIUM CHLORIDE 9 MG/ML
INJECTION, SOLUTION INTRAVENOUS PRN
Status: DISCONTINUED | OUTPATIENT
Start: 2024-01-19 | End: 2024-01-20 | Stop reason: HOSPADM

## 2024-01-19 RX ORDER — ONDANSETRON 2 MG/ML
4 INJECTION INTRAMUSCULAR; INTRAVENOUS EVERY 6 HOURS PRN
Status: DISCONTINUED | OUTPATIENT
Start: 2024-01-19 | End: 2024-01-20 | Stop reason: HOSPADM

## 2024-01-19 RX ORDER — POTASSIUM CHLORIDE 20 MEQ/1
40 TABLET, EXTENDED RELEASE ORAL PRN
Status: DISCONTINUED | OUTPATIENT
Start: 2024-01-19 | End: 2024-01-20 | Stop reason: HOSPADM

## 2024-01-19 RX ADMIN — SUCRALFATE 1 G: 1 TABLET ORAL at 16:14

## 2024-01-19 RX ADMIN — SUCRALFATE 1 G: 1 TABLET ORAL at 21:00

## 2024-01-19 RX ADMIN — PANTOPRAZOLE SODIUM 40 MG: 40 TABLET, DELAYED RELEASE ORAL at 16:14

## 2024-01-19 RX ADMIN — LEVOTHYROXINE SODIUM 100 MCG: 100 TABLET ORAL at 06:29

## 2024-01-19 RX ADMIN — HEPARIN SODIUM 7200 UNITS: 1000 INJECTION INTRAVENOUS; SUBCUTANEOUS at 00:53

## 2024-01-19 RX ADMIN — SUCRALFATE 1 G: 1 TABLET ORAL at 02:18

## 2024-01-19 RX ADMIN — SODIUM CHLORIDE, PRESERVATIVE FREE 10 ML: 5 INJECTION INTRAVENOUS at 12:09

## 2024-01-19 RX ADMIN — SODIUM CHLORIDE, PRESERVATIVE FREE 10 ML: 5 INJECTION INTRAVENOUS at 21:00

## 2024-01-19 RX ADMIN — TOPIRAMATE 25 MG: 25 TABLET, FILM COATED ORAL at 21:00

## 2024-01-19 RX ADMIN — SUCRALFATE 1 G: 1 TABLET ORAL at 06:29

## 2024-01-19 RX ADMIN — NAPROXEN 500 MG: 500 TABLET ORAL at 16:14

## 2024-01-19 RX ADMIN — TOPIRAMATE 25 MG: 25 TABLET, FILM COATED ORAL at 02:18

## 2024-01-19 RX ADMIN — HEPARIN SODIUM 18 UNITS/KG/HR: 10000 INJECTION, SOLUTION INTRAVENOUS at 00:53

## 2024-01-19 RX ADMIN — IOPAMIDOL 75 ML: 755 INJECTION, SOLUTION INTRAVENOUS at 09:13

## 2024-01-19 RX ADMIN — PANTOPRAZOLE SODIUM 40 MG: 40 TABLET, DELAYED RELEASE ORAL at 06:29

## 2024-01-19 ASSESSMENT — PAIN SCALES - GENERAL: PAINLEVEL_OUTOF10: 0

## 2024-01-19 NOTE — PROGRESS NOTES
Per High-dose heparin drip protocol for suspected PE:  CT pending in AM    Wt = 90.3 kg      Heparin to be dosed on adjusted body wt = 90.3 kg    Baseline aPTT = 32.9 sec  Bolus dose = 7200 units  (80 units/kg)    Initiate drip at 18 units/kg/h     First anti-Xa ordered for to be determined once drip initiated      Adjust drip as needed per the following protocol:    Pharmacy to manage Heparin - contact for questions.    Adjust infusion rate based off anti-Xa results below.     anti-Xa < 0.1  Heparin 80 units/kg bolus  Increase infusion by 4 units/kg/hr  anti-Xa 0.1-0.29    Heparin 40 units/kg bolus Increase infusion by 2 units/kg/hr  anti-Xa 0.3-0.7  No bolus No change   anti-Xa 0.71-0.8    No bolus Decrease infusion by 1 units/kg/hr  anti-Xa 0.81-0.99   No bolus Decrease infusion by 2 units/kg/hr  anti-Xa 1 or more  Hold heparin for 1 hour Decrease infusion by 3 units/kg/hr    Obtain anti-Xa 6-8 hours after bolus and 6 hours after any dose change until two consecutive therapeutic anti-Xa are achieved- then daily.

## 2024-01-19 NOTE — CARE COORDINATION
Case Management Assessment  Initial Evaluation    Date/Time of Evaluation: 1/19/2024 10:38 AM  Assessment Completed by: Azalia Conte    If patient is discharged prior to next notation, then this note serves as note for discharge by case management.    Patient Name: Graciela Dean                   YOB: 1950  Diagnosis: Chest pain [R07.9]  Chest pain, unspecified type [R07.9]                   Date / Time: 1/18/2024  4:48 PM    Patient Admission Status: Observation   Readmission Risk (Low < 19, Mod (19-27), High > 27): No data recorded  Current PCP: Brian Mercado, DO  PCP verified by CM? Yes    Chart Reviewed: Yes      History Provided by: Patient  Patient Orientation: Alert and Oriented    Patient Cognition: Alert    Hospitalization in the last 30 days (Readmission):  No    If yes, Readmission Assessment in CM Navigator will be completed.    Advance Directives:      Code Status: Full Code   Patient's Primary Decision Maker is: Legal Next of Kin    Primary Decision Maker: Zafar Ramirez - Brother/Sister - [phone]    Discharge Planning:    Patient lives with: Family Members Type of Home: House  Primary Care Giver: Self  Patient Support Systems include: Family Members   Current Financial resources: None  Current community resources: None  Current services prior to admission: C-pap            Current DME:              Type of Home Care services:  None    ADLS  Prior functional level: Independent in ADLs/IADLs  Current functional level: Independent in ADLs/IADLs    PT AM-PAC:   /24  OT AM-PAC:   /24    Family can provide assistance at DC: Yes  Would you like Case Management to discuss the discharge plan with any other family members/significant others, and if so, who? No  Plans to Return to Present Housing: Yes  Other Identified Issues/Barriers to RETURNING to current housing: none   Potential Assistance needed at discharge: N/A            Potential DME:    Patient expects to discharge to:

## 2024-01-19 NOTE — PROGRESS NOTES
Per lab Anti XA result 1.06, notified pharmacy to verify next step in heparin gtt. Per lab disregard this Anti Xa and repeat lab draw at 8am.

## 2024-01-19 NOTE — PROGRESS NOTES
Admit: 2024    Name:  Graciela Dean  Room:  /0322-01  MRN:    [id number]    Daily Progress Note for 2024     Interval History:   No chest pain overnight   Scheduled Meds:   aspirin  81 mg Oral Daily    isosorbide mononitrate  30 mg Oral Daily    rosuvastatin  5 mg Oral Daily    sodium chloride flush  5-40 mL IntraVENous 2 times per day    Armodafinil  150 mg Oral Daily    escitalopram  20 mg Oral Daily    folic acid  1 mg Oral Daily    furosemide  40 mg Oral Daily    L-Arginine  1,000 mg Oral BID    levothyroxine  100 mcg Oral Daily    naproxen  500 mg Oral BID WC    pantoprazole  40 mg Oral BID AC    sucralfate  1 g Oral 4x Daily AC & HS    topiramate  25 mg Oral BID    valsartan  160 mg Oral Daily       Continuous Infusions:   sodium chloride      heparin (PORCINE) Infusion 18 Units/kg/hr (24 0053)       PRN Meds:  sodium chloride flush, sodium chloride, potassium chloride **OR** potassium alternative oral replacement **OR** potassium chloride, magnesium sulfate, ondansetron **OR** ondansetron, polyethylene glycol, acetaminophen **OR** acetaminophen, morphine, nitroGLYCERIN, heparin (porcine), heparin (porcine)                  Objective:     Temp  Av.3 °F (36.3 °C)  Min: 96.7 °F (35.9 °C)  Max: 97.9 °F (36.6 °C)  Pulse  Av.3  Min: 43  Max: 65  BP  Min: 121/90  Max: 156/77  SpO2  Av.9 %  Min: 94 %  Max: 100 %  No data found.    No intake or output data in the 24 hours ending 24 0801    Physical Exam:  General:  Awake, alert and oriented. Appears to be not in any distress  Mucous Membranes:  Pink , anicteric  Neck: No JVD, no carotid bruit, no thyromegaly  Chest:  Clear to auscultation bilaterally, no added sounds  Cardiovascular:  RRR S1S2 heard, no murmurs or gallops  Abdomen:  Soft, undistended, non tender, no organomegaly, BS present  Extremities: No edema or cyanosis. Distal pulses well felt  Neurological : no focal deficits    Lab Data:  CBC:   Recent Labs

## 2024-01-19 NOTE — PROGRESS NOTES
Disregard Anti-Xa level results drawn @ 0520, as this was timed to be drawn @ 0800 and likely reflects the results of the bolus given @ around 1AM.  Anti-Xa again ordered to be drawn at 0800.  Continue heparin drip rate at 18 units/kg/h for now.

## 2024-01-19 NOTE — DISCHARGE INSTRUCTIONS
FOLLOW-UP APPOINTMENTS at [facility] OFFICE - Follow-up appointment on February 19, 2024 at 10:30 AM with Ernesto Monsalve NP.   Please arrive 15 minutes early to complete necessary paperwork.  [facility] Office [address]:  [phone].    If you are unable to make this appointment, please call to reschedule [phone].  To get to the office go to the side of the hospital at [facility], enter at the [facility], entrance that faces [address].   Take the elevator to the 3rd floor turn right off elevator then take hallway to the right.  Go down the faith and office will be on your right [address]

## 2024-01-19 NOTE — CONSULTS
tablet 81 mg, Daily  isosorbide mononitrate (IMDUR) extended release tablet 30 mg, Daily  rosuvastatin (CRESTOR) tablet 5 mg, Daily  sodium chloride flush 0.9 % injection 5-40 mL, 2 times per day  sodium chloride flush 0.9 % injection 5-40 mL, PRN  0.9 % sodium chloride infusion, PRN  potassium chloride (KLOR-CON M) extended release tablet 40 mEq, PRN   Or  potassium bicarb-citric acid (EFFER-K) effervescent tablet 40 mEq, PRN   Or  potassium chloride 10 mEq/100 mL IVPB (Peripheral Line), PRN  magnesium sulfate 2000 mg in 50 mL IVPB premix, PRN  ondansetron (ZOFRAN-ODT) disintegrating tablet 4 mg, Q8H PRN   Or  ondansetron (ZOFRAN) injection 4 mg, Q6H PRN  polyethylene glycol (GLYCOLAX) packet 17 g, Daily PRN  acetaminophen (TYLENOL) tablet 650 mg, Q6H PRN   Or  acetaminophen (TYLENOL) suppository 650 mg, Q6H PRN  morphine (PF) injection 1 mg, Q2H PRN  Armodafinil (NUVIGIL) tablet 150 mg, Daily  escitalopram (LEXAPRO) tablet 20 mg, Daily  folic acid (FOLVITE) tablet 1 mg, Daily  furosemide (LASIX) tablet 40 mg, Daily  L-Arginine TABS 1,000 mg, BID  levothyroxine (SYNTHROID) tablet 100 mcg, Daily  naproxen (NAPROSYN) tablet 500 mg, BID WC  pantoprazole (PROTONIX) tablet 40 mg, BID AC  sucralfate (CARAFATE) tablet 1 g, 4x Daily AC & HS  topiramate (TOPAMAX) tablet 25 mg, BID  valsartan (DIOVAN) tablet 160 mg, Daily  nitroGLYCERIN (NITROSTAT) SL tablet 0.4 mg, Q5 Min PRN  heparin (porcine) injection 7,200 Units, PRN  heparin (porcine) injection 3,600 Units, PRN  heparin 25,000 units in dextrose 5% 250 mL (premix) infusion, Continuous        Allergies:  Latex and Vistaril [hydroxyzine hcl]     Review of Systems:   A 14 point review of symptoms completed. Pertinent positives identified in the HPI, all other review of symptoms negative as below.      Objective:     Vitals:    01/18/24 2143 01/19/24 0030 01/19/24 0036 01/19/24 0815   BP: 137/85  (!) 156/77 118/76   Pulse: 50  (!) 49 50   Resp:   18 18   Temp:   (!) 96.7 °F  (35.9 °C) 97.2 °F (36.2 °C)   TempSrc:   Oral Oral   SpO2:   100%    Weight:  90.8 kg (200 lb 1.6 oz)     Height:  1.626 m (5' 4\")        Weight - Scale: 90.8 kg (200 lb 1.6 oz)       PHYSICAL EXAM:    General:  No acute distress    Head:  Normocephalic, atraumatic   Eyes:  Conjunctiva/corneas clear, anicteric sclerae    Nose: Nares normal, no drainage or sinus tenderness   Throat: No abnormalities of the lips, oral mucosa or tongue.    Neck: Trachea midline. Neck supple with no lymphadenopathy    Lungs:   Clear to auscultation bilaterally, no wheezes, no rales, no respiratory distress   Chest Wall:  No deformity or tenderness to palpation   Heart:  Regular, bradycardic, normal S1, normal S2, no murmur, no rub, no S3/S4, PMI non-palpable     Abdomen:   Obese, Soft, non-tender, with normoactive bowel sounds.    Extremities: No cyanosis, clubbing or pitting edema.    Vascular: 2+ radial, 2+dorsalis pedis and posterior tibial pulses bilaterally. Brisk carotid upstrokes without carotid bruit.    Skin: Skin color, texture, turgor are normal with no rashes or ulceration.    Pysch: Euthymic mood, appropriate affect   Neurologic: Oriented to person, place and time. No slurred speech or facial asymmetry. No motor or sensory deficits on gross examination.         Labs:   CBC:   Lab Results   Component Value Date/Time    WBC 4.2 01/19/2024 05:20 AM    RBC 3.57 01/19/2024 05:20 AM    HGB 11.4 01/19/2024 05:20 AM    HCT 33.2 01/19/2024 05:20 AM    MCV 92.8 01/19/2024 05:20 AM    RDW 15.6 01/19/2024 05:20 AM     01/19/2024 05:20 AM     CMP:  Lab Results   Component Value Date/Time     01/19/2024 05:20 AM    K 3.1 01/19/2024 05:20 AM     01/19/2024 05:20 AM    CO2 22 01/19/2024 05:20 AM    BUN 18 01/19/2024 05:20 AM    CREATININE 0.8 01/19/2024 05:20 AM    GFRAA >60 05/06/2022 12:10 PM    GFRAA >60 07/19/2012 03:20 PM    AGRATIO 1.3 05/23/2023 11:21 AM    LABGLOM >60 01/19/2024 05:20 AM    GLUCOSE 89 01/19/2024

## 2024-01-19 NOTE — PROGRESS NOTES
Patient admitted to room 322 from ED.  Patient oriented to room, call light, bed rails, phone, lights and bathroom.  Patient instructed about the schedule of the day including: vital sign frequency, lab draws, possible tests, frequency of MD and staff rounds, including RN/MD rounding together at bedside, daily weights, and I &O's.  Patient instructed about prescribed diet, how to order meals, and television.  Telemetry box 40 in place, patient aware of placement and reason.  Bed locked, in lowest position, side rails up 2/4, call light within reach.  Will continue to monitor.

## 2024-01-19 NOTE — PROGRESS NOTES
1/19/2024  Current drip rate = 18 units/kg//hr  Anti-Xa = >1.1 at 0831.  Per protocol hold heparin drip for 1 hour and decrease heparin gtt to 15 units/kg/hr.  Recheck Anti-Xa in 6 hours.    Eric Blizzard, [facility] 1/19/2024 9:18 AM

## 2024-01-19 NOTE — PROGRESS NOTES
4 Eyes Skin Assessment     NAME:  Graciela Dean  YOB: 1950  MEDICAL RECORD NUMBER:  [id number]    The patient is being assessed for  Admission    I agree that at least one RN has performed a thorough Head to Toe Skin Assessment on the patient. ALL assessment sites listed below have been assessed.      Areas assessed by both nurses:    Head, Face, Ears, Shoulders, Back, Chest, Arms, Elbows, Hands, Sacrum. Buttock, Coccyx, Ischium, Legs. Feet and Heels, and Under Medical Devices         Does the Patient have a Wound? No noted wound(s)       Ashwin Prevention initiated by RN: No  Wound Care Orders initiated by RN: No    Pressure Injury (Stage 3,4, Unstageable, DTI, NWPT, and Complex wounds) if present, place Wound referral order by RN under : No    New Ostomies, if present place, Ostomy referral order under : No     Nurse 1 eSignature: Electronically signed by Lizzy Guerra RN on 1/19/24 at 12:58 AM EST    **SHARE this note so that the co-signing nurse can place an eSignature**    Nurse 2 eSignature: {Esignature:[id number]}

## 2024-01-19 NOTE — PLAN OF CARE
Problem: Safety - Adult  Goal: Free from fall injury  1/19/2024 1403 by Yuliya Ramos RN  Outcome: Progressing  1/19/2024 0205 by Lizzy Guerra RN  Outcome: Progressing  Flowsheets (Taken 1/19/2024 0039)  Free From Fall Injury: (pt. her own caregiver) --     Problem: Discharge Planning  Goal: Discharge to home or other facility with appropriate resources  1/19/2024 1403 by Yuliya Ramos RN  Outcome: Progressing  1/19/2024 0205 by Lizzy Guerra RN  Outcome: Progressing  Flowsheets  Taken 1/19/2024 0037  Discharge to home or other facility with appropriate resources: Arrange for needed discharge resources and transportation as appropriate  Taken 1/19/2024 0036  Discharge to home or other facility with appropriate resources: Arrange for needed discharge resources and transportation as appropriate     Problem: Pain  Goal: Verbalizes/displays adequate comfort level or baseline comfort level  1/19/2024 1403 by Yuliya Ramos RN  Outcome: Progressing  1/19/2024 0205 by Lizzy Guerra RN  Outcome: Progressing  Flowsheets (Taken 1/19/2024 0036)  Verbalizes/displays adequate comfort level or baseline comfort level: Assess pain using appropriate pain scale     Problem: ABCDS Injury Assessment  Goal: Absence of physical injury  1/19/2024 1403 by Yuliya Ramos RN  Outcome: Progressing  1/19/2024 0205 by Lizzy Guerra RN  Outcome: Progressing  Flowsheets (Taken 1/19/2024 0205)  Absence of Physical Injury: Implement safety measures based on patient assessment     Problem: Cardiovascular - Adult  Goal: Maintains optimal cardiac output and hemodynamic stability  1/19/2024 1403 by Yuliya Ramos RN  Outcome: Progressing  1/19/2024 0205 by Lizzy Guerra RN  Outcome: Progressing  Flowsheets (Taken 1/19/2024 0036)  Maintains optimal cardiac output and hemodynamic stability: Monitor blood pressure and heart rate     Problem: Chronic Conditions and Co-morbidities  Goal: Patient's chronic conditions and co-morbidity  symptoms are monitored and maintained or improved  Outcome: Progressing

## 2024-01-19 NOTE — H&P
V2.0  History and Physical      Name:  Graciela Dean /Age/Sex: 1950  (73 y.o. female)   MRN & CSN:  [id number] & [id number] Encounter Date/Time: 2024 10:07 PM EST   Location:   PCP: Brian Mercado DO       Hospital Day: 1    Assessment and Plan:   Graciela Dean is a 73 y.o. female with a pmh of coronary artery disease, cardiac syndrome X, hypertension, hyperlipidemia, restless leg syndrome, hypothyroidism, B12 deficiency, sleep apnea treated with CPAP who presents with Chest pain        Hospital Problems             Last Modified POA    * (Principal) Chest pain 2024 Yes         Principal Problem:    Chest pain  Plan:   Admit for observation  Telemetry  Consult cardiology  Treat with aspirin and statin  Morphine as needed for chest pain    Bradycardia  Hemodynamically stable  Not on any rate lowering medications that I can see  Check TSH    Positive D-dimer  Weight-based heparin as above  CTA PE study in the a.m.           Disposition:   Current Living situation: Home with her brother.  Expected Disposition: Home  Estimated D/C:1-2 days    Diet N.p.o. after midnight for any possible procedure cardiology may have in mind   DVT Prophylaxis [] Lovenox, []  Heparin, [] SCDs, [x] Ambulation,  [] Eliquis, [] Xarelto, [] Coumadin   Code Status Full code   Surrogate Decision Maker/ POA Unknown     Personally reviewed Lab Studies and Imaging     Discussed management of the case with CT personnel who recommended timing of neck CT with contrast as discussed above    EKG interpreted personally and results sinus bradycardia with a rate of 48      History from:     patient, Quality of history:  vague    History of Present Illness:     Chief Complaint: Chest pain    Graciela Dean is a 73 y.o. female with a pmh of coronary artery disease, cardiac syndrome X, hypertension, hyperlipidemia, restless leg syndrome, hypothyroidism, B12 deficiency, sleep apnea treated with CPAP who presents with Chest  pain    Patient last had heart catheterization in December 2020 but I cannot currently pull up that report.  Echocardiogram performed February 2023 and was unremarkable.    Patient states she does have frequent angina but was somewhat vague about the details.  It seems that the reason she presented to the hospital today was that it was not relieved by itself in a short time.  She called her brother at work who came home and brought her to the hospital.  She says she did not want to be bothersome by calling an ambulance.  Besides being persistent she says that it was associated with nausea she describes it as \"stabbing or pressure or catching\".  It is increased with movement and deep inspiration.  She was offered nitroglycerin but declined secondary to it causing her headaches.  Maximal pain reported as 5/10 currently 3/10.  One-time dose of morphine ordered just now by myself.    Because of headache complaint she had a CT scan of the head and neck performed which were unremarkable.  The latter study was angiographic with IV contrast.    I added a D-dimer test.  This returned significantly elevated.  I have spoken with the CAT scan personnel.  They feel that rescanning with contrast in the morning since she has normal renal function is reasonable.  CTA PE study now ordered at 9 AM.  I have empirically begun the patient on weight-based heparin protocol.    Patient request full CODE STATUS..     Review of Systems:    Patient sees a neurologist for headache but describes her headaches as currently \"strange\" but again she is vague.  She reports that recently she did have some blurriness in her left eye.    Pertinent positives and negatives discussed in HPI     Objective:   No intake or output data in the 24 hours ending 01/18/24 2207   Vitals:   Vitals:    01/18/24 2002 01/18/24 2032 01/18/24 2102 01/18/24 2143   BP: (!) 131/90 (!) 156/94 (!) 139/96 137/85   Pulse: (!) 49 53 (!) 43 50   Resp:       Temp:       SpO2: 100%

## 2024-01-19 NOTE — PROGRESS NOTES
Echo shows normal EF. Low suspicion for ACS. Ok for DC from my standpoint. Can consider any need for repeat ischemic evaluation as OP as Chest pain is chronic. I would like to see her have NM amyloid study and cardiac MRI OP to complete amyloid workup. She is due to see Heme-Onc in follow up for MGUS history. No medication changes.     Erik Junior MD, [facility]  Cardiovascular Disease  [facility]  [phone] [place] [facility]  [phone] [facility]

## 2024-01-19 NOTE — PROGRESS NOTES
Per CMU, pt.'s hr has been dropping into the 40's and 30's. Recently pt. Has been dropping into the 30's more frequently. Notified provider.

## 2024-01-19 NOTE — PLAN OF CARE
Problem: Safety - Adult  Goal: Free from fall injury  Outcome: Progressing  Flowsheets (Taken 1/19/2024 0039)  Free From Fall Injury: (pt. her own caregiver) --     Problem: Discharge Planning  Goal: Discharge to home or other facility with appropriate resources  Outcome: Progressing  Flowsheets  Taken 1/19/2024 0037  Discharge to home or other facility with appropriate resources: Arrange for needed discharge resources and transportation as appropriate  Taken 1/19/2024 0036  Discharge to home or other facility with appropriate resources: Arrange for needed discharge resources and transportation as appropriate     Problem: Pain  Goal: Verbalizes/displays adequate comfort level or baseline comfort level  Outcome: Progressing  Flowsheets (Taken 1/19/2024 0036)  Verbalizes/displays adequate comfort level or baseline comfort level: Assess pain using appropriate pain scale     Problem: ABCDS Injury Assessment  Goal: Absence of physical injury  Outcome: Progressing  Flowsheets (Taken 1/19/2024 0205)  Absence of Physical Injury: Implement safety measures based on patient assessment     Problem: Cardiovascular - Adult  Goal: Maintains optimal cardiac output and hemodynamic stability  Outcome: Progressing  Flowsheets (Taken 1/19/2024 0036)  Maintains optimal cardiac output and hemodynamic stability: Monitor blood pressure and heart rate

## 2024-01-20 VITALS
OXYGEN SATURATION: 98 % | SYSTOLIC BLOOD PRESSURE: 120 MMHG | TEMPERATURE: 97.3 F | HEIGHT: 64 IN | HEART RATE: 48 BPM | BODY MASS INDEX: 35.39 KG/M2 | RESPIRATION RATE: 19 BRPM | WEIGHT: 207.3 LBS | DIASTOLIC BLOOD PRESSURE: 76 MMHG

## 2024-01-20 LAB
ANION GAP SERPL CALCULATED.3IONS-SCNC: 8 MMOL/L (ref 3–16)
BASOPHILS # BLD: 0 K/UL (ref 0–0.2)
BASOPHILS NFR BLD: 0.4 %
BUN SERPL-MCNC: 18 MG/DL (ref 7–20)
CALCIUM SERPL-MCNC: 8.8 MG/DL (ref 8.3–10.6)
CHLORIDE SERPL-SCNC: 114 MMOL/L (ref 99–110)
CO2 SERPL-SCNC: 22 MMOL/L (ref 21–32)
CREAT SERPL-MCNC: 1 MG/DL (ref 0.6–1.2)
DEPRECATED RDW RBC AUTO: 15.6 % (ref 12.4–15.4)
EOSINOPHIL # BLD: 0 K/UL (ref 0–0.6)
EOSINOPHIL NFR BLD: 0.8 %
GFR SERPLBLD CREATININE-BSD FMLA CKD-EPI: 60 ML/MIN/{1.73_M2}
GLUCOSE SERPL-MCNC: 85 MG/DL (ref 70–99)
HCT VFR BLD AUTO: 33.3 % (ref 36–48)
HGB BLD-MCNC: 11.4 G/DL (ref 12–16)
LYMPHOCYTES # BLD: 1.8 K/UL (ref 1–5.1)
LYMPHOCYTES NFR BLD: 47.6 %
MAGNESIUM SERPL-MCNC: 2.3 MG/DL (ref 1.8–2.4)
MCH RBC QN AUTO: 32 PG (ref 26–34)
MCHC RBC AUTO-ENTMCNC: 34.3 G/DL (ref 31–36)
MCV RBC AUTO: 93.3 FL (ref 80–100)
MONOCYTES # BLD: 0.2 K/UL (ref 0–1.3)
MONOCYTES NFR BLD: 5.3 %
NEUTROPHILS # BLD: 1.8 K/UL (ref 1.7–7.7)
NEUTROPHILS NFR BLD: 45.9 %
PLATELET # BLD AUTO: 119 K/UL (ref 135–450)
PMV BLD AUTO: 8.1 FL (ref 5–10.5)
POTASSIUM SERPL-SCNC: 3.4 MMOL/L (ref 3.5–5.1)
RBC # BLD AUTO: 3.57 M/UL (ref 4–5.2)
SODIUM SERPL-SCNC: 144 MMOL/L (ref 136–145)
WBC # BLD AUTO: 3.9 K/UL (ref 4–11)

## 2024-01-20 PROCEDURE — 6370000000 HC RX 637 (ALT 250 FOR IP): Performed by: NURSE PRACTITIONER

## 2024-01-20 PROCEDURE — 83735 ASSAY OF MAGNESIUM: CPT

## 2024-01-20 PROCEDURE — 99238 HOSP IP/OBS DSCHRG MGMT 30/<: CPT | Performed by: INTERNAL MEDICINE

## 2024-01-20 PROCEDURE — 2580000003 HC RX 258: Performed by: INTERNAL MEDICINE

## 2024-01-20 PROCEDURE — G0378 HOSPITAL OBSERVATION PER HR: HCPCS

## 2024-01-20 PROCEDURE — 85025 COMPLETE CBC W/AUTO DIFF WBC: CPT

## 2024-01-20 PROCEDURE — 80048 BASIC METABOLIC PNL TOTAL CA: CPT

## 2024-01-20 PROCEDURE — 36415 COLL VENOUS BLD VENIPUNCTURE: CPT

## 2024-01-20 PROCEDURE — 6370000000 HC RX 637 (ALT 250 FOR IP): Performed by: INTERNAL MEDICINE

## 2024-01-20 RX ORDER — TRAZODONE HYDROCHLORIDE 50 MG/1
25 TABLET ORAL ONCE
Status: COMPLETED | OUTPATIENT
Start: 2024-01-20 | End: 2024-01-20

## 2024-01-20 RX ADMIN — ISOSORBIDE MONONITRATE 30 MG: 30 TABLET, EXTENDED RELEASE ORAL at 08:11

## 2024-01-20 RX ADMIN — TOPIRAMATE 25 MG: 25 TABLET, FILM COATED ORAL at 08:12

## 2024-01-20 RX ADMIN — NAPROXEN 500 MG: 500 TABLET ORAL at 08:12

## 2024-01-20 RX ADMIN — PANTOPRAZOLE SODIUM 40 MG: 40 TABLET, DELAYED RELEASE ORAL at 06:04

## 2024-01-20 RX ADMIN — POTASSIUM CHLORIDE 40 MEQ: 1500 TABLET, EXTENDED RELEASE ORAL at 06:35

## 2024-01-20 RX ADMIN — SUCRALFATE 1 G: 1 TABLET ORAL at 06:04

## 2024-01-20 RX ADMIN — TRAZODONE HYDROCHLORIDE 25 MG: 50 TABLET ORAL at 01:20

## 2024-01-20 RX ADMIN — ESCITALOPRAM OXALATE 20 MG: 10 TABLET ORAL at 08:12

## 2024-01-20 RX ADMIN — VALSARTAN 160 MG: 80 TABLET, FILM COATED ORAL at 08:11

## 2024-01-20 RX ADMIN — SUCRALFATE 1 G: 1 TABLET ORAL at 10:48

## 2024-01-20 RX ADMIN — SODIUM CHLORIDE, PRESERVATIVE FREE 10 ML: 5 INJECTION INTRAVENOUS at 08:14

## 2024-01-20 RX ADMIN — FOLIC ACID 1 MG: 1 TABLET ORAL at 08:12

## 2024-01-20 RX ADMIN — FUROSEMIDE 40 MG: 40 TABLET ORAL at 08:12

## 2024-01-20 RX ADMIN — ROSUVASTATIN CALCIUM 5 MG: 10 TABLET, FILM COATED ORAL at 08:11

## 2024-01-20 RX ADMIN — LEVOTHYROXINE SODIUM 100 MCG: 100 TABLET ORAL at 06:04

## 2024-01-20 RX ADMIN — ASPIRIN 81 MG: 81 TABLET, CHEWABLE ORAL at 08:12

## 2024-01-20 NOTE — FLOWSHEET NOTE
01/19/24 2030   Vital Signs   Temp 97.6 °F (36.4 °C)   Temp Source Oral   Pulse 50   Heart Rate Source Monitor   Respirations 18   /74   MAP (Calculated) 84   BP Location Right upper arm   BP Method Automatic   Patient Position High fowlers   Pain Assessment   Pain Assessment None - Denies Pain   Oxygen Therapy   SpO2 96 %   O2 Device None (Room air)     PM Assessment completed. Scheduled medications given per MAR, On Room Air, A&O X4, Vital Signs completed and Charted, Patient denies any further needs at this time. Call light within reach, Reminded patient to call RN if she needed anything.

## 2024-01-20 NOTE — PLAN OF CARE
Problem: Safety - Adult  Goal: Free from fall injury  1/19/2024 2236 by Dipika Mccormack RN  Outcome: Progressing  1/19/2024 1403 by Yuliya Ramos RN  Outcome: Progressing     Problem: Discharge Planning  Goal: Discharge to home or other facility with appropriate resources  1/19/2024 2236 by Dipika Mccormack RN  Outcome: Progressing  1/19/2024 1403 by Yuliya Ramos RN  Outcome: Progressing     Problem: Pain  Goal: Verbalizes/displays adequate comfort level or baseline comfort level  1/19/2024 2236 by Dipika Mccormack RN  Outcome: Progressing  1/19/2024 1403 by Yuliya Ramos RN  Outcome: Progressing     Problem: ABCDS Injury Assessment  Goal: Absence of physical injury  1/19/2024 2236 by Dipika Mccormack RN  Outcome: Progressing  1/19/2024 1403 by Yuliya Ramos RN  Outcome: Progressing     Problem: Cardiovascular - Adult  Goal: Maintains optimal cardiac output and hemodynamic stability  1/19/2024 2236 by Dipika Mccormack RN  Outcome: Progressing  1/19/2024 1403 by Yuliya Ramos RN  Outcome: Progressing     Problem: Chronic Conditions and Co-morbidities  Goal: Patient's chronic conditions and co-morbidity symptoms are monitored and maintained or improved  1/19/2024 2236 by Dipika Mccormack RN  Outcome: Progressing  1/19/2024 1403 by Yuliya Ramos RN  Outcome: Progressing

## 2024-01-20 NOTE — CARE COORDINATION
DISCHARGE ORDER  Date/Time 2024 10:54 AM  Completed by: Debbie Cabrales RN, Case Management    Patient Name: Graciela Dean      : 1950  Admitting Diagnosis: Chest pain [R07.9]  Chest pain, unspecified type [R07.9]      Admit order Date and Status:24 observation  (verify MD's last order for status of admission)      Noted discharge order.   If applicable PT/OT recommendation at Discharge: N/A  DME recommendation by PT/OT:N/A  Confirmed discharge plan  : Yes  with whom patietn  If pt confirmed DC plan does family need to be contacted by CM No   Discharge Plan: Order for dc noted. Spoke with opt who cont plan to return home with brother. Discussed HHC and pt declines. Chart reviewed and no other dc needs identified.    Date of Last IMM Given: N/A Obs    Reviewed chart.  Role of discharge planner explained and patient verbalized understanding. Discharge order is noted.    Has Home O2 in place on admit:  No  Informed of need to bring portable home O2 tank on day of discharge for nursing to connect prior to leaving:   Not Indicated  Verbalized agreement/Understanding:   Not Indicated  Pt is being d/c'd to home today. Pt's O2 sats are 98% on RA.    Discharge timeout done with nsg, CM and pt. All discharge needs and concerns addressed.

## 2024-01-20 NOTE — DISCHARGE SUMMARY
Name:  Graciela Dean  Room:  /0322-01  MRN:    [id number]    Discharge Summary      This discharge summary is in conjunction with a complete physical exam done on the day of discharge.      Discharging Physician: ALYSA LARA MD      Admit: 1/18/2024  Discharge:  1/20/2024     Diagnoses this Admission    Principal Problem:    Chest pain  Active Problems:    Primary hypertension    Chronic diastolic congestive heart failure (HCC)  Resolved Problems:    * No resolved hospital problems. *          Procedures (Please Review Full Report for Details)    ECHO  Limited study performed for LVEF and strain.   Left ventricle size is normal.   Sigmoid septum is present.   Left ventricular systolic function is normal with ejection fraction   estimated at 60-65 %.   No regional wall motion abnormalities are noted.   Ave peak global longitudinal strain is normal at -21.8%.   No pericardial effusion present.      Compared with the previous study 2/2023, LVEF appears similar. I believe   strain performed previously was inaccurate due to poor endocardial  Consults    IP CONSULT TO CARDIOLOGY      HPI:  Graciela Dean is a 73 y.o. female with a pmh of coronary artery disease, cardiac syndrome X, hypertension, hyperlipidemia, restless leg syndrome, hypothyroidism, B12 deficiency, sleep apnea treated with CPAP who presents with Chest pain     Patient last had heart catheterization in December 2020 but I cannot currently pull up that report.  Echocardiogram performed February 2023 and was unremarkable.     Patient states she does have frequent angina but was somewhat vague about the details.  It seems that the reason she presented to the hospital today was that it was not relieved by itself in a short time.  She called her brother at work who came home and brought her to the hospital.  She says she did not want to be bothersome by calling an ambulance.  Besides being persistent she says that it was associated with  nausea she describes it as \"stabbing or pressure or catching\".  It is increased with movement and deep inspiration.  She was offered nitroglycerin but declined secondary to it causing her headaches.  Maximal pain reported as 5/10 currently 3/10.  One-time dose of morphine ordered just now by myself.      Physical Exam at Discharge:  /76   Pulse (!) 48   Temp 97.3 °F (36.3 °C) (Oral)   Resp 19   Ht 1.626 m (5' 4\")   Wt 94 kg (207 lb 4.8 oz)   SpO2 98%   BMI 35.58 kg/m²       General:  Awake, alert and oriented. Appears to be not in any distress  Mucous Membranes:  Pink , anicteric  Neck: No JVD, no carotid bruit, no thyromegaly  Chest:  Clear to auscultation bilaterally, no added sounds  Cardiovascular:  RRR S1S2 heard, no murmurs or gallops  Abdomen:  Soft, undistended, non tender, no organomegaly, BS present  Extremities: No edema or cyanosis. Distal pulses well felt  Neurological : no focal deficits       Hospital Course      Chest pain  Plan:   Admit for observation  Telemetry  Consult cardiology  Treat with aspirin and statin  Morphine as needed for chest pain  ECHO normal.  Needs NM amyloid study and cardiac MRI as outpatient      Bradycardia  Hemodynamically stable  Not on any rate lowering medications that I can see  Check TSH-mildly elevated. T4 normal.     Positive D-dimer  Weight-based heparin as above  CTA PE- negative      Hypertension  Stable.  Continue diovan     Chronic diastolic CHF   Stable.  Continue lasix      CT CHEST PULMONARY EMBOLISM W CONTRAST   Final Result   No evidence of pulmonary embolism or acute pulmonary abnormality.         CT HEAD WO CONTRAST   Final Result   1. No acute intracranial hemorrhage.   2. Extensive chronic microvascular ischemic changes, stable.   3. Stable right frontal meningioma.   4. No significant interval change compared to October 25, 2023.         CTA HEAD NECK W CONTRAST   Final Result   No acute abnormality or flow-limiting stenosis of the major

## 2024-01-22 ENCOUNTER — CARE COORDINATION (OUTPATIENT)
Dept: CASE MANAGEMENT | Age: 74
End: 2024-01-22

## 2024-01-22 NOTE — CARE COORDINATION
Care Transitions Initial Follow Up Call    Call within 2 business days of discharge: Yes    Patient: Graciela Dean Patient : 1950   MRN: [id number]  Reason for Admission: 2 days obs -> chest pain, bradycardia, positive d-dimer, HTN, chronic dCHF, PRINCE on CPAP, RLS, hypothyroidsim -> home no services, f/up cardiology for outpt work up, f/up heme-onc for MGUS hx  Discharge Date: 24 RARS: No data recorded    Last Discharge Facility       Date Complaint Diagnosis Description Type Department Provider    24 Chest Pain Chest pain, unspecified type ED to Hosp-Admission (Discharged) (ADMITTED) [facility] Karuna Hudson MD; Jony Cavazos..     CTN attempted initial outreach to patient. Message left including CTN contact information.     Follow Up  Future Appointments   Date Time Provider Department Center   2024  1:15 PM Jo Ann Morley, PT [facility] PT [facility]   2024 10:30 AM Mojgan Strickland APRN - CNP KW UROGYN [facility]   2024  2:00 PM Jo Ann Morley, PT [facility] PT [facility]   2024 10:30 AM Ernesto Monsalve APRN - CNP [facility] CLER CAR [facility]   2024 10:45 AM Bradley Gonsalez MD AND ORTHO [facility]   2024 11:45 AM Jay Jay Meza MD AND NEURO Neurology -     Iris Brown RN  Care Transition Nurse  [phone] mobile

## 2024-01-23 ENCOUNTER — CARE COORDINATION (OUTPATIENT)
Dept: CASE MANAGEMENT | Age: 74
End: 2024-01-23

## 2024-01-23 NOTE — CARE COORDINATION
Care Transitions Initial Follow Up Call    Call within 2 business days of discharge: Yes    Patient: Graciela Dean Patient : 1950   MRN: [id number]  Reason for Admission: 2 days obs -> chest pain, bradycardia, positive d-dimer, HTN, chronic dCHF, PRINCE on CPAP, RLS, hypothyroidsim -> home no services, f/up cardiology for outpt work up, f/up heme-onc for MGUS hx  Discharge Date: 24 RARS: No data recorded    Last Discharge Facility       Date Complaint Diagnosis Description Type Department Provider    24 Chest Pain Chest pain, unspecified type ED to Hosp-Admission (Discharged) (ADMITTED) [facility] Karuna Hudson MD; Jony Cavazos..     CTN attempted follow-up outreach to patient. Message left including CTN contact information. No further CTN outreach scheduled at this time. Care transition program closed. Routing note to PCP office for assistance scheduling hospital follow up visit.     Follow Up  Future Appointments   Date Time Provider Department Center   2024  1:15 PM Jo Ann Morley, PT [facility] PT [facility]   2024 10:30 AM Mojgan Strickland APRN - CNP K[facility] UROGYN [facility]   2024  2:00 PM Jo Ann Morley, PT [facility] PT [facility]   2024 10:30 AM Ernesto Monsalve APRN - CNP [facility] CLER CAR [facility]   2024 10:45 AM Bradley Gonsalez MD AND ORTHO [facility]   2024 11:45 AM Jay Jay Meza MD AND NEURO Neurology -     Iris Brown RN  Care Transition Nurse  [phone] mobile

## 2024-01-24 ENCOUNTER — APPOINTMENT (OUTPATIENT)
Dept: PHYSICAL THERAPY | Age: 74
End: 2024-01-24
Payer: MEDICARE

## 2024-01-25 ENCOUNTER — TELEPHONE (OUTPATIENT)
Dept: FAMILY MEDICINE CLINIC | Age: 74
End: 2024-01-25

## 2024-01-26 DIAGNOSIS — G43.119 INTRACTABLE MIGRAINE WITH AURA WITHOUT STATUS MIGRAINOSUS: ICD-10-CM

## 2024-01-26 RX ORDER — FOLIC ACID 1 MG/1
1000 TABLET ORAL DAILY
Qty: 30 TABLET | Refills: 2 | Status: SHIPPED | OUTPATIENT
Start: 2024-01-26

## 2024-01-26 NOTE — TELEPHONE ENCOUNTER
LOV 5/31/2023    Future Appointments   Date Time Provider Department Center   1/29/2024 10:30 AM Mojgan Strickland, APRN - CNP K[facility] UROGYN [facility]   2/5/2024  2:00 PM Jo Ann Morley PT NATALIE PT [facility]   2/19/2024 10:30 AM Ernesto Monsalve APRN - CNP [facility]   2/27/2024 10:45 AM Bradley Gonsalez MD AND ORTHO [facility]   6/11/2024 11:45 AM Jay Jay Meza MD AND NEURO Neurology -

## 2024-01-29 ENCOUNTER — OFFICE VISIT (OUTPATIENT)
Dept: UROGYNECOLOGY | Age: 74
End: 2024-01-29
Payer: MEDICARE

## 2024-01-29 VITALS
DIASTOLIC BLOOD PRESSURE: 84 MMHG | RESPIRATION RATE: 16 BRPM | TEMPERATURE: 97.5 F | SYSTOLIC BLOOD PRESSURE: 133 MMHG | OXYGEN SATURATION: 98 % | HEART RATE: 55 BPM

## 2024-01-29 DIAGNOSIS — R35.0 URINARY FREQUENCY: ICD-10-CM

## 2024-01-29 DIAGNOSIS — R33.9 INCOMPLETE BLADDER EMPTYING: Primary | ICD-10-CM

## 2024-01-29 DIAGNOSIS — N39.8 VOIDING DYSFUNCTION: ICD-10-CM

## 2024-01-29 DIAGNOSIS — N30.01 ACUTE CYSTITIS WITH HEMATURIA: ICD-10-CM

## 2024-01-29 DIAGNOSIS — R39.15 URINARY URGENCY: ICD-10-CM

## 2024-01-29 LAB
BILIRUBIN, POC: NORMAL
BLOOD URINE, POC: NORMAL
CLARITY, POC: NORMAL
COLOR, POC: NORMAL
GLUCOSE URINE, POC: NORMAL
KETONES, POC: NORMAL
LEUKOCYTE EST, POC: NORMAL
NITRITE, POC: NORMAL
PH, POC: 8
PROTEIN, POC: NORMAL
SPECIFIC GRAVITY, POC: 1
UROBILINOGEN, POC: 0.2

## 2024-01-29 PROCEDURE — 81002 URINALYSIS NONAUTO W/O SCOPE: CPT | Performed by: NURSE PRACTITIONER

## 2024-01-29 PROCEDURE — 3075F SYST BP GE 130 - 139MM HG: CPT | Performed by: NURSE PRACTITIONER

## 2024-01-29 PROCEDURE — 3079F DIAST BP 80-89 MM HG: CPT | Performed by: NURSE PRACTITIONER

## 2024-01-29 PROCEDURE — 1123F ACP DISCUSS/DSCN MKR DOCD: CPT | Performed by: NURSE PRACTITIONER

## 2024-01-29 PROCEDURE — 99213 OFFICE O/P EST LOW 20 MIN: CPT | Performed by: NURSE PRACTITIONER

## 2024-01-29 RX ORDER — NITROFURANTOIN 25; 75 MG/1; MG/1
100 CAPSULE ORAL 2 TIMES DAILY
Qty: 14 CAPSULE | Refills: 0 | Status: SHIPPED | OUTPATIENT
Start: 2024-01-29 | End: 2024-02-05

## 2024-01-29 NOTE — PROGRESS NOTES
2024       HPI:     Name: Graciela Dean  YOB: 1950    CC: Patient is a 73 y.o. presenting for evaluation of  incomplete bladder emptying and repeat post void residual (PVR) check .       HPI:     How long have you had this problem? years  Please rate the severity of your problem: moderate  Anything make it better?   Patient has been going to PFPT and states this has been helpful for her urinary symptoms.     Presents today for evaluation of PVR but does feel she has a UTI.    Patient's most recent positive urine culture on 11/3/2023- treated with Macrobid.   Organism Enterococcus faecalis Abnormal    Urine Culture, Routine 4,000 CFU/ml   Organism Streptococcus viridans group Abnormal    Urine Culture, Routine 25,000 CFU/ml  Nutrionally deficient, no further workup       Ob/Gyn History:    OB History    Para Term  AB Living   2 2 2         SAB IAB Ectopic Molar Multiple Live Births                    # Outcome Date GA Lbr Pancho/2nd Weight Sex Delivery Anes PTL Lv   2 Term            1 Term               Obstetric Comments   Patient reports two vaginal deliveries, one miscarriage     Past Medical History:   Past Medical History:   Diagnosis Date    Acid reflux     small stomach ulcer    Angina pectoris, variant (HCC)     Anxiety     Arthritis     Asthma     Benign esophageal stricture 2016    Benign tumor 2017     Benign brain tumor size of a quater    Cancer (HCC)     ovarian    Depression     Diabetes (HCC)     Frequency     Headache     Hyperlipidemia     Hypertension     Hypothyroidism     Irritable bowel syndrome     Kidney stones     MI, old     Narcolepsy     Obesity     Restless legs syndrome     Sleep apnea     Thyroid disease     Urgency of urination     Urinary incontinence      Past Surgical History:   Past Surgical History:   Procedure Laterality Date    ARM SURGERY Right 2014    exc.trapezium and flexor carpi radialis interpositional arthroplasty

## 2024-01-30 DIAGNOSIS — I25.10 CORONARY ARTERY DISEASE INVOLVING NATIVE CORONARY ARTERY OF NATIVE HEART WITHOUT ANGINA PECTORIS: ICD-10-CM

## 2024-01-30 DIAGNOSIS — I10 ESSENTIAL HYPERTENSION: ICD-10-CM

## 2024-01-30 RX ORDER — VALSARTAN 160 MG/1
TABLET ORAL
Qty: 90 TABLET | Refills: 3 | Status: SHIPPED | OUTPATIENT
Start: 2024-01-30

## 2024-01-30 RX ORDER — TOPIRAMATE 25 MG/1
TABLET ORAL
Qty: 360 TABLET | Refills: 1 | Status: SHIPPED | OUTPATIENT
Start: 2024-01-30

## 2024-02-01 LAB
BACTERIA UR CULT: ABNORMAL
ORGANISM: ABNORMAL

## 2024-02-01 NOTE — RESULT ENCOUNTER NOTE
Advised patient of positive culture and need to complete her already prescribed treatment. She verbalized understanding. Denies any questions or concerns at this time.

## 2024-02-05 ENCOUNTER — HOSPITAL ENCOUNTER (OUTPATIENT)
Dept: PHYSICAL THERAPY | Age: 74
Setting detail: THERAPIES SERIES
Discharge: HOME | End: 2024-02-05
Payer: MEDICARE

## 2024-02-05 PROCEDURE — 97140 MANUAL THERAPY 1/> REGIONS: CPT

## 2024-02-05 PROCEDURE — 97530 THERAPEUTIC ACTIVITIES: CPT

## 2024-02-05 PROCEDURE — 97110 THERAPEUTIC EXERCISES: CPT

## 2024-02-05 NOTE — PROGRESS NOTES
[facility] - Outpatient Rehabilitation and Therapy, [id number], [address]  [place]  [address]  Phone: [phone]  Fax [phone]      Physical Therapy Daily Treatment and Progress Note    Date:  2024    Patient Name:  Graciela Dean    :  1950  MRN: [id number]    Restrictions/Precautions:  universal   Allergies: Latex and Vistaril [hydroxyzine hcl]   Preferred Language for Healthcare:   [x] English       [] other:    Medical Diagnosis Information:  Diagnosis: R33.9 (ICD-10-CM) - Incomplete bladder emptying  N39.8 (ICD-10-CM) - Voiding dysfunction  Treatment Diagnosis:pelvic floor weakness      Insurance/Certification information:  PT Insurance Information: BCBS Medicare  Physician Information:  Mojgan Strickland APRN - CNP  Plan of care signed (Y/N):  Y    Visit# / total visits:   /3 exp. 24 5 (23 to 24)      Functional Outcome (if applicable):          PFDI-20: 75/300 (was 127/300) (Lower score = Better function)     Medicare Cap (if applicable):  N/a= total amount used, updated 2024    Time in:   1:20pm      Timed Treatment: 40min Total Treatment Time:  40min  ________________________________________________________________________________________    Pain Level:    0/10  SUBJECTIVE: Patient reports she went to see urogynecology last week.  Reports \"I don't know, I was told I have to go back and have 2 different tests done\".  Reports \"I had another UTI, they said it is from holding urine\".  Reports compliance with HEP.      OBJECTIVE:     Exercise (TE) Resistance/Repetitions Other comments            PF strengthening        Short hold: (1sec) 10 times       Long hold: (5sec) 10 times      PF + Hip ADD  x10 W/ ball squeeze     PF + Hip ABD x10 W/ orange tband   PF sit to stand x10    PF squat x10    Side-lying TA TA only  TA + PF  March: x10B Unable to achieve in hook-lying          PF relaxation Belly breathing:x10         Hip ADD

## 2024-02-05 NOTE — THERAPY DISCHARGE
[facility] - Outpatient Rehabilitation and Therapy, [address], [address]  [place]  [address]  Phone: [phone]  Fax [phone]        Physical Therapy: Discharge Note   Patient: Graciela Dean (73 y.o. female)   Date: 2024   :  1950 MRN: [id number]   Visit #: 5   Auth needed?    [x]  Yes    []  No   Amount authorized: 6  Visit Limit:  Insurance: Payor: BCBS MEDICARE / Plan: ANTHEM MEDIBLUE ESSENTIAL/PLUS / Product Type: *No Product type* /   Insurance ID: [id number] - (Medicare Managed)  Secondary Insurance (if applicable): MEDICAID [place]   Treatment Diagnosis:  pelvic floor weakness        Medical Diagnosis:   R33.9 (ICD-10-CM) - Incomplete bladder emptying N39.8 (ICD-10-CM) - Voiding dysfunction    Referring Physician: Mojgan Strickland APRN*    PCP: Brian Mercado DO     Surgical status:  Conservative  Number of Comorbidities: 3+  Duration:   Reporting Period: Beginning Date: 23 End Date: 2024  Visits: 5  Weeks: 14    Discharge Demographics:  Specialty/SubSpecialty: Medical Specialty: Women's Health: Urine Retention  Certs/Equipment/Programs: Women's Health    Objective:   Test used Initial score Discharge Score   Pain Summary      Functional questionnaire PFDI-20 42% 5%   Functional Testing            ROM            Strength                   Treatment to date:  Interventions Modalities    [x] Therapeutic Exercise  [] Electrical Stimulation    [x] Therapeutic Activity  [] VASO    [x] Neuromusc. Re-ed  [] Estim Unattended    [x] Manual Therapy  [] Mechanical Traction    [] Gait Training  [] Ultrasound    [] Dry Needling  [] Iontophoresis    [x] HEP Instruction  []      Assessment:  [] All Goals were achieved.  [] The following goals were achieved (#'s):  [] The following goals were not achieved for the following reasons:/assessment of improvement as it relates to each goal:    Plan of Care: Discharge from Physical Therapy    Reason for

## 2024-02-06 ENCOUNTER — TELEPHONE (OUTPATIENT)
Dept: UROGYNECOLOGY | Age: 74
End: 2024-02-06

## 2024-02-06 DIAGNOSIS — R35.0 URINARY FREQUENCY: ICD-10-CM

## 2024-02-06 DIAGNOSIS — R39.15 URINARY URGENCY: Primary | ICD-10-CM

## 2024-02-06 NOTE — TELEPHONE ENCOUNTER
Pt states She will drop off urine specimen tomorrow. She believes she has a UTI still because her urine is dark. Instructed her to drink plenty of water and try cranberry supplements. Patient verbalizes understanding of all of the above, and has no further questions or concerns at this time. Encouraged to call back the office should any questions/concerns arise. 2/6/2024 at 11:28 AM.

## 2024-02-06 NOTE — TELEPHONE ENCOUNTER
Called patient to discuss symptoms. LM requesting call back.     Outpatient urine culture orders placed per last office note by Mojgan Strickland NP. 2/6/2024 at 10:59 AM

## 2024-02-07 ENCOUNTER — APPOINTMENT (OUTPATIENT)
Dept: PHYSICAL THERAPY | Age: 74
End: 2024-02-07
Payer: MEDICARE

## 2024-02-07 ENCOUNTER — OFFICE VISIT (OUTPATIENT)
Dept: FAMILY MEDICINE CLINIC | Age: 74
End: 2024-02-07

## 2024-02-07 VITALS
HEART RATE: 52 BPM | RESPIRATION RATE: 16 BRPM | BODY MASS INDEX: 33.8 KG/M2 | HEIGHT: 64 IN | WEIGHT: 198 LBS | SYSTOLIC BLOOD PRESSURE: 133 MMHG | OXYGEN SATURATION: 98 % | DIASTOLIC BLOOD PRESSURE: 82 MMHG | TEMPERATURE: 97.1 F

## 2024-02-07 DIAGNOSIS — I25.10 CORONARY ARTERY DISEASE INVOLVING NATIVE CORONARY ARTERY OF NATIVE HEART WITHOUT ANGINA PECTORIS: ICD-10-CM

## 2024-02-07 DIAGNOSIS — N39.0 URINARY TRACT INFECTION WITHOUT HEMATURIA, SITE UNSPECIFIED: ICD-10-CM

## 2024-02-07 DIAGNOSIS — Z09 HOSPITAL DISCHARGE FOLLOW-UP: Primary | ICD-10-CM

## 2024-02-07 DIAGNOSIS — I10 ESSENTIAL HYPERTENSION: ICD-10-CM

## 2024-02-07 DIAGNOSIS — R39.15 URINARY URGENCY: ICD-10-CM

## 2024-02-07 DIAGNOSIS — R79.89 ELEVATED TSH: ICD-10-CM

## 2024-02-07 LAB
BILIRUBIN, POC: NEGATIVE
BLOOD URINE, POC: ABNORMAL
CLARITY, POC: ABNORMAL
COLOR, POC: YELLOW
GLUCOSE URINE, POC: NEGATIVE
KETONES, POC: NEGATIVE
LEUKOCYTE EST, POC: ABNORMAL
NITRITE, POC: POSITIVE
PH, POC: 7
PROTEIN, POC: ABNORMAL
SPECIFIC GRAVITY, POC: 1.02
UROBILINOGEN, POC: 1

## 2024-02-07 NOTE — PROGRESS NOTES
Post-Discharge Transitional Care  Follow Up      Graciela Dean   YOB: 1950    Date of Office Visit:  2/7/2024  Date of Hospital Admission: 1/18/24  Date of Hospital Discharge: 1/20/24  Risk of hospital readmission (high >=14%. Medium >=10%) :No data recorded    Care management risk score Rising risk (score 2-5) and Complex Care (Scores >=6): No Risk Score On File     Non face to face  following discharge, date last encounter closed (first attempt may have been earlier): *No documented post hospital discharge outreach found in the last 14 days    Call initiated 2 business days of discharge: *No response recorded in the last 14 days    ASSESSMENT/PLAN:   Hospital discharge follow-up  -     DC DISCHARGE MEDS RECONCILED W/ CURRENT OUTPATIENT MED LIST     Severe obesity (BMI 35.0-39.9) with comorbidity (HCC  - Encourage healthy diet and exercise    Coronary artery disease involving native coronary artery of native heart without angina pectoris  ECHOLeft ventricular systolic function is normal with ejection fraction estimated at 60-65 %.   - Follow up for nuclear medicine metabolic study and cardiac MRI outpatient  Essential Hypertension.   Controlled, On valsartan 160 mg , imdur 30 mg daily.  Lasix 40 mg daily , Crestor 5 mg daily   Elevated TSH  TSH 8.65 1/19/24 was mildly elevated, T4 normal . Taking Synthroid 100 mcg      Urinary tract infection without hematuria, site unspecified  -     Culture, Urine  Urinary urgency  -     Culture, Urine  -     POCT Urinalysis no Micro      Medical Decision Making: moderate complexity  Return in 1 month (on 3/7/2024).           Subjective:   HPI:  Follow up of Hospital problems/diagnosis(es):   Chest pain  Active Problems:    Primary hypertension    Chronic diastolic congestive heart failure (HCC)  Inpatient course: Discharge summary reviewed- see chart.    Interval history/Current status:  Patient is a 73-year-old female with coronary artery disease

## 2024-02-08 LAB — BACTERIA UR CULT: NORMAL

## 2024-02-13 ENCOUNTER — OFFICE VISIT (OUTPATIENT)
Dept: FAMILY MEDICINE CLINIC | Age: 74
End: 2024-02-13
Payer: MEDICARE

## 2024-02-13 VITALS
DIASTOLIC BLOOD PRESSURE: 70 MMHG | BODY MASS INDEX: 33.47 KG/M2 | HEART RATE: 85 BPM | OXYGEN SATURATION: 98 % | RESPIRATION RATE: 16 BRPM | TEMPERATURE: 97.1 F | WEIGHT: 195 LBS | SYSTOLIC BLOOD PRESSURE: 120 MMHG

## 2024-02-13 DIAGNOSIS — E03.9 HYPOTHYROIDISM, UNSPECIFIED TYPE: Primary | ICD-10-CM

## 2024-02-13 DIAGNOSIS — I10 HTN (HYPERTENSION), BENIGN: ICD-10-CM

## 2024-02-13 DIAGNOSIS — G47.419 PRIMARY NARCOLEPSY WITHOUT CATAPLEXY: ICD-10-CM

## 2024-02-13 DIAGNOSIS — F33.0 MILD EPISODE OF RECURRENT MAJOR DEPRESSIVE DISORDER (HCC): ICD-10-CM

## 2024-02-13 DIAGNOSIS — D33.0 BENIGN NEOPLASM OF SUPRATENTORIAL REGION OF BRAIN (HCC): ICD-10-CM

## 2024-02-13 DIAGNOSIS — M46.90 INFLAMMATORY SPONDYLOPATHY, UNSPECIFIED SPINAL REGION (HCC): ICD-10-CM

## 2024-02-13 DIAGNOSIS — D69.6 THROMBOCYTOPENIA, UNSPECIFIED (HCC): ICD-10-CM

## 2024-02-13 PROCEDURE — 99214 OFFICE O/P EST MOD 30 MIN: CPT | Performed by: FAMILY MEDICINE

## 2024-02-13 PROCEDURE — 1123F ACP DISCUSS/DSCN MKR DOCD: CPT | Performed by: FAMILY MEDICINE

## 2024-02-13 PROCEDURE — 3074F SYST BP LT 130 MM HG: CPT | Performed by: FAMILY MEDICINE

## 2024-02-13 PROCEDURE — 3078F DIAST BP <80 MM HG: CPT | Performed by: FAMILY MEDICINE

## 2024-02-13 RX ORDER — LEVOTHYROXINE SODIUM 112 UG/1
112 TABLET ORAL DAILY
Qty: 90 TABLET | Refills: 2 | Status: SHIPPED | OUTPATIENT
Start: 2024-02-13

## 2024-02-13 NOTE — PROGRESS NOTES
Subjective:      Patient ID: Graciela Dean is a 73 y.o. y.o. female.  Fatigue and low energy    Seems to fall asleep if just sits down    Questions neuvigil dose ?    Thyoid lad-  TSH / T4 not at goal      HPI      Chief Complaint   Patient presents with    Other     CHRONIC CARE GAP UPDATE (4)    Medication Check       Allergies   Allergen Reactions    Latex Rash    Vistaril [Hydroxyzine Hcl]        Past Medical History:   Diagnosis Date    Acid reflux     small stomach ulcer    Angina pectoris, variant (HCC)     Anxiety     Arthritis     Asthma     Benign esophageal stricture 12/2016    Benign tumor 03/20/2017     Benign brain tumor size of a quater    Cancer (HCC)     ovarian    Depression     Diabetes (HCC)     Frequency     Headache     Hyperlipidemia     Hypertension     Hypothyroidism     Irritable bowel syndrome     Kidney stones     MI, old     Narcolepsy     Obesity     Restless legs syndrome     Sleep apnea     Thyroid disease     Urgency of urination     Urinary incontinence        Past Surgical History:   Procedure Laterality Date    ARM SURGERY Right 08/19/2014    exc.trapezium and flexor carpi radialis interpositional arthroplasty    BREAST REDUCTION SURGERY      BREAST SURGERY      reduction    CARPAL TUNNEL RELEASE Right     CHOLECYSTECTOMY      COLONOSCOPY      COSMETIC SURGERY  3/6/2022    Had surgery on left side of nose to cover up hole using skin from eyelid from right eye.    DENTAL SURGERY      ENDOSCOPY, COLON, DIAGNOSTIC  01/06/2017    Anastomotic ulcer, gastritis    ESOPHAGEAL DILATATION  12/2016    GASTRIC BYPASS SURGERY      GASTRIC BYPASS SURGERY      HERNIA REPAIR      Had a hiatal hernia removed when they done my gastric  bipass.    HYSTERECTOMY (CERVIX STATUS UNKNOWN)      HYSTERECTOMY, VAGINAL  12/26/1990    Had ovarian cancer and then had the surgery.    LITHOTRIPSY Left 11/12/2013    LITHOTRIPSY Left 12/26/2013    LEFT ESWL    MOHS SURGERY  03/07/2022    Left cheek    OVARY

## 2024-02-14 NOTE — PROGRESS NOTES
endoscopy (03/03/2017); pr njx dx/ther sbst intrlmnr crv/thrc w/img gdn (Right, 08/27/2018); Percutaneous Transluminal Coronary Angio; Mohs surgery (03/07/2022); Breast reduction surgery; Ovary removal; Cosmetic surgery (3/6/2022); Tubal ligation (1988, ,,1990/12/26); hernia repair; and Hysterectomy, vaginal (12/26/1990).     Home Medications:    Prior to Admission medications    Medication Sig Start Date End Date Taking? Authorizing Provider   levothyroxine (SYNTHROID) 112 MCG tablet Take 1 tablet by mouth daily For thyroid 2/13/24   Brian Mercado DO   topiramate (TOPAMAX) 25 MG tablet Take 2 tablets by mouth twice daily 1/30/24   Jay Jay Meza MD   valsartan (DIOVAN) 160 MG tablet Take one tablet daily 1/30/24   Saravanan Garcia DO   folic acid (FOLVITE) 1 MG tablet Take 1 tablet by mouth once daily 1/26/24   Brian Mercado DO   escitalopram (LEXAPRO) 20 MG tablet Take 1 tablet by mouth once daily 1/17/24   Brian Mercado DO   pantoprazole (PROTONIX) 40 MG tablet Take 1 tablet by mouth twice daily 12/26/23   Marge Nunez, APRN - CNP   sucralfate (CARAFATE) 1 GM tablet Take 1 tablet by mouth 4 times daily 11/10/23   Brian Mercado DO   furosemide (LASIX) 40 MG tablet Take 1 tablet by mouth daily Take one tablet daily 10/16/23   Ferdinand Navarro MD   isosorbide mononitrate (IMDUR) 30 MG extended release tablet Take 1 tablet by mouth once daily 10/16/23   Ferdinand Navarro MD   rosuvastatin (CRESTOR) 5 MG tablet Take 1 tablet by mouth once daily 10/16/23   Ferdinand Navarro MD   L-Arginine 500 MG TABS Take 1,000 mg by mouth 2 times daily 12/9/20   Kleber King MD   aspirin 81 MG tablet Take 1 tablet by mouth daily    Provider, MD Elliott     Allergies:  Latex and Vistaril [hydroxyzine hcl]    Social History:   reports that she has never smoked. She has been exposed to tobacco smoke. She has never used smokeless tobacco. She reports that she does not drink alcohol and does not use drugs.

## 2024-02-15 ENCOUNTER — OFFICE VISIT (OUTPATIENT)
Dept: CARDIOLOGY CLINIC | Age: 74
End: 2024-02-15
Payer: MEDICARE

## 2024-02-15 VITALS
BODY MASS INDEX: 33.73 KG/M2 | SYSTOLIC BLOOD PRESSURE: 122 MMHG | OXYGEN SATURATION: 98 % | HEART RATE: 71 BPM | DIASTOLIC BLOOD PRESSURE: 72 MMHG | WEIGHT: 197.6 LBS | HEIGHT: 64 IN

## 2024-02-15 DIAGNOSIS — I10 ESSENTIAL HYPERTENSION: ICD-10-CM

## 2024-02-15 DIAGNOSIS — I25.10 CORONARY ARTERY DISEASE INVOLVING NATIVE CORONARY ARTERY OF NATIVE HEART WITHOUT ANGINA PECTORIS: Primary | ICD-10-CM

## 2024-02-15 PROCEDURE — 3078F DIAST BP <80 MM HG: CPT | Performed by: NURSE PRACTITIONER

## 2024-02-15 PROCEDURE — 1123F ACP DISCUSS/DSCN MKR DOCD: CPT | Performed by: NURSE PRACTITIONER

## 2024-02-15 PROCEDURE — 99214 OFFICE O/P EST MOD 30 MIN: CPT | Performed by: NURSE PRACTITIONER

## 2024-02-15 PROCEDURE — 3074F SYST BP LT 130 MM HG: CPT | Performed by: NURSE PRACTITIONER

## 2024-02-15 NOTE — PATIENT INSTRUCTIONS
Schedule NM amyloid scan and cardiac MRI, call [phone]  Continue current medications  Follow up with Dr. Navarro in 2-3 months

## 2024-02-17 PROBLEM — D69.6 THROMBOCYTOPENIA, UNSPECIFIED (HCC): Status: ACTIVE | Noted: 2024-02-17

## 2024-02-27 ENCOUNTER — OFFICE VISIT (OUTPATIENT)
Dept: ORTHOPEDIC SURGERY | Age: 74
End: 2024-02-27
Payer: MEDICARE

## 2024-02-27 DIAGNOSIS — S46.002A INJURY OF LEFT ROTATOR CUFF, INITIAL ENCOUNTER: ICD-10-CM

## 2024-02-27 DIAGNOSIS — S92.352A DISPLACED FRACTURE OF FIFTH METATARSAL BONE, LEFT FOOT, INITIAL ENCOUNTER FOR CLOSED FRACTURE: Primary | ICD-10-CM

## 2024-02-27 PROCEDURE — 1123F ACP DISCUSS/DSCN MKR DOCD: CPT | Performed by: ORTHOPAEDIC SURGERY

## 2024-02-27 PROCEDURE — 99213 OFFICE O/P EST LOW 20 MIN: CPT | Performed by: ORTHOPAEDIC SURGERY

## 2024-02-27 NOTE — PROGRESS NOTES
CHIEF COMPLAINT:   1- Left foot pain/ 5th MT proximal shaft minimally displaced fracture.  2- Left shoulder pain/ shoulder RTC contusion.    DATE OF INJURY:  10/25/2023    HISTORY:  Ms. Dean 73 y.o.   female  presents today for f/u evaluation of a left foot and shoulder injury which occurred when she fell off a chair while painting her house.   She is complaining of lateral foot and shoulder pain and swelling. Rates pain a 0/10 foot and 0/10 shoulder VAS.  This is better with elevation and worse with bearing wt and ROM left  shoulder.  The pain is achy and not radiating. No other complaint. She was seen 1st at [facility], where she was x-rayed and splinted and asked to f/u with orthopaedics.    Past Medical History:   Diagnosis Date    Acid reflux     small stomach ulcer    Angina pectoris, variant (HCC)     Anxiety     Arthritis     Asthma     Benign esophageal stricture 12/2016    Benign tumor 03/20/2017     Benign brain tumor size of a quater    Cancer (HCC)     ovarian    Depression     Diabetes (HCC)     Frequency     Headache     Hyperlipidemia     Hypertension     Hypothyroidism     Irritable bowel syndrome     Kidney stones     MI, old     Narcolepsy     Obesity     Restless legs syndrome     Sleep apnea     Thyroid disease     Urgency of urination     Urinary incontinence        Past Surgical History:   Procedure Laterality Date    ARM SURGERY Right 08/19/2014    exc.trapezium and flexor carpi radialis interpositional arthroplasty    BREAST REDUCTION SURGERY      BREAST SURGERY      reduction    CARPAL TUNNEL RELEASE Right     CHOLECYSTECTOMY      COLONOSCOPY      COSMETIC SURGERY  3/6/2022    Had surgery on left side of nose to cover up hole using skin from eyelid from right eye.    DENTAL SURGERY      ENDOSCOPY, COLON, DIAGNOSTIC  01/06/2017    Anastomotic ulcer, gastritis    ESOPHAGEAL DILATATION  12/2016    GASTRIC BYPASS SURGERY      GASTRIC BYPASS SURGERY      HERNIA REPAIR      Had a hiatal

## 2024-03-08 ENCOUNTER — TELEPHONE (OUTPATIENT)
Dept: FAMILY MEDICINE CLINIC | Age: 74
End: 2024-03-08

## 2024-03-08 DIAGNOSIS — G47.419 PRIMARY NARCOLEPSY WITHOUT CATAPLEXY: ICD-10-CM

## 2024-03-08 RX ORDER — ARMODAFINIL 150 MG/1
150 TABLET ORAL DAILY
Qty: 90 TABLET | Refills: 0 | Status: SHIPPED | OUTPATIENT
Start: 2024-03-08 | End: 2024-06-06

## 2024-03-08 RX ORDER — ESCITALOPRAM OXALATE 20 MG/1
20 TABLET ORAL DAILY
Qty: 90 TABLET | Refills: 1 | Status: SHIPPED | OUTPATIENT
Start: 2024-03-08

## 2024-03-08 NOTE — TELEPHONE ENCOUNTER
Patient needs a refill on Lexapro and Nuvigil. They need a 90 day supply.     Mail order or local pharmacy: local    Pharmacy: [facility]    Last OV: 2/13/24    Future Appointments   Date Time Provider Department Center   3/18/2024 10:30 AM Lincoln Lamar MD [facility] UROGYN [facility]   4/16/2024 10:15 AM Bradley Gonsalez MD AND ORTHO [facility]   5/7/2024 10:30 AM [facility] MRI RM 2 [facility] MRI [facility]   5/7/2024 12:00 PM [facility] NM INJ RM 1 [facility] NUC MED [facility]   5/7/2024  1:00 PM [facility] NM RM 1 [facility] NUC MED [facility]   5/10/2024 10:15 AM Ferdinand Nvaarro MD P CLER CAR [facility]   5/14/2024 10:40 AM Brian Mercado DO [facility]   5/16/2024 11:30 AM Lincoln Lamar MD K[facility] UROG[facility]   6/11/2024 11:45 AM Jay Jay Meza MD AND NEURO Neurology -

## 2024-03-08 NOTE — TELEPHONE ENCOUNTER
2/13/2024    Future Appointments   Date Time Provider Department Center   3/18/2024 10:30 AM Lincoln Lamar MD KW UROGYN [facility]   4/16/2024 10:15 AM Bradley Gonsalez MD AND ORTHO [facility]   5/7/2024 10:30 AM [facility] MRI RM 2 [facility] MRI [facility]   5/7/2024 12:00 PM TJ NM INJ RM 1 [facility] NUC MED [facility]   5/7/2024  1:00 PM [facility] NM RM 1 [facility] NUC MED [facility]   5/10/2024 10:15 AM Ferdinand Navarro MD P [facility] CAR [facility]   5/14/2024 10:40 AM Brian Mercado DO [facility]   5/16/2024 11:30 AM Lincoln Lamar MD KW UROGYN [facility]   6/11/2024 11:45 AM Jay Jay Meza MD AND NEURO Neurology -

## 2024-03-18 ENCOUNTER — TELEPHONE (OUTPATIENT)
Dept: NEUROLOGY | Age: 74
End: 2024-03-18

## 2024-03-19 RX ORDER — SUCRALFATE 1 G/1
1 TABLET ORAL 4 TIMES DAILY
Qty: 120 TABLET | Refills: 2 | Status: SHIPPED | OUTPATIENT
Start: 2024-03-19

## 2024-03-19 NOTE — TELEPHONE ENCOUNTER
2/13/2024    Future Appointments   Date Time Provider Department Center   4/4/2024  1:00 PM Lincoln Lamar MD KW UROGYN [facility]   4/16/2024 10:15 AM Bradley Gonsalez MD AND ORTHO [facility]   5/7/2024 10:30 AM [facility] MRI RM 2 [facility] MRI [facility]   5/7/2024 12:00 PM TJ NM INJ RM 1 [facility] NUC MED [facility]   5/7/2024  1:00 PM [facility] NM RM 1 [facility] NUC MED [facility]   5/10/2024 10:15 AM Ferdinand Navarro MD P CLER CAR [facility]   5/14/2024 10:40 AM Brian Mercado DO [facility]   5/16/2024 11:30 AM Lincoln Lamar MD KW UROGYN [facility]   6/11/2024 11:45 AM Jay Jay Meza MD AND NEURO Neurology -

## 2024-03-20 NOTE — ONCOLOGY
"Anesthesia Transfer of Care Note    Patient: Darya Low    Procedure(s) Performed: Procedure(s) (LRB):  PINNING, HIP, PERCUTANEOUS (Right)    Patient location: ICU    Anesthesia Type: general    Transport from OR: Transported from OR on 2-3 L/min O2 by NC with adequate spontaneous ventilation. Continuous ECG monitoring in transport. Continuous SpO2 monitoring in transport    Post pain: adequate analgesia    Post assessment: no apparent anesthetic complications and tolerated procedure well    Post vital signs: stable    Level of consciousness: alert, responds to stimulation and awake    Nausea/Vomiting: no nausea/vomiting    Complications: none    Transfer of care protocol was followed      Last vitals: Visit Vitals  BP (!) 129/49   Pulse 82   Temp 37 °C (98.6 °F) (Oral)   Resp 10   Ht 5' 3" (1.6 m)   Wt 43.6 kg (96 lb 1.9 oz)   LMP  (LMP Unknown)   SpO2 95%   Breastfeeding No   BMI 17.03 kg/m²     " Radiation Oncology [facility]    ICON Fractionated SRS Treatment Note. Mesha Parsons  05/28/19       Patient presents for her 1st of 5 treatments on the gamma knife machine for her right frontal convexity meningioma. She was taken to the gamma knife machine, and her mask was fitted to the machine. Patient confirmation and identification was confirmed using 2 means and a timeout procedure was also done prior to the procedure.     Cone beam CT scan was performed. These images were reviewed in detailed and fused upon her stereotactic reference images. Isodose lines were also reviewed. These were then accepted by myself and our physicist.     Fraction Number: 1  Dose delivered: 5 Gy  Total dose delivered: 5 Gy  Planned total dose:  25 Gy     The patient acutely tolerated the procedure well. Mask was removed. She will follow-up when the next appointment has been scheduled. I was present throughout the procedure.     Oscar Baron MD

## 2024-03-21 ENCOUNTER — ENROLLMENT (OUTPATIENT)
Dept: PHARMACY | Facility: CLINIC | Age: 74
End: 2024-03-21

## 2024-03-25 ENCOUNTER — TELEPHONE (OUTPATIENT)
Dept: PHARMACY | Facility: CLINIC | Age: 74
End: 2024-03-25

## 2024-03-25 NOTE — TELEPHONE ENCOUNTER
Brian Mercado, DO, please see below - would patient benefit from DEXA due to recent fracture?  Fracture of fifth metatarsal in October, s/p mechanical fall  Last DEXA 2017- osteoporosis  May also consider vitamin D level     If appropriate, please order and have your staff notify patient.    Thank you,  Ashley Espana, PharmD, [facility]  Population Health Pharmacist  [facility] Clinical [facility]  Department, toll free: [phone], option 1    ==================================================================  POPULATION HEALTH CLINICAL PHARMACY REVIEW: RECENT FRACTURE    Graciela Dean is a 73 y.o. old White (non-) female patient who recently had a fracture of fifth metatarsal (10/25/23 ED visit; s/p mechanical fall)    No results found for: \"VITD25\"     Lab Results   Component Value Date    CALCIUM 8.8 01/20/2024    PHOS 3.5 04/06/2017     estimated creatinine clearance is 55 mL/min (based on SCr of 1 mg/dL).    DEXA 9/11/17:  Osteoporosis    FRAX-calculated 10-year fracture probability:   Per WHO calculator: major Osteoporotic = 18% and Hip = 3.9%    Assessment:   - 73 y.o. female with recent fracture and may benefit from DEXA to assess current BMD  Last DEXA 2017- osteoporosis  - If targeting vitamin D  ng/ml: Unable to assess as no level noted.     Considerations:  - Suggest obtaining DEXA and Consider getting vitamin D level

## 2024-04-01 ENCOUNTER — TELEPHONE (OUTPATIENT)
Dept: NEUROLOGY | Age: 74
End: 2024-04-01

## 2024-04-01 RX ORDER — PANTOPRAZOLE SODIUM 40 MG/1
TABLET, DELAYED RELEASE ORAL
Qty: 180 TABLET | Refills: 1 | Status: SHIPPED | OUTPATIENT
Start: 2024-04-01

## 2024-04-01 NOTE — TELEPHONE ENCOUNTER
2/13/2024    Future Appointments   Date Time Provider Department Center   4/4/2024  1:00 PM Lincoln Lamar MD KW UROGYN [facility]   4/16/2024 10:15 AM Bradley Gonsalez MD AND ORTHO [facility]   5/7/2024 10:30 AM [facility] MRI RM 2 [facility] MRI [facility]   5/7/2024 12:00 PM TJ NM INJ RM 1 [facility] NUC MED [facility]   5/7/2024  1:00 PM [facility] NM RM 1 [facility] NUC MED [facility]   5/10/2024 10:15 AM Ferdinand Navarro MD P CLER CAR [facility]   5/14/2024 10:40 AM Brian Mercado DO [facility] [place] [facility]   5/16/2024 11:30 AM Lincoln Lamar MD KW UROGYN [facility]   6/11/2024 11:45 AM Jay Jay Meza MD AND NEURO Neurology -

## 2024-04-01 NOTE — TELEPHONE ENCOUNTER
Patient called today to request sooner appointment due to her having headaches. Writer informed pt that there are no openings, will watch for cancellations. Pt can't go to the FF office    Patient report having headaches for the last two weeks,  she states that they are severe and that it feels like someone is hitting her with a sledge hammer at the temple of left side. She can touch her left eyelid a shooting pain that stays around the eyeball. Some light sensitivity    Writer confirmed pt is taking Topamax 25 mg and is taking 2 tablets bu mouth twice daily    Please advise.

## 2024-04-04 ENCOUNTER — PROCEDURE VISIT (OUTPATIENT)
Dept: UROGYNECOLOGY | Age: 74
End: 2024-04-04
Payer: MEDICARE

## 2024-04-04 VITALS
OXYGEN SATURATION: 98 % | TEMPERATURE: 97.5 F | DIASTOLIC BLOOD PRESSURE: 84 MMHG | SYSTOLIC BLOOD PRESSURE: 137 MMHG | HEART RATE: 52 BPM | RESPIRATION RATE: 16 BRPM

## 2024-04-04 DIAGNOSIS — R35.0 URINARY FREQUENCY: ICD-10-CM

## 2024-04-04 DIAGNOSIS — S92.909S CLOSED FRACTURE OF FOOT, UNSPECIFIED LATERALITY, SEQUELA: ICD-10-CM

## 2024-04-04 DIAGNOSIS — Z78.0 MENOPAUSE: Primary | ICD-10-CM

## 2024-04-04 DIAGNOSIS — R33.9 INCOMPLETE BLADDER EMPTYING: ICD-10-CM

## 2024-04-04 DIAGNOSIS — R39.15 URINARY URGENCY: Primary | ICD-10-CM

## 2024-04-04 LAB
BILIRUBIN, POC: NORMAL
BLOOD URINE, POC: NORMAL
CLARITY, POC: NORMAL
COLOR, POC: NORMAL
GLUCOSE URINE, POC: NORMAL
KETONES, POC: NORMAL
LEUKOCYTE EST, POC: NORMAL
NITRITE, POC: NORMAL
PH, POC: 6.5
PROTEIN, POC: NORMAL
SPECIFIC GRAVITY, POC: 1.01
UROBILINOGEN, POC: NORMAL

## 2024-04-04 PROCEDURE — 81002 URINALYSIS NONAUTO W/O SCOPE: CPT | Performed by: OBSTETRICS & GYNECOLOGY

## 2024-04-04 PROCEDURE — 3079F DIAST BP 80-89 MM HG: CPT | Performed by: OBSTETRICS & GYNECOLOGY

## 2024-04-04 PROCEDURE — 99212 OFFICE O/P EST SF 10 MIN: CPT | Performed by: OBSTETRICS & GYNECOLOGY

## 2024-04-04 PROCEDURE — 3075F SYST BP GE 130 - 139MM HG: CPT | Performed by: OBSTETRICS & GYNECOLOGY

## 2024-04-04 PROCEDURE — 1123F ACP DISCUSS/DSCN MKR DOCD: CPT | Performed by: OBSTETRICS & GYNECOLOGY

## 2024-04-04 RX ORDER — NITROFURANTOIN 25; 75 MG/1; MG/1
100 CAPSULE ORAL 2 TIMES DAILY
Qty: 14 CAPSULE | Refills: 0 | Status: SHIPPED | OUTPATIENT
Start: 2024-04-04 | End: 2024-04-11

## 2024-04-04 NOTE — PROGRESS NOTES
Urodynamic Procedure Note    Graciela VALDEZ Jermaine  1950    Urodynamicist: Dr. Lamar    Equipment: Independent Space    Brief History/Indication:    Patient is a 73 y.o. female with subjective complaints of  incomplete emptying  for a urodynamic evaluation. Patient reports she has been to PFPT.    Cystometrogram     ECST: neg  1st: 200ml  Urge: 230ml  Max: 250ml  Spasm: no  FCST: neg     Pelvic Organ Prolapse Quantification  Anterior Wall (Aa): -3   Anterior Wall (Ba): -3   Cervix or Cuff (C): -8     Genital Hiatus (gh): 2   Perineal Body (pb): -2   Total Vaginal Length (tvl): 8     Posterior Wall (Ap): -2   Posterior Wall (Bp): -2   No data recorded           Procedure:  Unable to perform procedure as patient has a UTI. Patient's urine is foul and malodorous. Urine culture will be sent. Patient reports burning with urination a few days ago.    Findings:   Results for POC orders placed in visit on 04/04/24   POCT Urinalysis no Micro   Result Value Ref Range    Color, UA renu     Clarity, UA cloudy     Glucose, UA POC neg     Bilirubin, UA neg     Ketones, UA neg     Spec Grav, UA 1.015     Blood, UA POC pos     pH, UA 6.5     Protein, UA POC neg     Urobilinogen, UA neg     Leukocytes, UA pos     Nitrite, UA pos        Allergies and previous cultures reviewed  Patient to begin Macrobid today while culture pending as she is experiencing some burning  Macrobid 100mg po BID for 7 days sent to pharmacy  Mojgan Strickland, APRN - CNP

## 2024-04-04 NOTE — TELEPHONE ENCOUNTER
Call please call the patient and tell her that I would like her to get a bone density scan to see if she has osteoporosis and to what extent if any.  Osteoporosis will increase her risk of fractures and if present will need treated.  She had a scan 7 years ago which was suggestive but we should update the scan and then recommend therapy or not depending on the results.  I will put the order in.  Please ask her to schedule the scan.

## 2024-04-05 LAB — BACTERIA UR CULT: NORMAL

## 2024-04-15 ENCOUNTER — TELEPHONE (OUTPATIENT)
Dept: ORTHOPEDIC SURGERY | Age: 74
End: 2024-04-15

## 2024-04-15 NOTE — TELEPHONE ENCOUNTER
JUNIOR stating that Dr. Gonsalez will be out of the office and will need to reschedule her appointment on 5/28/24.   Upon return call, please reschedule for 5/21/24 or 6/4/24 at [place].

## 2024-04-18 ENCOUNTER — HOSPITAL ENCOUNTER (OUTPATIENT)
Dept: GENERAL RADIOLOGY | Age: 74
Discharge: HOME | End: 2024-04-18
Payer: MEDICARE

## 2024-04-18 DIAGNOSIS — S92.909S CLOSED FRACTURE OF FOOT, UNSPECIFIED LATERALITY, SEQUELA: ICD-10-CM

## 2024-04-18 DIAGNOSIS — Z78.0 MENOPAUSE: ICD-10-CM

## 2024-04-18 PROCEDURE — 77080 DXA BONE DENSITY AXIAL: CPT

## 2024-04-26 ENCOUNTER — PROCEDURE VISIT (OUTPATIENT)
Dept: UROGYNECOLOGY | Age: 74
End: 2024-04-26
Payer: MEDICARE

## 2024-04-26 VITALS
SYSTOLIC BLOOD PRESSURE: 124 MMHG | DIASTOLIC BLOOD PRESSURE: 79 MMHG | TEMPERATURE: 97.5 F | HEART RATE: 69 BPM | OXYGEN SATURATION: 97 % | RESPIRATION RATE: 16 BRPM

## 2024-04-26 DIAGNOSIS — R39.15 URINARY URGENCY: Primary | ICD-10-CM

## 2024-04-26 DIAGNOSIS — R33.9 INCOMPLETE BLADDER EMPTYING: ICD-10-CM

## 2024-04-26 DIAGNOSIS — R35.0 URINARY FREQUENCY: ICD-10-CM

## 2024-04-26 DIAGNOSIS — N39.8 VOIDING DYSFUNCTION: ICD-10-CM

## 2024-04-26 PROCEDURE — 51797 INTRAABDOMINAL PRESSURE TEST: CPT | Performed by: OBSTETRICS & GYNECOLOGY

## 2024-04-26 PROCEDURE — 81002 URINALYSIS NONAUTO W/O SCOPE: CPT | Performed by: OBSTETRICS & GYNECOLOGY

## 2024-04-26 PROCEDURE — 51729 CYSTOMETROGRAM W/VP&UP: CPT | Performed by: OBSTETRICS & GYNECOLOGY

## 2024-04-26 PROCEDURE — 51784 ANAL/URINARY MUSCLE STUDY: CPT | Performed by: OBSTETRICS & GYNECOLOGY

## 2024-04-26 PROCEDURE — 51741 ELECTRO-UROFLOWMETRY FIRST: CPT | Performed by: OBSTETRICS & GYNECOLOGY

## 2024-04-26 NOTE — PROGRESS NOTES
[facility] Office Note  5/10/2024     Subjective:  Ms. Dean is here to follow up for CAD, HTN, Coronary microvascular dysfunction,HLD, h/o meningioma 5/14/19 with 5 radiation treatments.  C/o no cardiac complaints    HPI:    Patient had limited echo 1/19/24 which demonstrated ef of 60-65%, otherwise unremarkable. She had a cardiac MRI 5/7/24 that was unremarkable.     Today, she reports she is doing well. Patient denies current edema, chest pain, shortness of breath, palpitations, dizziness or syncope. Patient is taking all cardiac medications as prescribed and tolerates them well.     Patient is vaccinated against Covid. Pfizer 2/2    PMI:  transferred care from Dr. King  Last seen by Dr King she was severely bradycardiac and was thought to be drug related.  She wore a 30 day monitor 9/16/17 and showed NSR with ave of 58 bpm.  [place] then had an abnormal Lexiscan 11/19/20 and underwent LHC 12/9/20 which showed mild nonobstructive CAD with medical management.  Echo 2/15/23 showed LVEF=67% mild septal hypertrophy, sigmoid at basal inferoseptal and inferior wall segments. RA moderately enlarged.   Admitted 1/2024 for chest pain. No acute changes on EKG, troponin negative. Echo showed EF 60-65%, no WMAs. Outpatient workup for amyloid recommended (due to low voltage EKG, LVH, SANDY, carpal tunnel history).       12 point ROS negative in all areas as listed below except as in [place]  Constitutional, EENT, Cardiovascular, pulmonary, GI, , Musculoskeletal, skin, neurological, hematological, endocrine, Psychiatric      Reviewed past medical history, social, and family history.   Smoking:no  Alcohol:no  Recreational Drugs:no  Family History: no significant on review    Past Medical History:   Diagnosis Date    Acid reflux     small stomach ulcer    Angina pectoris, variant (HCC)     Anxiety     Arthritis     Asthma     Benign esophageal stricture 12/2016    Benign tumor 03/20/2017     Benign brain tumor size of 
skin

## 2024-04-26 NOTE — PROGRESS NOTES
Urodynamic Procedure Note    Graciela VALDEZ Jermaine  1950    Urodynamicist: Dr. Lamar    Equipment: CATASYS    Brief History/Indication:    Patient is a 73 y.o. female with subjective complaints of urge incontinence for a urodynamic evaluation. Patient reports she is on no medications for overactive bladder.     Cystometrogram   8/2/23    ECST: neg  1st: 200ml  Urge: 230ml  Max: 250ml  Spasm: no  FCST: neg      Pelvic Organ Prolapse Quantification  Anterior Wall (Aa): -3   Anterior Wall (Ba): -3   Cervix or Cuff (C): -8     Genital Hiatus (gh): 2   Perineal Body (pb): -2   Total Vaginal Length (tvl): 8     Posterior Wall (Ap): -2   Posterior Wall (Bp): -2   No data recorded         Procedure:  The Patient was taken to the Urodynamics suite and a free urine flow was obtained followed by catheterization for residual urine. A double-lumen urodynamic catheter was introduced to the bladder for measuring bladder pressure, and for filling. EMG patch electrodes were placed perianally for recording activity of the anal sphincter. A vaginal catheter was inserted to record the abdominal pressure. The entire process was displayed and analyzed using the CATASYS Urodynamic machine and software.    Findings:   Results for POC orders placed in visit on 04/26/24   POCT Urinalysis no Micro   Result Value Ref Range    Color, UA renu     Clarity, UA cloudy     Glucose, UA POC neg     Bilirubin, UA neg     Ketones, UA neg     Spec Grav, UA 1.015     Blood, UA POC neg     pH, UA 6.5     Protein, UA POC neg     Urobilinogen, UA neg     Leukocytes, UA pos     Nitrite, UA pos        Uroflow:  Patient was able to void for Uroflow    Voided Volume: 269 ml  PVR: 100 ml  Max Flow: 26 ml/sec with an average flow rate of 12 ml/sec    CMG:  First sensation: 116 ml  First desire: 143 ml  Strong desire: 169 ml  Max capacity: ml  Uninhibited detrusor contraction: Yes at 179 ml with leakage    Leak Point Pressures:  150 ml with  negative

## 2024-05-07 ENCOUNTER — TELEPHONE (OUTPATIENT)
Dept: CARDIOLOGY CLINIC | Age: 74
End: 2024-05-07

## 2024-05-07 ENCOUNTER — HOSPITAL ENCOUNTER (OUTPATIENT)
Dept: MRI IMAGING | Age: 74
Discharge: HOME | End: 2024-05-07
Payer: MEDICARE

## 2024-05-07 ENCOUNTER — APPOINTMENT (OUTPATIENT)
Dept: NUCLEAR MEDICINE | Age: 74
End: 2024-05-07
Payer: MEDICARE

## 2024-05-07 DIAGNOSIS — I25.10 CORONARY ARTERY DISEASE INVOLVING NATIVE CORONARY ARTERY OF NATIVE HEART WITHOUT ANGINA PECTORIS: ICD-10-CM

## 2024-05-07 PROBLEM — I42.9 CARDIOMYOPATHY (HCC): Status: ACTIVE | Noted: 2024-05-07

## 2024-05-07 PROBLEM — I51.7 LVH (LEFT VENTRICULAR HYPERTROPHY): Status: ACTIVE | Noted: 2024-05-07

## 2024-05-07 PROCEDURE — 6360000004 HC RX CONTRAST MEDICATION: Performed by: NURSE PRACTITIONER

## 2024-05-07 PROCEDURE — 75561 CARDIAC MRI FOR MORPH W/DYE: CPT | Performed by: INTERNAL MEDICINE

## 2024-05-07 PROCEDURE — A9585 GADOBUTROL INJECTION: HCPCS | Performed by: NURSE PRACTITIONER

## 2024-05-07 PROCEDURE — 75565 CARD MRI VELOC FLOW MAPPING: CPT

## 2024-05-07 RX ORDER — GADOBUTROL 604.72 MG/ML
15 INJECTION INTRAVENOUS
Status: COMPLETED | OUTPATIENT
Start: 2024-05-07 | End: 2024-05-07

## 2024-05-07 RX ADMIN — GADOBUTROL 15 ML: 604.72 INJECTION INTRAVENOUS at 12:07

## 2024-05-07 NOTE — TELEPHONE ENCOUNTER
----- Message from YOCASTA Michele - CNP sent at 5/7/2024  2:25 PM EDT -----  Please let her know that heart MRI showed normal heart function. Keep follow up with Dr. Navarro. Thank you

## 2024-05-10 ENCOUNTER — OFFICE VISIT (OUTPATIENT)
Dept: CARDIOLOGY CLINIC | Age: 74
End: 2024-05-10
Payer: MEDICARE

## 2024-05-10 VITALS
HEIGHT: 64 IN | OXYGEN SATURATION: 98 % | SYSTOLIC BLOOD PRESSURE: 118 MMHG | BODY MASS INDEX: 34.11 KG/M2 | DIASTOLIC BLOOD PRESSURE: 62 MMHG | HEART RATE: 49 BPM | WEIGHT: 199.8 LBS

## 2024-05-10 DIAGNOSIS — Z79.899 MEDICATION MANAGEMENT: ICD-10-CM

## 2024-05-10 DIAGNOSIS — G47.33 OSA (OBSTRUCTIVE SLEEP APNEA): ICD-10-CM

## 2024-05-10 DIAGNOSIS — E78.5 DYSLIPIDEMIA: ICD-10-CM

## 2024-05-10 DIAGNOSIS — I10 PRIMARY HYPERTENSION: ICD-10-CM

## 2024-05-10 DIAGNOSIS — I25.85 CHRONIC CORONARY MICROVASCULAR DYSFUNCTION: Primary | ICD-10-CM

## 2024-05-10 DIAGNOSIS — I25.10 CORONARY ARTERY DISEASE INVOLVING NATIVE CORONARY ARTERY OF NATIVE HEART WITHOUT ANGINA PECTORIS: ICD-10-CM

## 2024-05-10 PROCEDURE — 3078F DIAST BP <80 MM HG: CPT | Performed by: INTERNAL MEDICINE

## 2024-05-10 PROCEDURE — 1123F ACP DISCUSS/DSCN MKR DOCD: CPT | Performed by: INTERNAL MEDICINE

## 2024-05-10 PROCEDURE — 99214 OFFICE O/P EST MOD 30 MIN: CPT | Performed by: INTERNAL MEDICINE

## 2024-05-10 PROCEDURE — 3074F SYST BP LT 130 MM HG: CPT | Performed by: INTERNAL MEDICINE

## 2024-05-10 NOTE — PATIENT INSTRUCTIONS
Labs reviewed in epic and discussed with patient.  Current medications reviewed.  Refills given as warranted.  Please obtain the following labs;-LIPID FASTING, CBC, CMP, TSH

## 2024-05-13 RX ORDER — FOLIC ACID 1 MG/1
1000 TABLET ORAL DAILY
Qty: 30 TABLET | Refills: 1 | Status: SHIPPED | OUTPATIENT
Start: 2024-05-13

## 2024-05-13 NOTE — TELEPHONE ENCOUNTER
Future Appointments   Date Time Provider Department Center   5/14/2024 10:40 AM Brian Mercado DO [place]  [place] - DYD   5/15/2024  9:30 AM TJ NM INJ RM 1 [facility] NUC MED [facility] Radio   5/15/2024 10:30 AM TJH NM RM 1 T[facility] NUC [facility] Radio   5/16/2024 11:30 AM Lincoln Lamar MD [facility] UROGYN [facility]   5/28/2024 10:45 AM Bradley Gonsalez MD AND ORTHO [facility]   6/11/2024 11:45 AM Jay Jay Meza MD AND NEURO Neurology -   11/21/2024 11:00 AM Ferdinand Navarro MD P CLER CAR [facility] 2/13/2024

## 2024-05-14 ENCOUNTER — OFFICE VISIT (OUTPATIENT)
Dept: FAMILY MEDICINE CLINIC | Age: 74
End: 2024-05-14

## 2024-05-14 VITALS
SYSTOLIC BLOOD PRESSURE: 112 MMHG | HEART RATE: 61 BPM | BODY MASS INDEX: 34.91 KG/M2 | RESPIRATION RATE: 16 BRPM | WEIGHT: 197 LBS | OXYGEN SATURATION: 97 % | TEMPERATURE: 97.3 F | HEIGHT: 63 IN | DIASTOLIC BLOOD PRESSURE: 78 MMHG

## 2024-05-14 DIAGNOSIS — Z00.00 MEDICARE ANNUAL WELLNESS VISIT, SUBSEQUENT: Primary | ICD-10-CM

## 2024-05-14 DIAGNOSIS — M80.00XS AGE-RELATED OSTEOPOROSIS WITH CURRENT PATHOLOGICAL FRACTURE, SEQUELA: ICD-10-CM

## 2024-05-14 DIAGNOSIS — I10 ESSENTIAL HYPERTENSION: ICD-10-CM

## 2024-05-14 DIAGNOSIS — E55.9 VITAMIN D DEFICIENCY: ICD-10-CM

## 2024-05-14 DIAGNOSIS — G47.419 PRIMARY NARCOLEPSY WITHOUT CATAPLEXY: ICD-10-CM

## 2024-05-14 DIAGNOSIS — E66.01 SEVERE OBESITY (BMI 35.0-39.9) WITH COMORBIDITY (HCC): ICD-10-CM

## 2024-05-14 DIAGNOSIS — I20.89 SYNDROME X, CARDIAC (HCC): ICD-10-CM

## 2024-05-14 RX ORDER — ALENDRONATE SODIUM 70 MG/1
70 TABLET ORAL
Qty: 4 TABLET | Refills: 6 | Status: SHIPPED | OUTPATIENT
Start: 2024-05-14

## 2024-05-14 SDOH — ECONOMIC STABILITY: FOOD INSECURITY: WITHIN THE PAST 12 MONTHS, THE FOOD YOU BOUGHT JUST DIDN'T LAST AND YOU DIDN'T HAVE MONEY TO GET MORE.: NEVER TRUE

## 2024-05-14 SDOH — ECONOMIC STABILITY: FOOD INSECURITY: WITHIN THE PAST 12 MONTHS, YOU WORRIED THAT YOUR FOOD WOULD RUN OUT BEFORE YOU GOT MONEY TO BUY MORE.: NEVER TRUE

## 2024-05-14 SDOH — ECONOMIC STABILITY: INCOME INSECURITY: HOW HARD IS IT FOR YOU TO PAY FOR THE VERY BASICS LIKE FOOD, HOUSING, MEDICAL CARE, AND HEATING?: NOT HARD AT ALL

## 2024-05-14 ASSESSMENT — PATIENT HEALTH QUESTIONNAIRE - PHQ9
7. TROUBLE CONCENTRATING ON THINGS, SUCH AS READING THE NEWSPAPER OR WATCHING TELEVISION: NOT AT ALL
SUM OF ALL RESPONSES TO PHQ9 QUESTIONS 1 & 2: 0
3. TROUBLE FALLING OR STAYING ASLEEP: NOT AT ALL
SUM OF ALL RESPONSES TO PHQ QUESTIONS 1-9: 0
10. IF YOU CHECKED OFF ANY PROBLEMS, HOW DIFFICULT HAVE THESE PROBLEMS MADE IT FOR YOU TO DO YOUR WORK, TAKE CARE OF THINGS AT HOME, OR GET ALONG WITH OTHER PEOPLE: NOT DIFFICULT AT ALL
6. FEELING BAD ABOUT YOURSELF - OR THAT YOU ARE A FAILURE OR HAVE LET YOURSELF OR YOUR FAMILY DOWN: NOT AT ALL
2. FEELING DOWN, DEPRESSED OR HOPELESS: NOT AT ALL
5. POOR APPETITE OR OVEREATING: NOT AT ALL
9. THOUGHTS THAT YOU WOULD BE BETTER OFF DEAD, OR OF HURTING YOURSELF: NOT AT ALL
1. LITTLE INTEREST OR PLEASURE IN DOING THINGS: NOT AT ALL
SUM OF ALL RESPONSES TO PHQ QUESTIONS 1-9: 0
SUM OF ALL RESPONSES TO PHQ QUESTIONS 1-9: 0
4. FEELING TIRED OR HAVING LITTLE ENERGY: NOT AT ALL
SUM OF ALL RESPONSES TO PHQ QUESTIONS 1-9: 0
8. MOVING OR SPEAKING SO SLOWLY THAT OTHER PEOPLE COULD HAVE NOTICED. OR THE OPPOSITE, BEING SO FIGETY OR RESTLESS THAT YOU HAVE BEEN MOVING AROUND A LOT MORE THAN USUAL: NOT AT ALL

## 2024-05-14 ASSESSMENT — LIFESTYLE VARIABLES
HOW MANY STANDARD DRINKS CONTAINING ALCOHOL DO YOU HAVE ON A TYPICAL DAY: PATIENT DOES NOT DRINK
HOW OFTEN DO YOU HAVE A DRINK CONTAINING ALCOHOL: NEVER

## 2024-05-14 NOTE — PROGRESS NOTES
Increased Pain    Pain Interventions:  Strategies for pain reduction discussed      Activity, Diet, and Weight:  On average, how many days per week do you engage in moderate to strenuous exercise (like a brisk walk)?: 2 days  On average, how many minutes do you engage in exercise at this level?: 20 min    Do you eat balanced/healthy meals regularly?: Yes    Body mass index is 35.12 kg/m². (!) Abnormal      Obesity Interventions:  Eating habit changes and reduction in carbs discussed             Hearing Screen:  Do you or your family notice any trouble with your hearing that hasn't been managed with hearing aids?: (!) Yes    Interventions:  Discussed possible referral for evaluation.  Patient will consider    Vision Screen:  Do you have difficulty driving, watching TV, or doing any of your daily activities because of your eyesight?: (!) Yes  Have you had an eye exam within the past year?: (!) No  No results found.    Interventions:   Patient encouraged to make appointment with their eye specialist                    Objective   Vitals:    05/14/24 1041   BP: 112/78   Site: Left Upper Arm   Position: Sitting   Pulse: 61   Resp: 16   Temp: 97.3 °F (36.3 °C)   TempSrc: Temporal   SpO2: 97%   Weight: 89.4 kg (197 lb)   Height: 1.595 m (5' 2.8\")      Body mass index is 35.12 kg/m².             Allergies   Allergen Reactions    Latex Rash    Vistaril [Hydroxyzine Hcl]      Prior to Visit Medications    Medication Sig Taking? Authorizing Provider   alendronate (FOSAMAX) 70 MG tablet Take 1 tablet by mouth every 7 days Yes Brian Mercado DO   folic acid (FOLVITE) 1 MG tablet Take 1 tablet by mouth once daily Yes Brian Mercado DO   pantoprazole (PROTONIX) 40 MG tablet Take 1 tablet by mouth twice daily Yes Brian Mercado DO   sucralfate (CARAFATE) 1 GM tablet Take 1 tablet by mouth 4 times daily Yes Brian Mercado DO   escitalopram (LEXAPRO) 20 MG tablet Take 1 tablet by mouth daily Yes Brian Mercado DO   Armodafinil

## 2024-05-14 NOTE — PATIENT INSTRUCTIONS
When you have the blood test ask them to be sure they do my test and Dr. Navarro's    Take the osteoporosis medication-  morning.  No food or drink  Take the med with 6-8 ounces of water and do not lay down or eat anything for 30 minutes.    If bad heartburn or reflux sx we may need to stop the medication so let me know.           Preventing Falls: Care Instructions  Injuries and health problems such as trouble walking or poor eyesight can increase your risk of falling. So can some medicines. But there are things you can do to help prevent falls. You can exercise to get stronger. You can also arrange your home to make it safer.    Talk to your doctor about the medicines you take. Ask if any of them increase the risk of falls and whether they can be changed or stopped.   Try to exercise regularly. It can help improve your strength and balance. This can help lower your risk of falling.     Practice fall safety and prevention.    Wear low-heeled shoes that fit well and give your feet good support. Talk to your doctor if you have foot problems that make this hard.  Carry a cellphone or wear a medical alert device that you can use to call for help.  Use stepladders instead of chairs to reach high objects. Don't climb if you're at risk for falls. Ask for help, if needed.  Wear the correct eyeglasses, if you need them.    Make your home safer.    Remove rugs, cords, clutter, and furniture from walkways.  Keep your house well lit. Use night-lights in hallways and bathrooms.  Install and use sturdy handrails on stairways.  Wear nonskid footwear, even inside. Don't walk barefoot or in socks without shoes.    Be safe outside.    Use handrails, curb cuts, and ramps whenever possible.  Keep your hands free by using a shoulder bag or backpack.  Try to walk in well-lit areas. Watch out for uneven ground, changes in pavement, and debris.  Be careful in the winter. Walk on the grass or gravel when sidewalks are slippery. Use de-icer

## 2024-05-16 ENCOUNTER — PROCEDURE VISIT (OUTPATIENT)
Dept: UROGYNECOLOGY | Age: 74
End: 2024-05-16

## 2024-05-16 VITALS
DIASTOLIC BLOOD PRESSURE: 70 MMHG | SYSTOLIC BLOOD PRESSURE: 104 MMHG | TEMPERATURE: 97.5 F | RESPIRATION RATE: 14 BRPM | HEART RATE: 62 BPM | OXYGEN SATURATION: 99 %

## 2024-05-16 DIAGNOSIS — R35.0 URINARY FREQUENCY: ICD-10-CM

## 2024-05-16 DIAGNOSIS — R33.9 INCOMPLETE BLADDER EMPTYING: ICD-10-CM

## 2024-05-16 DIAGNOSIS — R39.15 URINARY URGENCY: Primary | ICD-10-CM

## 2024-05-16 RX ORDER — LIDOCAINE HYDROCHLORIDE 20 MG/ML
JELLY TOPICAL ONCE
Status: COMPLETED | OUTPATIENT
Start: 2024-05-16 | End: 2024-05-16

## 2024-05-16 RX ADMIN — LIDOCAINE HYDROCHLORIDE: 20 JELLY TOPICAL at 11:59

## 2024-05-16 NOTE — PROGRESS NOTES
tablet Take 1 tablet by mouth daily       No current facility-administered medications for this visit.     Allergies:   Allergies   Allergen Reactions    Latex Rash    Vistaril [Hydroxyzine Hcl]      Social History:   Social History     Socioeconomic History    Marital status:      Spouse name: Not on file    Number of children: Not on file    Years of education: Not on file    Highest education level: Not on file   Occupational History     Employer: [employer]   Tobacco Use    Smoking status: Never     Passive exposure: Past    Smokeless tobacco: Never   Vaping Use    Vaping Use: Never used   Substance and Sexual Activity    Alcohol use: No     Comment: rarely    Drug use: No    Sexual activity: Not Currently   Other Topics Concern    Not on file   Social History Narrative    Not on file     Social Determinants of Health     Financial Resource Strain: Low Risk  (5/14/2024)    Overall Financial Resource Strain (CARDIA)     Difficulty of Paying Living Expenses: Not hard at all   Food Insecurity: No Food Insecurity (5/14/2024)    Hunger Vital Sign     Worried About Running Out of Food in the Last Year: Never true     Ran Out of Food in the Last Year: Never true   Transportation Needs: No Transportation Needs (5/14/2024)    PRAPARE - Transportation     Lack of Transportation (Medical): No     Lack of Transportation (Non-Medical): No   Physical Activity: Insufficiently Active (5/14/2024)    Exercise Vital Sign     Days of Exercise per Week: 2 days     Minutes of Exercise per Session: 20 min   Stress: Not on file   Social Connections: Not on file   Intimate Partner Violence: Not on file   Housing Stability: Low Risk  (5/14/2024)    Housing Stability Vital Sign     Unable to Pay for Housing in the Last Year: No     Number of Places Lived in the Last Year: 1     Unstable Housing in the Last Year: No     Family History:   Family History   Problem Relation Age of Onset    Diabetes Mother     Heart Disease Mother

## 2024-05-19 LAB
BACTERIA UR CULT: ABNORMAL
ORGANISM: ABNORMAL

## 2024-06-03 ENCOUNTER — TELEPHONE (OUTPATIENT)
Dept: CARDIOLOGY CLINIC | Age: 74
End: 2024-06-03

## 2024-06-03 NOTE — TELEPHONE ENCOUNTER
PT called and stated [facility] has called and canceled her Nuclear Cardiac Study 3x. PT wants to know if the test is really necessary since RKG has results from other test she completed? PT wants to know what RKG thinks and if she should R/S or not since they have canceled 3x?

## 2024-06-03 NOTE — TELEPHONE ENCOUNTER
RKG please advise      PT called and stated [facility] has called and canceled her Nuclear Cardiac Study 3x. PT wants to know if the test is really necessary since RKG has results from other test she completed? PT wants to know what RKG thinks and if she should R/S or not since they have canceled 3x?

## 2024-06-03 NOTE — TELEPHONE ENCOUNTER
Ferdinand Navarro MD  P [facility] Staff  Caller: Unspecified (Today, 12:07 PM)  We can hold off on nuclear scan since her MRI did not indicate amyloid cardiac disease.

## 2024-06-10 DIAGNOSIS — G43.119 INTRACTABLE MIGRAINE WITH AURA WITHOUT STATUS MIGRAINOSUS: ICD-10-CM

## 2024-06-10 RX ORDER — TOPIRAMATE 25 MG/1
50 TABLET ORAL 2 TIMES DAILY
Qty: 120 TABLET | Refills: 0 | Status: SHIPPED | OUTPATIENT
Start: 2024-06-10

## 2024-06-10 NOTE — TELEPHONE ENCOUNTER
Refill needed for Topiramate 25mg 2 tab BID    Pt scheduled for ov 6/11/24 in [place] but provider is not available at this location this day.    Pt is aware that the office will c/b and r/s appt.    LOV 6/8/23    NOV originally 6/11/24 but needs r/s w/ new provider at office    Medication pended. #120 no refill

## 2024-06-11 ENCOUNTER — OFFICE VISIT (OUTPATIENT)
Dept: ORTHOPEDIC SURGERY | Age: 74
End: 2024-06-11
Payer: MEDICARE

## 2024-06-11 DIAGNOSIS — S92.352A DISPLACED FRACTURE OF FIFTH METATARSAL BONE, LEFT FOOT, INITIAL ENCOUNTER FOR CLOSED FRACTURE: Primary | ICD-10-CM

## 2024-06-11 DIAGNOSIS — S46.002A INJURY OF LEFT ROTATOR CUFF, INITIAL ENCOUNTER: ICD-10-CM

## 2024-06-11 PROCEDURE — 1123F ACP DISCUSS/DSCN MKR DOCD: CPT | Performed by: ORTHOPAEDIC SURGERY

## 2024-06-11 PROCEDURE — 99213 OFFICE O/P EST LOW 20 MIN: CPT | Performed by: ORTHOPAEDIC SURGERY

## 2024-06-11 NOTE — PROGRESS NOTES
discussed the risk of nonunion and  malunion.  She will be WBAT, and start aggressive ROM and peroneal strengthening exercise.      Regarding her left shoulder, I assured the patient that the xray is negative for acute fracture.  I discussed that the overall alignment of the shoulder is good and that we can treat this non-operatively with ROM and no heavy impact activities. I discussed with the patient that I think that she would really benefit from a course of physical therapy for further strengthening and stretching. She would like to do it on her own.  We discussed the possibility of RTC tear. We may consider MRI left shoulder if not better.    We will see her  back in 2 months at which time we get xray left foot.          Bradley Gonsalez MD

## 2024-06-12 ENCOUNTER — TELEPHONE (OUTPATIENT)
Dept: FAMILY MEDICINE CLINIC | Age: 74
End: 2024-06-12

## 2024-06-12 DIAGNOSIS — G47.419 PRIMARY NARCOLEPSY WITHOUT CATAPLEXY: ICD-10-CM

## 2024-06-12 RX ORDER — ARMODAFINIL 150 MG/1
150 TABLET ORAL DAILY
Qty: 90 TABLET | Refills: 0 | Status: SHIPPED | OUTPATIENT
Start: 2024-06-12 | End: 2024-09-10

## 2024-06-12 NOTE — TELEPHONE ENCOUNTER
Patient needs a refill on Nuvigil. They need a 90 day supply.     Mail order or local pharmacy: local    Pharmacy: [facility]    Last OV: 5/14/24  Future Appointments   Date Time Provider Department Center   8/13/2024  9:15 AM Bradley Gonsalez MD AND ORTHO [facility]   11/14/2024 10:20 AM Brian Mercado DO [place] [place] - DYD   11/21/2024 11:00 AM Ferdinand Navarro MD [facility] CLER CAR [facility]

## 2024-06-13 ENCOUNTER — HOSPITAL ENCOUNTER (OUTPATIENT)
Age: 74
Discharge: HOME | End: 2024-06-13
Payer: MEDICARE

## 2024-06-13 DIAGNOSIS — M80.00XS AGE-RELATED OSTEOPOROSIS WITH CURRENT PATHOLOGICAL FRACTURE, SEQUELA: ICD-10-CM

## 2024-06-13 DIAGNOSIS — Z79.899 MEDICATION MANAGEMENT: ICD-10-CM

## 2024-06-13 DIAGNOSIS — E55.9 VITAMIN D DEFICIENCY: ICD-10-CM

## 2024-06-13 LAB — 25(OH)D3 SERPL-MCNC: 9.4 NG/ML

## 2024-06-13 PROCEDURE — 36415 COLL VENOUS BLD VENIPUNCTURE: CPT

## 2024-06-13 PROCEDURE — 85025 COMPLETE CBC W/AUTO DIFF WBC: CPT

## 2024-06-13 PROCEDURE — 84443 ASSAY THYROID STIM HORMONE: CPT

## 2024-06-13 PROCEDURE — 82306 VITAMIN D 25 HYDROXY: CPT

## 2024-06-13 PROCEDURE — 80061 LIPID PANEL: CPT

## 2024-06-13 PROCEDURE — 80053 COMPREHEN METABOLIC PANEL: CPT

## 2024-06-17 ENCOUNTER — TELEPHONE (OUTPATIENT)
Dept: FAMILY MEDICINE CLINIC | Age: 74
End: 2024-06-17

## 2024-06-17 RX ORDER — SUCRALFATE 1 G/1
1 TABLET ORAL 4 TIMES DAILY
Qty: 360 TABLET | Refills: 1 | Status: SHIPPED | OUTPATIENT
Start: 2024-06-17

## 2024-06-17 NOTE — TELEPHONE ENCOUNTER
Lov 5/14/2024    Future Appointments   Date Time Provider Department Center   8/13/2024  9:15 AM Bradley Gonsalez MD AND ORTHO [facility]   11/14/2024 10:20 AM Brian Mercado DO [place] [place] - DYD   11/21/2024 11:00 AM Ferdinand Navarro MD P CLER CAR [facility]

## 2024-06-17 NOTE — TELEPHONE ENCOUNTER
Patient called to get her lab result for her 06/13 Vitamin D lab. She was read Dr. Mercado's result note, and had no further questions.

## 2024-06-20 ENCOUNTER — TELEPHONE (OUTPATIENT)
Dept: FAMILY MEDICINE CLINIC | Age: 74
End: 2024-06-20

## 2024-06-20 NOTE — TELEPHONE ENCOUNTER
Patient states [facility] will be sending over a medical certification form over today due to her breathing machine. This is to prevent her electric from being turned off.

## 2024-07-09 RX ORDER — FOLIC ACID 1 MG/1
1000 TABLET ORAL DAILY
Qty: 30 TABLET | Refills: 3 | Status: SHIPPED | OUTPATIENT
Start: 2024-07-09

## 2024-07-09 NOTE — TELEPHONE ENCOUNTER
Future Appointments   Date Time Provider Department Center   7/11/2024 11:20 AM Brian Mercado DO [place] [place] - DYD   8/6/2024 11:00 AM Alcides Alcantar MD CLER NEURO Neurology -   8/13/2024  9:15 AM Bradley Gonsalez MD AND ORTHO [facility]   11/14/2024 10:20 AM Brian Mercado DO [place] [facility] [place] - DYD   11/21/2024 11:00 AM Ferdinand Navarro MD P CLER CAR [facility] 5/14/2024

## 2024-08-06 ENCOUNTER — OFFICE VISIT (OUTPATIENT)
Age: 74
End: 2024-08-06
Payer: MEDICARE

## 2024-08-06 VITALS
DIASTOLIC BLOOD PRESSURE: 79 MMHG | BODY MASS INDEX: 35.07 KG/M2 | WEIGHT: 190.6 LBS | SYSTOLIC BLOOD PRESSURE: 123 MMHG | OXYGEN SATURATION: 97 % | HEIGHT: 62 IN | HEART RATE: 54 BPM

## 2024-08-06 DIAGNOSIS — G25.81 RESTLESS LEGS SYNDROME (RLS): ICD-10-CM

## 2024-08-06 DIAGNOSIS — G43.009 MIGRAINE WITHOUT AURA AND WITHOUT STATUS MIGRAINOSUS, NOT INTRACTABLE: Primary | ICD-10-CM

## 2024-08-06 PROBLEM — G44.221 CHRONIC TENSION-TYPE HEADACHE, INTRACTABLE: Status: RESOLVED | Noted: 2017-07-26 | Resolved: 2024-08-06

## 2024-08-06 PROCEDURE — 3074F SYST BP LT 130 MM HG: CPT | Performed by: STUDENT IN AN ORGANIZED HEALTH CARE EDUCATION/TRAINING PROGRAM

## 2024-08-06 PROCEDURE — 99214 OFFICE O/P EST MOD 30 MIN: CPT | Performed by: STUDENT IN AN ORGANIZED HEALTH CARE EDUCATION/TRAINING PROGRAM

## 2024-08-06 PROCEDURE — 3078F DIAST BP <80 MM HG: CPT | Performed by: STUDENT IN AN ORGANIZED HEALTH CARE EDUCATION/TRAINING PROGRAM

## 2024-08-06 PROCEDURE — 1123F ACP DISCUSS/DSCN MKR DOCD: CPT | Performed by: STUDENT IN AN ORGANIZED HEALTH CARE EDUCATION/TRAINING PROGRAM

## 2024-08-06 RX ORDER — TOPIRAMATE 50 MG/1
50 TABLET, FILM COATED ORAL 2 TIMES DAILY
Qty: 180 TABLET | Refills: 3 | Status: SHIPPED | OUTPATIENT
Start: 2024-08-06 | End: 2025-08-01

## 2024-08-06 NOTE — ASSESSMENT & PLAN NOTE
Well-controlled, continue current medications (Topamax 50mg BID, acetaminophen 500-1000mg OTC prn). Abortive treatment is effective. Only 2-3 HA days per mo.

## 2024-08-06 NOTE — PROGRESS NOTES
Graciela Dean (:  1950) is a 74 y.o. female,Established patient, here for evaluation of the following chief complaint(s):  Follow-up (Former [place] [facility], migraines)      Assessment & Plan   1. Migraine without aura and without status migrainosus, not intractable  Assessment & Plan:   Well-controlled, continue current medications (Topamax 50mg BID, acetaminophen 500-1000mg OTC prn). Abortive treatment is effective. Only 2-3 HA days per mo.  Orders:  -     topiramate (TOPAMAX) 50 MG tablet; Take 1 tablet by mouth 2 times daily, Disp-180 tablet, R-3Normal  2. Restless legs syndrome (RLS)  Assessment & Plan:   Well-controlled, without medications. 20-30 minute delay to onset of sleep. She prefers to not address this problem today but may consider it in the future. Manifests as tingling > urge to move and \"twitching\" in the evening at rest improves with walking. No evidence of peripheral neuropathy on examination. Lacks dysautonomia and reduced pinprick to suggest small fiber neuropathy. Noted to have previous documented increased kappa/lambda ratio in blood and increased kappa in urine in . Defer management of this lab finding to PCP. I don't see notes from a hematologist and she does not recall seeing a hematologist. She tried iron in the past for unclear indication but did not tolerate oral iron. RLS treatment options to consider in the future include dopamine agonist, gabapentin/pregabalin, and iron infusion.      Return in about 1 year (around 2025).       Subjective   Unaccompanied. Continues on TPM 50mg BID. She has PRINCE and uses CPAP. Hedaches are unilateral left eye/frontal region sometimes bioccipital. Throbbing quality. +photo/phonophobia. Headache is essentially unchanged since last visit. Frequency 2-3 days per month. Not debilitating when HA occurs. She uses Tylenol OTC 500mg prn and this is effective. No memory issues.     She has inner urge to move her legs and electrical sensation in

## 2024-08-06 NOTE — ASSESSMENT & PLAN NOTE
Well-controlled, without medications. 20-30 minute delay to onset of sleep. She prefers to not address this problem today but may consider it in the future. Manifests as tingling > urge to move and \"twitching\" in the evening at rest improves with walking. No evidence of peripheral neuropathy on examination. Lacks dysautonomia and reduced pinprick to suggest small fiber neuropathy. Noted to have previous documented increased kappa/lambda ratio in blood and increased kappa in urine in 2022. Defer management of this lab finding to PCP. I don't see notes from a hematologist and she does not recall seeing a hematologist. She tried iron in the past for unclear indication but did not tolerate oral iron. RLS treatment options to consider in the future include dopamine agonist, gabapentin/pregabalin, and iron infusion.

## 2024-08-06 NOTE — PATIENT INSTRUCTIONS
Here are some behavioral and dietary changes you can make to improve your headaches:    Gradually reduce and ideally stop consuming caffeine. Caffeine use 3 or more days per week is likely to worsen your headache.    Exercise regularly. Try starting out with 30 minutes of aerobic exercise 3 times per week.    Make time to pursue activities that make you feel relaxed and happy. Try to spend at least 20 minutes outdoors (weather permitting). Consider pursuing artistic activities to explore your creative side. Try doing process-driven activities such as cooking and baking.    Make changes to improve your sleep at night (see below).    Take note of any food or drink items that seem to trigger your headaches within 12-24 hours after consuming the dietary item. Some common (but not universal) dietary triggers are listed below. If you identify a dietary trigger, then you may consider limiting the dietary item for 4 weeks then monitor your headache frequency, intensity, and response for rescue medications by using a headache diary. If there is no change to your headache, then the dietary item alone is unlikely to be a significant trigger for your headache.      Common (but not universal) dietary triggers for headache:    Alcohol, especially red wine   Aspartame sweetener   Beans and other tyramine-containing foods   Caffeine   Cheese   Yogurt   Food containing MSG    Processed meat containing sulfites   Vitamins and herbal supplements containing caffeine    Try keeping a headache diary to track headache frequency, intensity, and response to rescue therapy. There are many smartphone apps that can be used for this purpose or you can use a handwritten journal. Keeping track of the mild headache days per month, severe headache days per month, and rescue medication use days per week will be helpful to monitor response to treatment.     Behavioral changes you can make to improve your sleep at night:   Be consistent; go to bed at

## 2024-08-21 ENCOUNTER — OFFICE VISIT (OUTPATIENT)
Dept: FAMILY MEDICINE CLINIC | Age: 74
End: 2024-08-21
Payer: MEDICARE

## 2024-08-21 VITALS
OXYGEN SATURATION: 97 % | DIASTOLIC BLOOD PRESSURE: 68 MMHG | BODY MASS INDEX: 34.93 KG/M2 | HEART RATE: 56 BPM | WEIGHT: 191 LBS | RESPIRATION RATE: 16 BRPM | SYSTOLIC BLOOD PRESSURE: 98 MMHG | TEMPERATURE: 97.4 F

## 2024-08-21 DIAGNOSIS — G47.419 PRIMARY NARCOLEPSY WITHOUT CATAPLEXY: Primary | ICD-10-CM

## 2024-08-21 DIAGNOSIS — I10 ESSENTIAL HYPERTENSION: ICD-10-CM

## 2024-08-21 DIAGNOSIS — G47.33 OSA (OBSTRUCTIVE SLEEP APNEA): ICD-10-CM

## 2024-08-21 DIAGNOSIS — Z91.81 AT HIGH RISK FOR FALLS: ICD-10-CM

## 2024-08-21 PROCEDURE — 1123F ACP DISCUSS/DSCN MKR DOCD: CPT | Performed by: FAMILY MEDICINE

## 2024-08-21 PROCEDURE — 3074F SYST BP LT 130 MM HG: CPT | Performed by: FAMILY MEDICINE

## 2024-08-21 PROCEDURE — 99214 OFFICE O/P EST MOD 30 MIN: CPT | Performed by: FAMILY MEDICINE

## 2024-08-21 PROCEDURE — 3078F DIAST BP <80 MM HG: CPT | Performed by: FAMILY MEDICINE

## 2024-08-21 RX ORDER — ROPINIROLE 0.5 MG/1
TABLET, FILM COATED ORAL
Qty: 30 TABLET | Refills: 3 | Status: SHIPPED | OUTPATIENT
Start: 2024-08-21

## 2024-08-21 RX ORDER — ACETAMINOPHEN 160 MG
TABLET,DISINTEGRATING ORAL
COMMUNITY

## 2024-08-21 SDOH — ECONOMIC STABILITY: FOOD INSECURITY: WITHIN THE PAST 12 MONTHS, THE FOOD YOU BOUGHT JUST DIDN'T LAST AND YOU DIDN'T HAVE MONEY TO GET MORE.: NEVER TRUE

## 2024-08-21 SDOH — ECONOMIC STABILITY: FOOD INSECURITY: WITHIN THE PAST 12 MONTHS, YOU WORRIED THAT YOUR FOOD WOULD RUN OUT BEFORE YOU GOT MONEY TO BUY MORE.: NEVER TRUE

## 2024-08-21 SDOH — ECONOMIC STABILITY: INCOME INSECURITY: HOW HARD IS IT FOR YOU TO PAY FOR THE VERY BASICS LIKE FOOD, HOUSING, MEDICAL CARE, AND HEATING?: SOMEWHAT HARD

## 2024-08-21 ASSESSMENT — PATIENT HEALTH QUESTIONNAIRE - PHQ9
1. LITTLE INTEREST OR PLEASURE IN DOING THINGS: NOT AT ALL
10. IF YOU CHECKED OFF ANY PROBLEMS, HOW DIFFICULT HAVE THESE PROBLEMS MADE IT FOR YOU TO DO YOUR WORK, TAKE CARE OF THINGS AT HOME, OR GET ALONG WITH OTHER PEOPLE: NOT DIFFICULT AT ALL
3. TROUBLE FALLING OR STAYING ASLEEP: NOT AT ALL
4. FEELING TIRED OR HAVING LITTLE ENERGY: NOT AT ALL
5. POOR APPETITE OR OVEREATING: NOT AT ALL
SUM OF ALL RESPONSES TO PHQ QUESTIONS 1-9: 3
8. MOVING OR SPEAKING SO SLOWLY THAT OTHER PEOPLE COULD HAVE NOTICED. OR THE OPPOSITE, BEING SO FIGETY OR RESTLESS THAT YOU HAVE BEEN MOVING AROUND A LOT MORE THAN USUAL: NOT AT ALL
SUM OF ALL RESPONSES TO PHQ9 QUESTIONS 1 & 2: 3
9. THOUGHTS THAT YOU WOULD BE BETTER OFF DEAD, OR OF HURTING YOURSELF: NOT AT ALL
6. FEELING BAD ABOUT YOURSELF - OR THAT YOU ARE A FAILURE OR HAVE LET YOURSELF OR YOUR FAMILY DOWN: NOT AT ALL
SUM OF ALL RESPONSES TO PHQ QUESTIONS 1-9: 3
7. TROUBLE CONCENTRATING ON THINGS, SUCH AS READING THE NEWSPAPER OR WATCHING TELEVISION: NOT AT ALL
SUM OF ALL RESPONSES TO PHQ QUESTIONS 1-9: 3
SUM OF ALL RESPONSES TO PHQ QUESTIONS 1-9: 3
2. FEELING DOWN, DEPRESSED OR HOPELESS: NEARLY EVERY DAY

## 2024-08-21 NOTE — PROGRESS NOTES
Subjective:      Patient ID: Graciela Dean is a 74 y.o. y.o. female.    Here for general follow up.  Meds and hx reviewed.    Saw Dr Alcantar-  [facility] neurologist.  Told her not neuropathy  RLS    HPI      Chief Complaint   Patient presents with    Generalized Body Aches    Hip Pain     Bilat  Walked home from the store last week - hips took 2 days to recover       Allergies   Allergen Reactions    Latex Rash    Vistaril [Hydroxyzine Hcl]        Past Medical History:   Diagnosis Date    Acid reflux     small stomach ulcer    Angina pectoris, variant (HCC)     Anxiety     Arthritis     Asthma     Benign esophageal stricture 12/2016    Benign tumor 03/20/2017     Benign brain tumor size of a quater    Cancer (HCC)     ovarian    Depression     Diabetes (HCC)     Frequency     Headache     Hyperlipidemia     Hypertension     Hypothyroidism     Irritable bowel syndrome     Kidney stones     MI, old     Narcolepsy     Obesity     Restless legs syndrome     Sleep apnea     Thyroid disease     Urgency of urination     Urinary incontinence        Past Surgical History:   Procedure Laterality Date    ARM SURGERY Right 08/19/2014    exc.trapezium and flexor carpi radialis interpositional arthroplasty    BREAST REDUCTION SURGERY      BREAST SURGERY      reduction    CARPAL TUNNEL RELEASE Right     CHOLECYSTECTOMY      COLONOSCOPY      COSMETIC SURGERY  3/6/2022    Had surgery on left side of nose to cover up hole using skin from eyelid from right eye.    DENTAL SURGERY      ENDOSCOPY, COLON, DIAGNOSTIC  01/06/2017    Anastomotic ulcer, gastritis    ESOPHAGEAL DILATATION  12/2016    GASTRIC BYPASS SURGERY      GASTRIC BYPASS SURGERY      HERNIA REPAIR      Had a hiatal hernia removed when they done my gastric  bipass.    HYSTERECTOMY (CERVIX STATUS UNKNOWN)      HYSTERECTOMY, VAGINAL  12/26/1990    Had ovarian cancer and then had the surgery.    LITHOTRIPSY Left 11/12/2013    LITHOTRIPSY Left 12/26/2013    LEFT ESWL    MOHS

## 2024-08-23 ENCOUNTER — TELEPHONE (OUTPATIENT)
Dept: PULMONOLOGY | Age: 74
End: 2024-08-23

## 2024-08-23 NOTE — TELEPHONE ENCOUNTER
----- Message from Raquel IZAGUIRRE sent at 8/23/2024  8:50 AM EDT -----  Regarding: [employer]  Patient interested in [employer]. Needs to see Sleep

## 2024-09-03 DIAGNOSIS — G47.419 PRIMARY NARCOLEPSY WITHOUT CATAPLEXY: ICD-10-CM

## 2024-09-03 RX ORDER — ESCITALOPRAM OXALATE 20 MG/1
20 TABLET ORAL DAILY
Qty: 90 TABLET | Refills: 1 | Status: SHIPPED | OUTPATIENT
Start: 2024-09-03

## 2024-09-03 NOTE — TELEPHONE ENCOUNTER
Future Appointments   Date Time Provider Department Center   9/4/2024 10:00 AM Kia Zamora AuD CLER AUDIO [facility]   9/4/2024 10:30 AM Magali Agrawal DO [facility] ENT [facility]   9/24/2024  9:45 AM Bradley Gonsalez MD AND ORTHO [facility]   11/14/2024 10:20 AM Brian Mercado DO [facility] DEP   11/21/2024 11:00 AM Ferdinand Navarro MD P CLER CAR [facility]   8/6/2025 11:00 AM Alcides Alcantar MD CLER NEURO Neurology -     LOV 8/21/2024

## 2024-09-04 ENCOUNTER — PROCEDURE VISIT (OUTPATIENT)
Dept: AUDIOLOGY | Age: 74
End: 2024-09-04
Payer: MEDICARE

## 2024-09-04 ENCOUNTER — OFFICE VISIT (OUTPATIENT)
Dept: ENT CLINIC | Age: 74
End: 2024-09-04

## 2024-09-04 VITALS
DIASTOLIC BLOOD PRESSURE: 71 MMHG | HEART RATE: 56 BPM | BODY MASS INDEX: 35.15 KG/M2 | SYSTOLIC BLOOD PRESSURE: 104 MMHG | WEIGHT: 191 LBS | HEIGHT: 62 IN

## 2024-09-04 DIAGNOSIS — H90.3 SENSORINEURAL HEARING LOSS, BILATERAL: Primary | ICD-10-CM

## 2024-09-04 DIAGNOSIS — G47.33 OSA (OBSTRUCTIVE SLEEP APNEA): ICD-10-CM

## 2024-09-04 DIAGNOSIS — H93.13 TINNITUS OF BOTH EARS: ICD-10-CM

## 2024-09-04 DIAGNOSIS — H90.3 SENSORINEURAL HEARING LOSS (SNHL) OF BOTH EARS: Primary | ICD-10-CM

## 2024-09-04 PROCEDURE — 92567 TYMPANOMETRY: CPT | Performed by: AUDIOLOGIST

## 2024-09-04 PROCEDURE — 92557 COMPREHENSIVE HEARING TEST: CPT | Performed by: AUDIOLOGIST

## 2024-09-04 ASSESSMENT — ENCOUNTER SYMPTOMS
VOICE CHANGE: 0
COUGH: 0
SHORTNESS OF BREATH: 0
EYE ITCHING: 0
FACIAL SWELLING: 0
SORE THROAT: 0
TROUBLE SWALLOWING: 0
APNEA: 0
SINUS PRESSURE: 0

## 2024-09-04 NOTE — PROGRESS NOTES
[facility]  [address], [address]  [place] [address]  P: [phone]       Patient     Graciela Dean  1950    ChiefComplaint     Chief Complaint   Patient presents with    Hearing Problem    Other     Asking about inspire; states on CPAP x several years.         History of Present Illness     Graciela is a 74-year-old female here today for evaluation of hearing loss notes gradual decline over the last several years has been asking people to repeat themselves.  Denies otalgia, otorrhea, vertigo.  Also reports sleep apnea currently uses CPAP notices if she misses a night.  States she is getting  and will be traveling more frequently interested in alternative to CPAP.    Past Medical History     Past Medical History:   Diagnosis Date    Acid reflux     small stomach ulcer    Angina pectoris, variant (HCC)     Anxiety     Arthritis     Asthma     Benign esophageal stricture 12/2016    Benign tumor 03/20/2017     Benign brain tumor size of a quater    Cancer (HCC)     ovarian    Depression     Diabetes (HCC)     Frequency     Headache     Hyperlipidemia     Hypertension     Hypothyroidism     Irritable bowel syndrome     Kidney stones     MI, old     Narcolepsy     Obesity     Restless legs syndrome     Sleep apnea     Thyroid disease     Urgency of urination     Urinary incontinence        Past Surgical History     Past Surgical History:   Procedure Laterality Date    ARM SURGERY Right 08/19/2014    exc.trapezium and flexor carpi radialis interpositional arthroplasty    BREAST REDUCTION SURGERY      BREAST SURGERY      reduction    CARPAL TUNNEL RELEASE Right     CHOLECYSTECTOMY      COLONOSCOPY      COSMETIC SURGERY  3/6/2022    Had surgery on left side of nose to cover up hole using skin from eyelid from right eye.    DENTAL SURGERY      ENDOSCOPY, COLON, DIAGNOSTIC  01/06/2017    Anastomotic ulcer, gastritis    ESOPHAGEAL DILATATION  12/2016    GASTRIC BYPASS SURGERY

## 2024-09-04 NOTE — PROGRESS NOTES
Graciela Dean   1950, 74 y.o. female   [id number]       Referring Provider: Magali Agrawal DO  Referral Type: In an order in Epic    Reason for Visit: Evaluation of the cause of disorders of hearing, tinnitus, or balance.    ADULT AUDIOLOGIC EVALUATION      Graciela Dean is a 74 y.o. female seen today, 9/4/2024 , for an initial audiologic evaluation.  Patient was seen by Magali Agrawal DO following today's evaluation. Patient was alone.    AUDIOLOGIC AND OTHER PERTINENT MEDICAL HISTORY:      Graciela Dean noted tinnitus and family history of hearing loss.  Patient reports perceived decline in hearing in difficult listening environments.  She noted constant bilateral tinnitus.  Patient has a family history of haring loss, mom, occurring later in life.  Medical history is reportedly significant for removal of frontal lobe tumor.     Graciela Dean denied otalgia, aural fullness, dizziness, history of occupational/recreational noise exposure, history of head trauma, and history of ear surgery.    Date: 9/4/2024     IMPRESSIONS:      Normal middle ear pressure and compliance, bilaterally.  Abnormal hearing sensitivity, bilaterally, which can affect every day listening needs.  Word understanding was excellent presented at elevated sensation levels, bilaterally.  Hearing aids are recommended at this time.  Follow medical recommendations of Magali Agrawal DO.     ASSESSMENT AND FINDINGS:     Otoscopy revealed: Clear ear canals bilaterally    RIGHT EAR:  Hearing Sensitivity: Normal hearing sensitivity to a moderately severe sensorineural hearing loss from 250-8000 Hz  Speech Recognition Threshold: 30 dB HL  Word Recognition:Excellent (100%), based on NU-6 25-word list at 65 dBHL using recorded speech stimuli.    Tympanometry: Normal peak pressure and compliance, Type A tympanogram, consistent with normal middle ear function.      LEFT EAR:  Hearing Sensitivity: Normal hearing sensitivity to a moderately severe

## 2024-09-06 DIAGNOSIS — G47.419 PRIMARY NARCOLEPSY WITHOUT CATAPLEXY: ICD-10-CM

## 2024-09-09 RX ORDER — ESCITALOPRAM OXALATE 20 MG/1
20 TABLET ORAL DAILY
Qty: 90 TABLET | Refills: 1 | OUTPATIENT
Start: 2024-09-09

## 2024-09-17 ENCOUNTER — OFFICE VISIT (OUTPATIENT)
Dept: PULMONOLOGY | Age: 74
End: 2024-09-17
Payer: MEDICARE

## 2024-09-17 VITALS
OXYGEN SATURATION: 98 % | HEIGHT: 63 IN | BODY MASS INDEX: 33.84 KG/M2 | RESPIRATION RATE: 16 BRPM | TEMPERATURE: 97.5 F | SYSTOLIC BLOOD PRESSURE: 118 MMHG | WEIGHT: 191 LBS | DIASTOLIC BLOOD PRESSURE: 66 MMHG | HEART RATE: 55 BPM

## 2024-09-17 DIAGNOSIS — G47.10 HYPERSOMNOLENCE: ICD-10-CM

## 2024-09-17 DIAGNOSIS — G47.33 OSA (OBSTRUCTIVE SLEEP APNEA): Primary | ICD-10-CM

## 2024-09-17 PROCEDURE — 3074F SYST BP LT 130 MM HG: CPT | Performed by: INTERNAL MEDICINE

## 2024-09-17 PROCEDURE — 99204 OFFICE O/P NEW MOD 45 MIN: CPT | Performed by: INTERNAL MEDICINE

## 2024-09-17 PROCEDURE — 1123F ACP DISCUSS/DSCN MKR DOCD: CPT | Performed by: INTERNAL MEDICINE

## 2024-09-17 PROCEDURE — 3078F DIAST BP <80 MM HG: CPT | Performed by: INTERNAL MEDICINE

## 2024-09-17 ASSESSMENT — SLEEP AND FATIGUE QUESTIONNAIRES
ESS TOTAL SCORE: 2
HOW LIKELY ARE YOU TO NOD OFF OR FALL ASLEEP WHEN YOU ARE A PASSENGER IN A CAR FOR AN HOUR WITHOUT A BREAK: WOULD NEVER DOZE
HOW LIKELY ARE YOU TO NOD OFF OR FALL ASLEEP WHILE LYING DOWN TO REST IN THE AFTERNOON WHEN CIRCUMSTANCES PERMIT: WOULD NEVER DOZE
HOW LIKELY ARE YOU TO NOD OFF OR FALL ASLEEP WHILE SITTING QUIETLY AFTER LUNCH WITHOUT ALCOHOL: WOULD NEVER DOZE
HOW LIKELY ARE YOU TO NOD OFF OR FALL ASLEEP WHILE WATCHING TV: SLIGHT CHANCE OF DOZING
HOW LIKELY ARE YOU TO NOD OFF OR FALL ASLEEP WHILE SITTING INACTIVE IN A PUBLIC PLACE: WOULD NEVER DOZE
HOW LIKELY ARE YOU TO NOD OFF OR FALL ASLEEP WHILE SITTING AND TALKING TO SOMEONE: WOULD NEVER DOZE
HOW LIKELY ARE YOU TO NOD OFF OR FALL ASLEEP WHILE SITTING AND READING: SLIGHT CHANCE OF DOZING
HOW LIKELY ARE YOU TO NOD OFF OR FALL ASLEEP IN A CAR, WHILE STOPPED FOR A FEW MINUTES IN TRAFFIC: WOULD NEVER DOZE

## 2024-09-20 ENCOUNTER — TELEPHONE (OUTPATIENT)
Dept: FAMILY MEDICINE CLINIC | Age: 74
End: 2024-09-20

## 2024-09-20 DIAGNOSIS — G47.419 PRIMARY NARCOLEPSY WITHOUT CATAPLEXY: ICD-10-CM

## 2024-09-20 RX ORDER — ARMODAFINIL 150 MG/1
150 TABLET ORAL DAILY
Qty: 90 TABLET | Refills: 0 | Status: SHIPPED | OUTPATIENT
Start: 2024-09-20 | End: 2024-12-19

## 2024-09-24 ENCOUNTER — OFFICE VISIT (OUTPATIENT)
Dept: ORTHOPEDIC SURGERY | Age: 74
End: 2024-09-24
Payer: MEDICARE

## 2024-09-24 DIAGNOSIS — S92.352A DISPLACED FRACTURE OF FIFTH METATARSAL BONE, LEFT FOOT, INITIAL ENCOUNTER FOR CLOSED FRACTURE: Primary | ICD-10-CM

## 2024-09-24 PROCEDURE — 99213 OFFICE O/P EST LOW 20 MIN: CPT | Performed by: ORTHOPAEDIC SURGERY

## 2024-09-24 PROCEDURE — 1123F ACP DISCUSS/DSCN MKR DOCD: CPT | Performed by: ORTHOPAEDIC SURGERY

## 2024-10-02 RX ORDER — PANTOPRAZOLE SODIUM 40 MG/1
TABLET, DELAYED RELEASE ORAL
Qty: 180 TABLET | Refills: 1 | Status: SHIPPED | OUTPATIENT
Start: 2024-10-02

## 2024-10-02 NOTE — TELEPHONE ENCOUNTER
Future Appointments   Date Time Provider Department Center   11/14/2024 10:20 AM Brian Mercado DO [facility] ECC DEP   11/21/2024 11:00 AM Ferdinand Navarro MD [facility]   8/6/2025 11:00 AM Alcides Alcantar MD [facility] NEURO Neurology -     LOV 8/21/2024

## 2024-10-12 DIAGNOSIS — I25.9 CHRONIC ISCHEMIC HEART DISEASE: ICD-10-CM

## 2024-10-12 DIAGNOSIS — I25.10 CORONARY ARTERY DISEASE INVOLVING NATIVE CORONARY ARTERY OF NATIVE HEART WITHOUT ANGINA PECTORIS: ICD-10-CM

## 2024-10-14 RX ORDER — FUROSEMIDE 40 MG
40 TABLET ORAL DAILY
Qty: 90 TABLET | Refills: 0 | Status: SHIPPED | OUTPATIENT
Start: 2024-10-14

## 2024-10-25 ENCOUNTER — TELEPHONE (OUTPATIENT)
Dept: FAMILY MEDICINE CLINIC | Age: 74
End: 2024-10-25

## 2024-10-25 NOTE — TELEPHONE ENCOUNTER
Wilma from [facility] is calling to let dr younger know this patient reported having financial issues. She tried to call her but she didn't answer. She said is has to report it to the PCP.

## 2024-11-10 DIAGNOSIS — I25.10 CORONARY ARTERY DISEASE INVOLVING NATIVE CORONARY ARTERY OF NATIVE HEART WITHOUT ANGINA PECTORIS: ICD-10-CM

## 2024-11-10 DIAGNOSIS — E78.5 DYSLIPIDEMIA: ICD-10-CM

## 2024-11-11 ENCOUNTER — TELEPHONE (OUTPATIENT)
Dept: FAMILY MEDICINE CLINIC | Age: 74
End: 2024-11-11

## 2024-11-11 RX ORDER — LEVOTHYROXINE SODIUM 112 UG/1
TABLET ORAL
Qty: 90 TABLET | Refills: 0 | Status: SHIPPED | OUTPATIENT
Start: 2024-11-11

## 2024-11-11 RX ORDER — ROSUVASTATIN CALCIUM 5 MG/1
TABLET, COATED ORAL
Qty: 90 TABLET | Refills: 2 | Status: SHIPPED | OUTPATIENT
Start: 2024-11-11

## 2024-11-11 RX ORDER — FOLIC ACID 1 MG/1
1000 TABLET ORAL DAILY
Qty: 30 TABLET | Refills: 0 | Status: SHIPPED | OUTPATIENT
Start: 2024-11-11

## 2024-11-11 RX ORDER — ISOSORBIDE MONONITRATE 30 MG/1
TABLET, EXTENDED RELEASE ORAL
Qty: 90 TABLET | Refills: 2 | Status: SHIPPED | OUTPATIENT
Start: 2024-11-11

## 2024-11-11 NOTE — TELEPHONE ENCOUNTER
LMTCB to reschedule  Future Appointments   Date Time Provider Department Center   11/14/2024 10:20 AM Brian Mercado DO [place] ECC DEP   11/21/2024 11:00 AM Ferdinand Navarro MD [facility] CAR [facility]   8/6/2025 11:00 AM Alcides Alcantar MD [facility] NEURO Neurology -       Dr. Mercado not in office that morning

## 2024-11-11 NOTE — TELEPHONE ENCOUNTER
Future Appointments   Date Time Provider Department Center   11/14/2024 10:20 AM Brian Mercado DO [place] ECC DEP   11/21/2024 11:00 AM Ferdinand Navarro MD [facility]   8/6/2025 11:00 AM Alcides Alcantar MD [facility] NEURO Neurology -     LOV 8/21/2024

## 2024-11-13 NOTE — PROGRESS NOTES
[facility] Office Note  11/21/2024     Subjective:  Ms. Dean is here to follow up for CAD, HTN, Coronary microvascular dysfunction,HLD, h/o meningioma 5/14/19 with 5 radiation treatments.  C/o no cardiac complaints    HPI:   Today, she is doing well from a cardiac standpoint and has no difficulties doing her daily activities. Patient denies current edema, chest pain, shortness of breath, palpitations, dizziness or syncope. Patient is taking all cardiac medications as prescribed and tolerates them well. Patient is going to be moving at the end of December, she is moving to [place]. She is going to get  next month.        Patient is vaccinated against Covid. Pfizer 2/2    PMI:  transferred care from Dr. King  Last seen by Dr King she was severely bradycardiac and was thought to be drug related.  She wore a 30 day monitor 9/16/17 and showed NSR with ave of 58 bpm.  [place] then had an abnormal Lexiscan 11/19/20 and underwent LHC 12/9/20 which showed mild nonobstructive CAD with medical management.  Echo 2/15/23 showed LVEF=67% mild septal hypertrophy, sigmoid at basal inferoseptal and inferior wall segments. RA moderately enlarged.   Admitted 1/2024 for chest pain. No acute changes on EKG, troponin negative. Echo showed EF 60-65%, no WMAs. Outpatient workup for amyloid recommended (due to low voltage EKG, LVH, SANDY, carpal tunnel history).   Cardiac MRI 5.7.24 neg for amyloid cardiac disease.    12 point ROS negative in all areas as listed below except as in [place]  Constitutional, EENT, Cardiovascular, pulmonary, GI, , Musculoskeletal, skin, neurological, hematological, endocrine, Psychiatric      Reviewed past medical history, social, and family history.   Smoking:no  Alcohol:no  Recreational Drugs:no  Family History: no significant on review    Past Medical History:   Diagnosis Date    Acid reflux     small stomach ulcer    Angina pectoris, variant (HCC)     Anxiety     Arthritis     Asthma

## 2024-11-19 ENCOUNTER — OFFICE VISIT (OUTPATIENT)
Dept: FAMILY MEDICINE CLINIC | Age: 74
End: 2024-11-19
Payer: MEDICARE

## 2024-11-19 ENCOUNTER — HOSPITAL ENCOUNTER (OUTPATIENT)
Dept: GENERAL RADIOLOGY | Age: 74
Discharge: HOME | End: 2024-11-19
Payer: MEDICARE

## 2024-11-19 VITALS
WEIGHT: 185 LBS | RESPIRATION RATE: 16 BRPM | BODY MASS INDEX: 32.77 KG/M2 | HEART RATE: 54 BPM | TEMPERATURE: 97.8 F | DIASTOLIC BLOOD PRESSURE: 72 MMHG | SYSTOLIC BLOOD PRESSURE: 108 MMHG | OXYGEN SATURATION: 97 %

## 2024-11-19 DIAGNOSIS — I20.89 SYNDROME X, CARDIAC (HCC): ICD-10-CM

## 2024-11-19 DIAGNOSIS — M54.6 THORACIC SPINE PAIN: ICD-10-CM

## 2024-11-19 DIAGNOSIS — I10 ESSENTIAL HYPERTENSION: ICD-10-CM

## 2024-11-19 DIAGNOSIS — G47.419 PRIMARY NARCOLEPSY WITHOUT CATAPLEXY: ICD-10-CM

## 2024-11-19 DIAGNOSIS — Z12.31 ENCOUNTER FOR SCREENING MAMMOGRAM FOR BREAST CANCER: ICD-10-CM

## 2024-11-19 DIAGNOSIS — M54.6 THORACIC SPINE PAIN: Primary | ICD-10-CM

## 2024-11-19 DIAGNOSIS — E03.9 HYPOTHYROIDISM, UNSPECIFIED TYPE: ICD-10-CM

## 2024-11-19 DIAGNOSIS — F33.0 MILD EPISODE OF RECURRENT MAJOR DEPRESSIVE DISORDER (HCC): ICD-10-CM

## 2024-11-19 DIAGNOSIS — M46.90 INFLAMMATORY SPONDYLOPATHY, UNSPECIFIED SPINAL REGION (HCC): ICD-10-CM

## 2024-11-19 PROCEDURE — 99214 OFFICE O/P EST MOD 30 MIN: CPT | Performed by: FAMILY MEDICINE

## 2024-11-19 PROCEDURE — 72070 X-RAY EXAM THORAC SPINE 2VWS: CPT

## 2024-11-19 PROCEDURE — 3078F DIAST BP <80 MM HG: CPT | Performed by: FAMILY MEDICINE

## 2024-11-19 PROCEDURE — 1123F ACP DISCUSS/DSCN MKR DOCD: CPT | Performed by: FAMILY MEDICINE

## 2024-11-19 PROCEDURE — 3074F SYST BP LT 130 MM HG: CPT | Performed by: FAMILY MEDICINE

## 2024-11-19 RX ORDER — ROPINIROLE 1 MG/1
1 TABLET, FILM COATED ORAL 2 TIMES DAILY
Qty: 180 TABLET | Refills: 1 | Status: SHIPPED | OUTPATIENT
Start: 2024-11-19

## 2024-11-19 RX ORDER — TRIAMCINOLONE ACETONIDE 1 MG/G
OINTMENT TOPICAL
Qty: 80 G | Refills: 1 | Status: SHIPPED | OUTPATIENT
Start: 2024-11-19 | End: 2024-11-26

## 2024-11-19 SDOH — ECONOMIC STABILITY: FOOD INSECURITY: WITHIN THE PAST 12 MONTHS, YOU WORRIED THAT YOUR FOOD WOULD RUN OUT BEFORE YOU GOT MONEY TO BUY MORE.: NEVER TRUE

## 2024-11-19 SDOH — ECONOMIC STABILITY: FOOD INSECURITY: WITHIN THE PAST 12 MONTHS, THE FOOD YOU BOUGHT JUST DIDN'T LAST AND YOU DIDN'T HAVE MONEY TO GET MORE.: NEVER TRUE

## 2024-11-19 SDOH — ECONOMIC STABILITY: INCOME INSECURITY: HOW HARD IS IT FOR YOU TO PAY FOR THE VERY BASICS LIKE FOOD, HOUSING, MEDICAL CARE, AND HEATING?: NOT HARD AT ALL

## 2024-11-19 ASSESSMENT — PATIENT HEALTH QUESTIONNAIRE - PHQ9
7. TROUBLE CONCENTRATING ON THINGS, SUCH AS READING THE NEWSPAPER OR WATCHING TELEVISION: NOT AT ALL
SUM OF ALL RESPONSES TO PHQ9 QUESTIONS 1 & 2: 1
SUM OF ALL RESPONSES TO PHQ QUESTIONS 1-9: 1
SUM OF ALL RESPONSES TO PHQ QUESTIONS 1-9: 1
1. LITTLE INTEREST OR PLEASURE IN DOING THINGS: NOT AT ALL
SUM OF ALL RESPONSES TO PHQ QUESTIONS 1-9: 1
9. THOUGHTS THAT YOU WOULD BE BETTER OFF DEAD, OR OF HURTING YOURSELF: NOT AT ALL
2. FEELING DOWN, DEPRESSED OR HOPELESS: SEVERAL DAYS
8. MOVING OR SPEAKING SO SLOWLY THAT OTHER PEOPLE COULD HAVE NOTICED. OR THE OPPOSITE, BEING SO FIGETY OR RESTLESS THAT YOU HAVE BEEN MOVING AROUND A LOT MORE THAN USUAL: NOT AT ALL
10. IF YOU CHECKED OFF ANY PROBLEMS, HOW DIFFICULT HAVE THESE PROBLEMS MADE IT FOR YOU TO DO YOUR WORK, TAKE CARE OF THINGS AT HOME, OR GET ALONG WITH OTHER PEOPLE: NOT DIFFICULT AT ALL
4. FEELING TIRED OR HAVING LITTLE ENERGY: NOT AT ALL
5. POOR APPETITE OR OVEREATING: NOT AT ALL
6. FEELING BAD ABOUT YOURSELF - OR THAT YOU ARE A FAILURE OR HAVE LET YOURSELF OR YOUR FAMILY DOWN: NOT AT ALL
3. TROUBLE FALLING OR STAYING ASLEEP: NOT AT ALL
SUM OF ALL RESPONSES TO PHQ QUESTIONS 1-9: 1

## 2024-11-19 NOTE — PROGRESS NOTES
Subjective:      Patient ID: Graciela Dean is a 74 y.o. y.o. female.  General check    Moving to [place]  and getting  end of the year. Discussed getting her records and meds.    Meds and histories reviewed        HPI      Chief Complaint   Patient presents with    6 Month Follow-Up     Fasting today       Allergies   Allergen Reactions    Latex Rash    Vistaril [Hydroxyzine Hcl]        Past Medical History:   Diagnosis Date    Acid reflux     small stomach ulcer    Angina pectoris, variant (HCC)     Anxiety     Arthritis     Asthma     Benign esophageal stricture 12/2016    Benign tumor 03/20/2017     Benign brain tumor size of a quater    Cancer (HCC)     ovarian    Depression     Diabetes (HCC)     Frequency     Headache     Hyperlipidemia     Hypertension     Hypothyroidism     Irritable bowel syndrome     Kidney stones     MI, old     Narcolepsy     Obesity     Restless legs syndrome     Sleep apnea     Thyroid disease     Urgency of urination     Urinary incontinence        Past Surgical History:   Procedure Laterality Date    ARM SURGERY Right 08/19/2014    exc.trapezium and flexor carpi radialis interpositional arthroplasty    BREAST REDUCTION SURGERY      BREAST SURGERY      reduction    CARPAL TUNNEL RELEASE Right     CHOLECYSTECTOMY      COLONOSCOPY      COSMETIC SURGERY  3/6/2022    Had surgery on left side of nose to cover up hole using skin from eyelid from right eye.    DENTAL SURGERY      ENDOSCOPY, COLON, DIAGNOSTIC  01/06/2017    Anastomotic ulcer, gastritis    ESOPHAGEAL DILATATION  12/2016    GASTRIC BYPASS SURGERY      GASTRIC BYPASS SURGERY      HERNIA REPAIR      Had a hiatal hernia removed when they done my gastric  bipass.    HYSTERECTOMY (CERVIX STATUS UNKNOWN)      HYSTERECTOMY, VAGINAL  12/26/1990    Had ovarian cancer and then had the surgery.    LITHOTRIPSY Left 11/12/2013    LITHOTRIPSY Left 12/26/2013    LEFT ESWL    MOHS SURGERY  03/07/2022    Left cheek    OVARY REMOVAL      CA

## 2024-11-19 NOTE — PATIENT INSTRUCTIONS
New dose of restless leg med    New medication for the rash on the foot    Get the x-ray and check on results in 2 days

## 2024-11-21 ENCOUNTER — OFFICE VISIT (OUTPATIENT)
Dept: CARDIOLOGY CLINIC | Age: 74
End: 2024-11-21

## 2024-11-21 ENCOUNTER — TELEPHONE (OUTPATIENT)
Dept: FAMILY MEDICINE CLINIC | Age: 74
End: 2024-11-21

## 2024-11-21 VITALS
HEART RATE: 62 BPM | HEIGHT: 63 IN | BODY MASS INDEX: 33.56 KG/M2 | SYSTOLIC BLOOD PRESSURE: 110 MMHG | DIASTOLIC BLOOD PRESSURE: 60 MMHG | OXYGEN SATURATION: 97 % | WEIGHT: 189.4 LBS

## 2024-11-21 DIAGNOSIS — I25.10 CORONARY ARTERY DISEASE INVOLVING NATIVE CORONARY ARTERY OF NATIVE HEART WITHOUT ANGINA PECTORIS: Primary | ICD-10-CM

## 2024-11-21 DIAGNOSIS — E78.2 MIXED HYPERLIPIDEMIA: ICD-10-CM

## 2024-11-21 DIAGNOSIS — I10 ESSENTIAL HYPERTENSION: ICD-10-CM

## 2024-11-21 DIAGNOSIS — Z79.899 MEDICATION MANAGEMENT: ICD-10-CM

## 2024-11-21 DIAGNOSIS — I25.85 CHRONIC CORONARY MICROVASCULAR DYSFUNCTION: ICD-10-CM

## 2024-11-21 DIAGNOSIS — I51.7 LVH (LEFT VENTRICULAR HYPERTROPHY): ICD-10-CM

## 2024-11-21 RX ORDER — FUROSEMIDE 40 MG/1
40 TABLET ORAL DAILY
Qty: 90 TABLET | Refills: 3 | Status: SHIPPED | OUTPATIENT
Start: 2024-11-21

## 2024-11-21 RX ORDER — VALSARTAN 160 MG/1
TABLET ORAL
Qty: 90 TABLET | Refills: 3 | Status: SHIPPED | OUTPATIENT
Start: 2024-11-21

## 2024-11-21 NOTE — TELEPHONE ENCOUNTER
Also patient said [facility] is going to send over papers for  to fill out and fax back to [facility]. Patient is on a c pap machine.

## 2024-11-21 NOTE — PATIENT INSTRUCTIONS
Labs reviewed in epic and discussed with patient.  Current medications reviewed.  Refills given as warranted.  Please obtain the following labs; -LIPID FASTING, CBC, CMP, TSH  When you establish with a new cardiologist, they can reach out to obtain medical records.     Good luck on your new marriage, congrats! Good luck and safe travels on your move.

## 2024-11-25 RX ORDER — ALENDRONATE SODIUM 70 MG/1
70 TABLET ORAL
Qty: 4 TABLET | Refills: 6 | Status: SHIPPED | OUTPATIENT
Start: 2024-11-25

## 2024-11-25 NOTE — TELEPHONE ENCOUNTER
Future Appointments   Date Time Provider Department Center   8/6/2025 11:00 AM Alcides Alcantar MD [facility] NEURO Neurology -     LOV 11/19/2024

## 2024-11-29 ASSESSMENT — ENCOUNTER SYMPTOMS
SHORTNESS OF BREATH: 0
DIARRHEA: 0
BLOOD IN STOOL: 0

## 2024-12-10 RX ORDER — SUCRALFATE 1 G/1
1 TABLET ORAL 4 TIMES DAILY
Qty: 360 TABLET | Refills: 1 | Status: SHIPPED | OUTPATIENT
Start: 2024-12-10

## 2024-12-16 RX ORDER — FOLIC ACID 1 MG/1
1000 TABLET ORAL DAILY
Qty: 30 TABLET | Refills: 5 | Status: SHIPPED | OUTPATIENT
Start: 2024-12-16

## 2024-12-16 NOTE — TELEPHONE ENCOUNTER
Lov 11/19/2024    Future Appointments   Date Time Provider Department Center   8/6/2025 11:00 AM Alcides Alcantar MD [facility] NEURO Neurology -

## 2025-01-02 DIAGNOSIS — G47.419 PRIMARY NARCOLEPSY WITHOUT CATAPLEXY: ICD-10-CM

## 2025-01-02 RX ORDER — ARMODAFINIL 150 MG/1
TABLET ORAL
Qty: 90 TABLET | Refills: 0 | Status: SHIPPED | OUTPATIENT
Start: 2025-01-02 | End: 2025-02-01

## 2025-01-15 RX ORDER — TRIAMCINOLONE ACETONIDE 1 MG/G
OINTMENT TOPICAL
Qty: 80 G | Refills: 0 | Status: SHIPPED | OUTPATIENT
Start: 2025-01-15

## 2025-01-27 RX ORDER — TRIAMCINOLONE ACETONIDE 1 MG/G
OINTMENT TOPICAL
Qty: 80 G | Refills: 0 | Status: SHIPPED | OUTPATIENT
Start: 2025-01-27

## 2025-01-27 NOTE — TELEPHONE ENCOUNTER
Future Appointments   Date Time Provider Department Center   1/29/2025  2:30 PM [facility] MRI RM 1 [place] [facility] MRI [facility]   8/6/2025 11:00 AM Alcides Alcantar MD [facility] NEURO Neurology -     LOV 11/19/2024

## 2025-01-29 ENCOUNTER — HOSPITAL ENCOUNTER (OUTPATIENT)
Dept: MRI IMAGING | Age: 75
Discharge: HOME | End: 2025-01-29
Attending: NEUROLOGICAL SURGERY
Payer: MEDICARE

## 2025-01-29 DIAGNOSIS — D32.9 MENINGIOMA (HCC): ICD-10-CM

## 2025-01-29 PROCEDURE — 70553 MRI BRAIN STEM W/O & W/DYE: CPT

## 2025-01-29 PROCEDURE — A9576 INJ PROHANCE MULTIPACK: HCPCS | Performed by: NEUROLOGICAL SURGERY

## 2025-01-29 PROCEDURE — 6360000004 HC RX CONTRAST MEDICATION: Performed by: NEUROLOGICAL SURGERY

## 2025-01-29 RX ADMIN — GADOTERIDOL 20 ML: 279.3 INJECTION, SOLUTION INTRAVENOUS at 15:15

## 2025-02-03 ENCOUNTER — TELEPHONE (OUTPATIENT)
Dept: FAMILY MEDICINE CLINIC | Age: 75
End: 2025-02-03

## 2025-02-03 DIAGNOSIS — N64.4 BREAST PAIN, LEFT: Primary | ICD-10-CM

## 2025-02-03 NOTE — TELEPHONE ENCOUNTER
Patient called  She needs an order for mammogram  Diagnotic bilateral mammogram  US left breast (having pain)    Call pt once ordered

## 2025-02-07 ENCOUNTER — TELEPHONE (OUTPATIENT)
Dept: ADMINISTRATIVE | Age: 75
End: 2025-02-07

## 2025-02-07 DIAGNOSIS — G47.419 PRIMARY NARCOLEPSY WITHOUT CATAPLEXY: ICD-10-CM

## 2025-02-07 RX ORDER — ARMODAFINIL 150 MG/1
150 TABLET ORAL DAILY
Qty: 30 TABLET | Refills: 0 | Status: CANCELLED | OUTPATIENT
Start: 2025-02-07 | End: 2025-03-09

## 2025-02-07 RX ORDER — ARMODAFINIL 150 MG/1
150 TABLET ORAL DAILY
Qty: 90 TABLET | Refills: 0 | Status: SHIPPED | OUTPATIENT
Start: 2025-02-07 | End: 2025-05-08

## 2025-02-07 NOTE — TELEPHONE ENCOUNTER
Submitted PA for Armodafinil 150MG tablets  Via [facility] [id number]  STATUS: not sent    There is not a current RX on file.    If you want PA please submit new RX, and resend this PA request back to the pool    If this requires a response please respond to the pool ( P MHC[facility] PSC MEDICATION PRE-AUTH).      Thank you please advise patient.      Follow up done daily; if no decision with in three days we will refax.  If another three days goes by with no decision will call the insurance for status.

## 2025-02-10 NOTE — TELEPHONE ENCOUNTER
RX available     Submitted PA for Armodafinil 150MG tablets  Via [facility] [id number]  STATUS: PENDING.    Follow up done daily; if no decision with in three days we will refax.  If another three days goes by with no decision will call the insurance for status.

## 2025-02-10 NOTE — TELEPHONE ENCOUNTER
The medication is APPROVED.    Outcome  Approved today by [employer] 2017  PA Case: [id number], Status: Approved, Coverage Starts on: 11/11/2024 12:00:00 AM, Coverage Ends on: 2/10/2026 12:00:00 AM.  Authorization Expiration Date: 2/9/2026    If this requires a response please respond to the pool ( P MHCX PSC MEDICATION PRE-AUTH).      Thank you please advise patient.

## 2025-02-12 RX ORDER — LEVOTHYROXINE SODIUM 112 UG/1
TABLET ORAL
Qty: 90 TABLET | Refills: 0 | Status: SHIPPED | OUTPATIENT
Start: 2025-02-12

## 2025-02-12 NOTE — TELEPHONE ENCOUNTER
Future Appointments   Date Time Provider Department Center   2/19/2025 10:30 AM [facility]   2/19/2025 11:00 AM [facility] US RM 2 [facility] ULTRA [place] [facility]   8/6/2025 11:00 AM Alcides Alcantar MD [facility] NEURO Neurology -     LOV 11/19/2024

## 2025-02-19 ENCOUNTER — HOSPITAL ENCOUNTER (OUTPATIENT)
Dept: MAMMOGRAPHY | Age: 75
Discharge: HOME | End: 2025-02-19
Payer: MEDICARE

## 2025-02-19 ENCOUNTER — HOSPITAL ENCOUNTER (OUTPATIENT)
Dept: ULTRASOUND IMAGING | Age: 75
Discharge: HOME | End: 2025-02-19
Payer: MEDICARE

## 2025-02-19 ENCOUNTER — HOSPITAL ENCOUNTER (OUTPATIENT)
Age: 75
Discharge: HOME | End: 2025-02-19
Payer: MEDICARE

## 2025-02-19 DIAGNOSIS — Z79.899 MEDICATION MANAGEMENT: ICD-10-CM

## 2025-02-19 DIAGNOSIS — N64.4 BREAST PAIN, LEFT: ICD-10-CM

## 2025-02-19 LAB
ALBUMIN SERPL-MCNC: 4.3 G/DL (ref 3.4–5)
ALBUMIN/GLOB SERPL: 1.6 {RATIO} (ref 1.1–2.2)
ALP SERPL-CCNC: 71 U/L (ref 40–129)
ALT SERPL-CCNC: 16 U/L (ref 10–40)
ANION GAP SERPL CALCULATED.3IONS-SCNC: 10 MMOL/L (ref 3–16)
AST SERPL-CCNC: 17 U/L (ref 15–37)
BASOPHILS # BLD: 0.1 K/UL (ref 0–0.2)
BASOPHILS NFR BLD: 0.9 %
BILIRUB SERPL-MCNC: 0.4 MG/DL (ref 0–1)
BUN SERPL-MCNC: 24 MG/DL (ref 7–20)
CALCIUM SERPL-MCNC: 9.3 MG/DL (ref 8.3–10.6)
CHLORIDE SERPL-SCNC: 108 MMOL/L (ref 99–110)
CHOLEST SERPL-MCNC: 173 MG/DL (ref 0–199)
CO2 SERPL-SCNC: 23 MMOL/L (ref 21–32)
CREAT SERPL-MCNC: 1.1 MG/DL (ref 0.6–1.2)
DEPRECATED RDW RBC AUTO: 14.4 % (ref 12.4–15.4)
EOSINOPHIL # BLD: 0.1 K/UL (ref 0–0.6)
EOSINOPHIL NFR BLD: 1.3 %
GFR SERPLBLD CREATININE-BSD FMLA CKD-EPI: 53 ML/MIN/{1.73_M2}
GLUCOSE SERPL-MCNC: 90 MG/DL (ref 70–99)
HCT VFR BLD AUTO: 35.7 % (ref 36–48)
HDLC SERPL-MCNC: 61 MG/DL (ref 40–60)
HGB BLD-MCNC: 12 G/DL (ref 12–16)
LDLC SERPL CALC-MCNC: 84 MG/DL
LYMPHOCYTES # BLD: 1.9 K/UL (ref 1–5.1)
LYMPHOCYTES NFR BLD: 34.7 %
MCH RBC QN AUTO: 33.4 PG (ref 26–34)
MCHC RBC AUTO-ENTMCNC: 33.7 G/DL (ref 31–36)
MCV RBC AUTO: 99.1 FL (ref 80–100)
MONOCYTES # BLD: 0.3 K/UL (ref 0–1.3)
MONOCYTES NFR BLD: 6.1 %
NEUTROPHILS # BLD: 3.2 K/UL (ref 1.7–7.7)
NEUTROPHILS NFR BLD: 57 %
PLATELET # BLD AUTO: 145 K/UL (ref 135–450)
PMV BLD AUTO: 8.3 FL (ref 5–10.5)
POTASSIUM SERPL-SCNC: 3.6 MMOL/L (ref 3.5–5.1)
PROT SERPL-MCNC: 7 G/DL (ref 6.4–8.2)
RBC # BLD AUTO: 3.6 M/UL (ref 4–5.2)
SODIUM SERPL-SCNC: 141 MMOL/L (ref 136–145)
T4 FREE SERPL-MCNC: 1.2 NG/DL (ref 0.9–1.8)
TRIGL SERPL-MCNC: 140 MG/DL (ref 0–150)
TSH SERPL DL<=0.005 MIU/L-ACNC: 5.4 UIU/ML (ref 0.27–4.2)
VLDLC SERPL CALC-MCNC: 28 MG/DL
WBC # BLD AUTO: 5.6 K/UL (ref 4–11)

## 2025-02-19 PROCEDURE — 36415 COLL VENOUS BLD VENIPUNCTURE: CPT

## 2025-02-19 PROCEDURE — 80061 LIPID PANEL: CPT

## 2025-02-19 PROCEDURE — 85025 COMPLETE CBC W/AUTO DIFF WBC: CPT

## 2025-02-19 PROCEDURE — 76642 ULTRASOUND BREAST LIMITED: CPT

## 2025-02-19 PROCEDURE — 80053 COMPREHEN METABOLIC PANEL: CPT

## 2025-02-19 PROCEDURE — 84443 ASSAY THYROID STIM HORMONE: CPT

## 2025-02-19 PROCEDURE — 77066 DX MAMMO INCL CAD BI: CPT

## 2025-02-19 PROCEDURE — 84439 ASSAY OF FREE THYROXINE: CPT

## 2025-02-20 NOTE — RESULT ENCOUNTER NOTE
Ferdinand Navarro MD  P [facility] Cardio Practice Staff  Blood test looks good.    LMOVM for pt to return call back.

## 2025-02-21 ENCOUNTER — TELEPHONE (OUTPATIENT)
Dept: FAMILY MEDICINE CLINIC | Age: 75
End: 2025-02-21

## 2025-02-21 NOTE — TELEPHONE ENCOUNTER
Patient would like dr younger to call her to go over some test results she just got from dr barber. Please advise

## 2025-02-21 NOTE — TELEPHONE ENCOUNTER
Called and spoke with patient patient stated that lab came back fine but stated that she believe her thyroid is not acting right. says all she wants to do is sleep and stated that it is usually a sign that something is wrong with her thyroid. Wanting to know if she can repeat the lab work . Stated that she is taking her levothyroxine daily but feel that something is wrong , also wanted to let you know Mammo was done  and that everything came back fine.

## 2025-02-24 DIAGNOSIS — Z79.899 MEDICATION MANAGEMENT: ICD-10-CM

## 2025-02-24 DIAGNOSIS — E03.9 HYPOTHYROIDISM, UNSPECIFIED TYPE: Primary | ICD-10-CM

## 2025-02-24 DIAGNOSIS — G47.419 PRIMARY NARCOLEPSY WITHOUT CATAPLEXY: ICD-10-CM

## 2025-02-24 RX ORDER — ESCITALOPRAM OXALATE 20 MG/1
20 TABLET ORAL DAILY
Qty: 90 TABLET | Refills: 1 | Status: ON HOLD | OUTPATIENT
Start: 2025-02-24

## 2025-02-24 RX ORDER — LEVOTHYROXINE SODIUM 125 UG/1
125 TABLET ORAL DAILY
Qty: 90 TABLET | Refills: 1 | Status: ON HOLD | OUTPATIENT
Start: 2025-02-24

## 2025-02-24 RX ORDER — SOY ISOFLAVONE 40 MG
1000 TABLET ORAL DAILY
Qty: 180 CAPSULE | Refills: 1 | Status: ON HOLD | OUTPATIENT
Start: 2025-02-24

## 2025-02-24 NOTE — TELEPHONE ENCOUNTER
Tell pt the phone service cut us off the other day.  I apologize  I will send in a script for thyroid medication.  Increased dose,  See me in 3-4 months for blood recheck please.

## 2025-02-24 NOTE — TELEPHONE ENCOUNTER
Called & spoke to pt  She made an apt for thyroid  But, also wanted to discuss medication for narcolepsy  Thought it might be part of that - increase that strength?    Also, pt states her hair is falling out - its been going on for awhile but now getting more aggravating - anything she could be taking? She is finally letting her hair grow    Please advise

## 2025-02-24 NOTE — TELEPHONE ENCOUNTER
Future Appointments   Date Time Provider Department [place]   3/7/2025 12:45 PM Mojgan Strickland APRN - CNP [facility] UROGYN [facility]   8/6/2025 11:00 AM Alcides Alcantar MD [facility] NEURO Neurology -     LOV 11/19/2024

## 2025-02-27 RX ORDER — TRIAMCINOLONE ACETONIDE 1 MG/G
OINTMENT TOPICAL
Qty: 80 G | Refills: 0 | Status: ON HOLD | OUTPATIENT
Start: 2025-02-27

## 2025-02-27 NOTE — TELEPHONE ENCOUNTER
Future Appointments   Date Time Provider Department Center   3/7/2025 12:45 PM Mojgan Strickland APRN - CNP [facility] UROGYN [facility]   5/27/2025 11:00 AM Brian Mercado DO [facility] DEP   8/6/2025 11:00 AM Alcides Alcantar MD [facility] NEURO Neurology -     LOV 11/19/2024

## 2025-02-28 ENCOUNTER — HOSPITAL ENCOUNTER (EMERGENCY)
Age: 75
Discharge: ANOTHER HOSPITAL | End: 2025-02-28
Attending: STUDENT IN AN ORGANIZED HEALTH CARE EDUCATION/TRAINING PROGRAM
Payer: MEDICARE

## 2025-02-28 ENCOUNTER — APPOINTMENT (OUTPATIENT)
Dept: CT IMAGING | Age: 75
End: 2025-02-28
Payer: MEDICARE

## 2025-02-28 VITALS
HEART RATE: 55 BPM | RESPIRATION RATE: 22 BRPM | DIASTOLIC BLOOD PRESSURE: 66 MMHG | TEMPERATURE: 97.7 F | OXYGEN SATURATION: 97 % | SYSTOLIC BLOOD PRESSURE: 105 MMHG

## 2025-02-28 DIAGNOSIS — R94.31 PROLONGED Q-T INTERVAL ON ECG: Primary | ICD-10-CM

## 2025-02-28 DIAGNOSIS — R00.1 BRADYCARDIA: ICD-10-CM

## 2025-02-28 DIAGNOSIS — E83.42 HYPOMAGNESEMIA: ICD-10-CM

## 2025-02-28 DIAGNOSIS — N39.0 URINARY TRACT INFECTION IN FEMALE: ICD-10-CM

## 2025-02-28 DIAGNOSIS — E87.6 HYPOKALEMIA: ICD-10-CM

## 2025-02-28 DIAGNOSIS — R10.12 LEFT UPPER QUADRANT ABDOMINAL PAIN: ICD-10-CM

## 2025-02-28 LAB
ALBUMIN SERPL-MCNC: 3.9 G/DL (ref 3.4–5)
ALBUMIN/GLOB SERPL: 1.2 {RATIO} (ref 1.1–2.2)
ALP SERPL-CCNC: 74 U/L (ref 40–129)
ALT SERPL-CCNC: 13 U/L (ref 10–40)
AMORPH SED URNS QL MICRO: ABNORMAL /HPF
ANION GAP SERPL CALCULATED.3IONS-SCNC: 10 MMOL/L (ref 3–16)
AST SERPL-CCNC: 15 U/L (ref 15–37)
BACTERIA URNS QL MICRO: ABNORMAL /HPF
BASOPHILS # BLD: 0 K/UL (ref 0–0.2)
BASOPHILS NFR BLD: 0.3 %
BILIRUB SERPL-MCNC: 0.3 MG/DL (ref 0–1)
BILIRUB UR QL STRIP.AUTO: NEGATIVE
BUN SERPL-MCNC: 22 MG/DL (ref 7–20)
CALCIUM SERPL-MCNC: 8.8 MG/DL (ref 8.3–10.6)
CHLORIDE SERPL-SCNC: 112 MMOL/L (ref 99–110)
CLARITY UR: ABNORMAL
CO2 SERPL-SCNC: 23 MMOL/L (ref 21–32)
COLOR UR: YELLOW
CREAT SERPL-MCNC: 0.8 MG/DL (ref 0.6–1.2)
DEPRECATED RDW RBC AUTO: 14.3 % (ref 12.4–15.4)
EOSINOPHIL # BLD: 0 K/UL (ref 0–0.6)
EOSINOPHIL NFR BLD: 0.3 %
EPI CELLS #/AREA URNS HPF: ABNORMAL /HPF (ref 0–5)
GFR SERPLBLD CREATININE-BSD FMLA CKD-EPI: 77 ML/MIN/{1.73_M2}
GLUCOSE SERPL-MCNC: 100 MG/DL (ref 70–99)
GLUCOSE UR STRIP.AUTO-MCNC: NEGATIVE MG/DL
HCT VFR BLD AUTO: 35.4 % (ref 36–48)
HGB BLD-MCNC: 12 G/DL (ref 12–16)
HGB UR QL STRIP.AUTO: ABNORMAL
KETONES UR STRIP.AUTO-MCNC: NEGATIVE MG/DL
LEUKOCYTE ESTERASE UR QL STRIP.AUTO: ABNORMAL
LIPASE SERPL-CCNC: 20 U/L (ref 13–60)
LYMPHOCYTES # BLD: 1.1 K/UL (ref 1–5.1)
LYMPHOCYTES NFR BLD: 19.6 %
MAGNESIUM SERPL-MCNC: 1.78 MG/DL (ref 1.8–2.4)
MCH RBC QN AUTO: 33.7 PG (ref 26–34)
MCHC RBC AUTO-ENTMCNC: 33.8 G/DL (ref 31–36)
MCV RBC AUTO: 99.8 FL (ref 80–100)
MONOCYTES # BLD: 0.3 K/UL (ref 0–1.3)
MONOCYTES NFR BLD: 4.4 %
MUCOUS THREADS #/AREA URNS LPF: ABNORMAL /LPF
NEUTROPHILS # BLD: 4.4 K/UL (ref 1.7–7.7)
NEUTROPHILS NFR BLD: 75.4 %
NITRITE UR QL STRIP.AUTO: POSITIVE
NT-PROBNP SERPL-MCNC: 612 PG/ML (ref 0–124)
PH UR STRIP.AUTO: 7 [PH] (ref 5–8)
PLATELET # BLD AUTO: 139 K/UL (ref 135–450)
PMV BLD AUTO: 7.4 FL (ref 5–10.5)
POTASSIUM SERPL-SCNC: 3.4 MMOL/L (ref 3.5–5.1)
PROT SERPL-MCNC: 7.1 G/DL (ref 6.4–8.2)
PROT UR STRIP.AUTO-MCNC: ABNORMAL MG/DL
RBC # BLD AUTO: 3.55 M/UL (ref 4–5.2)
RBC #/AREA URNS HPF: ABNORMAL /HPF (ref 0–4)
SODIUM SERPL-SCNC: 145 MMOL/L (ref 136–145)
SP GR UR STRIP.AUTO: 1.01 (ref 1–1.03)
TROPONIN, HIGH SENSITIVITY: 7 NG/L (ref 0–14)
TROPONIN, HIGH SENSITIVITY: 7 NG/L (ref 0–14)
UA COMPLETE W REFLEX CULTURE PNL UR: ABNORMAL
UA DIPSTICK W REFLEX MICRO PNL UR: YES
URN SPEC COLLECT METH UR: ABNORMAL
UROBILINOGEN UR STRIP-ACNC: 0.2 E.U./DL
WBC # BLD AUTO: 5.9 K/UL (ref 4–11)
WBC #/AREA URNS HPF: ABNORMAL /HPF (ref 0–5)

## 2025-02-28 PROCEDURE — 74177 CT ABD & PELVIS W/CONTRAST: CPT

## 2025-02-28 PROCEDURE — 83735 ASSAY OF MAGNESIUM: CPT

## 2025-02-28 PROCEDURE — 96372 THER/PROPH/DIAG INJ SC/IM: CPT

## 2025-02-28 PROCEDURE — 80053 COMPREHEN METABOLIC PANEL: CPT

## 2025-02-28 PROCEDURE — 96367 TX/PROPH/DG ADDL SEQ IV INF: CPT

## 2025-02-28 PROCEDURE — 36415 COLL VENOUS BLD VENIPUNCTURE: CPT

## 2025-02-28 PROCEDURE — 99285 EMERGENCY DEPT VISIT HI MDM: CPT

## 2025-02-28 PROCEDURE — 6360000004 HC RX CONTRAST MEDICATION: Performed by: STUDENT IN AN ORGANIZED HEALTH CARE EDUCATION/TRAINING PROGRAM

## 2025-02-28 PROCEDURE — 83690 ASSAY OF LIPASE: CPT

## 2025-02-28 PROCEDURE — 83880 ASSAY OF NATRIURETIC PEPTIDE: CPT

## 2025-02-28 PROCEDURE — 96375 TX/PRO/DX INJ NEW DRUG ADDON: CPT

## 2025-02-28 PROCEDURE — 84484 ASSAY OF TROPONIN QUANT: CPT

## 2025-02-28 PROCEDURE — 93005 ELECTROCARDIOGRAM TRACING: CPT | Performed by: STUDENT IN AN ORGANIZED HEALTH CARE EDUCATION/TRAINING PROGRAM

## 2025-02-28 PROCEDURE — 6360000002 HC RX W HCPCS: Performed by: STUDENT IN AN ORGANIZED HEALTH CARE EDUCATION/TRAINING PROGRAM

## 2025-02-28 PROCEDURE — 85025 COMPLETE CBC W/AUTO DIFF WBC: CPT

## 2025-02-28 PROCEDURE — 6370000000 HC RX 637 (ALT 250 FOR IP): Performed by: STUDENT IN AN ORGANIZED HEALTH CARE EDUCATION/TRAINING PROGRAM

## 2025-02-28 PROCEDURE — 96365 THER/PROPH/DIAG IV INF INIT: CPT

## 2025-02-28 PROCEDURE — 96361 HYDRATE IV INFUSION ADD-ON: CPT

## 2025-02-28 PROCEDURE — 81001 URINALYSIS AUTO W/SCOPE: CPT

## 2025-02-28 PROCEDURE — 2580000003 HC RX 258: Performed by: STUDENT IN AN ORGANIZED HEALTH CARE EDUCATION/TRAINING PROGRAM

## 2025-02-28 RX ORDER — 0.9 % SODIUM CHLORIDE 0.9 %
500 INTRAVENOUS SOLUTION INTRAVENOUS ONCE
Status: COMPLETED | OUTPATIENT
Start: 2025-02-28 | End: 2025-02-28

## 2025-02-28 RX ORDER — KETOROLAC TROMETHAMINE 15 MG/ML
15 INJECTION, SOLUTION INTRAMUSCULAR; INTRAVENOUS ONCE
Status: COMPLETED | OUTPATIENT
Start: 2025-02-28 | End: 2025-02-28

## 2025-02-28 RX ORDER — POTASSIUM CHLORIDE 1500 MG/1
40 TABLET, EXTENDED RELEASE ORAL ONCE
Status: COMPLETED | OUTPATIENT
Start: 2025-02-28 | End: 2025-02-28

## 2025-02-28 RX ORDER — IOPAMIDOL 755 MG/ML
75 INJECTION, SOLUTION INTRAVASCULAR
Status: COMPLETED | OUTPATIENT
Start: 2025-02-28 | End: 2025-02-28

## 2025-02-28 RX ORDER — ONDANSETRON 2 MG/ML
4 INJECTION INTRAMUSCULAR; INTRAVENOUS EVERY 6 HOURS PRN
Status: DISCONTINUED | OUTPATIENT
Start: 2025-02-28 | End: 2025-02-28

## 2025-02-28 RX ORDER — DICYCLOMINE HYDROCHLORIDE 10 MG/ML
20 INJECTION INTRAMUSCULAR ONCE
Status: COMPLETED | OUTPATIENT
Start: 2025-02-28 | End: 2025-02-28

## 2025-02-28 RX ORDER — MAGNESIUM SULFATE 1 G/100ML
1000 INJECTION INTRAVENOUS ONCE
Status: COMPLETED | OUTPATIENT
Start: 2025-02-28 | End: 2025-02-28

## 2025-02-28 RX ADMIN — MAGNESIUM SULFATE HEPTAHYDRATE 1000 MG: 1 INJECTION, SOLUTION INTRAVENOUS at 18:18

## 2025-02-28 RX ADMIN — SODIUM CHLORIDE 500 ML: 9 INJECTION, SOLUTION INTRAVENOUS at 19:51

## 2025-02-28 RX ADMIN — DICYCLOMINE HYDROCHLORIDE 20 MG: 10 INJECTION, SOLUTION INTRAMUSCULAR at 15:36

## 2025-02-28 RX ADMIN — POTASSIUM CHLORIDE 40 MEQ: 1500 TABLET, EXTENDED RELEASE ORAL at 16:19

## 2025-02-28 RX ADMIN — ONDANSETRON 4 MG: 2 INJECTION, SOLUTION INTRAMUSCULAR; INTRAVENOUS at 15:34

## 2025-02-28 RX ADMIN — SODIUM CHLORIDE 1000 MG: 9 INJECTION, SOLUTION INTRAVENOUS at 16:21

## 2025-02-28 RX ADMIN — KETOROLAC TROMETHAMINE 15 MG: 15 INJECTION, SOLUTION INTRAMUSCULAR; INTRAVENOUS at 15:34

## 2025-02-28 RX ADMIN — IOPAMIDOL 75 ML: 755 INJECTION, SOLUTION INTRAVENOUS at 16:08

## 2025-02-28 ASSESSMENT — LIFESTYLE VARIABLES
HOW OFTEN DO YOU HAVE A DRINK CONTAINING ALCOHOL: NEVER
HOW MANY STANDARD DRINKS CONTAINING ALCOHOL DO YOU HAVE ON A TYPICAL DAY: PATIENT DOES NOT DRINK

## 2025-02-28 ASSESSMENT — PAIN DESCRIPTION - LOCATION: LOCATION: ABDOMEN

## 2025-02-28 ASSESSMENT — PAIN - FUNCTIONAL ASSESSMENT: PAIN_FUNCTIONAL_ASSESSMENT: 0-10

## 2025-02-28 ASSESSMENT — PAIN DESCRIPTION - ORIENTATION: ORIENTATION: LEFT

## 2025-02-28 ASSESSMENT — PAIN SCALES - GENERAL: PAINLEVEL_OUTOF10: 9

## 2025-02-28 ASSESSMENT — PAIN DESCRIPTION - DESCRIPTORS: DESCRIPTORS: ACHING

## 2025-02-28 NOTE — ED NOTES
Patient resting in bed with family member at bedside. Patient alert, oriented, with no s/s distress. Patient denies any pain at this time, currently awaiting CT results and dispo.

## 2025-02-28 NOTE — ED PROVIDER NOTES
and DIFFERENTIAL DIAGNOSIS/MDM:   Patient seen and evaluated. Old records reviewed and pertinent information included in HPI. Labs and imaging reviewed and results discussed with patient.      Overall patient, in no acute distress, presenting for abdominal pain.  History obtained from patient.  Physical exam remarkable for left-sided abdominal pain, bradycardia.     Patient's pertinent external medical records: Records Reviewed : Other see HPI    Chronic Medical Conditions that may contribute to presentation today:  has a past medical history of Acid reflux, Angina pectoris, variant, Anxiety, Arthritis, Asthma, Benign esophageal stricture (12/2016), Benign tumor (03/20/2017), Cancer (HCC), Depression, Diabetes (HCC), Frequency, Headache, Hyperlipidemia, Hypertension, Hypothyroidism, Irritable bowel syndrome, Kidney stones, MI, old, Narcolepsy, Obesity, Restless legs syndrome, Sleep apnea, Thyroid disease, Urgency of urination, and Urinary incontinence.     Social Determinants affecting Dx or Tx:    Social History     Socioeconomic History    Marital status:      Spouse name: Not on file    Number of children: Not on file    Years of education: Not on file    Highest education level: Not on file   Occupational History     Employer: [employer]   Tobacco Use    Smoking status: Never     Passive exposure: Past    Smokeless tobacco: Never   Vaping Use    Vaping status: Never Used   Substance and Sexual Activity    Alcohol use: No     Comment: rarely    Drug use: No    Sexual activity: Not Currently   Other Topics Concern    Not on file   Social History Narrative    Not on file     Social Determinants of Health     Financial Resource Strain: Low Risk  (11/19/2024)    Overall Financial Resource Strain (CARDIA)     Difficulty of Paying Living Expenses: Not hard at all   Recent Concern: Financial Resource Strain - Medium Risk (8/21/2024)    Overall Financial Resource Strain (CARDIA)     Difficulty of Paying Living  MD  03/01/25 0003

## 2025-02-28 NOTE — TELEPHONE ENCOUNTER
Please check with the patient that she got the message I was sending in a stronger thyroid medicine.  She should try that and we will see how she does overall.  I think some of her issues related to stress she is undergoing.  If she would like I can refer her to counseling with Ramona Peralta.    I think she should continue the current dose of her other medicines, and we will see what the new thyroid dose does.

## 2025-03-01 ENCOUNTER — HOSPITAL ENCOUNTER (INPATIENT)
Age: 75
LOS: 5 days | Discharge: HOME | DRG: 243 | End: 2025-03-06
Attending: HOSPITALIST | Admitting: HOSPITALIST
Payer: MEDICARE

## 2025-03-01 ENCOUNTER — APPOINTMENT (OUTPATIENT)
Age: 75
DRG: 243 | End: 2025-03-01
Attending: INTERNAL MEDICINE
Payer: MEDICARE

## 2025-03-01 DIAGNOSIS — R00.1 BRADYCARDIA: Primary | ICD-10-CM

## 2025-03-01 LAB
ALBUMIN SERPL-MCNC: 3.6 G/DL (ref 3.4–5)
ALBUMIN/GLOB SERPL: 1.5 {RATIO} (ref 1.1–2.2)
ALP SERPL-CCNC: 62 U/L (ref 40–129)
ALT SERPL-CCNC: 9 U/L (ref 10–40)
ANION GAP SERPL CALCULATED.3IONS-SCNC: 10 MMOL/L (ref 3–16)
AST SERPL-CCNC: 16 U/L (ref 15–37)
BASOPHILS # BLD: 0 K/UL (ref 0–0.2)
BASOPHILS NFR BLD: 0.4 %
BILIRUB SERPL-MCNC: <0.2 MG/DL (ref 0–1)
BUN SERPL-MCNC: 25 MG/DL (ref 7–20)
CALCIUM SERPL-MCNC: 8.8 MG/DL (ref 8.3–10.6)
CHLORIDE SERPL-SCNC: 114 MMOL/L (ref 99–110)
CO2 SERPL-SCNC: 20 MMOL/L (ref 21–32)
CREAT SERPL-MCNC: 0.9 MG/DL (ref 0.6–1.2)
DEPRECATED RDW RBC AUTO: 14.3 % (ref 12.4–15.4)
ECHO AO ROOT DIAM: 2.9 CM
ECHO AO ROOT INDEX: 1.51 CM/M2
ECHO BSA: 1.98 M2
ECHO EST RA PRESSURE: 3 MMHG
ECHO IVC PROX: 1.8 CM
ECHO LA AREA 2C: 19.6 CM2
ECHO LA AREA 4C: 18.1 CM2
ECHO LA DIAMETER INDEX: 1.82 CM/M2
ECHO LA DIAMETER: 3.5 CM
ECHO LA MAJOR AXIS: 5 CM
ECHO LA MINOR AXIS: 5.1 CM
ECHO LA TO AORTIC ROOT RATIO: 1.21
ECHO LA VOL BP: 58 ML (ref 22–52)
ECHO LA VOL MOD A2C: 64 ML (ref 22–52)
ECHO LA VOL MOD A4C: 53 ML (ref 22–52)
ECHO LA VOL/BSA BIPLANE: 30 ML/M2 (ref 16–34)
ECHO LA VOLUME INDEX MOD A2C: 33 ML/M2 (ref 16–34)
ECHO LA VOLUME INDEX MOD A4C: 28 ML/M2 (ref 16–34)
ECHO LV EDV 3D: 116 ML
ECHO LV EDV INDEX 3D: 60 ML/M2
ECHO LV EF PHYSICIAN: 60 %
ECHO LV EJECTION FRACTION 3D: 64 %
ECHO LV ESV 3D: 42 ML
ECHO LV ESV INDEX 3D: 22 ML/M2
ECHO LV FRACTIONAL SHORTENING: 49 % (ref 28–44)
ECHO LV GLOBAL LONGITUDINAL STRAIN (GLS): -21.5 %
ECHO LV INTERNAL DIMENSION DIASTOLE INDEX: 2.24 CM/M2
ECHO LV INTERNAL DIMENSION DIASTOLIC: 4.3 CM (ref 3.9–5.3)
ECHO LV INTERNAL DIMENSION SYSTOLIC INDEX: 1.15 CM/M2
ECHO LV INTERNAL DIMENSION SYSTOLIC: 2.2 CM
ECHO LV IVSD: 0.9 CM (ref 0.6–0.9)
ECHO LV MASS 2D: 123.3 G (ref 67–162)
ECHO LV MASS 3D INDEX: 65.1 G/M2
ECHO LV MASS 3D: 125 G
ECHO LV MASS INDEX 2D: 64.2 G/M2 (ref 43–95)
ECHO LV POSTERIOR WALL DIASTOLIC: 0.9 CM (ref 0.6–0.9)
ECHO LV RELATIVE WALL THICKNESS RATIO: 0.42
ECHO MV A VELOCITY: 0.72 M/S
ECHO MV E DECELERATION TIME (DT): 177 MS
ECHO MV E VELOCITY: 0.99 M/S
ECHO MV E/A RATIO: 1.38
ECHO RA AREA 4C: 13.7 CM2
ECHO RA END SYSTOLIC VOLUME APICAL 4 CHAMBER INDEX BSA: 17 ML/M2
ECHO RA VOLUME: 33 ML
ECHO RIGHT VENTRICULAR SYSTOLIC PRESSURE (RVSP): 38 MMHG
ECHO RV TAPSE: 2 CM (ref 1.7–?)
ECHO TV REGURGITANT MAX VELOCITY: 2.97 M/S
ECHO TV REGURGITANT PEAK GRADIENT: 35 MMHG
EKG ATRIAL RATE: 44 BPM
EKG ATRIAL RATE: 44 BPM
EKG ATRIAL RATE: 51 BPM
EKG DIAGNOSIS: NORMAL
EKG P AXIS: 15 DEGREES
EKG P AXIS: 21 DEGREES
EKG P AXIS: 30 DEGREES
EKG P-R INTERVAL: 132 MS
EKG P-R INTERVAL: 136 MS
EKG P-R INTERVAL: 146 MS
EKG Q-T INTERVAL: 500 MS
EKG Q-T INTERVAL: 638 MS
EKG Q-T INTERVAL: 644 MS
EKG QRS DURATION: 76 MS
EKG QRS DURATION: 78 MS
EKG QRS DURATION: 82 MS
EKG QTC CALCULATION (BAZETT): 427 MS
EKG QTC CALCULATION (BAZETT): 550 MS
EKG QTC CALCULATION (BAZETT): 588 MS
EKG R AXIS: -13 DEGREES
EKG R AXIS: -14 DEGREES
EKG R AXIS: -15 DEGREES
EKG T AXIS: 29 DEGREES
EKG T AXIS: 36 DEGREES
EKG T AXIS: 98 DEGREES
EKG VENTRICULAR RATE: 44 BPM
EKG VENTRICULAR RATE: 44 BPM
EKG VENTRICULAR RATE: 51 BPM
EOSINOPHIL # BLD: 0.1 K/UL (ref 0–0.6)
EOSINOPHIL NFR BLD: 1.4 %
GFR SERPLBLD CREATININE-BSD FMLA CKD-EPI: 67 ML/MIN/{1.73_M2}
GLUCOSE SERPL-MCNC: 94 MG/DL (ref 70–99)
HCT VFR BLD AUTO: 31.7 % (ref 36–48)
HGB BLD-MCNC: 10.7 G/DL (ref 12–16)
LYMPHOCYTES # BLD: 1.9 K/UL (ref 1–5.1)
LYMPHOCYTES NFR BLD: 40.7 %
MCH RBC QN AUTO: 33.8 PG (ref 26–34)
MCHC RBC AUTO-ENTMCNC: 33.7 G/DL (ref 31–36)
MCV RBC AUTO: 100.3 FL (ref 80–100)
MONOCYTES # BLD: 0.3 K/UL (ref 0–1.3)
MONOCYTES NFR BLD: 5.4 %
NEUTROPHILS # BLD: 2.5 K/UL (ref 1.7–7.7)
NEUTROPHILS NFR BLD: 52.1 %
PLATELET # BLD AUTO: 123 K/UL (ref 135–450)
PMV BLD AUTO: 7.2 FL (ref 5–10.5)
POTASSIUM SERPL-SCNC: 3.7 MMOL/L (ref 3.5–5.1)
PROT SERPL-MCNC: 6 G/DL (ref 6.4–8.2)
RBC # BLD AUTO: 3.16 M/UL (ref 4–5.2)
SODIUM SERPL-SCNC: 144 MMOL/L (ref 136–145)
WBC # BLD AUTO: 4.8 K/UL (ref 4–11)

## 2025-03-01 PROCEDURE — 93010 ELECTROCARDIOGRAM REPORT: CPT | Performed by: STUDENT IN AN ORGANIZED HEALTH CARE EDUCATION/TRAINING PROGRAM

## 2025-03-01 PROCEDURE — 93356 MYOCRD STRAIN IMG SPCKL TRCK: CPT | Performed by: STUDENT IN AN ORGANIZED HEALTH CARE EDUCATION/TRAINING PROGRAM

## 2025-03-01 PROCEDURE — 93321 DOPPLER ECHO F-UP/LMTD STD: CPT

## 2025-03-01 PROCEDURE — 2500000003 HC RX 250 WO HCPCS: Performed by: INTERNAL MEDICINE

## 2025-03-01 PROCEDURE — 1200000000 HC SEMI PRIVATE

## 2025-03-01 PROCEDURE — 99222 1ST HOSP IP/OBS MODERATE 55: CPT | Performed by: INTERNAL MEDICINE

## 2025-03-01 PROCEDURE — 93308 TTE F-UP OR LMTD: CPT | Performed by: STUDENT IN AN ORGANIZED HEALTH CARE EDUCATION/TRAINING PROGRAM

## 2025-03-01 PROCEDURE — 93321 DOPPLER ECHO F-UP/LMTD STD: CPT | Performed by: STUDENT IN AN ORGANIZED HEALTH CARE EDUCATION/TRAINING PROGRAM

## 2025-03-01 PROCEDURE — 85025 COMPLETE CBC W/AUTO DIFF WBC: CPT

## 2025-03-01 PROCEDURE — 2500000003 HC RX 250 WO HCPCS: Performed by: HOSPITALIST

## 2025-03-01 PROCEDURE — 6360000002 HC RX W HCPCS: Performed by: HOSPITALIST

## 2025-03-01 PROCEDURE — 6370000000 HC RX 637 (ALT 250 FOR IP): Performed by: HOSPITALIST

## 2025-03-01 PROCEDURE — 36415 COLL VENOUS BLD VENIPUNCTURE: CPT

## 2025-03-01 PROCEDURE — 6360000002 HC RX W HCPCS: Performed by: INTERNAL MEDICINE

## 2025-03-01 PROCEDURE — 80053 COMPREHEN METABOLIC PANEL: CPT

## 2025-03-01 RX ORDER — SODIUM CHLORIDE 0.9 % (FLUSH) 0.9 %
5-40 SYRINGE (ML) INJECTION EVERY 12 HOURS SCHEDULED
Status: DISCONTINUED | OUTPATIENT
Start: 2025-03-01 | End: 2025-03-06 | Stop reason: HOSPADM

## 2025-03-01 RX ORDER — ENOXAPARIN SODIUM 100 MG/ML
40 INJECTION SUBCUTANEOUS DAILY
Status: DISCONTINUED | OUTPATIENT
Start: 2025-03-01 | End: 2025-03-04

## 2025-03-01 RX ORDER — ONDANSETRON 4 MG/1
4 TABLET, ORALLY DISINTEGRATING ORAL EVERY 8 HOURS PRN
Status: DISCONTINUED | OUTPATIENT
Start: 2025-03-01 | End: 2025-03-06 | Stop reason: HOSPADM

## 2025-03-01 RX ORDER — POLYETHYLENE GLYCOL 3350 17 G/17G
17 POWDER, FOR SOLUTION ORAL DAILY PRN
Status: DISCONTINUED | OUTPATIENT
Start: 2025-03-01 | End: 2025-03-06 | Stop reason: HOSPADM

## 2025-03-01 RX ORDER — ACETAMINOPHEN 325 MG/1
650 TABLET ORAL EVERY 6 HOURS PRN
Status: DISCONTINUED | OUTPATIENT
Start: 2025-03-01 | End: 2025-03-06 | Stop reason: HOSPADM

## 2025-03-01 RX ORDER — ONDANSETRON 2 MG/ML
4 INJECTION INTRAMUSCULAR; INTRAVENOUS EVERY 6 HOURS PRN
Status: DISCONTINUED | OUTPATIENT
Start: 2025-03-01 | End: 2025-03-06 | Stop reason: HOSPADM

## 2025-03-01 RX ORDER — TOPIRAMATE 25 MG/1
50 TABLET, FILM COATED ORAL 2 TIMES DAILY
Status: DISCONTINUED | OUTPATIENT
Start: 2025-03-01 | End: 2025-03-06 | Stop reason: HOSPADM

## 2025-03-01 RX ORDER — SODIUM CHLORIDE 9 MG/ML
INJECTION, SOLUTION INTRAVENOUS PRN
Status: DISCONTINUED | OUTPATIENT
Start: 2025-03-01 | End: 2025-03-06 | Stop reason: HOSPADM

## 2025-03-01 RX ORDER — ASPIRIN 81 MG/1
81 TABLET ORAL DAILY
Status: DISCONTINUED | OUTPATIENT
Start: 2025-03-01 | End: 2025-03-06 | Stop reason: HOSPADM

## 2025-03-01 RX ORDER — PANTOPRAZOLE SODIUM 40 MG/1
40 TABLET, DELAYED RELEASE ORAL
Status: DISCONTINUED | OUTPATIENT
Start: 2025-03-01 | End: 2025-03-06 | Stop reason: HOSPADM

## 2025-03-01 RX ORDER — SODIUM CHLORIDE 0.9 % (FLUSH) 0.9 %
5-40 SYRINGE (ML) INJECTION PRN
Status: DISCONTINUED | OUTPATIENT
Start: 2025-03-01 | End: 2025-03-06 | Stop reason: HOSPADM

## 2025-03-01 RX ORDER — ESCITALOPRAM OXALATE 10 MG/1
20 TABLET ORAL DAILY
Status: DISCONTINUED | OUTPATIENT
Start: 2025-03-01 | End: 2025-03-06 | Stop reason: HOSPADM

## 2025-03-01 RX ORDER — LEVOTHYROXINE SODIUM 125 UG/1
125 TABLET ORAL DAILY
Status: DISCONTINUED | OUTPATIENT
Start: 2025-03-01 | End: 2025-03-06 | Stop reason: HOSPADM

## 2025-03-01 RX ORDER — VALSARTAN 80 MG/1
160 TABLET ORAL DAILY
Status: DISCONTINUED | OUTPATIENT
Start: 2025-03-01 | End: 2025-03-06 | Stop reason: HOSPADM

## 2025-03-01 RX ORDER — POTASSIUM CHLORIDE 1500 MG/1
40 TABLET, EXTENDED RELEASE ORAL PRN
Status: DISCONTINUED | OUTPATIENT
Start: 2025-03-01 | End: 2025-03-01

## 2025-03-01 RX ORDER — MAGNESIUM SULFATE IN WATER 40 MG/ML
2000 INJECTION, SOLUTION INTRAVENOUS PRN
Status: DISCONTINUED | OUTPATIENT
Start: 2025-03-01 | End: 2025-03-01

## 2025-03-01 RX ORDER — ROPINIROLE 0.5 MG/1
1 TABLET, FILM COATED ORAL 2 TIMES DAILY
Status: DISCONTINUED | OUTPATIENT
Start: 2025-03-01 | End: 2025-03-06 | Stop reason: HOSPADM

## 2025-03-01 RX ORDER — ACETAMINOPHEN 650 MG/1
650 SUPPOSITORY RECTAL EVERY 6 HOURS PRN
Status: DISCONTINUED | OUTPATIENT
Start: 2025-03-01 | End: 2025-03-06 | Stop reason: HOSPADM

## 2025-03-01 RX ORDER — ROSUVASTATIN CALCIUM 10 MG/1
5 TABLET, COATED ORAL NIGHTLY
Status: DISCONTINUED | OUTPATIENT
Start: 2025-03-01 | End: 2025-03-06 | Stop reason: HOSPADM

## 2025-03-01 RX ORDER — POTASSIUM CHLORIDE 7.45 MG/ML
10 INJECTION INTRAVENOUS PRN
Status: DISCONTINUED | OUTPATIENT
Start: 2025-03-01 | End: 2025-03-01

## 2025-03-01 RX ORDER — FUROSEMIDE 40 MG/1
40 TABLET ORAL DAILY
Status: DISCONTINUED | OUTPATIENT
Start: 2025-03-01 | End: 2025-03-06 | Stop reason: HOSPADM

## 2025-03-01 RX ADMIN — TOPIRAMATE 50 MG: 25 TABLET, FILM COATED ORAL at 01:19

## 2025-03-01 RX ADMIN — WATER 1000 MG: 1 INJECTION INTRAMUSCULAR; INTRAVENOUS; SUBCUTANEOUS at 10:15

## 2025-03-01 RX ADMIN — Medication 10 ML: at 21:00

## 2025-03-01 RX ADMIN — ROSUVASTATIN CALCIUM 5 MG: 10 TABLET, FILM COATED ORAL at 20:59

## 2025-03-01 RX ADMIN — TOPIRAMATE 50 MG: 25 TABLET, FILM COATED ORAL at 20:59

## 2025-03-01 RX ADMIN — VALSARTAN 160 MG: 80 TABLET, FILM COATED ORAL at 09:06

## 2025-03-01 RX ADMIN — ROPINIROLE HYDROCHLORIDE 1 MG: 0.5 TABLET, FILM COATED ORAL at 16:01

## 2025-03-01 RX ADMIN — ENOXAPARIN SODIUM 40 MG: 100 INJECTION SUBCUTANEOUS at 09:05

## 2025-03-01 RX ADMIN — TOPIRAMATE 50 MG: 25 TABLET, FILM COATED ORAL at 09:06

## 2025-03-01 RX ADMIN — PANTOPRAZOLE SODIUM 40 MG: 40 TABLET, DELAYED RELEASE ORAL at 05:06

## 2025-03-01 RX ADMIN — ASPIRIN 81 MG: 81 TABLET, COATED ORAL at 09:05

## 2025-03-01 RX ADMIN — ESCITALOPRAM OXALATE 20 MG: 10 TABLET ORAL at 09:06

## 2025-03-01 RX ADMIN — ROPINIROLE HYDROCHLORIDE 1 MG: 0.5 TABLET, FILM COATED ORAL at 09:06

## 2025-03-01 RX ADMIN — FUROSEMIDE 40 MG: 40 TABLET ORAL at 09:05

## 2025-03-01 RX ADMIN — LEVOTHYROXINE SODIUM 125 MCG: 0.12 TABLET ORAL at 05:07

## 2025-03-01 ASSESSMENT — PAIN DESCRIPTION - LOCATION: LOCATION: ABDOMEN

## 2025-03-01 ASSESSMENT — PAIN DESCRIPTION - DESCRIPTORS: DESCRIPTORS: ACHING

## 2025-03-01 ASSESSMENT — PAIN SCALES - GENERAL: PAINLEVEL_OUTOF10: 5

## 2025-03-01 ASSESSMENT — PAIN DESCRIPTION - ORIENTATION: ORIENTATION: LEFT

## 2025-03-01 NOTE — ED NOTES
Patient resting in bed. Patient requesting update on transfer, MD notified via Epic comments.    Patient's VSS on RA, no s/s distress, patient has no c/o pain.

## 2025-03-01 NOTE — H&P
[facility] Medicine History & Physical    V 1.6    Date of Admission: 3/1/2025    Date of Service:  Pt seen/examined on 3/1/25    [x]Admitted to Inpatient with expected LOS greater than two midnights due to medical therapy.  []Placed in Observation status.    Chief Admission Complaint:  I had stomach pain    Presenting Admission History:      74 y.o. female with hx of CAD, CHF and HLD transferred from [place] to [facility] for bradycardia and prolonged QT.  She initially presented to [place] complaining of lower abdominal pain which she states she feels when she has a UTI.  She was ultimately found to have a UTI. CT abd revealed a left renal hemorrhagic or proteinaceous cyst.  While in the ER she was found to be bradycardic in the 40s as well as having a prolonged QT of 588.  She subsequently also had some episodes of bradycardia going into the 30s.  At which point the ED attending spoke to the cardiologist here at [facility] who recommended transfer for EP evaluation.  Patient has subsequently arrived at [facility], no major complaints, HR upper 40's.    Assessment/Plan:      Current Principal Problem:  Bradycardia    Bradycardia / prolonged QT   - monitor on tele   - check echo   - monitor and replete lytes   - avoid neg inotropic agents   - atropine, transcutaneous pacers at    - cardiology consult   - repeat EKG in AM to assess Qtc  UTI   - FU urine cx   - IV rocephin  Left renal hemorrhagic vs proteinaceous cyst   - CT abd reviewed   - rec non-emergent renal mass protocol MRI abd (explained to pt who verbalized understanding)  Hx CAD   - stable   - cont ASA, valsartan, statin  Hx CHF   - stable   - cont ASA, valsartan, statin  Hx HLD   - statin    Labs/medications/imaging reviewed    Dispo: High risk to deteriorate with multiple medical problems and advanced age              High level medical complexity with complicated medical decision-making        Discussed management and the need

## 2025-03-01 NOTE — ED NOTES
Patient's HR dipping into the high 30s consistently, not sustaining, but patient feels briefly symptomatic.MD came to bedside to eval patient, patient will be admitted.

## 2025-03-01 NOTE — ED NOTES
[facility] given patient's transfer packet and report.    Patient alert, oriented, with no s/s distress at time of transfer.

## 2025-03-01 NOTE — ED NOTES
[facility]147 [facility] called with cardiology on the line informed them we need the hospitalists at [place]   2222 call placed to [facility] to page HOSP again   2225 [facility] called back with Hospitalists on the line speaking with Dr. Monge

## 2025-03-02 LAB
ALBUMIN SERPL-MCNC: 3.7 G/DL (ref 3.4–5)
ANION GAP SERPL CALCULATED.3IONS-SCNC: 9 MMOL/L (ref 3–16)
BUN SERPL-MCNC: 22 MG/DL (ref 7–20)
CALCIUM SERPL-MCNC: 9 MG/DL (ref 8.3–10.6)
CHLORIDE SERPL-SCNC: 113 MMOL/L (ref 99–110)
CO2 SERPL-SCNC: 24 MMOL/L (ref 21–32)
CREAT SERPL-MCNC: 0.9 MG/DL (ref 0.6–1.2)
DEPRECATED RDW RBC AUTO: 14.9 % (ref 12.4–15.4)
GFR SERPLBLD CREATININE-BSD FMLA CKD-EPI: 67 ML/MIN/{1.73_M2}
GLUCOSE SERPL-MCNC: 94 MG/DL (ref 70–99)
HCT VFR BLD AUTO: 32.3 % (ref 36–48)
HGB BLD-MCNC: 10.8 G/DL (ref 12–16)
MAGNESIUM SERPL-MCNC: 2.08 MG/DL (ref 1.8–2.4)
MCH RBC QN AUTO: 33.6 PG (ref 26–34)
MCHC RBC AUTO-ENTMCNC: 33.4 G/DL (ref 31–36)
MCV RBC AUTO: 100.6 FL (ref 80–100)
PHOSPHATE SERPL-MCNC: 3.3 MG/DL (ref 2.5–4.9)
PLATELET # BLD AUTO: 122 K/UL (ref 135–450)
PMV BLD AUTO: 7.7 FL (ref 5–10.5)
POTASSIUM SERPL-SCNC: 3.8 MMOL/L (ref 3.5–5.1)
RBC # BLD AUTO: 3.21 M/UL (ref 4–5.2)
SODIUM SERPL-SCNC: 146 MMOL/L (ref 136–145)
WBC # BLD AUTO: 4.8 K/UL (ref 4–11)

## 2025-03-02 PROCEDURE — 36415 COLL VENOUS BLD VENIPUNCTURE: CPT

## 2025-03-02 PROCEDURE — 6360000002 HC RX W HCPCS: Performed by: HOSPITALIST

## 2025-03-02 PROCEDURE — 80069 RENAL FUNCTION PANEL: CPT

## 2025-03-02 PROCEDURE — 6370000000 HC RX 637 (ALT 250 FOR IP): Performed by: NURSE PRACTITIONER

## 2025-03-02 PROCEDURE — 6360000002 HC RX W HCPCS: Performed by: INTERNAL MEDICINE

## 2025-03-02 PROCEDURE — 84443 ASSAY THYROID STIM HORMONE: CPT

## 2025-03-02 PROCEDURE — 83735 ASSAY OF MAGNESIUM: CPT

## 2025-03-02 PROCEDURE — 2500000003 HC RX 250 WO HCPCS: Performed by: HOSPITALIST

## 2025-03-02 PROCEDURE — 6370000000 HC RX 637 (ALT 250 FOR IP): Performed by: HOSPITALIST

## 2025-03-02 PROCEDURE — 2500000003 HC RX 250 WO HCPCS: Performed by: INTERNAL MEDICINE

## 2025-03-02 PROCEDURE — 85027 COMPLETE CBC AUTOMATED: CPT

## 2025-03-02 PROCEDURE — 99232 SBSQ HOSP IP/OBS MODERATE 35: CPT | Performed by: NURSE PRACTITIONER

## 2025-03-02 PROCEDURE — 1200000000 HC SEMI PRIVATE

## 2025-03-02 RX ORDER — POTASSIUM CHLORIDE 1500 MG/1
20 TABLET, EXTENDED RELEASE ORAL ONCE
Status: COMPLETED | OUTPATIENT
Start: 2025-03-02 | End: 2025-03-02

## 2025-03-02 RX ADMIN — ROPINIROLE HYDROCHLORIDE 1 MG: 0.5 TABLET, FILM COATED ORAL at 15:29

## 2025-03-02 RX ADMIN — ESCITALOPRAM OXALATE 20 MG: 10 TABLET ORAL at 09:40

## 2025-03-02 RX ADMIN — Medication 10 ML: at 20:48

## 2025-03-02 RX ADMIN — Medication 10 ML: at 09:44

## 2025-03-02 RX ADMIN — VALSARTAN 160 MG: 80 TABLET, FILM COATED ORAL at 09:40

## 2025-03-02 RX ADMIN — ROPINIROLE HYDROCHLORIDE 1 MG: 0.5 TABLET, FILM COATED ORAL at 09:42

## 2025-03-02 RX ADMIN — PANTOPRAZOLE SODIUM 40 MG: 40 TABLET, DELAYED RELEASE ORAL at 05:00

## 2025-03-02 RX ADMIN — ASPIRIN 81 MG: 81 TABLET, COATED ORAL at 09:41

## 2025-03-02 RX ADMIN — TOPIRAMATE 50 MG: 25 TABLET, FILM COATED ORAL at 20:48

## 2025-03-02 RX ADMIN — TOPIRAMATE 50 MG: 25 TABLET, FILM COATED ORAL at 09:41

## 2025-03-02 RX ADMIN — ROSUVASTATIN CALCIUM 5 MG: 10 TABLET, FILM COATED ORAL at 20:48

## 2025-03-02 RX ADMIN — ENOXAPARIN SODIUM 40 MG: 100 INJECTION SUBCUTANEOUS at 09:42

## 2025-03-02 RX ADMIN — POTASSIUM CHLORIDE 20 MEQ: 1500 TABLET, EXTENDED RELEASE ORAL at 11:43

## 2025-03-02 RX ADMIN — LEVOTHYROXINE SODIUM 125 MCG: 0.12 TABLET ORAL at 05:00

## 2025-03-02 RX ADMIN — WATER 1000 MG: 1 INJECTION INTRAMUSCULAR; INTRAVENOUS; SUBCUTANEOUS at 09:43

## 2025-03-02 RX ADMIN — FUROSEMIDE 40 MG: 40 TABLET ORAL at 09:42

## 2025-03-03 ENCOUNTER — ANESTHESIA EVENT (OUTPATIENT)
Dept: CARDIAC CATH/INVASIVE PROCEDURES | Age: 75
DRG: 243 | End: 2025-03-03
Payer: MEDICARE

## 2025-03-03 LAB
ALBUMIN SERPL-MCNC: 3.5 G/DL (ref 3.4–5)
ANION GAP SERPL CALCULATED.3IONS-SCNC: 8 MMOL/L (ref 3–16)
BUN SERPL-MCNC: 19 MG/DL (ref 7–20)
CALCIUM SERPL-MCNC: 8.9 MG/DL (ref 8.3–10.6)
CHLORIDE SERPL-SCNC: 113 MMOL/L (ref 99–110)
CO2 SERPL-SCNC: 23 MMOL/L (ref 21–32)
CREAT SERPL-MCNC: 1.1 MG/DL (ref 0.6–1.2)
DEPRECATED RDW RBC AUTO: 14.2 % (ref 12.4–15.4)
GFR SERPLBLD CREATININE-BSD FMLA CKD-EPI: 53 ML/MIN/{1.73_M2}
GLUCOSE SERPL-MCNC: 93 MG/DL (ref 70–99)
HCT VFR BLD AUTO: 31.9 % (ref 36–48)
HGB BLD-MCNC: 10.8 G/DL (ref 12–16)
MCH RBC QN AUTO: 33.7 PG (ref 26–34)
MCHC RBC AUTO-ENTMCNC: 33.9 G/DL (ref 31–36)
MCV RBC AUTO: 99.5 FL (ref 80–100)
PHOSPHATE SERPL-MCNC: 3.3 MG/DL (ref 2.5–4.9)
PLATELET # BLD AUTO: 127 K/UL (ref 135–450)
PMV BLD AUTO: 7.1 FL (ref 5–10.5)
POTASSIUM SERPL-SCNC: 3.9 MMOL/L (ref 3.5–5.1)
RBC # BLD AUTO: 3.2 M/UL (ref 4–5.2)
SODIUM SERPL-SCNC: 144 MMOL/L (ref 136–145)
TSH SERPL DL<=0.005 MIU/L-ACNC: 1.37 UIU/ML (ref 0.27–4.2)
WBC # BLD AUTO: 4.8 K/UL (ref 4–11)

## 2025-03-03 PROCEDURE — 36415 COLL VENOUS BLD VENIPUNCTURE: CPT

## 2025-03-03 PROCEDURE — 2500000003 HC RX 250 WO HCPCS: Performed by: HOSPITALIST

## 2025-03-03 PROCEDURE — 85027 COMPLETE CBC AUTOMATED: CPT

## 2025-03-03 PROCEDURE — 80069 RENAL FUNCTION PANEL: CPT

## 2025-03-03 PROCEDURE — 6360000002 HC RX W HCPCS: Performed by: INTERNAL MEDICINE

## 2025-03-03 PROCEDURE — 2500000003 HC RX 250 WO HCPCS: Performed by: INTERNAL MEDICINE

## 2025-03-03 PROCEDURE — 1200000000 HC SEMI PRIVATE

## 2025-03-03 PROCEDURE — 6370000000 HC RX 637 (ALT 250 FOR IP): Performed by: HOSPITALIST

## 2025-03-03 RX ORDER — SODIUM CHLORIDE 0.9 % (FLUSH) 0.9 %
5-40 SYRINGE (ML) INJECTION EVERY 12 HOURS SCHEDULED
Status: CANCELLED | OUTPATIENT
Start: 2025-03-03

## 2025-03-03 RX ORDER — SODIUM CHLORIDE 9 MG/ML
INJECTION, SOLUTION INTRAVENOUS PRN
Status: CANCELLED | OUTPATIENT
Start: 2025-03-03

## 2025-03-03 RX ORDER — LORAZEPAM 0.5 MG/1
0.5 TABLET ORAL
Status: CANCELLED | OUTPATIENT
Start: 2025-03-03 | End: 2025-03-04

## 2025-03-03 RX ORDER — ONDANSETRON 2 MG/ML
4 INJECTION INTRAMUSCULAR; INTRAVENOUS EVERY 6 HOURS PRN
Status: CANCELLED | OUTPATIENT
Start: 2025-03-03

## 2025-03-03 RX ORDER — SODIUM CHLORIDE 0.9 % (FLUSH) 0.9 %
5-40 SYRINGE (ML) INJECTION PRN
Status: CANCELLED | OUTPATIENT
Start: 2025-03-03

## 2025-03-03 RX ORDER — ASPIRIN 325 MG
325 TABLET ORAL ONCE
Status: CANCELLED | OUTPATIENT
Start: 2025-03-03 | End: 2025-03-03

## 2025-03-03 RX ADMIN — TOPIRAMATE 50 MG: 25 TABLET, FILM COATED ORAL at 08:27

## 2025-03-03 RX ADMIN — ROPINIROLE HYDROCHLORIDE 1 MG: 0.5 TABLET, FILM COATED ORAL at 16:41

## 2025-03-03 RX ADMIN — FUROSEMIDE 40 MG: 40 TABLET ORAL at 08:27

## 2025-03-03 RX ADMIN — VALSARTAN 160 MG: 80 TABLET, FILM COATED ORAL at 08:27

## 2025-03-03 RX ADMIN — WATER 1000 MG: 1 INJECTION INTRAMUSCULAR; INTRAVENOUS; SUBCUTANEOUS at 08:27

## 2025-03-03 RX ADMIN — ASPIRIN 81 MG: 81 TABLET, COATED ORAL at 08:27

## 2025-03-03 RX ADMIN — Medication 10 ML: at 08:28

## 2025-03-03 RX ADMIN — LEVOTHYROXINE SODIUM 125 MCG: 0.12 TABLET ORAL at 05:05

## 2025-03-03 RX ADMIN — ROSUVASTATIN CALCIUM 5 MG: 10 TABLET, FILM COATED ORAL at 20:42

## 2025-03-03 RX ADMIN — PANTOPRAZOLE SODIUM 40 MG: 40 TABLET, DELAYED RELEASE ORAL at 05:05

## 2025-03-03 RX ADMIN — Medication 10 ML: at 20:46

## 2025-03-03 RX ADMIN — ESCITALOPRAM OXALATE 20 MG: 10 TABLET ORAL at 08:27

## 2025-03-03 RX ADMIN — TOPIRAMATE 50 MG: 25 TABLET, FILM COATED ORAL at 20:42

## 2025-03-03 RX ADMIN — ROPINIROLE HYDROCHLORIDE 1 MG: 0.5 TABLET, FILM COATED ORAL at 08:27

## 2025-03-03 ASSESSMENT — ENCOUNTER SYMPTOMS
HEMATEMESIS: 0
STRIDOR: 0
LEFT EYE: 0
SHORTNESS OF BREATH: 0
WHEEZING: 0
SLEEP DISTURBANCES DUE TO BREATHING: 1
HEMATOCHEZIA: 0
RIGHT EYE: 0

## 2025-03-04 ENCOUNTER — APPOINTMENT (OUTPATIENT)
Dept: GENERAL RADIOLOGY | Age: 75
DRG: 243 | End: 2025-03-04
Attending: HOSPITALIST
Payer: MEDICARE

## 2025-03-04 ENCOUNTER — NURSE ONLY (OUTPATIENT)
Dept: CARDIOLOGY CLINIC | Age: 75
End: 2025-03-04

## 2025-03-04 ENCOUNTER — ANESTHESIA (OUTPATIENT)
Dept: CARDIAC CATH/INVASIVE PROCEDURES | Age: 75
DRG: 243 | End: 2025-03-04
Payer: MEDICARE

## 2025-03-04 DIAGNOSIS — Z95.0 PACEMAKER: Primary | ICD-10-CM

## 2025-03-04 PROBLEM — I45.89 CHRONOTROPIC INCOMPETENCE: Status: ACTIVE | Noted: 2025-03-04

## 2025-03-04 LAB
ALBUMIN SERPL-MCNC: 3.5 G/DL (ref 3.4–5)
ANION GAP SERPL CALCULATED.3IONS-SCNC: 11 MMOL/L (ref 3–16)
BUN SERPL-MCNC: 19 MG/DL (ref 7–20)
CALCIUM SERPL-MCNC: 8.7 MG/DL (ref 8.3–10.6)
CHLORIDE SERPL-SCNC: 112 MMOL/L (ref 99–110)
CO2 SERPL-SCNC: 20 MMOL/L (ref 21–32)
CREAT SERPL-MCNC: 0.9 MG/DL (ref 0.6–1.2)
DEPRECATED RDW RBC AUTO: 14.5 % (ref 12.4–15.4)
ECHO BSA: 1.98 M2
EKG ATRIAL RATE: 60 BPM
EKG DIAGNOSIS: NORMAL
EKG P AXIS: 63 DEGREES
EKG P-R INTERVAL: 182 MS
EKG Q-T INTERVAL: 440 MS
EKG QRS DURATION: 72 MS
EKG QTC CALCULATION (BAZETT): 440 MS
EKG R AXIS: 42 DEGREES
EKG T AXIS: 40 DEGREES
EKG VENTRICULAR RATE: 60 BPM
GFR SERPLBLD CREATININE-BSD FMLA CKD-EPI: 67 ML/MIN/{1.73_M2}
GLUCOSE BLD-MCNC: 100 MG/DL (ref 70–99)
GLUCOSE SERPL-MCNC: 94 MG/DL (ref 70–99)
HCT VFR BLD AUTO: 33.1 % (ref 36–48)
HGB BLD-MCNC: 11.1 G/DL (ref 12–16)
MCH RBC QN AUTO: 33.7 PG (ref 26–34)
MCHC RBC AUTO-ENTMCNC: 33.7 G/DL (ref 31–36)
MCV RBC AUTO: 100 FL (ref 80–100)
PERFORMED ON: ABNORMAL
PHOSPHATE SERPL-MCNC: 3.7 MG/DL (ref 2.5–4.9)
PLATELET # BLD AUTO: 127 K/UL (ref 135–450)
PMV BLD AUTO: 7.5 FL (ref 5–10.5)
POTASSIUM SERPL-SCNC: 3.7 MMOL/L (ref 3.5–5.1)
RBC # BLD AUTO: 3.31 M/UL (ref 4–5.2)
SODIUM SERPL-SCNC: 143 MMOL/L (ref 136–145)
WBC # BLD AUTO: 4.6 K/UL (ref 4–11)

## 2025-03-04 PROCEDURE — 33208 INSRT HEART PM ATRIAL & VENT: CPT | Performed by: INTERNAL MEDICINE

## 2025-03-04 PROCEDURE — 02H63JZ INSERTION OF PACEMAKER LEAD INTO RIGHT ATRIUM, PERCUTANEOUS APPROACH: ICD-10-PCS | Performed by: INTERNAL MEDICINE

## 2025-03-04 PROCEDURE — 0JH606Z INSERTION OF PACEMAKER, DUAL CHAMBER INTO CHEST SUBCUTANEOUS TISSUE AND FASCIA, OPEN APPROACH: ICD-10-PCS | Performed by: INTERNAL MEDICINE

## 2025-03-04 PROCEDURE — 3700000000 HC ANESTHESIA ATTENDED CARE: Performed by: INTERNAL MEDICINE

## 2025-03-04 PROCEDURE — C1892 INTRO/SHEATH,FIXED,PEEL-AWAY: HCPCS | Performed by: INTERNAL MEDICINE

## 2025-03-04 PROCEDURE — 85027 COMPLETE CBC AUTOMATED: CPT

## 2025-03-04 PROCEDURE — 2580000003 HC RX 258: Performed by: NURSE ANESTHETIST, CERTIFIED REGISTERED

## 2025-03-04 PROCEDURE — 32551 INSERTION OF CHEST TUBE: CPT

## 2025-03-04 PROCEDURE — 6360000002 HC RX W HCPCS: Performed by: INTERNAL MEDICINE

## 2025-03-04 PROCEDURE — L3660 SO 8 AB RSTR CAN/WEB PRE OTS: HCPCS | Performed by: INTERNAL MEDICINE

## 2025-03-04 PROCEDURE — 7100000001 HC PACU RECOVERY - ADDTL 15 MIN: Performed by: INTERNAL MEDICINE

## 2025-03-04 PROCEDURE — 2500000003 HC RX 250 WO HCPCS: Performed by: INTERNAL MEDICINE

## 2025-03-04 PROCEDURE — C1898 LEAD, PMKR, OTHER THAN TRANS: HCPCS | Performed by: INTERNAL MEDICINE

## 2025-03-04 PROCEDURE — 71045 X-RAY EXAM CHEST 1 VIEW: CPT

## 2025-03-04 PROCEDURE — 94761 N-INVAS EAR/PLS OXIMETRY MLT: CPT

## 2025-03-04 PROCEDURE — 2500000003 HC RX 250 WO HCPCS: Performed by: HOSPITALIST

## 2025-03-04 PROCEDURE — 6360000002 HC RX W HCPCS: Performed by: HOSPITALIST

## 2025-03-04 PROCEDURE — 2580000003 HC RX 258: Performed by: INTERNAL MEDICINE

## 2025-03-04 PROCEDURE — 7100000000 HC PACU RECOVERY - FIRST 15 MIN: Performed by: INTERNAL MEDICINE

## 2025-03-04 PROCEDURE — 2709999900 HC NON-CHARGEABLE SUPPLY: Performed by: INTERNAL MEDICINE

## 2025-03-04 PROCEDURE — C1785 PMKR, DUAL, RATE-RESP: HCPCS | Performed by: INTERNAL MEDICINE

## 2025-03-04 PROCEDURE — 80069 RENAL FUNCTION PANEL: CPT

## 2025-03-04 PROCEDURE — 0W9B30Z DRAINAGE OF LEFT PLEURAL CAVITY WITH DRAINAGE DEVICE, PERCUTANEOUS APPROACH: ICD-10-PCS | Performed by: INTERNAL MEDICINE

## 2025-03-04 PROCEDURE — 6360000002 HC RX W HCPCS: Performed by: NURSE ANESTHETIST, CERTIFIED REGISTERED

## 2025-03-04 PROCEDURE — 6360000002 HC RX W HCPCS: Performed by: ANESTHESIOLOGY

## 2025-03-04 PROCEDURE — 93005 ELECTROCARDIOGRAM TRACING: CPT | Performed by: INTERNAL MEDICINE

## 2025-03-04 PROCEDURE — C1894 INTRO/SHEATH, NON-LASER: HCPCS | Performed by: INTERNAL MEDICINE

## 2025-03-04 PROCEDURE — 32551 INSERTION OF CHEST TUBE: CPT | Performed by: INTERNAL MEDICINE

## 2025-03-04 PROCEDURE — 3700000001 HC ADD 15 MINUTES (ANESTHESIA): Performed by: INTERNAL MEDICINE

## 2025-03-04 PROCEDURE — 6370000000 HC RX 637 (ALT 250 FOR IP): Performed by: HOSPITALIST

## 2025-03-04 PROCEDURE — 36415 COLL VENOUS BLD VENIPUNCTURE: CPT

## 2025-03-04 PROCEDURE — 02HK3JZ INSERTION OF PACEMAKER LEAD INTO RIGHT VENTRICLE, PERCUTANEOUS APPROACH: ICD-10-PCS | Performed by: INTERNAL MEDICINE

## 2025-03-04 PROCEDURE — 99222 1ST HOSP IP/OBS MODERATE 55: CPT | Performed by: INTERNAL MEDICINE

## 2025-03-04 PROCEDURE — 6360000004 HC RX CONTRAST MEDICATION: Performed by: INTERNAL MEDICINE

## 2025-03-04 PROCEDURE — 93010 ELECTROCARDIOGRAM REPORT: CPT | Performed by: INTERNAL MEDICINE

## 2025-03-04 PROCEDURE — 2700000000 HC OXYGEN THERAPY PER DAY

## 2025-03-04 PROCEDURE — 2060000000 HC ICU INTERMEDIATE R&B

## 2025-03-04 DEVICE — LEAD 5076-52 MRI US RCMCRD
Type: IMPLANTABLE DEVICE | Site: HEART | Status: FUNCTIONAL
Brand: CAPSUREFIX NOVUS MRI™ SURESCAN®

## 2025-03-04 DEVICE — LEAD 457445 MRI US BI RCMCRD MVC
Type: IMPLANTABLE DEVICE | Site: HEART | Status: FUNCTIONAL
Brand: CAPSURE SENSE MRI™ SURESCAN™

## 2025-03-04 DEVICE — IPG W1DR01 AZURE XT DR MRI USA
Type: IMPLANTABLE DEVICE | Site: CHEST | Status: FUNCTIONAL
Brand: AZURE™ XT DR MRI SURESCAN™

## 2025-03-04 RX ORDER — ONDANSETRON 2 MG/ML
4 INJECTION INTRAMUSCULAR; INTRAVENOUS EVERY 30 MIN PRN
Status: DISCONTINUED | OUTPATIENT
Start: 2025-03-04 | End: 2025-03-04 | Stop reason: HOSPADM

## 2025-03-04 RX ORDER — SODIUM CHLORIDE 9 MG/ML
INJECTION, SOLUTION INTRAVENOUS
Status: DISCONTINUED | OUTPATIENT
Start: 2025-03-04 | End: 2025-03-04 | Stop reason: SDUPTHER

## 2025-03-04 RX ORDER — OXYCODONE HYDROCHLORIDE 5 MG/1
10 TABLET ORAL PRN
Status: DISCONTINUED | OUTPATIENT
Start: 2025-03-04 | End: 2025-03-04 | Stop reason: HOSPADM

## 2025-03-04 RX ORDER — OXYCODONE HYDROCHLORIDE 5 MG/1
5 TABLET ORAL PRN
Status: DISCONTINUED | OUTPATIENT
Start: 2025-03-04 | End: 2025-03-04 | Stop reason: HOSPADM

## 2025-03-04 RX ORDER — SODIUM CHLORIDE 0.9 % (FLUSH) 0.9 %
5-40 SYRINGE (ML) INJECTION PRN
Status: DISCONTINUED | OUTPATIENT
Start: 2025-03-04 | End: 2025-03-04 | Stop reason: HOSPADM

## 2025-03-04 RX ORDER — NALOXONE HYDROCHLORIDE 0.4 MG/ML
INJECTION, SOLUTION INTRAMUSCULAR; INTRAVENOUS; SUBCUTANEOUS PRN
Status: DISCONTINUED | OUTPATIENT
Start: 2025-03-04 | End: 2025-03-04 | Stop reason: HOSPADM

## 2025-03-04 RX ORDER — SODIUM CHLORIDE 9 MG/ML
INJECTION, SOLUTION INTRAVENOUS PRN
Status: DISCONTINUED | OUTPATIENT
Start: 2025-03-04 | End: 2025-03-04 | Stop reason: HOSPADM

## 2025-03-04 RX ORDER — MORPHINE SULFATE 2 MG/ML
2 INJECTION, SOLUTION INTRAMUSCULAR; INTRAVENOUS EVERY 4 HOURS PRN
Status: DISCONTINUED | OUTPATIENT
Start: 2025-03-04 | End: 2025-03-06 | Stop reason: HOSPADM

## 2025-03-04 RX ORDER — SODIUM CHLORIDE 0.9 % (FLUSH) 0.9 %
5-40 SYRINGE (ML) INJECTION PRN
Status: DISCONTINUED | OUTPATIENT
Start: 2025-03-04 | End: 2025-03-04 | Stop reason: SDUPTHER

## 2025-03-04 RX ORDER — SODIUM CHLORIDE 0.9 % (FLUSH) 0.9 %
5-40 SYRINGE (ML) INJECTION EVERY 12 HOURS SCHEDULED
Status: DISCONTINUED | OUTPATIENT
Start: 2025-03-04 | End: 2025-03-04 | Stop reason: SDUPTHER

## 2025-03-04 RX ORDER — SODIUM CHLORIDE 0.9 % (FLUSH) 0.9 %
5-40 SYRINGE (ML) INJECTION EVERY 12 HOURS SCHEDULED
Status: DISCONTINUED | OUTPATIENT
Start: 2025-03-04 | End: 2025-03-04 | Stop reason: HOSPADM

## 2025-03-04 RX ORDER — PROPOFOL 10 MG/ML
INJECTION, EMULSION INTRAVENOUS
Status: DISCONTINUED | OUTPATIENT
Start: 2025-03-04 | End: 2025-03-04 | Stop reason: SDUPTHER

## 2025-03-04 RX ORDER — SODIUM CHLORIDE 9 MG/ML
INJECTION, SOLUTION INTRAVENOUS PRN
Status: DISCONTINUED | OUTPATIENT
Start: 2025-03-04 | End: 2025-03-04 | Stop reason: SDUPTHER

## 2025-03-04 RX ORDER — MORPHINE SULFATE 4 MG/ML
4 INJECTION, SOLUTION INTRAMUSCULAR; INTRAVENOUS EVERY 4 HOURS PRN
Status: DISCONTINUED | OUTPATIENT
Start: 2025-03-04 | End: 2025-03-06 | Stop reason: HOSPADM

## 2025-03-04 RX ORDER — IOPAMIDOL 755 MG/ML
INJECTION, SOLUTION INTRAVASCULAR PRN
Status: DISCONTINUED | OUTPATIENT
Start: 2025-03-04 | End: 2025-03-04 | Stop reason: HOSPADM

## 2025-03-04 RX ORDER — KETOROLAC TROMETHAMINE 30 MG/ML
15 INJECTION, SOLUTION INTRAMUSCULAR; INTRAVENOUS EVERY 6 HOURS PRN
Status: DISCONTINUED | OUTPATIENT
Start: 2025-03-04 | End: 2025-03-06 | Stop reason: HOSPADM

## 2025-03-04 RX ORDER — BUPIVACAINE HYDROCHLORIDE 5 MG/ML
INJECTION, SOLUTION EPIDURAL; INTRACAUDAL PRN
Status: DISCONTINUED | OUTPATIENT
Start: 2025-03-04 | End: 2025-03-04 | Stop reason: HOSPADM

## 2025-03-04 RX ORDER — LIDOCAINE HYDROCHLORIDE 20 MG/ML
INJECTION, SOLUTION EPIDURAL; INFILTRATION; INTRACAUDAL; PERINEURAL
Status: DISCONTINUED | OUTPATIENT
Start: 2025-03-04 | End: 2025-03-04 | Stop reason: SDUPTHER

## 2025-03-04 RX ADMIN — FUROSEMIDE 40 MG: 40 TABLET ORAL at 12:17

## 2025-03-04 RX ADMIN — ROPINIROLE HYDROCHLORIDE 1 MG: 0.5 TABLET, FILM COATED ORAL at 16:08

## 2025-03-04 RX ADMIN — ROSUVASTATIN CALCIUM 5 MG: 10 TABLET, FILM COATED ORAL at 20:19

## 2025-03-04 RX ADMIN — MORPHINE SULFATE 4 MG: 4 INJECTION, SOLUTION INTRAMUSCULAR; INTRAVENOUS at 16:12

## 2025-03-04 RX ADMIN — HYDROMORPHONE HYDROCHLORIDE 0.5 MG: 1 INJECTION, SOLUTION INTRAMUSCULAR; INTRAVENOUS; SUBCUTANEOUS at 10:02

## 2025-03-04 RX ADMIN — ONDANSETRON 4 MG: 2 INJECTION, SOLUTION INTRAMUSCULAR; INTRAVENOUS at 10:54

## 2025-03-04 RX ADMIN — TOPIRAMATE 50 MG: 25 TABLET, FILM COATED ORAL at 20:19

## 2025-03-04 RX ADMIN — TOPIRAMATE 50 MG: 25 TABLET, FILM COATED ORAL at 12:17

## 2025-03-04 RX ADMIN — PROPOFOL 40 MCG/KG/MIN: 10 INJECTION, EMULSION INTRAVENOUS at 08:49

## 2025-03-04 RX ADMIN — LIDOCAINE HYDROCHLORIDE 100 MG: 20 INJECTION, SOLUTION EPIDURAL; INFILTRATION; INTRACAUDAL; PERINEURAL at 08:46

## 2025-03-04 RX ADMIN — VALSARTAN 160 MG: 80 TABLET, FILM COATED ORAL at 12:16

## 2025-03-04 RX ADMIN — KETOROLAC TROMETHAMINE 15 MG: 30 INJECTION, SOLUTION INTRAMUSCULAR at 12:06

## 2025-03-04 RX ADMIN — Medication 10 ML: at 20:20

## 2025-03-04 RX ADMIN — ACETAMINOPHEN 650 MG: 325 TABLET ORAL at 00:04

## 2025-03-04 RX ADMIN — ROPINIROLE HYDROCHLORIDE 1 MG: 0.5 TABLET, FILM COATED ORAL at 12:16

## 2025-03-04 RX ADMIN — WATER 1000 MG: 1 INJECTION INTRAMUSCULAR; INTRAVENOUS; SUBCUTANEOUS at 12:17

## 2025-03-04 RX ADMIN — PROPOFOL 40 MG: 10 INJECTION, EMULSION INTRAVENOUS at 08:49

## 2025-03-04 RX ADMIN — SODIUM CHLORIDE: 9 INJECTION, SOLUTION INTRAVENOUS at 08:31

## 2025-03-04 ASSESSMENT — PAIN DESCRIPTION - FREQUENCY
FREQUENCY: INTERMITTENT
FREQUENCY: CONTINUOUS

## 2025-03-04 ASSESSMENT — PAIN SCALES - GENERAL
PAINLEVEL_OUTOF10: 9
PAINLEVEL_OUTOF10: 8
PAINLEVEL_OUTOF10: 5
PAINLEVEL_OUTOF10: 7
PAINLEVEL_OUTOF10: 7

## 2025-03-04 ASSESSMENT — PAIN DESCRIPTION - ONSET
ONSET: ON-GOING
ONSET: ON-GOING

## 2025-03-04 ASSESSMENT — PAIN DESCRIPTION - LOCATION
LOCATION: CHEST
LOCATION: HEAD

## 2025-03-04 ASSESSMENT — PAIN - FUNCTIONAL ASSESSMENT
PAIN_FUNCTIONAL_ASSESSMENT: PREVENTS OR INTERFERES SOME ACTIVE ACTIVITIES AND ADLS

## 2025-03-04 ASSESSMENT — PAIN DESCRIPTION - DESCRIPTORS
DESCRIPTORS: SPASM
DESCRIPTORS: ACHING
DESCRIPTORS: DISCOMFORT
DESCRIPTORS: PRESSURE
DESCRIPTORS: PRESSURE

## 2025-03-04 ASSESSMENT — PAIN DESCRIPTION - PAIN TYPE
TYPE: SURGICAL PAIN
TYPE: SURGICAL PAIN

## 2025-03-04 ASSESSMENT — PAIN DESCRIPTION - ORIENTATION
ORIENTATION: LEFT
ORIENTATION: ANTERIOR
ORIENTATION: LEFT

## 2025-03-04 NOTE — CONSULTS
[address], [place] [address]   Phone (office): [phone]     
Q24H        Allergies:  Latex and Vistaril [hydroxyzine hcl]     Review of Systems:   A 14 point review of symptoms completed. Pertinent positives identified in the HPI, all other review of symptoms negative as below.      Objective:     Vitals:    03/01/25 0752   BP: 117/77   Pulse: 50   Resp: 18   Temp: 97.3 °F (36.3 °C)   SpO2: 97%    Weight - Scale: 86.6 kg (190 lb 14.4 oz)       PHYSICAL EXAM:    /77   Pulse 50   Temp 97.3 °F (36.3 °C) (Oral)   Resp 18   Ht 1.626 m (5' 4\")   Wt 86.6 kg (190 lb 14.4 oz)   SpO2 97%   BMI 32.77 kg/m²     General:  Alert, cooperative, no distress, appears stated age   Head:  Normocephalic, atraumatic   Eyes:  Conjunctiva/corneas clear, anicteric sclerae    Nose: Nares normal, no drainage or sinus tenderness   Throat: No abnormalities of the lips, oral mucosa or tongue.    Neck: Trachea midline. Neck supple with no lymphadenopathy, thyroid not enlarged, symmetric, no tenderness/mass/nodules, no Jugular venous pressure elevation    Lungs:   Clear to auscultation bilaterally,  no respiratory distress   Chest Wall:  No deformity or tenderness to palpation   Heart:  Bradycardiac and regular, normal S1, normal S2    Abdomen:   Soft, non-tender, with normoactive bowel sounds, obese   Extremities: No cyanosis, clubbing or pitting edema.    Vascular: 2+ radial, brachial, femoral, dorsalis pedis and posterior tibial pulses bilaterally. Brisk carotid upstrokes without carotid bruit.    Skin: Skin color, texture, turgor are normal with no rashes or ulceration.    Pysch: Euthymic mood, appropriate affect   Neurologic: Oriented to person, place and time. No slurred speech or facial asymmetry. No motor or sensory deficits on gross examination.         Labs:   CBC:   Lab Results   Component Value Date/Time    WBC 4.8 03/01/2025 09:12 AM    RBC 3.16 03/01/2025 09:12 AM    HGB 10.7 03/01/2025 09:12 AM    HCT 31.7 03/01/2025 09:12 AM    .3 03/01/2025 09:12 AM    RDW 14.3 
Activity: Insufficiently Active (5/14/2024)    Exercise Vital Sign     Days of Exercise per Week: 2 days     Minutes of Exercise per Session: 20 min    Received from [facility], [facility]    Interpersonal Safety   Housing Stability: Low Risk  (3/1/2025)    Housing Stability Vital Sign     Unable to Pay for Housing in the Last Year: No     Number of Times Moved in the Last Year: 0     Homeless in the Last Year: No         Review of Systems:    All other systems reviewed except for that noted above. Pertinent negatives and positives are:     Review of Systems   Constitutional: Positive for malaise/fatigue. Negative for weight gain and weight loss.   HENT:  Negative for nosebleeds and stridor.    Eyes:  Negative for vision loss in left eye and vision loss in right eye.   Cardiovascular:  Positive for dyspnea on exertion and palpitations. Negative for chest pain, leg swelling and syncope.   Respiratory:  Positive for sleep disturbances due to breathing. Negative for shortness of breath and wheezing.    Hematologic/Lymphatic: Negative for bleeding problem. Does not bruise/bleed easily.   Skin:  Negative for itching and rash.   Musculoskeletal:  Negative for joint pain and joint swelling.   Gastrointestinal:  Negative for hematemesis and hematochezia.   Genitourinary:  Negative for dysuria and hematuria.   Neurological:  Positive for light-headedness. Negative for dizziness.   Psychiatric/Behavioral:  Negative for altered mental status. The patient is not nervous/anxious.        Objective:     Vital Signs (last 24 hours):  Patient Vitals for the past 24 hrs:   BP Temp Temp src Pulse Resp SpO2   03/03/25 0825 108/77 98.4 °F (36.9 °C) Oral (!) 47 18 96 %   03/03/25 0504 110/73 98.2 °F (36.8 °C) Oral 50 18 96 %   03/02/25 2331 134/82 98.1 °F (36.7 °C) Oral (!) 45 16 97 %   03/02/25 2045 (!) 142/75 98.2 °F (36.8 °C) Oral 51 18 97 %   03/02/25 1515 126/83 97.5 °F

## 2025-03-04 NOTE — ANESTHESIA PRE PROCEDURE
D3) 50 MCG (2000 UT) CAPS Take by mouth   Yes Elliott Meza MD   topiramate (TOPAMAX) 50 MG tablet Take 1 tablet by mouth 2 times daily 8/6/24 8/1/25 Yes Alcides Alcantar MD   L-Arginine 500 MG TABS Take 1,000 mg by mouth 2 times daily 12/9/20  Yes Kleber King MD   aspirin 81 MG tablet Take 1 tablet by mouth daily   Yes Elliott Meza MD   l-arginine 500 MG capsule Take 2 capsules by mouth daily 2/24/25   Ferdinand Navarro MD       Current medications:    Current Facility-Administered Medications   Medication Dose Route Frequency Provider Last Rate Last Admin   • sodium chloride flush 0.9 % injection 5-40 mL  5-40 mL IntraVENous 2 times per day Sheyla Best MD       • sodium chloride flush 0.9 % injection 5-40 mL  5-40 mL IntraVENous PRN Sheyla Best MD       • 0.9 % sodium chloride infusion   IntraVENous PRN Sheyla Best MD       • sodium chloride flush 0.9 % injection 5-40 mL  5-40 mL IntraVENous 2 times per day Robbi Nunez MD   10 mL at 03/03/25 2046   • sodium chloride flush 0.9 % injection 5-40 mL  5-40 mL IntraVENous PRN Robbi Nunez MD       • 0.9 % sodium chloride infusion   IntraVENous PRN Robbi Nunez MD       • enoxaparin (LOVENOX) injection 40 mg  40 mg SubCUTAneous Daily Robbi Nunez MD   40 mg at 03/02/25 0942   • ondansetron (ZOFRAN-ODT) disintegrating tablet 4 mg  4 mg Oral Q8H PRN Robbi Nunez MD        Or   • ondansetron (ZOFRAN) injection 4 mg  4 mg IntraVENous Q6H PRN Robbi Nunez MD       • polyethylene glycol (GLYCOLAX) packet 17 g  17 g Oral Daily PRN Robbi Nunez MD       • acetaminophen (TYLENOL) tablet 650 mg  650 mg Oral Q6H PRN Robbi Nunez MD   650 mg at 03/04/25 0004    Or   • acetaminophen (TYLENOL) suppository 650 mg  650 mg Rectal Q6H PRN Robbi Nunez MD       • aspirin EC tablet 81 mg  81 mg Oral Daily Robbi Nunez MD   81 mg at 03/03/25 0827   • escitalopram (LEXAPRO)

## 2025-03-04 NOTE — PROCEDURES
PROCEDURE NOTE  Date: 3/4/2025   Name: Graciela Dean  YOB: 1950    Procedures       PROCEDURE NOTE: Pigtail Chest Tube Placement     The patient/daughter gave consent to pigtail catheter insertion after we explained the procedure,  the risks and effects. The indication for pigtail catheter insertion was moderate left-sided pneumothorax post PPM. The procedure was done under sterile conditions following the institution´s policy.  I sterilized the area with chlorhexadine and then placed sterile towels in the area.  Seldinger technique was used to place the pigtail catheter. The needle was introduced on the left side.  I advanced the needle until tip is in the pleural space.  I removed the syringe and introduced the wire through the needle.  The needle was then removed.  I made a nick in the skin and dilated the tract with the dilator.  Then, the pigtail catheter was placed over the wire and advanced into the pleural space. The 14Fr pigtail catheter was connected to a pleuravac and started draining air.  I secured the pigtail with sterile occlusive dressing.  Chest xray confirmed positioning with some improvement on pneumothorax. The patient tolerated the procedure well and there were no complications.    Estimated blood loss less than 5 cc.  _______________________________  Yohana Neely MD, FACP  Pulmonary and Critical Care Specialist

## 2025-03-04 NOTE — PROGRESS NOTES
DEVICE MODEL  [id number] Chanel™ XT DR MRI  DEVICE SERIAL NUMBER  [id number]  DEVICE TYPE  Pacemaker  DATE OF IMPLANT  04-Mar-2025  Monitor Information  MONITOR STATUS  Distributed to patient  DISTRIBUTION METHOD  Distributed from clinic inventory  DISTRIBUTION DATE  04-Mar-2025  MONITOR SERIAL NUMBER  [id number]  MONITOR MODEL  Booyah™ [id number]

## 2025-03-04 NOTE — ANESTHESIA POSTPROCEDURE EVALUATION
Department of Anesthesiology  Postprocedure Note    Patient: Graciela Dean  MRN: [id number]  YOB: 1950  Date of evaluation: 3/4/2025    Procedure Summary       Date: 03/04/25 Room / Location: [facility] CATH/EP LAB 4 / [facility] CARDIAC CATH LAB    Anesthesia Start: 0830 Anesthesia Stop:     Procedure: Insert PPM dual (Left) Diagnosis:       Bradycardia      (Bradycardia [R00.1])    Providers: Sheyla Best MD Responsible Provider:     Anesthesia Type: MAC ASA Status: 3            Anesthesia Type: No value filed.    Soy Phase I:      Soy Phase II:      Anesthesia Post Evaluation    Patient location during evaluation: PACU  Level of consciousness: awake  Airway patency: patent  Nausea & Vomiting: no vomiting  Cardiovascular status: blood pressure returned to baseline  Respiratory status: acceptable  Hydration status: stable  Pain management: adequate    There were no known notable events for this encounter.

## 2025-03-05 ENCOUNTER — APPOINTMENT (OUTPATIENT)
Dept: GENERAL RADIOLOGY | Age: 75
DRG: 243 | End: 2025-03-05
Attending: HOSPITALIST
Payer: MEDICARE

## 2025-03-05 ENCOUNTER — PROCEDURE VISIT (OUTPATIENT)
Dept: CARDIOLOGY CLINIC | Age: 75
End: 2025-03-05

## 2025-03-05 DIAGNOSIS — Z95.0 PACEMAKER: Primary | ICD-10-CM

## 2025-03-05 DIAGNOSIS — R00.1 BRADYCARDIA: ICD-10-CM

## 2025-03-05 PROBLEM — J95.811 POSTPROCEDURAL PNEUMOTHORAX: Status: ACTIVE | Noted: 2025-03-05

## 2025-03-05 LAB
ALBUMIN SERPL-MCNC: 3.5 G/DL (ref 3.4–5)
ANION GAP SERPL CALCULATED.3IONS-SCNC: 9 MMOL/L (ref 3–16)
BUN SERPL-MCNC: 19 MG/DL (ref 7–20)
CALCIUM SERPL-MCNC: 8.6 MG/DL (ref 8.3–10.6)
CHLORIDE SERPL-SCNC: 112 MMOL/L (ref 99–110)
CO2 SERPL-SCNC: 22 MMOL/L (ref 21–32)
CREAT SERPL-MCNC: 0.9 MG/DL (ref 0.6–1.2)
DEPRECATED RDW RBC AUTO: 14.1 % (ref 12.4–15.4)
GFR SERPLBLD CREATININE-BSD FMLA CKD-EPI: 67 ML/MIN/{1.73_M2}
GLUCOSE SERPL-MCNC: 97 MG/DL (ref 70–99)
HCT VFR BLD AUTO: 32.2 % (ref 36–48)
HGB BLD-MCNC: 11 G/DL (ref 12–16)
MCH RBC QN AUTO: 34 PG (ref 26–34)
MCHC RBC AUTO-ENTMCNC: 34 G/DL (ref 31–36)
MCV RBC AUTO: 99.9 FL (ref 80–100)
PHOSPHATE SERPL-MCNC: 3.8 MG/DL (ref 2.5–4.9)
PLATELET # BLD AUTO: 117 K/UL (ref 135–450)
PMV BLD AUTO: 7.2 FL (ref 5–10.5)
POTASSIUM SERPL-SCNC: 3.8 MMOL/L (ref 3.5–5.1)
RBC # BLD AUTO: 3.23 M/UL (ref 4–5.2)
SODIUM SERPL-SCNC: 143 MMOL/L (ref 136–145)
WBC # BLD AUTO: 4.5 K/UL (ref 4–11)

## 2025-03-05 PROCEDURE — 36415 COLL VENOUS BLD VENIPUNCTURE: CPT

## 2025-03-05 PROCEDURE — 2060000000 HC ICU INTERMEDIATE R&B

## 2025-03-05 PROCEDURE — 85027 COMPLETE CBC AUTOMATED: CPT

## 2025-03-05 PROCEDURE — 2500000003 HC RX 250 WO HCPCS: Performed by: HOSPITALIST

## 2025-03-05 PROCEDURE — 99232 SBSQ HOSP IP/OBS MODERATE 35: CPT

## 2025-03-05 PROCEDURE — 6370000000 HC RX 637 (ALT 250 FOR IP): Performed by: HOSPITALIST

## 2025-03-05 PROCEDURE — 71045 X-RAY EXAM CHEST 1 VIEW: CPT

## 2025-03-05 PROCEDURE — 99232 SBSQ HOSP IP/OBS MODERATE 35: CPT | Performed by: INTERNAL MEDICINE

## 2025-03-05 PROCEDURE — 6360000002 HC RX W HCPCS: Performed by: INTERNAL MEDICINE

## 2025-03-05 PROCEDURE — 80069 RENAL FUNCTION PANEL: CPT

## 2025-03-05 RX ADMIN — Medication 10 ML: at 08:42

## 2025-03-05 RX ADMIN — ROPINIROLE HYDROCHLORIDE 1 MG: 0.5 TABLET, FILM COATED ORAL at 08:24

## 2025-03-05 RX ADMIN — ESCITALOPRAM OXALATE 20 MG: 10 TABLET ORAL at 08:25

## 2025-03-05 RX ADMIN — VALSARTAN 160 MG: 80 TABLET, FILM COATED ORAL at 08:25

## 2025-03-05 RX ADMIN — ROSUVASTATIN CALCIUM 5 MG: 10 TABLET, FILM COATED ORAL at 20:17

## 2025-03-05 RX ADMIN — FUROSEMIDE 40 MG: 40 TABLET ORAL at 08:24

## 2025-03-05 RX ADMIN — TOPIRAMATE 50 MG: 25 TABLET, FILM COATED ORAL at 20:17

## 2025-03-05 RX ADMIN — Medication 10 ML: at 20:19

## 2025-03-05 RX ADMIN — PANTOPRAZOLE SODIUM 40 MG: 40 TABLET, DELAYED RELEASE ORAL at 05:02

## 2025-03-05 RX ADMIN — ROPINIROLE HYDROCHLORIDE 1 MG: 0.5 TABLET, FILM COATED ORAL at 17:41

## 2025-03-05 RX ADMIN — TOPIRAMATE 50 MG: 25 TABLET, FILM COATED ORAL at 08:25

## 2025-03-05 RX ADMIN — ASPIRIN 81 MG: 81 TABLET, COATED ORAL at 08:25

## 2025-03-05 RX ADMIN — MORPHINE SULFATE 2 MG: 2 INJECTION, SOLUTION INTRAMUSCULAR; INTRAVENOUS at 17:41

## 2025-03-05 RX ADMIN — LEVOTHYROXINE SODIUM 125 MCG: 0.12 TABLET ORAL at 05:02

## 2025-03-05 RX ADMIN — MORPHINE SULFATE 2 MG: 2 INJECTION, SOLUTION INTRAMUSCULAR; INTRAVENOUS at 08:42

## 2025-03-05 ASSESSMENT — PAIN DESCRIPTION - ORIENTATION
ORIENTATION: LEFT
ORIENTATION: LEFT

## 2025-03-05 ASSESSMENT — PAIN DESCRIPTION - LOCATION
LOCATION: OTHER (COMMENT)
LOCATION: CHEST
LOCATION: SHOULDER

## 2025-03-05 ASSESSMENT — PAIN SCALES - GENERAL
PAINLEVEL_OUTOF10: 6
PAINLEVEL_OUTOF10: 6
PAINLEVEL_OUTOF10: 8
PAINLEVEL_OUTOF10: 0

## 2025-03-05 ASSESSMENT — PAIN DESCRIPTION - DESCRIPTORS
DESCRIPTORS: DISCOMFORT
DESCRIPTORS: ACHING

## 2025-03-06 ENCOUNTER — APPOINTMENT (OUTPATIENT)
Dept: GENERAL RADIOLOGY | Age: 75
DRG: 243 | End: 2025-03-06
Attending: HOSPITALIST
Payer: MEDICARE

## 2025-03-06 VITALS
OXYGEN SATURATION: 95 % | HEIGHT: 64 IN | BODY MASS INDEX: 32.59 KG/M2 | DIASTOLIC BLOOD PRESSURE: 77 MMHG | RESPIRATION RATE: 18 BRPM | TEMPERATURE: 97.5 F | HEART RATE: 62 BPM | WEIGHT: 190.9 LBS | SYSTOLIC BLOOD PRESSURE: 109 MMHG

## 2025-03-06 LAB
ALBUMIN SERPL-MCNC: 3.5 G/DL (ref 3.4–5)
ANION GAP SERPL CALCULATED.3IONS-SCNC: 9 MMOL/L (ref 3–16)
BUN SERPL-MCNC: 15 MG/DL (ref 7–20)
CALCIUM SERPL-MCNC: 8.5 MG/DL (ref 8.3–10.6)
CHLORIDE SERPL-SCNC: 110 MMOL/L (ref 99–110)
CO2 SERPL-SCNC: 23 MMOL/L (ref 21–32)
CREAT SERPL-MCNC: 1 MG/DL (ref 0.6–1.2)
DEPRECATED RDW RBC AUTO: 14.3 % (ref 12.4–15.4)
GFR SERPLBLD CREATININE-BSD FMLA CKD-EPI: 59 ML/MIN/{1.73_M2}
GLUCOSE SERPL-MCNC: 94 MG/DL (ref 70–99)
HCT VFR BLD AUTO: 32.1 % (ref 36–48)
HGB BLD-MCNC: 10.9 G/DL (ref 12–16)
MCH RBC QN AUTO: 33.9 PG (ref 26–34)
MCHC RBC AUTO-ENTMCNC: 34 G/DL (ref 31–36)
MCV RBC AUTO: 99.7 FL (ref 80–100)
PHOSPHATE SERPL-MCNC: 2.5 MG/DL (ref 2.5–4.9)
PLATELET # BLD AUTO: 118 K/UL (ref 135–450)
PMV BLD AUTO: 7.7 FL (ref 5–10.5)
POTASSIUM SERPL-SCNC: 3.7 MMOL/L (ref 3.5–5.1)
RBC # BLD AUTO: 3.22 M/UL (ref 4–5.2)
SODIUM SERPL-SCNC: 142 MMOL/L (ref 136–145)
WBC # BLD AUTO: 5.3 K/UL (ref 4–11)

## 2025-03-06 PROCEDURE — 6370000000 HC RX 637 (ALT 250 FOR IP): Performed by: HOSPITALIST

## 2025-03-06 PROCEDURE — 99232 SBSQ HOSP IP/OBS MODERATE 35: CPT | Performed by: INTERNAL MEDICINE

## 2025-03-06 PROCEDURE — 85027 COMPLETE CBC AUTOMATED: CPT

## 2025-03-06 PROCEDURE — 80069 RENAL FUNCTION PANEL: CPT

## 2025-03-06 PROCEDURE — 2500000003 HC RX 250 WO HCPCS: Performed by: HOSPITALIST

## 2025-03-06 PROCEDURE — 71045 X-RAY EXAM CHEST 1 VIEW: CPT

## 2025-03-06 PROCEDURE — 99232 SBSQ HOSP IP/OBS MODERATE 35: CPT

## 2025-03-06 PROCEDURE — 36415 COLL VENOUS BLD VENIPUNCTURE: CPT

## 2025-03-06 RX ADMIN — FUROSEMIDE 40 MG: 40 TABLET ORAL at 08:09

## 2025-03-06 RX ADMIN — TOPIRAMATE 50 MG: 25 TABLET, FILM COATED ORAL at 08:09

## 2025-03-06 RX ADMIN — LEVOTHYROXINE SODIUM 125 MCG: 0.12 TABLET ORAL at 05:23

## 2025-03-06 RX ADMIN — ROPINIROLE HYDROCHLORIDE 1 MG: 0.5 TABLET, FILM COATED ORAL at 08:09

## 2025-03-06 RX ADMIN — ASPIRIN 81 MG: 81 TABLET, COATED ORAL at 08:09

## 2025-03-06 RX ADMIN — Medication 10 ML: at 08:09

## 2025-03-06 RX ADMIN — ROPINIROLE HYDROCHLORIDE 1 MG: 0.5 TABLET, FILM COATED ORAL at 16:47

## 2025-03-06 RX ADMIN — ESCITALOPRAM OXALATE 20 MG: 10 TABLET ORAL at 08:09

## 2025-03-06 RX ADMIN — PANTOPRAZOLE SODIUM 40 MG: 40 TABLET, DELAYED RELEASE ORAL at 05:23

## 2025-03-06 RX ADMIN — VALSARTAN 160 MG: 80 TABLET, FILM COATED ORAL at 08:09

## 2025-03-06 RX ADMIN — ACETAMINOPHEN 650 MG: 325 TABLET ORAL at 08:09

## 2025-03-06 ASSESSMENT — PAIN DESCRIPTION - LOCATION: LOCATION: SHOULDER

## 2025-03-06 ASSESSMENT — PAIN SCALES - GENERAL
PAINLEVEL_OUTOF10: 3
PAINLEVEL_OUTOF10: 0

## 2025-03-06 ASSESSMENT — PAIN DESCRIPTION - ORIENTATION: ORIENTATION: LEFT

## 2025-03-06 ASSESSMENT — PAIN DESCRIPTION - DESCRIPTORS: DESCRIPTORS: ACHING;DISCOMFORT

## 2025-03-06 NOTE — PLAN OF CARE
Problem: ABCDS Injury Assessment  Goal: Absence of physical injury  3/2/2025 0724 by Rajwinder Gipson RN  Outcome: Progressing     Problem: Safety - Adult  Goal: Free from fall injury  3/2/2025 0724 by Rajwinder Gipson RN  Outcome: Progressing     Problem: Pain  Goal: Verbalizes/displays adequate comfort level or baseline comfort level  3/2/2025 0724 by Rajwinder Gipson RN  Outcome: Progressing     Problem: Chronic Conditions and Co-morbidities  Goal: Patient's chronic conditions and co-morbidity symptoms are monitored and maintained or improved  3/2/2025 0724 by Rajwinder Gipson RN  Outcome: Progressing     
  Problem: Chronic Conditions and Co-morbidities  Goal: Patient's chronic conditions and co-morbidity symptoms are monitored and maintained or improved  3/2/2025 2119 by Ashley Minaya, RN  Outcome: Progressing  Flowsheets (Taken 3/2/2025 2103)  Care Plan - Patient's Chronic Conditions and Co-Morbidity Symptoms are Monitored and Maintained or Improved:   Monitor and assess patient's chronic conditions and comorbid symptoms for stability, deterioration, or improvement   Collaborate with multidisciplinary team to address chronic and comorbid conditions and prevent exacerbation or deterioration   Update acute care plan with appropriate goals if chronic or comorbid symptoms are exacerbated and prevent overall improvement and discharge     Problem: Discharge Planning  Goal: Discharge to home or other facility with appropriate resources  Outcome: Progressing  Flowsheets (Taken 3/2/2025 2119)  Discharge to home or other facility with appropriate resources:   Identify barriers to discharge with patient and caregiver   Arrange for needed discharge resources and transportation as appropriate   Identify discharge learning needs (meds, wound care, etc)   Refer to discharge planning if patient needs post-hospital services based on physician order or complex needs related to functional status, cognitive ability or social support system     Problem: Pain  Goal: Verbalizes/displays adequate comfort level or baseline comfort level  3/2/2025 2119 by Ashley Minaya, RN  Outcome: Progressing  Flowsheets (Taken 3/2/2025 2119)  Verbalizes/displays adequate comfort level or baseline comfort level:   Encourage patient to monitor pain and request assistance   Assess pain using appropriate pain scale   Administer analgesics based on type and severity of pain and evaluate response   Implement non-pharmacological measures as appropriate and evaluate response   Consider cultural and social influences on pain and pain management   Notify 
  Problem: Chronic Conditions and Co-morbidities  Goal: Patient's chronic conditions and co-morbidity symptoms are monitored and maintained or improved  Outcome: Adequate for Discharge     Problem: Discharge Planning  Goal: Discharge to home or other facility with appropriate resources  Outcome: Adequate for Discharge     Problem: Pain  Goal: Verbalizes/displays adequate comfort level or baseline comfort level  Outcome: Adequate for Discharge     Problem: Safety - Adult  Goal: Free from fall injury  Outcome: Adequate for Discharge     Problem: ABCDS Injury Assessment  Goal: Absence of physical injury  Outcome: Adequate for Discharge     
  Problem: Chronic Conditions and Co-morbidities  Goal: Patient's chronic conditions and co-morbidity symptoms are monitored and maintained or improved  Outcome: Progressing     
  Problem: Chronic Conditions and Co-morbidities  Goal: Patient's chronic conditions and co-morbidity symptoms are monitored and maintained or improved  Outcome: Progressing     Problem: Discharge Planning  Goal: Discharge to home or other facility with appropriate resources  Outcome: Progressing     Problem: Pain  Goal: Verbalizes/displays adequate comfort level or baseline comfort level  Outcome: Progressing     Problem: Safety - Adult  Goal: Free from fall injury  Outcome: Progressing     Problem: ABCDS Injury Assessment  Goal: Absence of physical injury  Outcome: Progressing     
  Problem: Chronic Conditions and Co-morbidities  Goal: Patient's chronic conditions and co-morbidity symptoms are monitored and maintained or improved  Outcome: Progressing     Problem: Discharge Planning  Goal: Discharge to home or other facility with appropriate resources  Outcome: Progressing     Problem: Pain  Goal: Verbalizes/displays adequate comfort level or baseline comfort level  Outcome: Progressing   Pt will be satisfied with pain control. Pt uses numeric pain rating scale with reassessments after pain med administration. Will continue to monitor progression throughout shift.     Problem: Safety - Adult  Goal: Free from fall injury  Outcome: Progressing   Pt will remain free from falls throughout hospital stay. Fall precautions in place, bed alarm on, bed in lowest position with wheels locked and side rails 2/4 up. Room door open and hourly rounding completed. Will continue to monitor throughout shift.   
  Problem: Chronic Conditions and Co-morbidities  Goal: Patient's chronic conditions and co-morbidity symptoms are monitored and maintained or improved  Outcome: Progressing     Problem: Discharge Planning  Goal: Discharge to home or other facility with appropriate resources  Outcome: Progressing     Problem: Pain  Goal: Verbalizes/displays adequate comfort level or baseline comfort level  Outcome: Progressing   Pt will be satisfied with pain control. Pt uses numeric pain rating scale with reassessments after pain med administration. Will continue to monitor progression throughout shift.     Problem: Safety - Adult  Goal: Free from fall injury  Outcome: Progressing   Pt will remain free from falls throughout hospital stay. Fall precautions in place, bed alarm on, bed in lowest position with wheels locked and side rails 2/4 up. Room door open and hourly rounding completed. Will continue to monitor throughout shift.     Problem: ABCDS Injury Assessment  Goal: Absence of physical injury  Outcome: Progressing     
  Problem: Pain  Goal: Verbalizes/displays adequate comfort level or baseline comfort level  3/3/2025 1038 by Radha Dunn RN  Outcome: Progressing     
Obesity, Restless legs syndrome, Sleep apnea, Thyroid disease, Urgency of urination, and Urinary incontinence.     >>For CHF and Comorbidity documentation on Education Time and Topics, please see Education Tab      CHF Education    Learners:  Patient  Readineess:   Acceptance  Method:   Explanation  Response:   Verbalizes Understanding  Comments: pt educated on the importance of taking medication and tracking daily weights.    Time Spent: 5 min      Pt resting in bed at this time on room air. Pt denies shortness of breath. Pt without lower extremity edema.     Patient and/or Family's stated Goal of Care this Admission: increase activity tolerance, better understand heart failure and disease management, and be more comfortable prior to discharge        :   
Recommendations for:   []Home independently  []Home w/ assist  []HHC  []SNF  []Acute Rehab    Multi-Disciplinary Rounds with Case Management completed on 3/1/2025 with the following recommendations:  Anticipated Discharge Location: [x]Home w/ []HHC vs []SNF  []Acute Rehab  []LTAC  []Hospice  []Other -    Anticipated Discharge Day/Date:  TBD  Barriers to Discharge: Clinical course and subspecialty recs    --------------------------------------------------    Labs:  Personally reviewed on 3/1/2025 and interpreted for clinical significance as documented above.     Recent Labs     02/28/25  1529 03/01/25  0912   WBC 5.9 4.8   HGB 12.0 10.7*   HCT 35.4* 31.7*    123*     Recent Labs     02/28/25  1529 03/01/25  0912    144   K 3.4* 3.7   * 114*   CO2 23 20*   BUN 22* 25*   CREATININE 0.8 0.9   CALCIUM 8.8 8.8   MG 1.78*  --      Recent Labs     02/28/25  1529 02/28/25  2018   PROBNP  --  612*   TROPHS 7 7     No results for input(s): \"LABA1C\" in the last 72 hours.  Recent Labs     02/28/25  1529 03/01/25  0912   AST 15 16   ALT 13 9*   BILITOT 0.3 <0.2   ALKPHOS 74 62     No results for input(s): \"INR\", \"LACTA\", \"TSH\" in the last 72 hours.    Urine Cultures:   Lab Results   Component Value Date/Time    LABURIN >100,000 CFU/ml  No further workup   05/16/2024 12:00 PM     Blood Cultures: No results found for: \"BC\"  No results found for: \"BLOODCULT2\"  Organism:   Lab Results   Component Value Date/Time    ORG Globicatella species 05/16/2024 12:00 PM         Williams Dia MD

## 2025-03-06 NOTE — PROGRESS NOTES
[facility]     Electrophysiology                                     Progress Note    Admission date:  3/1/2025    Reason for follow up visit: Bradycardia    HPI/CC: Graciela Dean was admitted on 3/1/2025 at [facility] for abdominal pain and was found to have UTI.  Patient reported having issues with fatigue over the last 2 months.  ECG reveals sinus bradycardia 44 bpm.  EP consulted.  3/4/2020 DC PPM .  Postprocedure complicated by left-sided pneumothorax.  Chest tube placed by pulmonary.    Rhythm has been APaced    Subjective: Patient denies chest pain, shortness of breath or palpitations.  Left-sided chest tube in place. CXR reveals no pneumothorax.  Pulmonary will repeat chest x-ray at 2 PM today reassess    Vitals:  Blood pressure 120/80, pulse 54, temperature 97.9 °F (36.6 °C), resp. rate 18, height 1.626 m (5' 4\"), weight 86.6 kg (190 lb 14.4 oz), SpO2 100%, not currently breastfeeding.  Temp  Av.1 °F (36.7 °C)  Min: 97.5 °F (36.4 °C)  Max: 98.4 °F (36.9 °C)  Pulse  Av.2  Min: 45  Max: 60  BP  Min: 108/77  Max: 142/75  SpO2  Av.7 %  Min: 96 %  Max: 100 %    24 hour I/O    Intake/Output Summary (Last 24 hours) at 3/3/2025 1512  Last data filed at 3/3/2025 1438  Gross per 24 hour   Intake 850 ml   Output 950 ml   Net -100 ml     Current Facility-Administered Medications   Medication Dose Route Frequency Provider Last Rate Last Admin    sodium chloride flush 0.9 % injection 5-40 mL  5-40 mL IntraVENous 2 times per day Robbi Nunez MD   10 mL at 25 0828    sodium chloride flush 0.9 % injection 5-40 mL  5-40 mL IntraVENous PRN Robbi Nunez MD        0.9 % sodium chloride infusion   IntraVENous PRN Robbi Nunez MD        enoxaparin (LOVENOX) injection 40 mg  40 mg SubCUTAneous Daily Robbi Nunez MD   40 mg at 25 0942    ondansetron (ZOFRAN-ODT) disintegrating tablet 4 mg  4 mg Oral Q8H PRN Robbi Nunez MD        Or    
  [facility] Medicine Progress Note  V 1.6      Date of Admission: 3/1/2025    Hospital Day: 4      Chief Admission Complaint:  \"I had stomach pain\"     Subjective:  no new c/o    Presenting Admission History:         \"74 y.o. female with hx of CAD, CHF and HLD transferred from [place] to [facility] for bradycardia and prolonged QT.  She initially presented to [place] complaining of lower abdominal pain which she states she feels when she has a UTI.  She was ultimately found to have a UTI. CT abd revealed a left renal hemorrhagic or proteinaceous cyst.  While in the ER she was found to be bradycardic in the 40s as well as having a prolonged QT of 588.  She subsequently also had some episodes of bradycardia going into the 30s.  At which point the ED attending spoke to the cardiologist here at [facility] who recommended transfer for EP evaluation.  Patient has subsequently arrived at [facility], no major complaints, HR upper 40's\".     Assessment/Plan:      Current Principal Problem:  Bradycardia      Bradycardia - symptomatic, w/ prolonged QT.  Cardiology consulted and appreciated s/p plans for PACER Tues 4 March.       UTI - admission U/A demonstrating probable acute cystitis.  Infection evidenced by U/A, Culture results and signs and/or symptoms of clinical infection.  Started on empiric Rocephin on 28 Feb pending Culture results - changed to DAILY dosing. .       Renal Cyst - Left renal hemorrhagic vs proteinaceous cyst seen on CT Abd/Pelvis - recs for  non-emergent renal mass protocol MRI abd (explained to pt who verbalized understanding)     HTN w/ CAD - w/ known CAD but no evidence of active signs and/or symptoms of ischemia and/or failure. Currently controlled on home meds w/ vitals documented and reviewed.       HyperLipidemia - normally controlled on home Statin. Continued.  Follow up w/ PCP outpatient for medication initiation and/or adjustment as needed.       HypoThyroid - clinically 
  [facility] Medicine Progress Note  V 1.6      Date of Admission: 3/1/2025    Hospital Day: 5      Chief Admission Complaint:  \"I had stomach pain\"     Subjective:  no new c/o    Presenting Admission History:         \"74 y.o. female with hx of CAD, CHF and HLD transferred from [place] to [facility] for bradycardia and prolonged QT.  She initially presented to [place] complaining of lower abdominal pain which she states she feels when she has a UTI.  She was ultimately found to have a UTI. CT abd revealed a left renal hemorrhagic or proteinaceous cyst.  While in the ER she was found to be bradycardic in the 40s as well as having a prolonged QT of 588.  She subsequently also had some episodes of bradycardia going into the 30s.  At which point the ED attending spoke to the cardiologist here at [facility] who recommended transfer for EP evaluation.  Patient has subsequently arrived at [facility], no major complaints, HR upper 40's\".     Assessment/Plan:      Current Principal Problem:  Bradycardia      Bradycardia - symptomatic, w/ prolonged QT.  Cardiology consulted and appreciated s/p PACER [place] 4 March, complicated by PTX s/p Chest Tube - w/ mgt per Pulmonology.       UTI - admission U/A demonstrating probable acute cystitis.  Infection evidenced by U/A, Culture results and signs and/or symptoms of clinical infection.  Started on empiric Rocephin on 28 Feb pending Culture results - changed to DAILY dosing. .       Renal Cyst - Left renal hemorrhagic vs proteinaceous cyst seen on CT Abd/Pelvis - recs for  non-emergent renal mass protocol MRI abd (explained to pt who verbalized understanding)     HTN w/ CAD - w/ known CAD but no evidence of active signs and/or symptoms of ischemia and/or failure. Currently controlled on home meds w/ vitals documented and reviewed.       HyperLipidemia - normally controlled on home Statin. Continued.  Follow up w/ PCP outpatient for medication initiation and/or 
CMU notified of pt transfer to Cath Lab. Informed consent signed by patient. Pt's contact called, no answer. Voicemail left.   
Chest tube to Water seal at this time. Repeat chest xray ordered.   
Chin WOODS at bedside to place chest tube. Daughter at bedside to sign informed consent. Chest tube placed at bedside. VSS. Chest tube placed to -20 continuous suction per orders. Pt placed back on room air.   
Dressing on incision site C/D/I. Patient stated pain underneath left breast--Site examined, no signs of bleeding or hematoma. Medicated per MAR and patient stated that pain is improving. No nausea; no emesis. SpO2 dropped to high 90s on room air. 2 L NC applied and SpO2 stable.   
Estephania WOODS at bedside.  Reviewed chest xray results with pt and pt;s family. Pulm consult placed. Orders for pain medication placed. Non-rebreather placed on patient per orders.  
PULMONARY AND CRITICAL CARE INPATIENT NOTE        Graciela Dean   : 1950  MRN: [id number]     Admitting Physician: Robbi Nunez MD  Attending Physician: Williams Dia MD  PCP: Brian Mercado DO    Date of Service: 3/6/2025  Admission date:3/1/2025   LOS: Hospital Day: 6   Code:Full Code      ASSESSMENT & PLAN       74 y.o. female patient was admitted on 3/1/2025 with    Left-sided pneumothorax, moderate, iatrogenic post PPM 3/4/2025.  No tension physiology  Symptomatic bradycardia  Prolonged Qtc  UTI    PRINCE on CPAP  CAD, hypertension, hypothyroidism      Plan:              Chest tube removed successfully at bedside this morning with no immediate complications  Patient observed 2 hours after chest tube removal with no cardiopulmonary symptoms.  Okay to discharge from pulmonary standpoint  Keep the dressing over the chest tube site until patient is seen by EP for the pacemaker due to proximity    We will sign off. Please let us know if you have any questions.  Available on [employer].   Thank you for involving us in this patient's care.    Subjective/Objective     Interval History: Encounter 3/6/2025  Patient remained afebrile and hemodynamically stable on room air  Blood sugar remains within range  No new positive MAC results  CBC and chemistry within range  Chest x-ray this morning showed no significant change with no pneumothorax        CC/Reason for Consult: Pneumothorax      HPI: 3/4/2025  74-year-old female patient with PMH of CAD, HTN, HLD, CHF who presented to [facility] with abdominal pain, UTI, fatigue over last 2 months and found to have bradycardia with prolonged QTc.  Patient was transferred to [facility] and underwent evaluation by EP and permanent pacemaker placement on 3/4/2025.  Postoperatively the patient had pneumothorax moderate in size on the chest x-ray.  Pulmonary team consulted for chest replacement and management    The following portions of the patient's 
Patient admitted to room 208 from [place].  Patient oriented to room, call light, bed rails, phone, lights and bathroom.  Patient instructed about the schedule of the day including: vital sign frequency, lab draws, possible tests, frequency of MD and staff rounds, including RN/MD rounding together at bedside, daily weights, and I &O's.  Patient instructed about prescribed diet, how to use 8MENU, and television.   bed alarm in place, patient aware of placement and reason.   Telemetry box  in place, patient aware of placement and reason.  Bed locked, in lowest position, side rails up 2/4, call light within reach.    
Patient arrived to PACU bay 5, phase one initiated. Placed on bedside monitor, VSS. Report obtained from OR RN and anesthesia. SpO2 stable on room air. Warm blankets applied. Side rails in place.    
Pt d/c'd home in private vehicle.  Removed PIV and stopped bleeding.  Catheter intact. Pt tolerated well. No redness noted at site.  Notified CMU and removed tele box. Reviewed d/c instructions, home meds, and  f/u information utilizing teach-back method.  Patient verbalized understanding.      
Pt returned from PACU. Left chest site clean and dry. Sling in place. VSS. Pt complaints of nausea and pain. PRN medication provided for nausea.   
Report given to ROSMERY Garcia  
Anicteric.  Cardiac: No murmur.  Pacemaker on the right side with dressing.  Chest tube was left side with no signs of infection.  No air leak with coughing  Lungs: Decreased air entry on the left side.  Abdomen: Soft.  Back & Extremities: No clubbing.  No signs of acute ischemia.  Neurological: No focal deficit.      ________________________________________________________  Electronically signed by:  Yohana Neely MD,FACP    3/5/2025    5:46 AM.     [facility]  [address], [place] [address]   Phone (office): [phone]     
    CBC:    Lab Results   Component Value Date/Time    WBC 5.3 03/06/2025 04:21 AM    RBC 3.22 03/06/2025 04:21 AM    HGB 10.9 03/06/2025 04:21 AM    HCT 32.1 03/06/2025 04:21 AM    MCV 99.7 03/06/2025 04:21 AM    RDW 14.3 03/06/2025 04:21 AM     03/06/2025 04:21 AM     BNP:  No results found for: \"BNP\"  Fasting Lipid Panel:    Lab Results   Component Value Date/Time    CHOL 173 02/19/2025 10:06 AM    HDL 61 02/19/2025 10:06 AM    TRIG 140 02/19/2025 10:06 AM     Cardiac Enzymes:  CK/MbTroponin  Lab Results   Component Value Date/Time    TROPONINI <0.01 06/02/2018 11:21 AM     PT/ INR   Lab Results   Component Value Date/Time    INR 1.04 01/18/2024 11:40 PM    INR 1.02 01/18/2024 05:06 PM    INR 0.99 12/09/2020 07:28 AM    PROTIME 13.7 01/18/2024 11:40 PM    PROTIME 13.4 01/18/2024 05:06 PM    PROTIME 11.5 12/09/2020 07:28 AM     PTT No components found for: \"PTT\"   Lab Results   Component Value Date/Time    MG 2.08 03/02/2025 04:44 AM      Lab Results   Component Value Date/Time    TSH 1.37 03/02/2025 04:44 AM       Assessment:  Symptomatic bradycardia   -DC PPM implantation 3//25  Hypertension  CAD  UTI  PRINCE: CPAP  Hypothyroidism-Synthroid  Left-sided pneumothorax--post PPM implantation    Plan:   Continue aspirin 81 mg daily  Continue Lasix 40 mg daily  Continue Crestor 5 mg daily  Continue valsartan 160 mg daily  Post care instructions reviewed with patient  S/p chest tube removal this morning  Continue to monitor on telemetry  Daily labs replace electrolytes as needed  Likely discharge in a.m. if remains medically stable    Seen outside [facility] for hypertension and CAD      Neema Orozco APRN-CNP  [facility]  [phone]        
Aggressive IV diuresis    [] Hypertonic Saline    [] Critical electrolyte abnormalities requiring IV replacement    [] Insulin - Scheduled/SSI or Insulin gtt    [] Anticoagulation (Heparin gtt or Coumadin - other anticoagulants in special circumstances)    [] Cardiac Medications (IV Amiodarone/Diltiazem, Tikosyn, etc)    [] Hemodialysis    [] Other -    [] Change in code status    [] Decision to escalate care    [] Major surgery/procedure with associated risk factors    --------------------------------------------------  C. Data (any 2)    [x] Data Review (any 3)    [x] Consultant notes from yesterday/today    [x] All available current labs reviewed interpreted for clinical significance    [x] Appropriate follow-up labs were ordered  [] Collateral history obtained     [x] Independent Interpretation of tests (any 1)    [x] Telemetry (Rhythm Strip) personally reviewed and interpreted        [] Imaging personally reviewed and interpreted     [x] Discussion (any 1)  [x] Multi-Disciplinary Rounds with Case Management  [] Discussed management of the case with           Labs:  Personally reviewed on 3/3/2025 and interpreted for clinical significance as documented above.     Recent Labs     03/01/25  0912 03/02/25 0444 03/03/25 0423   WBC 4.8 4.8 4.8   HGB 10.7* 10.8* 10.8*   HCT 31.7* 32.3* 31.9*   * 122* 127*     Recent Labs     02/28/25  1529 03/01/25  0912 03/02/25 0444 03/03/25  0423    144 146* 144   K 3.4* 3.7 3.8 3.9   * 114* 113* 113*   CO2 23 20* 24 23   BUN 22* 25* 22* 19   CREATININE 0.8 0.9 0.9 1.1   CALCIUM 8.8 8.8 9.0 8.9   MG 1.78*  --  2.08  --    PHOS  --   --  3.3 3.3     Recent Labs     02/28/25  1529 02/28/25  2018   PROBNP  --  612*   TROPHS 7 7     No results for input(s): \"LABA1C\" in the last 72 hours.  Recent Labs     02/28/25  1529 03/01/25  0912   AST 15 16   ALT 13 9*   BILITOT 0.3 <0.2   ALKPHOS 74 62     Recent Labs     03/02/25  0444   TSH 1.37       Urine Cultures:   Lab 
evaluate Monday for pacemaker     Education provided today Disease process and medications discussed. Questions answered fully.  Time spent educating today 10 minutes     YOCASTA Lewis - CNP,  3/2/2025, 11:29 AM    
    No results for input(s): \"PROBNP\", \"TROPHS\" in the last 72 hours.    No results for input(s): \"LABA1C\" in the last 72 hours.  No results for input(s): \"AST\", \"ALT\", \"BILIDIR\", \"BILITOT\", \"ALKPHOS\" in the last 72 hours.    No results for input(s): \"INR\", \"LACTA\", \"TSH\" in the last 72 hours.      Urine Cultures:   Lab Results   Component Value Date/Time    LABURIN >100,000 CFU/ml  No further workup   05/16/2024 12:00 PM     Blood Cultures: No results found for: \"BC\"  No results found for: \"BLOODCULT2\"  Organism:   Lab Results   Component Value Date/Time    ORG Globicatella species 05/16/2024 12:00 PM         Williams Dia MD    
05/16/2024 12:00 PM     Blood Cultures: No results found for: \"BC\"  No results found for: \"BLOODCULT2\"  Organism:   Lab Results   Component Value Date/Time    ORG Globicatella species 05/16/2024 12:00 PM         Williams Dia MD

## 2025-03-07 NOTE — DISCHARGE SUMMARY
stability or resolution.  Additionally, recommend routine bilateral screening mammography in 1 year. These findings and recommendations were discussed with the patient on the day of the examination. BIRADS: BIRADS - CATEGORY 1 Negative.  Normal interval follow-up is recommended in 12 months. OVERALL ASSESSMENT - NEGATIVE A letter of notification will be sent to the patient regarding the results. The American College of Radiology recommends annual mammograms for women 40 years and older.     [facility] NYA DIGITAL DIAGNOSTIC BILATERAL    Result Date: 2/19/2025  EXAMINATION: DIAGNOSTIC DIGITAL BILATERAL BREASTS MAMMOGRAM WITH TOMOSYNTHESIS; TARGETED ULTRASOUND OF THE LEFT BREAST, 2/19/2025 9:28 am; 2/19/2025 9:35 am TECHNIQUE: Diagnostic mammography of the bilateral breasts was performed with tomosynthesis.  2D standard and 3D tomosynthesis combination imaging performed through both breasts.  Computer aided detection was utilized in the interpretation of this exam.; Target ultrasound of the left breast was performed. Views: Standard CC and MLO views of the bilateral breasts were performed, in addition to a right cleavage view and exaggerated CC view of the left breast. Additionally, targeted ultrasound of the left breast was performed. COMPARISON: 08/23/2022, 09/11/2017, 07/15/2014 HISTORY: ORDERING SYSTEM PROVIDED HISTORY: Breast pain, left Patient with a history of prior bilateral breast reduction, now presents with a stated palpable lump and \"mushiness\" in the left infra-areolar region. FINDINGS: Multiple views of the bilateral breasts demonstrate scattered fibroglandular densities.  There are benign dystrophic calcifications throughout both breasts.  There is no new suspicious mass, focus of architectural distortion, or microcalcifications within either breast. Then, targeted ultrasound of the left breast was performed.  At the 6 o'clock position of the left breast, approximately 6 cm from the nipple, at the site of

## 2025-03-07 NOTE — CARE COORDINATION
CASE MANAGEMENT DISCHARGE SUMMARY      Discharge to: home with referral to Adult Protective Services pending due to patient telling staff that she is getting  to a man that she has never met and will supposedly be here to meet her next week      IMM given: (date) 3/5/25    Transportation: private      Confirmed discharge plan with:patient/brother/RN/APS     Patient: yes     Family:  yes        RN, name: Susan    Note: Discharging nurse to complete CIARAN, reconcile AVS, and place final copy with patient's discharge packet.  
Received call from [facility].  She is following up on patient referral.   Informed [facility]e with patient had been reporting to staff that she has been talking to some man for a couple of years (claims it is Erik Higgins Jr.).   patient states patient is coming into town to see her on Monday.   Annalee states she will turn in report and they will investigate the situation and follow up with patient.   
Spoke with MD and RN  both who state patient got her pacemaker today and should likely discharge tomorrow.  Per previous CM note, patient is from home with family and is independent at home and will likely have no needs from CM. Please notify CM should any  needs arise.      0308 addendum: Spoke with RN who states patient did get chest tube placement today so will now likely be here for a couple more days.  
Spoke with RN who states patient got her chest tube removed today and will likely be ok for discharge. Patient is from home with family and should likely have no needs from CM.   
discharge to: House  Plan for transportation at discharge:      Financial    Payor: [facility] MEDICARE / Plan: [employer] / Product Type: *No Product type* /     Does insurance require precert for SNF: Yes    Potential assistance Purchasing Medications: No  Meds-to-Beds request:        [facility] [address] [place], [place] - [address] [phone] - F [phone]  [address] [place] [address]  Phone: [phone] Fax: [phone]      Notes:    Factors facilitating achievement of predicted outcomes: Family support, Motivated, Cooperative, Pleasant, Sense of humor, and Good insight into deficits    Barriers to discharge: none    Additional Case Management Notes: spoke with patient. Lives in ranch style home with brother. 2STE. Has railing. Uses no DME, IADL's. Transportation provided by [facility] thru insurance. Plan for EP to eval for possible pacemaker tomorrow. Moe Royal RN      The Plan for Transition of Care is related to the following treatment goals of Bradycardia [R00.1]    IF APPLICABLE: The Patient and/or patient representative Graciela and her family were provided with a choice of provider and agrees with the discharge plan. Freedom of choice list with basic dialogue that supports the patient's individualized plan of care/goals and shares the quality data associated with the providers was provided to:     Patient Representative Name:       The Patient and/or Patient Representative Agree with the Discharge Plan?      Moe Royal RN  Case Management Department

## 2025-03-12 ENCOUNTER — NURSE ONLY (OUTPATIENT)
Dept: CARDIOLOGY CLINIC | Age: 75
End: 2025-03-12

## 2025-03-12 DIAGNOSIS — R00.1 BRADYCARDIA: ICD-10-CM

## 2025-03-12 DIAGNOSIS — Z95.0 PACEMAKER: Primary | ICD-10-CM

## 2025-03-12 NOTE — PATIENT INSTRUCTIONS
New Cardiac Device Implant (Pacemaker and/or Defibrillator) Post Op Instructions  Bathe with water indirectly hitting the incision site. Water and soap may run over the incision site. Do not scrub. Pat dry with a clean towel after bathing.   Leave incision open to the air; do not apply any dressings, ointments, or bandages to the area. Do not apply lotion, perfume/cologne, or powders to the area until it is completely healed.   Any scabbing or skin glue that is noted will fall off within 1-2 weeks after the post op appointment.   If any oozing, bleeding, or pus drainage occurs, please call the office immediately at [phone].       Patient has movement restrictions in place until 4 weeks post op (to the day of implant) unless otherwise instructed by physician.   Patient may not lift the device side arm above shoulder height.   Do not far reach or stretch across body or behind body with effected side.   Do not use this arm for pushing, pulling, or lifting body.   Do not use cane on the effected side.   Patient may not lift anything heavier than a gallon of milk with the associated arm.     Appointments to expect going forward:  Post operatively the patient will have had a 1-week post op check and a 3 month follow up with NP/MD and the device clinic.       Remote Monitoring Instructions    Within 2-3 weeks of your device being implanted, you will receive a call from the  of your device. Please answer this call as it is to set up remote monitoring for your device. Once you receive your in-home monitor, please follow the instructions provided to sync the home monitor to your implanted device. Once you have paired your home monitor to your implanted device, keep your monitor plugged in within 6 feet of where you sleep. Your monitor will routinely check in with your device during sleep hours and transmit any urgent events to the Device Clinic for review.     Please do not send additional routine

## 2025-03-18 ENCOUNTER — TELEPHONE (OUTPATIENT)
Dept: FAMILY MEDICINE CLINIC | Age: 75
End: 2025-03-18

## 2025-03-18 DIAGNOSIS — G47.419 PRIMARY NARCOLEPSY WITHOUT CATAPLEXY: ICD-10-CM

## 2025-03-18 NOTE — TELEPHONE ENCOUNTER
Patient needs a refill on Nuvigil. They need a 90 day supply.     Mail order or local pharmacy: local    Pharmacy: [facility]    Last OV: 11/19/2024    Future Appointments   Date Time Provider Department Center   3/26/2025  1:45 PM Mojgan Strickland APRN - CNP  UROGYN [facility]   3/31/2025 10:00 AM rBian Mercado DO [place] [facility]   6/2/2025  3:00 PM SCHEDULE, ANNETTE DEVICE CHECK [facility]   6/2/2025  3:00 PM Neema Orozco APRN - CNP [facility]   8/6/2025 11:00 AM Alcides Alcantar MD [facility] NEURO Neurology -

## 2025-03-19 RX ORDER — ARMODAFINIL 150 MG/1
150 TABLET ORAL DAILY
Qty: 90 TABLET | Refills: 0 | Status: SHIPPED | OUTPATIENT
Start: 2025-03-19 | End: 2025-06-17

## 2025-03-31 ENCOUNTER — OFFICE VISIT (OUTPATIENT)
Dept: FAMILY MEDICINE CLINIC | Age: 75
End: 2025-03-31

## 2025-03-31 VITALS
HEART RATE: 70 BPM | HEIGHT: 63 IN | TEMPERATURE: 97.2 F | BODY MASS INDEX: 32.78 KG/M2 | OXYGEN SATURATION: 98 % | WEIGHT: 185 LBS | SYSTOLIC BLOOD PRESSURE: 106 MMHG | DIASTOLIC BLOOD PRESSURE: 64 MMHG | RESPIRATION RATE: 16 BRPM

## 2025-03-31 DIAGNOSIS — G47.33 OSA (OBSTRUCTIVE SLEEP APNEA): ICD-10-CM

## 2025-03-31 DIAGNOSIS — G47.411 PRIMARY NARCOLEPSY WITH CATAPLEXY: ICD-10-CM

## 2025-03-31 DIAGNOSIS — R23.8 PAPULE OF SKIN: ICD-10-CM

## 2025-03-31 DIAGNOSIS — I50.32 CHRONIC DIASTOLIC (CONGESTIVE) HEART FAILURE: ICD-10-CM

## 2025-03-31 DIAGNOSIS — Z95.0 STATUS POST PLACEMENT OF CARDIAC PACEMAKER: ICD-10-CM

## 2025-03-31 DIAGNOSIS — Z09 HOSPITAL DISCHARGE FOLLOW-UP: ICD-10-CM

## 2025-03-31 DIAGNOSIS — I45.9 HEART BLOCK: Primary | ICD-10-CM

## 2025-03-31 RX ORDER — ARMODAFINIL 200 MG/1
1 TABLET ORAL DAILY
Qty: 30 TABLET | Refills: 2 | Status: SHIPPED | OUTPATIENT
Start: 2025-03-31 | End: 2025-06-30

## 2025-03-31 RX ORDER — TRIAMCINOLONE ACETONIDE 1 MG/G
OINTMENT TOPICAL
Qty: 80 G | Refills: 0 | Status: SHIPPED | OUTPATIENT
Start: 2025-03-31

## 2025-03-31 RX ORDER — DOXYCYCLINE HYCLATE 100 MG
100 TABLET ORAL 2 TIMES DAILY
Qty: 20 TABLET | Refills: 0 | Status: SHIPPED | OUTPATIENT
Start: 2025-03-31

## 2025-03-31 SDOH — ECONOMIC STABILITY: FOOD INSECURITY: WITHIN THE PAST 12 MONTHS, YOU WORRIED THAT YOUR FOOD WOULD RUN OUT BEFORE YOU GOT MONEY TO BUY MORE.: NEVER TRUE

## 2025-03-31 SDOH — ECONOMIC STABILITY: FOOD INSECURITY: WITHIN THE PAST 12 MONTHS, THE FOOD YOU BOUGHT JUST DIDN'T LAST AND YOU DIDN'T HAVE MONEY TO GET MORE.: NEVER TRUE

## 2025-03-31 ASSESSMENT — PATIENT HEALTH QUESTIONNAIRE - PHQ9
SUM OF ALL RESPONSES TO PHQ QUESTIONS 1-9: 10
1. LITTLE INTEREST OR PLEASURE IN DOING THINGS: NOT AT ALL
9. THOUGHTS THAT YOU WOULD BE BETTER OFF DEAD, OR OF HURTING YOURSELF: NOT AT ALL
SUM OF ALL RESPONSES TO PHQ QUESTIONS 1-9: 10
SUM OF ALL RESPONSES TO PHQ QUESTIONS 1-9: 10
5. POOR APPETITE OR OVEREATING: NEARLY EVERY DAY
7. TROUBLE CONCENTRATING ON THINGS, SUCH AS READING THE NEWSPAPER OR WATCHING TELEVISION: NOT AT ALL
6. FEELING BAD ABOUT YOURSELF - OR THAT YOU ARE A FAILURE OR HAVE LET YOURSELF OR YOUR FAMILY DOWN: NOT AT ALL
4. FEELING TIRED OR HAVING LITTLE ENERGY: NEARLY EVERY DAY
3. TROUBLE FALLING OR STAYING ASLEEP: NEARLY EVERY DAY
2. FEELING DOWN, DEPRESSED OR HOPELESS: SEVERAL DAYS
10. IF YOU CHECKED OFF ANY PROBLEMS, HOW DIFFICULT HAVE THESE PROBLEMS MADE IT FOR YOU TO DO YOUR WORK, TAKE CARE OF THINGS AT HOME, OR GET ALONG WITH OTHER PEOPLE: NOT DIFFICULT AT ALL
SUM OF ALL RESPONSES TO PHQ QUESTIONS 1-9: 10
8. MOVING OR SPEAKING SO SLOWLY THAT OTHER PEOPLE COULD HAVE NOTICED. OR THE OPPOSITE, BEING SO FIGETY OR RESTLESS THAT YOU HAVE BEEN MOVING AROUND A LOT MORE THAN USUAL: NOT AT ALL

## 2025-03-31 NOTE — PATIENT INSTRUCTIONS
Use the CPAP nightly please    Try the new dose of the Nuvigil    Advise me 1 month or so of improvement in daytime sleep or not    Continue the other medications and follow up.      If the breast  soreness / skin does not heal all the way in 2-3 weeks, call me

## 2025-03-31 NOTE — PROGRESS NOTES
Take one tablet daily 90 tablet 3    rOPINIRole (REQUIP) 1 MG tablet Take 1 tablet by mouth in the morning and 1 tablet in the evening. For restless legs. 180 tablet 1    isosorbide mononitrate (IMDUR) 30 MG extended release tablet Take 1 tablet by mouth once daily 90 tablet 2    rosuvastatin (CRESTOR) 5 MG tablet Take 1 tablet by mouth once daily 90 tablet 2    pantoprazole (PROTONIX) 40 MG tablet Take 1 tablet by mouth twice daily for acid 180 tablet 1    Cholecalciferol (VITAMIN D3) 50 MCG (2000 UT) CAPS Take by mouth      topiramate (TOPAMAX) 50 MG tablet Take 1 tablet by mouth 2 times daily 180 tablet 3    aspirin 81 MG tablet Take 1 tablet by mouth daily       No current facility-administered medications for this visit.

## 2025-03-31 NOTE — TELEPHONE ENCOUNTER
Lov 11/19/2024    Future Appointments   Date Time Provider Department Center   3/31/2025 10:00 AM Brian Mercado DO [place] [facility] DEP   6/2/2025  3:00 PM SCHEDULE, [facility]NETTE DEVICE CHECK [facility]   6/2/2025  3:00 PM Neema Orozco, YOCASTA - CNP [facility]   8/4/2025  9:30 AM Alcides Alcantar MD AND NEURO Neurology -

## 2025-04-07 ENCOUNTER — TELEPHONE (OUTPATIENT)
Dept: FAMILY MEDICINE CLINIC | Age: 75
End: 2025-04-07

## 2025-04-07 RX ORDER — TAMSULOSIN HYDROCHLORIDE 0.4 MG/1
0.4 CAPSULE ORAL DAILY
Qty: 30 CAPSULE | Refills: 3 | Status: SHIPPED | OUTPATIENT
Start: 2025-04-07

## 2025-04-07 NOTE — TELEPHONE ENCOUNTER
Patient has an appointment with a Urology group 4/24/2025. Patient is asking if medication can be sent in due to having issues with UTI.     Symptoms: Pressure, pain when using the bathroom feels like it is falling out, having a hard time going       [facility] :Pharmacy     Last Visit: 3/31/2025    Future Appointments   Date Time Provider Department Center   5/19/2025 11:00 AM Brian Mercado DO [place] [facility]   6/2/2025  3:00 PM SCHEDULE, ANNETTE DEVICE CHECK [facility]   6/2/2025  3:00 PM Neema Orozco, YOCASTA - CNP [place] [facility]   8/4/2025  9:30 AM Alcides Alcantar MD AND NEURO Neurology -

## 2025-04-11 RX ORDER — PANTOPRAZOLE SODIUM 40 MG/1
TABLET, DELAYED RELEASE ORAL
Qty: 180 TABLET | Refills: 1 | Status: SHIPPED | OUTPATIENT
Start: 2025-04-11

## 2025-04-11 NOTE — TELEPHONE ENCOUNTER
Future Appointments   Date Time Provider Department Center   5/19/2025 11:00 AM Brian Mercado DO [place] [facility] DEP   6/2/2025  3:00 PM SCHEDULE, ANNETTE DEVICE CHECK [facility]   6/2/2025  3:00 PM Neema Orozco, APRN - CNP [facility]   8/4/2025  9:30 AM Alcides Alcantar MD AND NEURO Neurology -     LOV 3/31/2025

## 2025-04-29 ENCOUNTER — TRANSCRIBE ORDERS (OUTPATIENT)
Dept: ADMINISTRATIVE | Age: 75
End: 2025-04-29

## 2025-04-29 DIAGNOSIS — N28.1 CYST OF KIDNEY, ACQUIRED: Primary | ICD-10-CM

## 2025-05-12 RX ORDER — ROPINIROLE 1 MG/1
TABLET, FILM COATED ORAL
Qty: 180 TABLET | Refills: 1 | Status: SHIPPED | OUTPATIENT
Start: 2025-05-12

## 2025-05-12 NOTE — TELEPHONE ENCOUNTER
3/31/2025        Future Appointments   Date Time Provider Department Center   5/14/2025  3:30 PM [facility] MRI RM 1 [facility]   5/19/2025 11:00 AM Brian Mercado DO [place] [facility]   6/2/2025  3:00 PM SCHEDULE, ANNETTE DEVICE CHECK [facility]   6/2/2025  3:00 PM Neema Orozco, APRN - CNP [place] [facility]   8/4/2025  9:30 AM Alcides Alcantar MD AND NEURO Neurology -

## 2025-05-20 ENCOUNTER — OFFICE VISIT (OUTPATIENT)
Dept: FAMILY MEDICINE CLINIC | Age: 75
End: 2025-05-20
Payer: MEDICARE

## 2025-05-20 VITALS
HEIGHT: 62 IN | BODY MASS INDEX: 33.49 KG/M2 | DIASTOLIC BLOOD PRESSURE: 70 MMHG | RESPIRATION RATE: 16 BRPM | WEIGHT: 182 LBS | TEMPERATURE: 97.1 F | HEART RATE: 71 BPM | OXYGEN SATURATION: 97 % | SYSTOLIC BLOOD PRESSURE: 100 MMHG

## 2025-05-20 DIAGNOSIS — L30.9 DERMATITIS: ICD-10-CM

## 2025-05-20 DIAGNOSIS — G47.411 PRIMARY NARCOLEPSY WITH CATAPLEXY: ICD-10-CM

## 2025-05-20 DIAGNOSIS — I25.10 CORONARY ARTERY DISEASE INVOLVING NATIVE CORONARY ARTERY OF NATIVE HEART WITHOUT ANGINA PECTORIS: ICD-10-CM

## 2025-05-20 DIAGNOSIS — I50.32 CHRONIC DIASTOLIC (CONGESTIVE) HEART FAILURE (HCC): ICD-10-CM

## 2025-05-20 DIAGNOSIS — Z00.00 MEDICARE ANNUAL WELLNESS VISIT, SUBSEQUENT: Primary | ICD-10-CM

## 2025-05-20 DIAGNOSIS — I10 ESSENTIAL HYPERTENSION: ICD-10-CM

## 2025-05-20 PROCEDURE — 1160F RVW MEDS BY RX/DR IN RCRD: CPT | Performed by: FAMILY MEDICINE

## 2025-05-20 PROCEDURE — 3074F SYST BP LT 130 MM HG: CPT | Performed by: FAMILY MEDICINE

## 2025-05-20 PROCEDURE — 1159F MED LIST DOCD IN RCRD: CPT | Performed by: FAMILY MEDICINE

## 2025-05-20 PROCEDURE — 3078F DIAST BP <80 MM HG: CPT | Performed by: FAMILY MEDICINE

## 2025-05-20 PROCEDURE — G0439 PPPS, SUBSEQ VISIT: HCPCS | Performed by: FAMILY MEDICINE

## 2025-05-20 PROCEDURE — 1123F ACP DISCUSS/DSCN MKR DOCD: CPT | Performed by: FAMILY MEDICINE

## 2025-05-20 SDOH — ECONOMIC STABILITY: FOOD INSECURITY: WITHIN THE PAST 12 MONTHS, YOU WORRIED THAT YOUR FOOD WOULD RUN OUT BEFORE YOU GOT MONEY TO BUY MORE.: NEVER TRUE

## 2025-05-20 SDOH — ECONOMIC STABILITY: FOOD INSECURITY: WITHIN THE PAST 12 MONTHS, THE FOOD YOU BOUGHT JUST DIDN'T LAST AND YOU DIDN'T HAVE MONEY TO GET MORE.: NEVER TRUE

## 2025-05-20 ASSESSMENT — PATIENT HEALTH QUESTIONNAIRE - PHQ9
9. THOUGHTS THAT YOU WOULD BE BETTER OFF DEAD, OR OF HURTING YOURSELF: NOT AT ALL
1. LITTLE INTEREST OR PLEASURE IN DOING THINGS: NOT AT ALL
8. MOVING OR SPEAKING SO SLOWLY THAT OTHER PEOPLE COULD HAVE NOTICED. OR THE OPPOSITE, BEING SO FIGETY OR RESTLESS THAT YOU HAVE BEEN MOVING AROUND A LOT MORE THAN USUAL: NOT AT ALL
SUM OF ALL RESPONSES TO PHQ QUESTIONS 1-9: 0
SUM OF ALL RESPONSES TO PHQ QUESTIONS 1-9: 0
4. FEELING TIRED OR HAVING LITTLE ENERGY: NOT AT ALL
7. TROUBLE CONCENTRATING ON THINGS, SUCH AS READING THE NEWSPAPER OR WATCHING TELEVISION: NOT AT ALL
10. IF YOU CHECKED OFF ANY PROBLEMS, HOW DIFFICULT HAVE THESE PROBLEMS MADE IT FOR YOU TO DO YOUR WORK, TAKE CARE OF THINGS AT HOME, OR GET ALONG WITH OTHER PEOPLE: NOT DIFFICULT AT ALL
5. POOR APPETITE OR OVEREATING: NOT AT ALL
6. FEELING BAD ABOUT YOURSELF - OR THAT YOU ARE A FAILURE OR HAVE LET YOURSELF OR YOUR FAMILY DOWN: NOT AT ALL
3. TROUBLE FALLING OR STAYING ASLEEP: NOT AT ALL
SUM OF ALL RESPONSES TO PHQ QUESTIONS 1-9: 0
2. FEELING DOWN, DEPRESSED OR HOPELESS: NOT AT ALL
SUM OF ALL RESPONSES TO PHQ QUESTIONS 1-9: 0

## 2025-05-20 ASSESSMENT — LIFESTYLE VARIABLES: HOW MANY STANDARD DRINKS CONTAINING ALCOHOL DO YOU HAVE ON A TYPICAL DAY: PATIENT DOES NOT DRINK

## 2025-05-20 NOTE — PROGRESS NOTES
Subjective:      Patient ID: Graciela Dean is a 74 y.o. y.o. female.      HPI      Chief Complaint   Patient presents with    Medicare AWV     Not fasting today       Allergies   Allergen Reactions    Latex Rash    Vistaril [Hydroxyzine Hcl]        Past Medical History:   Diagnosis Date    Acid reflux     small stomach ulcer    Angina pectoris, variant     Anxiety     Arthritis     Asthma     Benign esophageal stricture 12/2016    Benign tumor 03/20/2017     Benign brain tumor size of a quater    CAD (coronary artery disease)     Cancer (HCC)     ovarian    Depression     Diabetes (HCC)     Frequency     Headache     Hyperlipidemia     Hypertension     Hypothyroidism     Irritable bowel syndrome     Kidney stones     MI, old     Narcolepsy     Obesity     Restless legs syndrome     Sleep apnea     Thyroid disease     Urgency of urination     Urinary incontinence        Past Surgical History:   Procedure Laterality Date    ARM SURGERY Right 08/19/2014    exc.trapezium and flexor carpi radialis interpositional arthroplasty    BREAST REDUCTION SURGERY      BREAST SURGERY      reduction    CARPAL TUNNEL RELEASE Right     CHOLECYSTECTOMY      COLONOSCOPY      COSMETIC SURGERY  3/6/2022    Had surgery on left side of nose to cover up hole using skin from eyelid from right eye.    DENTAL SURGERY      ENDOSCOPY, COLON, DIAGNOSTIC  01/06/2017    Anastomotic ulcer, gastritis    EP DEVICE PROCEDURE Left 03/04/2025    Insert PPM dual performed by Sheyla Best MD at [facility] CARDIAC CATH LAB    ESOPHAGEAL DILATATION  12/2016    GASTRIC BYPASS SURGERY      GASTRIC BYPASS SURGERY      HERNIA REPAIR      Had a hiatal hernia removed when they done my gastric  bipass.    HYSTERECTOMY (CERVIX STATUS UNKNOWN)      HYSTERECTOMY, VAGINAL  12/26/1990    Had ovarian cancer and then had the surgery.    LITHOTRIPSY Left 11/12/2013    LITHOTRIPSY Left 12/26/2013    LEFT ESWL    MOHS SURGERY  03/07/2022    Left cheek    OVARY REMOVAL

## 2025-05-20 NOTE — PROGRESS NOTES
Medicare wellness    We increased Nuvigil last visit.  Felt the 200 mg was too strong-  almost paradoxical effect.  Went back to the 100 mg dose and doing better.      Seeing Urology for stones and emptying issues and possible mass of the kidney.     Meds and hx stable.    Vaccines discussed.  Rec she consider Shingles vaccine.   Discussed Medicare Annual Wellness Visit    Graciela Dean is here for Medicare AWV (Not fasting today)    Assessment & Plan   Dermatitis  -     AFL - Ang William MD, Dermatology, [facility]  Primary narcolepsy with cataplexy       No follow-ups on file.     Subjective       Patient's complete Health Risk Assessment and screening values have been reviewed and are found in Flowsheets. The following problems were reviewed today and where indicated follow up appointments were made and/or referrals ordered.    Positive Risk Factor Screenings with Interventions:    Fall Risk:  Do you feel unsteady or are you worried about falling? : (!) yes  2 or more falls in past year?: (!) yes  Fall with injury in past year?: no     Interventions:    Home safety tips and fall risk reduction strategies discussed.             Inactivity:  On average, how many days per week do you engage in moderate to strenuous exercise (like a brisk walk)?: 0 days (!) Abnormal  On average, how many minutes do you engage in exercise at this level?: 0 min    Interventions:  Recommend at least some mild activity such as walking, to tolerance on a regular basis.  At least 4 days a week try to increase activity as tolerated and strength improves.     Abnormal BMI (obese):  Body mass index is 33.29 kg/m². (!) Abnormal    Interventions:  Recommend carbohydrate moderation and increased activity.         Hearing Screen:  Do you or your family notice any trouble with your hearing that hasn't been managed with hearing aids?: (!) Yes    Interventions:  Is following up with audiology for possible hearing aids    Vision Screen:  Do you

## 2025-05-20 NOTE — PATIENT INSTRUCTIONS
Medicines    Take your medicines exactly as prescribed. Call your doctor if you think you are having a problem with your medicine.     If your doctor recommends aspirin, take the amount directed each day. Make sure you take aspirin and not another kind of pain reliever, such as acetaminophen (Tylenol).   When should you call for help?   Call 911 if you have symptoms of a heart attack. These may include:    Chest pain or pressure, or a strange feeling in the chest.     Sweating.     Shortness of breath.     Pain, pressure, or a strange feeling in the back, neck, jaw, or upper belly or in one or both shoulders or arms.     Lightheadedness or sudden weakness.     A fast or irregular heartbeat.   After you call 911, the  may tell you to chew 1 adult-strength or 2 to 4 low-dose aspirin. Wait for an ambulance. Do not try to drive yourself.  Watch closely for changes in your health, and be sure to contact your doctor if you have any problems.  Where can you learn more?  Go to https://www.[employer].net/patientEd and enter F075 to learn more about \"A Healthy Heart: Care Instructions.\"  Current as of: July 31, 2024  Content Version: 14.4  © 8941-5825 [employer] ([employer]   Care instructions adapted under license by [employer]. If you have questions about a medical condition or this instruction, always ask your healthcare professional. [employer] disclaims any warranty or liability for your use of this information.    Personalized Preventive Plan for Graciela Dean - 5/20/2025  Medicare offers a range of preventive health benefits. Some of the tests and screenings are paid in full while other may be subject to a deductible, co-insurance, and/or copay.  Some of these benefits include a comprehensive review of your medical history including lifestyle, illnesses that may run in your family, and various assessments and screenings as appropriate.  After reviewing your medical record and screening and

## 2025-05-23 ENCOUNTER — TELEPHONE (OUTPATIENT)
Dept: FAMILY MEDICINE CLINIC | Age: 75
End: 2025-05-23

## 2025-06-02 ENCOUNTER — APPOINTMENT (OUTPATIENT)
Dept: GENERAL RADIOLOGY | Age: 75
End: 2025-06-02
Attending: UROLOGY
Payer: MEDICARE

## 2025-06-02 ENCOUNTER — ANESTHESIA EVENT (OUTPATIENT)
Dept: OPERATING ROOM | Age: 75
End: 2025-06-02
Payer: MEDICARE

## 2025-06-02 ENCOUNTER — HOSPITAL ENCOUNTER (OUTPATIENT)
Age: 75
Setting detail: OUTPATIENT SURGERY
Discharge: HOME | End: 2025-06-02
Attending: UROLOGY | Admitting: UROLOGY
Payer: MEDICARE

## 2025-06-02 ENCOUNTER — ANESTHESIA (OUTPATIENT)
Dept: OPERATING ROOM | Age: 75
End: 2025-06-02
Payer: MEDICARE

## 2025-06-02 VITALS
TEMPERATURE: 97.6 F | DIASTOLIC BLOOD PRESSURE: 75 MMHG | RESPIRATION RATE: 27 BRPM | WEIGHT: 179.4 LBS | SYSTOLIC BLOOD PRESSURE: 123 MMHG | OXYGEN SATURATION: 94 % | HEIGHT: 63 IN | BODY MASS INDEX: 31.79 KG/M2 | HEART RATE: 78 BPM

## 2025-06-02 DIAGNOSIS — N20.0 RENAL CALCULI: Primary | ICD-10-CM

## 2025-06-02 LAB
ANION GAP SERPL CALCULATED.3IONS-SCNC: 11 MMOL/L (ref 3–16)
BUN SERPL-MCNC: 22 MG/DL (ref 7–20)
CALCIUM SERPL-MCNC: 9.7 MG/DL (ref 8.3–10.6)
CHLORIDE SERPL-SCNC: 112 MMOL/L (ref 99–110)
CO2 SERPL-SCNC: 22 MMOL/L (ref 21–32)
CREAT SERPL-MCNC: 1.1 MG/DL (ref 0.6–1.2)
GFR SERPLBLD CREATININE-BSD FMLA CKD-EPI: 53 ML/MIN/{1.73_M2}
GLUCOSE BLD-MCNC: 97 MG/DL (ref 70–99)
GLUCOSE SERPL-MCNC: 111 MG/DL (ref 70–99)
PERFORMED ON: NORMAL
POTASSIUM SERPL-SCNC: 4.1 MMOL/L (ref 3.5–5.1)
SODIUM SERPL-SCNC: 145 MMOL/L (ref 136–145)

## 2025-06-02 PROCEDURE — 2720000010 HC SURG SUPPLY STERILE: Performed by: UROLOGY

## 2025-06-02 PROCEDURE — 3700000000 HC ANESTHESIA ATTENDED CARE: Performed by: UROLOGY

## 2025-06-02 PROCEDURE — 6360000002 HC RX W HCPCS: Performed by: REGISTERED NURSE

## 2025-06-02 PROCEDURE — C1726 CATH, BAL DIL, NON-VASCULAR: HCPCS | Performed by: UROLOGY

## 2025-06-02 PROCEDURE — 7100000000 HC PACU RECOVERY - FIRST 15 MIN: Performed by: UROLOGY

## 2025-06-02 PROCEDURE — C1894 INTRO/SHEATH, NON-LASER: HCPCS | Performed by: UROLOGY

## 2025-06-02 PROCEDURE — 82365 CALCULUS SPECTROSCOPY: CPT

## 2025-06-02 PROCEDURE — 7100000010 HC PHASE II RECOVERY - FIRST 15 MIN: Performed by: UROLOGY

## 2025-06-02 PROCEDURE — 3600000014 HC SURGERY LEVEL 4 ADDTL 15MIN: Performed by: UROLOGY

## 2025-06-02 PROCEDURE — 2709999900 HC NON-CHARGEABLE SUPPLY: Performed by: UROLOGY

## 2025-06-02 PROCEDURE — 6360000002 HC RX W HCPCS

## 2025-06-02 PROCEDURE — 2500000003 HC RX 250 WO HCPCS: Performed by: UROLOGY

## 2025-06-02 PROCEDURE — C1769 GUIDE WIRE: HCPCS | Performed by: UROLOGY

## 2025-06-02 PROCEDURE — 3700000001 HC ADD 15 MINUTES (ANESTHESIA): Performed by: UROLOGY

## 2025-06-02 PROCEDURE — 80048 BASIC METABOLIC PNL TOTAL CA: CPT

## 2025-06-02 PROCEDURE — C2617 STENT, NON-COR, TEM W/O DEL: HCPCS | Performed by: UROLOGY

## 2025-06-02 PROCEDURE — 7100000011 HC PHASE II RECOVERY - ADDTL 15 MIN: Performed by: UROLOGY

## 2025-06-02 PROCEDURE — 6360000004 HC RX CONTRAST MEDICATION: Performed by: UROLOGY

## 2025-06-02 PROCEDURE — 74420 UROGRAPHY RTRGR +-KUB: CPT

## 2025-06-02 PROCEDURE — 6360000002 HC RX W HCPCS: Performed by: STUDENT IN AN ORGANIZED HEALTH CARE EDUCATION/TRAINING PROGRAM

## 2025-06-02 PROCEDURE — 7100000001 HC PACU RECOVERY - ADDTL 15 MIN: Performed by: UROLOGY

## 2025-06-02 PROCEDURE — C1747 HC ENDOSCOPE, SINGLE, URINARY TRACT: HCPCS | Performed by: UROLOGY

## 2025-06-02 PROCEDURE — 6360000002 HC RX W HCPCS: Performed by: UROLOGY

## 2025-06-02 PROCEDURE — 2580000003 HC RX 258: Performed by: UROLOGY

## 2025-06-02 PROCEDURE — 36415 COLL VENOUS BLD VENIPUNCTURE: CPT

## 2025-06-02 PROCEDURE — 3600000004 HC SURGERY LEVEL 4 BASE: Performed by: UROLOGY

## 2025-06-02 RX ORDER — ONDANSETRON 2 MG/ML
4 INJECTION INTRAMUSCULAR; INTRAVENOUS
Status: COMPLETED | OUTPATIENT
Start: 2025-06-02 | End: 2025-06-02

## 2025-06-02 RX ORDER — MEPERIDINE HYDROCHLORIDE 25 MG/ML
12.5 INJECTION INTRAMUSCULAR; INTRAVENOUS; SUBCUTANEOUS ONCE
Status: COMPLETED | OUTPATIENT
Start: 2025-06-02 | End: 2025-06-02

## 2025-06-02 RX ORDER — FENTANYL CITRATE 50 UG/ML
INJECTION, SOLUTION INTRAMUSCULAR; INTRAVENOUS
Status: DISCONTINUED | OUTPATIENT
Start: 2025-06-02 | End: 2025-06-02 | Stop reason: SDUPTHER

## 2025-06-02 RX ORDER — LEVOFLOXACIN 5 MG/ML
500 INJECTION, SOLUTION INTRAVENOUS
Status: COMPLETED | OUTPATIENT
Start: 2025-06-02 | End: 2025-06-02

## 2025-06-02 RX ORDER — PROPOFOL 10 MG/ML
INJECTION, EMULSION INTRAVENOUS
Status: DISCONTINUED | OUTPATIENT
Start: 2025-06-02 | End: 2025-06-02 | Stop reason: SDUPTHER

## 2025-06-02 RX ORDER — ACETAMINOPHEN 325 MG/1
650 TABLET ORAL
Status: DISCONTINUED | OUTPATIENT
Start: 2025-06-02 | End: 2025-06-02 | Stop reason: HOSPADM

## 2025-06-02 RX ORDER — SODIUM CHLORIDE 9 MG/ML
INJECTION, SOLUTION INTRAVENOUS PRN
Status: DISCONTINUED | OUTPATIENT
Start: 2025-06-02 | End: 2025-06-02 | Stop reason: HOSPADM

## 2025-06-02 RX ORDER — DEXAMETHASONE SODIUM PHOSPHATE 4 MG/ML
INJECTION, SOLUTION INTRA-ARTICULAR; INTRALESIONAL; INTRAMUSCULAR; INTRAVENOUS; SOFT TISSUE
Status: DISCONTINUED | OUTPATIENT
Start: 2025-06-02 | End: 2025-06-02 | Stop reason: SDUPTHER

## 2025-06-02 RX ORDER — LIDOCAINE HYDROCHLORIDE 10 MG/ML
0.5 INJECTION, SOLUTION EPIDURAL; INFILTRATION; INTRACAUDAL; PERINEURAL ONCE
Status: DISCONTINUED | OUTPATIENT
Start: 2025-06-02 | End: 2025-06-02 | Stop reason: HOSPADM

## 2025-06-02 RX ORDER — LORAZEPAM 2 MG/ML
0.5 INJECTION INTRAMUSCULAR
Status: DISCONTINUED | OUTPATIENT
Start: 2025-06-02 | End: 2025-06-02 | Stop reason: HOSPADM

## 2025-06-02 RX ORDER — ONDANSETRON 2 MG/ML
INJECTION INTRAMUSCULAR; INTRAVENOUS
Status: COMPLETED
Start: 2025-06-02 | End: 2025-06-02

## 2025-06-02 RX ORDER — MEPERIDINE HYDROCHLORIDE 25 MG/ML
25 INJECTION INTRAMUSCULAR; INTRAVENOUS; SUBCUTANEOUS ONCE
Status: CANCELLED | OUTPATIENT
Start: 2025-06-02

## 2025-06-02 RX ORDER — LIDOCAINE HYDROCHLORIDE 20 MG/ML
INJECTION, SOLUTION EPIDURAL; INFILTRATION; INTRACAUDAL; PERINEURAL
Status: DISCONTINUED | OUTPATIENT
Start: 2025-06-02 | End: 2025-06-02 | Stop reason: SDUPTHER

## 2025-06-02 RX ORDER — TRAMADOL HYDROCHLORIDE 50 MG/1
50 TABLET ORAL PRN
Status: DISCONTINUED | OUTPATIENT
Start: 2025-06-02 | End: 2025-06-02 | Stop reason: HOSPADM

## 2025-06-02 RX ORDER — HYDROMORPHONE HYDROCHLORIDE 2 MG/ML
0.5 INJECTION, SOLUTION INTRAMUSCULAR; INTRAVENOUS; SUBCUTANEOUS EVERY 5 MIN PRN
Status: DISCONTINUED | OUTPATIENT
Start: 2025-06-02 | End: 2025-06-02 | Stop reason: HOSPADM

## 2025-06-02 RX ORDER — HYDROCODONE BITARTRATE AND ACETAMINOPHEN 5; 325 MG/1; MG/1
1 TABLET ORAL EVERY 6 HOURS PRN
Qty: 10 TABLET | Refills: 0 | Status: SHIPPED | OUTPATIENT
Start: 2025-06-02 | End: 2025-06-05

## 2025-06-02 RX ORDER — LABETALOL HYDROCHLORIDE 5 MG/ML
10 INJECTION, SOLUTION INTRAVENOUS
Status: DISCONTINUED | OUTPATIENT
Start: 2025-06-02 | End: 2025-06-02 | Stop reason: HOSPADM

## 2025-06-02 RX ORDER — SODIUM CHLORIDE 9 MG/ML
INJECTION, SOLUTION INTRAVENOUS CONTINUOUS
Status: DISCONTINUED | OUTPATIENT
Start: 2025-06-02 | End: 2025-06-02 | Stop reason: HOSPADM

## 2025-06-02 RX ORDER — TRAMADOL HYDROCHLORIDE 50 MG/1
100 TABLET ORAL PRN
Status: DISCONTINUED | OUTPATIENT
Start: 2025-06-02 | End: 2025-06-02 | Stop reason: HOSPADM

## 2025-06-02 RX ORDER — SODIUM CHLORIDE 0.9 % (FLUSH) 0.9 %
5-40 SYRINGE (ML) INJECTION EVERY 12 HOURS SCHEDULED
Status: DISCONTINUED | OUTPATIENT
Start: 2025-06-02 | End: 2025-06-02 | Stop reason: HOSPADM

## 2025-06-02 RX ORDER — SODIUM CHLORIDE 0.9 % (FLUSH) 0.9 %
5-40 SYRINGE (ML) INJECTION PRN
Status: DISCONTINUED | OUTPATIENT
Start: 2025-06-02 | End: 2025-06-02 | Stop reason: HOSPADM

## 2025-06-02 RX ORDER — MEPERIDINE HYDROCHLORIDE 25 MG/ML
25 INJECTION INTRAMUSCULAR; INTRAVENOUS; SUBCUTANEOUS ONCE
Refills: 0 | Status: CANCELLED | OUTPATIENT
Start: 2025-06-02

## 2025-06-02 RX ORDER — NALOXONE HYDROCHLORIDE 0.4 MG/ML
INJECTION, SOLUTION INTRAMUSCULAR; INTRAVENOUS; SUBCUTANEOUS PRN
Status: DISCONTINUED | OUTPATIENT
Start: 2025-06-02 | End: 2025-06-02 | Stop reason: HOSPADM

## 2025-06-02 RX ORDER — ONDANSETRON 2 MG/ML
INJECTION INTRAMUSCULAR; INTRAVENOUS
Status: DISCONTINUED | OUTPATIENT
Start: 2025-06-02 | End: 2025-06-02 | Stop reason: SDUPTHER

## 2025-06-02 RX ORDER — HYDRALAZINE HYDROCHLORIDE 20 MG/ML
10 INJECTION INTRAMUSCULAR; INTRAVENOUS
Status: DISCONTINUED | OUTPATIENT
Start: 2025-06-02 | End: 2025-06-02 | Stop reason: HOSPADM

## 2025-06-02 RX ORDER — HYDROMORPHONE HYDROCHLORIDE 2 MG/ML
0.25 INJECTION, SOLUTION INTRAMUSCULAR; INTRAVENOUS; SUBCUTANEOUS EVERY 5 MIN PRN
Status: DISCONTINUED | OUTPATIENT
Start: 2025-06-02 | End: 2025-06-02 | Stop reason: HOSPADM

## 2025-06-02 RX ORDER — PROCHLORPERAZINE EDISYLATE 5 MG/ML
5 INJECTION INTRAMUSCULAR; INTRAVENOUS
Status: DISCONTINUED | OUTPATIENT
Start: 2025-06-02 | End: 2025-06-02 | Stop reason: HOSPADM

## 2025-06-02 RX ADMIN — FENTANYL CITRATE 50 MCG: 50 INJECTION, SOLUTION INTRAMUSCULAR; INTRAVENOUS at 14:01

## 2025-06-02 RX ADMIN — MEPERIDINE HYDROCHLORIDE 12.5 MG: 25 INJECTION, SOLUTION INTRAMUSCULAR; INTRAVENOUS; SUBCUTANEOUS at 16:17

## 2025-06-02 RX ADMIN — ONDANSETRON 4 MG: 2 INJECTION INTRAMUSCULAR; INTRAVENOUS at 15:57

## 2025-06-02 RX ADMIN — FENTANYL CITRATE 50 MCG: 50 INJECTION, SOLUTION INTRAMUSCULAR; INTRAVENOUS at 13:38

## 2025-06-02 RX ADMIN — ONDANSETRON 4 MG: 2 INJECTION, SOLUTION INTRAMUSCULAR; INTRAVENOUS at 15:57

## 2025-06-02 RX ADMIN — LEVOFLOXACIN 500 MG: 5 INJECTION, SOLUTION INTRAVENOUS at 13:51

## 2025-06-02 RX ADMIN — LIDOCAINE HYDROCHLORIDE 100 MG: 20 INJECTION, SOLUTION EPIDURAL; INFILTRATION; INTRACAUDAL; PERINEURAL at 13:46

## 2025-06-02 RX ADMIN — PROPOFOL 100 MG: 10 INJECTION, EMULSION INTRAVENOUS at 13:46

## 2025-06-02 RX ADMIN — ONDANSETRON 4 MG: 2 INJECTION INTRAMUSCULAR; INTRAVENOUS at 14:59

## 2025-06-02 RX ADMIN — SODIUM CHLORIDE: 9 INJECTION, SOLUTION INTRAVENOUS at 12:24

## 2025-06-02 RX ADMIN — DEXAMETHASONE SODIUM PHOSPHATE 8 MG: 4 INJECTION, SOLUTION INTRAMUSCULAR; INTRAVENOUS at 13:51

## 2025-06-02 ASSESSMENT — ENCOUNTER SYMPTOMS: SHORTNESS OF BREATH: 1

## 2025-06-02 ASSESSMENT — PAIN - FUNCTIONAL ASSESSMENT: PAIN_FUNCTIONAL_ASSESSMENT: 0-10

## 2025-06-02 NOTE — H&P
Cholecystectomy; Hysterectomy; Gastric bypass surgery; Breast surgery; Dental surgery; Lithotripsy (Left, 11/12/2013); Lithotripsy (Left, 12/26/2013); Colonoscopy; Carpal tunnel release (Right); Arm Surgery (Right, 08/19/2014); Gastric bypass surgery; Endoscopy, colon, diagnostic (01/06/2017); Esophagus dilation (12/2016); Upper gastrointestinal endoscopy (03/03/2017); pr njx dx/ther sbst intrlmnr crv/thrc w/img gdn (Right, 08/27/2018); Percutaneous Transluminal Coronary Angio; Mohs surgery (03/07/2022); Breast reduction surgery; Ovary removal; Cosmetic surgery (3/6/2022); Tubal ligation (1988, ,,1990/12/26); hernia repair; Hysterectomy, vaginal (12/26/1990); ep device procedure (Left, 03/04/2025); and pacemaker placement (03/05/2025).     Allergies:   Allergies   Allergen Reactions    Latex Rash    Vistaril [Hydroxyzine Hcl] Other (See Comments)     Not sure       Social History:  She reports that she has never smoked. She has been exposed to tobacco smoke. She has never used smokeless tobacco. She reports that she does not drink alcohol and does not use drugs.    Family History:  family history includes Cancer in her mother and sister; Coronary Art Dis in her father; Depression in her sister; Diabetes in her mother; Early Death in her father; Heart Disease in her father, mother, and sister; Stroke in her father and sister.    Medications:   Scheduled Meds:   lidocaine PF  0.5 mL IntraDERmal Once    levofloxacin  500 mg IntraVENous On Call to OR     Continuous Infusions:   sodium chloride 50 mL/hr at 06/02/25 1224     PRN Meds:    Review of Systems:  Constitutional: Negative for fever    Genitourinary: see HPI  Eyes: negative for sudden change in vision  EENT: no complaints  Cardiovascular: Negative for chest pain  Respiratory: Negative for shortness of breath  Gastrointestinal: Negative for nausea  Musculoskeletal: Negative for back pain   Neurological: Negative for weakness  Psychiatric: Negative for

## 2025-06-02 NOTE — DISCHARGE INSTRUCTIONS
Follow up with Dr. Barnett with any questions, concerns, results, and an appointment after your procedure in the time period recommended.     Remove stent on Thursday         UROLOGY DISCHARGE INSTRUCTIONS    Follow your surgeons instructions.  Your urine may look pink or red and it may burn when you urinate. You may also feel like you have to urinate often.  Call your surgeon if your temperature is higher than 101 degrees, your urine is grossly bloody, or has large clots.  Drink plenty of fluids unless your physician advises against it.  If you can not urinate once you are home, call your surgeon or go to the Emergency Room.  If you are discharged with a catheter in your bladder, follow instructions on care and removal. (See cathter removal/care instructions)  Take medications as directed.Take pain medication with food. Do not drive or operate machinery while taking narcotics.  Call your surgeon for any problems or questions        SEDATION DISCHARGE INSTRUCTIONS  6/2/2025    Wear your seatbelt home.  You are under the influence of drugs. Do not drink alcohol, drive, operate machinery, or make any important decisions or sign any legal documents for 24 hours  A responsible adult needs to be with you for 24 hours.  You may experience lightheadedness, dizziness, nausea, heightened emotions and/or sleepiness following surgery.  Rest at home today- increase activity as tolerated.  Progress slowly to a regular diet and drink plenty of fluids unless your physician has instructed you otherwise.  If nausea becomes a problem, call your physician.  Coughing, sore throat, and muscle aches are other side effects of anesthesia and should improve with time.  Do not drive or operate machinery while taking narcotics.  ATTENTION FEMALE PATIENTS: if you use an oral contraceptive or birth control pill, you need to use an additional or alternative method for pregnancy prevention for the next thirty days.  Medications given today may

## 2025-06-02 NOTE — ANESTHESIA POSTPROCEDURE EVALUATION
Department of Anesthesiology  Postprocedure Note    Patient: Graciela Dean  MRN: [id number]  YOB: 1950  Date of evaluation: 6/2/2025    Procedure Summary       Date: 06/02/25 Room / Location: [address]    Anesthesia Start: 1341 Anesthesia Stop: 1517    Procedure: CYSTOSCOPY, LEFT RETROGRADE PYLEOGRAM, LEFT URETEROSCOPY, HOLMIUM LASER LITHOTRIPSY, STONE MANIPULATION, STONE BASKET, LEFT STENT INSERT WITH CALYXO VACUUM ASPIRATION CATHETER (Left) Diagnosis:       Calculus of kidney      (Calculus of kidney [N20.0])    Surgeons: Chico Barnett MD Responsible Provider: Braxton Winslow MD    Anesthesia Type: general ASA Status: 3            Anesthesia Type: No value filed.    Soy Phase I: Soy Score: 10    Soy Phase II: Soy Score: 10    Anesthesia Post Evaluation    Level of consciousness: awake  Airway patency: patent    No notable events documented.

## 2025-06-02 NOTE — OP NOTE
and was transported to the PACU in stable condition.    Findings: urethral stenosis + atrophy, cloudy urine, multiple bladder tics. Unable to access much of the lower pole stone burden due to anatomy. ~5 mm treated the rest left    Estimated Blood Loss: minimal                  Drains: 6fr x 26 cm left ureteral stent with string          Specimens: stone for analysis    Complications: none apparent           Disposition:  PACU - hemodynamically stable.     Plan: Message sent to Dr. Reyes. Will need estrogen and additional treatment for OAB/KRISTEN. Likely only treatable stones with PCNL and this likely overkill.  Discussed in detail with patients brother today           Chico Barnett MD,  6/2/2025

## 2025-06-02 NOTE — PROGRESS NOTES
Notified pt that meds to take am of surgery are imdur, levothyroxine, and protonix.  
Pt VSS. Pt denies pain at this time. Surgical site internal with stent string secured with steri strip. Pt seen by anesthesia. Phase I criteria met, transferring to phase II.   
Pt VSS. Pt dressed with assistance of this nurse. Surgical site internal. External string secured with steri strip. Pt denies pain at this time. DC instructions given at the bedside with brother Zafar present, both stated understanding, all questions answered. IV removed with catheter intact. Pt DC home via wc with brother and all belongings.   
Pt arrived from OR to PACU with VSS. Pt awakens to voice. Surgical site internal with external string secured with steri strip. Will cont to monitor.   
Teaching / education initiated regarding perioperative experience, expectations, and pain management during stay. Patient verbalized understanding.  
PRIOR TO SURGERY. LAST DOSE_n/a______     7.  IF YOU TAKE A BLOOD THINNER (SUCH AS PLAVIX, ELIQUIS, XARELTO, WARFARIN) OR AN ANTIPLATELET SUCH AS PLETAL. MAKE SURE YOU GET INSTUCTIONS FROM YOUR SURGEON AND PRESCRIBING DRCarlos AS TO WHEN IT NEEDS TO BE STOPPED. LAST DOSE_n/a_____     8.  IF YOU TAKE ANY MEDICATIONS FOR WEIGHT LOSS (SUCH AS ADIPEX, NALTREXONE, PHENTERMINE) YOU MUST STOP X 3 DAYS. LAST DOSE__n/a____     9.  ASPIRIN, IBUPROFEN, ADVIL, ANTI INFLAMMATORIES, ALONG WITH VITAMINS AND SUPPLEMENTS SHOULD BE STOPPED FOR 5-7 DAYS. TYLENOL IS OK TO TAKE.     10. YOU MUST HAVE A RESPONSIBLE ADULT, AGE 18 OR OLDER TO STAY WITH YOU AND DRIVE YOU HOME. A PARENT OR LEGAL GUARDIAN MUST STAY ON SITE THE ENTIRE TIME A CHILD IS HERE.     11. FOLLOW YOUR SURGEONS INSTRUCTIONS FOR SHOWERING PRIOR TO SURGERY.     12. IF YOU HAVE NOT BEEN GIVEN SPECIFIC INSTRUCTIONS, SHOWER THE MORNING OF WITH AN ANTIBACTERIAL SOAP, SUCH AS DIAL.     13. FOLLOW YOUR SURGEONS INSTRUCTIONS IF THEY HAVE GIVEN YOU A BOWEL PREP TO DO BEFORE SURGERY.     14. IF YOU DEVELOP AN ILLNESS PRIOR TO SURGERY LET YOUR SURGEON KNOW (COUGH , FEVER, COLD, SORE THROAT,  NAUSEA OR VOMITING)     15. IF YOU HAVE ANY SKIN BREAKDOWN, SIGN OF INFECTION OR DENTAL ISSUES CONTACT YOUR SURGEON.     16. IF WE HAVE NOT REACHED YOU TO GIVE SPECIFIC INSTRUCTIONS ON WHAT MEDICATIONS TO TAKE THE MORNING OF SURGERY YOU MAY TAKE YOUR HEART, BLOOD PRESSURE, THYROID,SEIZURE MEDICATIONS AND USE YOUR INHALERS. DO NOT TAKE BLOOD PRESSURE MEDICATIONS ENDING IN \"PRIL\" or \"SARTAN\" THE MORNING OF OR SADA PRIOR TO SURGERY.                                                                                 GENERAL INSTRUCTIONS      Surgery dates, times and arrival times are subject to change. Please check with your surgeon. MAKE SURE YOUR VOICEMAIL IS SET UP AND NOT FULL so you are able to receive messages regarding your surgery.  Bring a photo ID,insurance cards and form of payment. There is a

## 2025-06-02 NOTE — ANESTHESIA PRE PROCEDURE
reduction   • CARPAL TUNNEL RELEASE Right    • CHOLECYSTECTOMY     • COLONOSCOPY     • COSMETIC SURGERY  3/6/2022    Had surgery on left side of nose to cover up hole using skin from eyelid from right eye.   • DENTAL SURGERY     • ENDOSCOPY, COLON, DIAGNOSTIC  01/06/2017    Anastomotic ulcer, gastritis   • EP DEVICE PROCEDURE Left 03/04/2025    Insert PPM dual performed by Sheyla Best MD at [facility] CARDIAC CATH LAB   • ESOPHAGEAL DILATATION  12/2016   • GASTRIC BYPASS SURGERY     • GASTRIC BYPASS SURGERY     • HERNIA REPAIR      Had a hiatal hernia removed when they done my gastric  bipass.   • HYSTERECTOMY (CERVIX STATUS UNKNOWN)     • HYSTERECTOMY, VAGINAL  12/26/1990    Had ovarian cancer and then had the surgery.   • LITHOTRIPSY Left 11/12/2013   • LITHOTRIPSY Left 12/26/2013    LEFT ESWL   • MOHS SURGERY  03/07/2022    Left cheek   • OVARY REMOVAL     • PACEMAKER PLACEMENT  03/05/2025    Had a pacemaker put in   • NC NJX DX/THER SBST INTRLMNR CRV/THRC W/IMG GDN Right 08/27/2018    RIGHT LUMBAR FIVE SACRAL ONE EPIDURAL STEROID INJECTION SITE CONFIRMED BY FLUOROSCOPY performed by ZINA Sparks MD at [facility] OR   • PTCA     • TUBAL LIGATION  1988, ,,1990/12/26    In 1990 had a total hysterectomy   • UPPER GASTROINTESTINAL ENDOSCOPY  03/03/2017    Gastritis       Social History:    Social History     Tobacco Use   • Smoking status: Never     Passive exposure: Past   • Smokeless tobacco: Never   Substance Use Topics   • Alcohol use: No     Comment: rarely                                Counseling given: Not Answered      Vital Signs (Current):   Vitals:    05/29/25 1426   Weight: 83.9 kg (185 lb)   Height: 1.6 m (5' 3\")                                              BP Readings from Last 3 Encounters:   05/20/25 100/70   03/31/25 106/64   03/19/25 105/71       NPO Status:                                                                                 BMI:   Wt Readings from Last 3 Encounters:   05/29/25

## 2025-06-07 LAB
APPEARANCE STONE: NORMAL
COMPN STONE: NORMAL
SPECIMEN WT: 3 MG

## 2025-06-23 RX ORDER — SUCRALFATE 1 G/1
1 TABLET ORAL 4 TIMES DAILY
Qty: 360 TABLET | Refills: 1 | Status: SHIPPED | OUTPATIENT
Start: 2025-06-23

## 2025-06-23 NOTE — TELEPHONE ENCOUNTER
Lov 5/20/2025  Future Appointments   Date Time Provider Department Center   6/26/2025  3:30 PM [facility] MRI RM 1 [facility]   7/2/2025  8:15 AM SCHEDULE, ANNETTE DEVICE CHECK [facility]   7/2/2025  8:15 AM Neema Orozco, APRN - CNP [facility]   8/28/2025  9:45 AM Alcides Alcantar MD AND NEURO Neurology -   11/20/2025 11:00 AM Brian Mercado DO [place] [facility] DEP

## 2025-06-26 ENCOUNTER — HOSPITAL ENCOUNTER (OUTPATIENT)
Dept: MRI IMAGING | Age: 75
Discharge: HOME | End: 2025-06-26
Payer: MEDICARE

## 2025-06-26 ENCOUNTER — PROCEDURE VISIT (OUTPATIENT)
Dept: CARDIOLOGY CLINIC | Age: 75
End: 2025-06-26

## 2025-06-26 VITALS — OXYGEN SATURATION: 98 % | HEART RATE: 75 BPM | SYSTOLIC BLOOD PRESSURE: 114 MMHG | DIASTOLIC BLOOD PRESSURE: 66 MMHG

## 2025-06-26 DIAGNOSIS — I45.89 CHRONOTROPIC INCOMPETENCE: ICD-10-CM

## 2025-06-26 DIAGNOSIS — N28.1 CYST OF KIDNEY, ACQUIRED: ICD-10-CM

## 2025-06-26 DIAGNOSIS — R00.1 BRADYCARDIA: ICD-10-CM

## 2025-06-26 DIAGNOSIS — Z95.0 PACEMAKER: Primary | ICD-10-CM

## 2025-06-26 PROCEDURE — A9579 GAD-BASE MR CONTRAST NOS,1ML: HCPCS | Performed by: PHYSICIAN ASSISTANT

## 2025-06-26 PROCEDURE — 6360000004 HC RX CONTRAST MEDICATION: Performed by: PHYSICIAN ASSISTANT

## 2025-06-26 PROCEDURE — 93286 PERI-PX EVAL PM/LDLS PM IP: CPT

## 2025-06-26 RX ORDER — GADOTERIDOL 279.3 MG/ML
16 INJECTION INTRAVENOUS
Status: COMPLETED | OUTPATIENT
Start: 2025-06-26 | End: 2025-06-26

## 2025-06-26 RX ADMIN — GADOTERIDOL 16 ML: 279.3 INJECTION, SOLUTION INTRAVENOUS at 15:07

## 2025-07-01 ENCOUNTER — PATIENT MESSAGE (OUTPATIENT)
Dept: CARDIOLOGY CLINIC | Age: 75
End: 2025-07-01

## 2025-07-01 NOTE — TELEPHONE ENCOUNTER
Attempted to call pt, no answer. LVM.     According to pts chart pt was scheduled to see NPKK/ device clinic tomorrow 07/02 but cancelled NPKK appt due to lack of transportation. If pt returns call please send message back so we can see if we can squeeze her in sooner than next available at end of August. Thank you

## 2025-07-01 NOTE — TELEPHONE ENCOUNTER
ADAMARIS AMAYAOT, Please advise upon RTO,     is it ok to utilize one of the Monday afternoon spots for this pt on your hospital week ? Next available is end of August.

## 2025-07-01 NOTE — TELEPHONE ENCOUNTER
Pt returned called.  Advised pt I would advise PB to call back to be rs with NPKK and Device check.

## 2025-07-07 ENCOUNTER — CLINICAL SUPPORT (OUTPATIENT)
Dept: CARDIOLOGY CLINIC | Age: 75
End: 2025-07-07

## 2025-07-07 ENCOUNTER — OFFICE VISIT (OUTPATIENT)
Dept: CARDIOLOGY CLINIC | Age: 75
End: 2025-07-07
Payer: MEDICARE

## 2025-07-07 VITALS
HEART RATE: 85 BPM | BODY MASS INDEX: 32.96 KG/M2 | HEIGHT: 63 IN | OXYGEN SATURATION: 98 % | WEIGHT: 186 LBS | DIASTOLIC BLOOD PRESSURE: 90 MMHG | SYSTOLIC BLOOD PRESSURE: 140 MMHG

## 2025-07-07 DIAGNOSIS — I25.10 CORONARY ARTERY DISEASE INVOLVING NATIVE CORONARY ARTERY OF NATIVE HEART WITHOUT ANGINA PECTORIS: ICD-10-CM

## 2025-07-07 DIAGNOSIS — R00.1 BRADYCARDIA: Primary | ICD-10-CM

## 2025-07-07 DIAGNOSIS — Z95.0 S/P PLACEMENT OF CARDIAC PACEMAKER: ICD-10-CM

## 2025-07-07 DIAGNOSIS — I10 ESSENTIAL HYPERTENSION: ICD-10-CM

## 2025-07-07 DIAGNOSIS — G47.33 OSA (OBSTRUCTIVE SLEEP APNEA): ICD-10-CM

## 2025-07-07 DIAGNOSIS — I49.5 SINUS NODE DYSFUNCTION (HCC): ICD-10-CM

## 2025-07-07 DIAGNOSIS — Z95.0 PACEMAKER: Primary | ICD-10-CM

## 2025-07-07 DIAGNOSIS — R00.1 BRADYCARDIA: ICD-10-CM

## 2025-07-07 DIAGNOSIS — E78.2 MIXED HYPERLIPIDEMIA: ICD-10-CM

## 2025-07-07 PROCEDURE — 1160F RVW MEDS BY RX/DR IN RCRD: CPT

## 2025-07-07 PROCEDURE — 3080F DIAST BP >= 90 MM HG: CPT

## 2025-07-07 PROCEDURE — 3075F SYST BP GE 130 - 139MM HG: CPT

## 2025-07-07 PROCEDURE — 93000 ELECTROCARDIOGRAM COMPLETE: CPT

## 2025-07-07 PROCEDURE — 99214 OFFICE O/P EST MOD 30 MIN: CPT

## 2025-07-07 PROCEDURE — G2211 COMPLEX E/M VISIT ADD ON: HCPCS

## 2025-07-07 PROCEDURE — 1123F ACP DISCUSS/DSCN MKR DOCD: CPT

## 2025-07-07 PROCEDURE — 1159F MED LIST DOCD IN RCRD: CPT

## 2025-07-07 NOTE — PROGRESS NOTES
[facility]   Electrophysiology Outpatient Note              Date:  July 7, 2025  Patient name: Graciela Dean  YOB: 1950    Primary Care physician: Brian Mercado DO    HISTORY OF PRESENT ILLNESS: The patient is a 75 y.o.  female with a history of symptomatic bradycardia, DC PPM, hypertension, CAD, PRINCE, hypothyroidism    Patient follows with Dr. Best in EP clinic for symptomatic bradycardia.  3/1/2025 patient was admitted to [facility] with symptomatic bradycardia.  Heart rates mostly in the 40s and occasionally down to the 30s.  She was not taking any negative chronotropic agents at the time.  Patient underwent DC PPM implantation 3/4/2025    Today she is being seen for bradycardia and device management. ECG shows SR with a HR of 67.  Device check reveals AP 83.9,  less than 0.1%, no events recorded. Patient denies chest pain, shortness of palpitations.  Patient states she is doing well overall.  She is noted to have high blood pressure today however she states she forgot to take her BP medications this morning.  She states that this never happens and this is the first time.  She ensures me she will take them when she gets home.  She also has slight lower extremity edema however she states she eats quite a bit of salt in her diet.  She will eat an entire bag of chips over a 2-day.  We discussed cutting back on this habit as well as deleting salt from cooking.  Patient has followed with Dr. Navarro however he is retiring at the end of the year.  She will get established with Dr. Anderson as she saw her earlier this year in the hospital.    Past Medical History:   has a past medical history of Acid reflux, Angina pectoris, variant, Anxiety, Arthritis, Asthma, Benign esophageal stricture, Benign tumor, CAD (coronary artery disease), Cancer (HCC), Depression, Diabetes (HCC), Frequency, Headache, Hyperlipidemia, Hypertension, Hypothyroidism, Irritable bowel syndrome, Kidney stones,

## 2025-07-07 NOTE — PATIENT INSTRUCTIONS
Continue aspirin 81 mg daily  Continue Lasix 40 mg daily  Continue Crestor 5 mg daily  Continue Imdur 30 mg daily  Continue valsartan 160 mg daily  Continue with every 3-month remote device checks  Low sodium diet please  Elevate legs when sitting    Dr Anderson follow up in 11/2025--establish care    Follow up in 6 months Neema GELLER

## 2025-07-11 ENCOUNTER — OFFICE VISIT (OUTPATIENT)
Dept: CARDIOLOGY CLINIC | Age: 75
End: 2025-07-11
Payer: MEDICARE

## 2025-07-11 VITALS
HEART RATE: 80 BPM | WEIGHT: 187 LBS | DIASTOLIC BLOOD PRESSURE: 70 MMHG | SYSTOLIC BLOOD PRESSURE: 98 MMHG | OXYGEN SATURATION: 98 % | HEIGHT: 63 IN | BODY MASS INDEX: 33.13 KG/M2

## 2025-07-11 DIAGNOSIS — I49.5 SINUS NODE DYSFUNCTION (HCC): ICD-10-CM

## 2025-07-11 DIAGNOSIS — G47.33 OSA (OBSTRUCTIVE SLEEP APNEA): ICD-10-CM

## 2025-07-11 DIAGNOSIS — I10 ESSENTIAL HYPERTENSION: ICD-10-CM

## 2025-07-11 DIAGNOSIS — I95.9 HYPOTENSION, UNSPECIFIED HYPOTENSION TYPE: Primary | ICD-10-CM

## 2025-07-11 DIAGNOSIS — Z95.0 PACEMAKER: ICD-10-CM

## 2025-07-11 DIAGNOSIS — E78.5 DYSLIPIDEMIA: ICD-10-CM

## 2025-07-11 DIAGNOSIS — I25.10 CORONARY ARTERY DISEASE INVOLVING NATIVE CORONARY ARTERY OF NATIVE HEART WITHOUT ANGINA PECTORIS: ICD-10-CM

## 2025-07-11 PROCEDURE — 3078F DIAST BP <80 MM HG: CPT | Performed by: INTERNAL MEDICINE

## 2025-07-11 PROCEDURE — G2211 COMPLEX E/M VISIT ADD ON: HCPCS | Performed by: INTERNAL MEDICINE

## 2025-07-11 PROCEDURE — 1159F MED LIST DOCD IN RCRD: CPT | Performed by: INTERNAL MEDICINE

## 2025-07-11 PROCEDURE — 3074F SYST BP LT 130 MM HG: CPT | Performed by: INTERNAL MEDICINE

## 2025-07-11 PROCEDURE — 1123F ACP DISCUSS/DSCN MKR DOCD: CPT | Performed by: INTERNAL MEDICINE

## 2025-07-11 PROCEDURE — 99214 OFFICE O/P EST MOD 30 MIN: CPT | Performed by: INTERNAL MEDICINE

## 2025-07-11 RX ORDER — ESTRADIOL 0.1 MG/G
CREAM VAGINAL
COMMUNITY
Start: 2025-07-08

## 2025-07-11 RX ORDER — VALSARTAN 160 MG/1
TABLET ORAL
Qty: 90 TABLET | Refills: 3 | Status: SHIPPED | OUTPATIENT
Start: 2025-07-11

## 2025-07-11 RX ORDER — ISOSORBIDE MONONITRATE 30 MG/1
TABLET, EXTENDED RELEASE ORAL
Qty: 90 TABLET | Refills: 3 | Status: CANCELLED | OUTPATIENT
Start: 2025-07-11

## 2025-07-11 RX ORDER — FUROSEMIDE 40 MG/1
40 TABLET ORAL DAILY
Qty: 90 TABLET | Refills: 3 | Status: SHIPPED | OUTPATIENT
Start: 2025-07-11

## 2025-07-11 RX ORDER — ROSUVASTATIN CALCIUM 5 MG/1
TABLET, COATED ORAL
Qty: 90 TABLET | Refills: 3 | Status: SHIPPED | OUTPATIENT
Start: 2025-07-11

## 2025-07-11 NOTE — PROGRESS NOTES
CARDIOLOGY OFFICE VISIT        Patient Name: Graciela Dean  Primary Care physician: Brian Mercado DO    Reason for Referral/Chief Complaint: Graciela Dean is a 75 y.o. patient who is referred to cardiology clinic today for office visit.     History of Present Illness:   Graciela Dean is a 74 y.o. woman with CAD and coronary microvascular dysfunction, HTN, HLP, h/o meningioma (5/2019) s/p 5 radiation treatments, COPD, asthma and obesity recently admitted 3/1/25 for bradycardia and prolonged QT.  Patient initially presented to [facility] complaining of lower abdominal pain and suspected at UTI.  Workup revealed UTI and a Left renal hemorrhagic or proteinaceous cyst.  She was also noted to be bradycardic in the 40s and occasionally the 30s with a QT interval of 588.  Patient was followed in clinic by Dr Navarro with last clinic visit in 11/2024.  Labs in ED showed mildly elevated BNP and negative troponin.  Ep was consulted and she had DC PPM implanted on 3/4/25. Postprocedure complicated by left-sided pneumothorax. Chest tube placed by pulmonary. This was removed and she was discharged 3/7/25.    In the interim, she followed with LEE Simmons on 7/7/25 and she reported elevated BP, she was educated on low salt diet.    Today, 7/11/25, she states she is doing well. She reports she feels great since insertion of her PPM. Patient denies current edema, chest pain, palpitations, or syncope.  She does experience shortness of breath and dizziness occasionally. She states when she was at the doctor office on 7/7 and her blood pressure was 140/90, but she notes she did not take her blood pressure medications that morning. Patient is taking all cardiac medications as prescribed and tolerates them well.      Past Medical History:   has a past medical history of Acid reflux, Angina pectoris, variant, Anxiety, Arthritis, Asthma, Benign esophageal stricture, Benign tumor, CAD (coronary artery

## 2025-07-21 RX ORDER — ALENDRONATE SODIUM 70 MG/1
70 TABLET ORAL WEEKLY
Qty: 4 TABLET | Refills: 4 | Status: SHIPPED | OUTPATIENT
Start: 2025-07-21

## 2025-07-21 NOTE — TELEPHONE ENCOUNTER
Future Appointments   Date Time Provider Department Center   8/28/2025  9:45 AM Alcides Alcantar MD [facility] Neurology -   11/20/2025 11:00 AM Brian Mercado DO [place] [facility] DEP   11/20/2025  1:15 PM SCHEDULE, ANNETTE DEVICE CHECK [facility]   11/20/2025  1:15 PM Neema Orozco, APRN - CNP [facility] 5/20/2025

## 2025-07-22 DIAGNOSIS — G47.419 PRIMARY NARCOLEPSY WITHOUT CATAPLEXY: ICD-10-CM

## 2025-07-22 RX ORDER — ESCITALOPRAM OXALATE 20 MG/1
20 TABLET ORAL DAILY
Qty: 90 TABLET | Refills: 1 | Status: SHIPPED | OUTPATIENT
Start: 2025-07-22

## 2025-07-22 NOTE — TELEPHONE ENCOUNTER
Future Appointments   Date Time Provider Department Center   8/28/2025  9:45 AM Alcides Alcantar MD [facility] Neurology -   11/20/2025 11:00 AM Brian Mercado DO [place] [facility] DEP   11/20/2025  1:15 PM SCHEDULE, ANNETTE DEVICE CHECK [facility]   11/20/2025  1:15 PM Neema Orozco, APRN - CNP [facility] 5/20/2025

## 2025-08-06 ENCOUNTER — TELEPHONE (OUTPATIENT)
Dept: FAMILY MEDICINE CLINIC | Age: 75
End: 2025-08-06

## 2025-08-11 DIAGNOSIS — G47.419 PRIMARY NARCOLEPSY WITHOUT CATAPLEXY: ICD-10-CM

## 2025-08-11 RX ORDER — ARMODAFINIL 150 MG/1
150 TABLET ORAL DAILY
Qty: 90 TABLET | Refills: 0 | Status: SHIPPED | OUTPATIENT
Start: 2025-08-11 | End: 2025-11-09

## 2025-08-12 ENCOUNTER — TELEPHONE (OUTPATIENT)
Dept: CARDIOLOGY CLINIC | Age: 75
End: 2025-08-12

## 2025-08-28 ENCOUNTER — OFFICE VISIT (OUTPATIENT)
Dept: NEUROLOGY | Age: 75
End: 2025-08-28
Payer: MEDICARE

## 2025-08-28 VITALS
WEIGHT: 187.2 LBS | HEIGHT: 63 IN | OXYGEN SATURATION: 97 % | DIASTOLIC BLOOD PRESSURE: 82 MMHG | SYSTOLIC BLOOD PRESSURE: 140 MMHG | BODY MASS INDEX: 33.17 KG/M2 | HEART RATE: 87 BPM

## 2025-08-28 DIAGNOSIS — E83.10 METABOLIC DISORDER, IRON: ICD-10-CM

## 2025-08-28 DIAGNOSIS — G25.81 RESTLESS LEGS SYNDROME (RLS): ICD-10-CM

## 2025-08-28 DIAGNOSIS — G43.009 MIGRAINE WITHOUT AURA AND WITHOUT STATUS MIGRAINOSUS, NOT INTRACTABLE: Primary | ICD-10-CM

## 2025-08-28 PROCEDURE — 1123F ACP DISCUSS/DSCN MKR DOCD: CPT | Performed by: STUDENT IN AN ORGANIZED HEALTH CARE EDUCATION/TRAINING PROGRAM

## 2025-08-28 PROCEDURE — 3077F SYST BP >= 140 MM HG: CPT | Performed by: STUDENT IN AN ORGANIZED HEALTH CARE EDUCATION/TRAINING PROGRAM

## 2025-08-28 PROCEDURE — 3079F DIAST BP 80-89 MM HG: CPT | Performed by: STUDENT IN AN ORGANIZED HEALTH CARE EDUCATION/TRAINING PROGRAM

## 2025-08-28 PROCEDURE — 99214 OFFICE O/P EST MOD 30 MIN: CPT | Performed by: STUDENT IN AN ORGANIZED HEALTH CARE EDUCATION/TRAINING PROGRAM

## 2025-08-28 PROCEDURE — 1159F MED LIST DOCD IN RCRD: CPT | Performed by: STUDENT IN AN ORGANIZED HEALTH CARE EDUCATION/TRAINING PROGRAM

## 2025-08-28 RX ORDER — ROPINIROLE 1 MG/1
TABLET, FILM COATED ORAL
Qty: 45 TABLET | Refills: 2 | Status: SHIPPED | OUTPATIENT
Start: 2025-08-28

## 2025-08-28 RX ORDER — TOPIRAMATE 50 MG/1
TABLET, FILM COATED ORAL
Qty: 21 TABLET | Refills: 0 | Status: SHIPPED | OUTPATIENT
Start: 2025-08-28 | End: 2025-09-18

## 2025-08-28 RX ORDER — LEVOTHYROXINE SODIUM 125 UG/1
TABLET ORAL
Qty: 90 TABLET | Refills: 1 | Status: SHIPPED | OUTPATIENT
Start: 2025-08-28

## 2025-09-02 RX ORDER — LEVOTHYROXINE SODIUM 125 UG/1
TABLET ORAL
Qty: 90 TABLET | Refills: 0 | Status: SHIPPED | OUTPATIENT
Start: 2025-09-02

## (undated) DEVICE — STRIP,CLOSURE,WOUND,MEDI-STRIP,1/2X4: Brand: MEDLINE

## (undated) DEVICE — PACEMAKER PACK: Brand: MEDLINE INDUSTRIES, INC.

## (undated) DEVICE — URETERAL ACCESS SHEATH SET: Brand: NAVIGATOR HD

## (undated) DEVICE — SUTURE VICRYL + SZ 2-0 L27IN ABSRB VLT SH 1/2 CIR TAPERPOINT VCP317H

## (undated) DEVICE — Device

## (undated) DEVICE — 3M(TM) MEDIPORE(TM) +PAD SOFT CLOTH ADHESIVE WOUND DRESSING 3566: Brand: 3M™ MEDIPORE™

## (undated) DEVICE — GAUZE,SPONGE,4"X4",16PLY,STRL,LF,10/TRAY: Brand: MEDLINE

## (undated) DEVICE — SOLUTION IRRIG 1000ML H2O PIC PLAS SHATTERPROOF CONTAINER

## (undated) DEVICE — CYSTO: Brand: MEDLINE INDUSTRIES, INC.

## (undated) DEVICE — INTRODUCER SHTH L13CM OD7FR SH ORNG HUB SEAMLESS SAFSHTH

## (undated) DEVICE — SET, IRRIGATION CYSTO, Y-TYPE, 81": Brand: MEDLINE

## (undated) DEVICE — UNIVERSAL BLOCK TRAY: Brand: MEDLINE INDUSTRIES, INC.

## (undated) DEVICE — BAG DRAINAGE NS

## (undated) DEVICE — GLOVE ORANGE PI 7 1/2   MSG9075

## (undated) DEVICE — TOWEL OR BLUEE 16X26IN ST 8 PACK ORB08 16X26ORTWL

## (undated) DEVICE — SYRINGE MED 30ML STD CLR PLAS LUERLOCK TIP N CTRL DISP

## (undated) DEVICE — SOLUTION IV STRL H2O 1000 ML INJ USP EXCEL CONTAINER

## (undated) DEVICE — GUIDEWIRE ENDOSCP L150CM DIA0.035IN TIP 3CM PTFE NIT

## (undated) DEVICE — URETERAL STENT
Type: IMPLANTABLE DEVICE | Status: NON-FUNCTIONAL
Brand: CONTOUR™

## (undated) DEVICE — ALCOHOL RUBBING 16OZ 70% ISO

## (undated) DEVICE — NEPTUNE E-SEP SMOKE EVACUATION PENCIL, COATED, 70MM BLADE, PUSH BUTTON SWITCH: Brand: NEPTUNE E-SEP

## (undated) DEVICE — FIBER LSR 365UM HOLM

## (undated) DEVICE — MASTISOL ADHESIVE LIQ 2/3ML

## (undated) DEVICE — URETEROSCOPE RIGID ASPIR SYS STRL DISP CVAC  MIN ORDER 2BX

## (undated) DEVICE — IMMOBILIZER SHOULDER SLING SWATHE DLX

## (undated) DEVICE — RADIFOCUS GLIDEWIRE: Brand: GLIDEWIRE

## (undated) DEVICE — NITINOL STONE RETRIEVAL BASKET: Brand: ZERO TIP

## (undated) DEVICE — SUTURE VICRYL SZ 4-0 L27IN ABSRB UD L26MM SH 1/2 CIR J415H

## (undated) DEVICE — URETERAL DILATOR

## (undated) DEVICE — CHLORAPREP 26ML ORANGE

## (undated) DEVICE — TUBING, SUCTION, 1/4" X 10', STRAIGHT: Brand: MEDLINE

## (undated) DEVICE — SHIELD RAD 12X17IN SCATTER ARMOR EP ABSRB SCOOP FEN

## (undated) DEVICE — BAG C ARM 22 IN LEN 22 IN W LTX FREE ST SNAP

## (undated) DEVICE — STERILE POLYISOPRENE POWDER-FREE SURGICAL GLOVES: Brand: PROTEXIS

## (undated) DEVICE — SUTURE PERMA-HAND SZ 0 L30IN NONABSORBABLE BLK L36MM CT-1 424H